# Patient Record
Sex: MALE | Race: WHITE
[De-identification: names, ages, dates, MRNs, and addresses within clinical notes are randomized per-mention and may not be internally consistent; named-entity substitution may affect disease eponyms.]

---

## 2017-01-03 ENCOUNTER — HOSPITAL ENCOUNTER (OUTPATIENT)
Dept: HOSPITAL 75 - CARD | Age: 77
End: 2017-01-03
Attending: INTERNAL MEDICINE
Payer: MEDICARE

## 2017-01-03 DIAGNOSIS — I10: ICD-10-CM

## 2017-01-03 DIAGNOSIS — I25.10: Primary | ICD-10-CM

## 2017-01-03 DIAGNOSIS — E78.2: ICD-10-CM

## 2017-01-03 DIAGNOSIS — I65.23: ICD-10-CM

## 2017-01-03 PROCEDURE — 93306 TTE W/DOPPLER COMPLETE: CPT

## 2017-01-03 NOTE — XMS REPORT
Continuity of Care Document

 Created on: 2016



TOO RAGLAND

External Reference #: [id number]

: 1940

Sex: Male



Demographics







 Address  [address] [place]  [address]

 

 Home Phone  [phone]

 

 Preferred Language  English

 

 Marital Status  Unknown

 

 Hindu Affiliation  Unknown

 

 Race  Unknown

 

 Ethnic Group  Unknown





Author







 Author  [facility]

 

 Organization  [facility]

 

 Address  Unknown

 

 Phone  Unavailable







Support







 Name  Relationship  Address  Phone

 

 JOSÉ METZGER DO  Caregiver  [address] [place]  [address] [phone]

 

 MARIETTA ORTEZ MD  Caregiver  [address]

[place]  [address]  [phone]

 

 MACARIO RAGLAND  Next Of Kin  [address] [place] [address]  [address] [phone]







Care Team Providers







 Care Team Member Name  Role  Phone

 

 JOSÉ METZGER DO  PCP  [phone]







Insurance Providers







 Payer Name  Policy Number  Subscriber Name  Relationship

 

 [employer]  [id number]  Too Ragland  20 Self / Same As Patient







Advance Directives







 Directive  Response  Recorded Date/Time

 

 Advance Directives  Yes  16 8:22pm

 

 Health Care Power of   LYLE Soriano Robert  16 8:22pm

 

 Organ Donor  Yes  16 8:22pm







Problems

Active Problems







 Medical Problem  Onset Date  Status

 

 Vertigo  Unknown  Acute

 

 Vertigo  Unknown  Acute







Medications

Current Home Medications







 Medication  Dose  Units  Route  Directions  Days/Qty  Instructions  Start Date

 

 Ranitidine Hcl 150 Mg  150  Mg  Oral  Twice A Day        01/10/12

 

 Aspirin 81 Mg  81  Mg  Oral  Daily        12

 

 Meloxicam (Mobic) 7.5 Mg  7.5  Mg  Oral  Daily        13

 

 Tiotropium Bromide 1 Inh  1  Puff  Inhalation  Daily as needed for Shortness 
Of Breath        13

 

 Enalapril Maleate 20 Mg  20  Mg  Oral  Twice A Day        14

 

 Carvedilol 12.5 Mg  12.5  Mg  Oral  Twice A Day        16

 

 Amlodipine Besylate 10 Mg  10  Mg  Oral  Daily        16

 

 Atorvastatin Calcium 10 Mg  10  Mg  Oral  Bedtime        16

 

 Levothyroxine Sodium 112 Mcg  112  Mcg  Oral  Daily        16

 

 Omega-3 Fatty Acids/Fish Oil 1 Each  1,200  Mg  Oral  Daily        16

 

 Cholecalciferol (Vitamin D3) 5,000 Unit  5,000  Unit  Oral  Daily        





Past Home Medications







 Medication  Directions  Ordered  Status

 

 Thyroid 60 Mg Tablet,     07  Discontinued

 

 [Levitra] Tab,     07  Discontinued

 

 Multivitamins 1 Ea Tablet,     07  Discontinued

 

 Aspirin 81 Mg Tablet,     07  Discontinued

 

 Aspirin 81 Mg Tabec, 81 Mg Oral  Daily  10/13/10  Discontinued

 

 Thyroid 65 Mg Tablet, 90 Mcg Oral  Daily  10/13/10  Discontinued

 

 Enalapril Maleate 10 Mg Tablet, 20 Mg Oral  Twice A Day  10/13/10  Discontinued

 

 Carvedilol (Coreg) 25 Mg Tablet, 6.25 Mg Oral  Twice A Day  10/13/10  
Discontinued

 

 Pravastatin Sodium 80 Mg Tablet, 80 Mg Oral  Daily  10/13/10  Discontinued

 

 Aspirin 325 Mg Tabec, 325 Mg Oral  Daily  01/10/12  Discontinued

 

 Potassium Chloride 10 Meq Tablet.sa, 10 Meq Oral  Daily  01/10/12  Discontinued

 

 Furosemide (Lasix) 20 Mg Tablet, 1 Each Oral  Daily  01/10/12  Discontinued

 

 Niacin 1,000 Mg Tablet.sa, 1000 Mg Oral  Bedtime  01/10/12  Discontinued

 

 Acetaminophen/Hydrocodone Bitart (Lortab Elixir) 120 Ml Solution, 10 Ml Oral  
Q4hr Prn  12  Discontinued

 

 Acetaminophen/Hydrocodone Bitart 1 Each Tablet, 1 - 2 Each Oral  Q4hr Prn    Discontinued

 

 Lidocaine Hcl 20 Ml Soln,  Ml Oral     12  Discontinued

 

 [Endur-Acin] , 750 Mg Oral  Daily  12  Discontinued

 

 Megestrol Acetate 8 Oz Susp, 8 Oz Oral  Daily  12  Discontinued

 

 Megestrol Acetate 8 Oz Susp, 20 Ml Oral  Daily  12  Discontinued

 

 Prednisone 20 Mg Tab, 20 Mg Oral  Daily  12  Discontinued

 

 Acetaminophen/Hydrocodone Bitart 1 Each Tablet, 1 - 2 Each Oral  Every 6 Hours 
as needed  12  Discontinued

 

 Levothyroxine Sodium (Levothroid) 50 Mcg Tablet, 1.5 Each Oral  Bedtime    Discontinued

 

 Furosemide 80 Mg Tablet, 40 Mg Oral  Daily  13  Discontinued

 

 Potassium Chloride 20 Meq Tab, 10 Meq Oral  Daily  13  Discontinued

 

 Hydrocodone Bit/Acetaminophen 1 Each Tablet, 1 - 2 Each Oral  Q4-6HR as needed
  02/15/13  Discontinued

 

 Levothyroxine Sodium 100 Mcg Tablet, 100 Mcg Oral  Daily  14  
Discontinued

 

 Atorvastatin Calcium 10 Mg Tablet, 10 Mg Oral  Daily  14  Discontinued

 

 Amlodipine Besylate 5 Mg Tablet, 10 Mg Oral  Daily  14  Discontinued

 

 Scopolamine Hcl 1 Patch .72 H Patch.td72, 1 Ea Transderm  Every 3 Days  03/04/
15  Discontinued

 

 Ondansetron Hcl 4 Mg Tab, 4 Mg Sublingual  Every 4HRS  03/04/15  Discontinued

 

 Amlodipine Besylate 5 Mg Tablet, 5 Mg Oral  Daily  10/15/15  Discontinued

 

 Atorvastatin Calcium 20 Mg Tablet, 20 Mg Oral  Bedtime  10/15/15  Discontinued

 

 [Levitroxin] , 100 Mg Oral  Bedtime  10/15/15  Discontinued

 

 Levothyroxine Sodium 100 Mcg Tablet, 100 Mcg Oral  Bedtime  10/15/15  
Discontinued

 

 Ranitidine Hcl 75 Mg Tablet, 75 Mg Oral  Bedtime  10/15/15  Discontinued







Social History







 Social History Problem  Response  Recorded Date/Time

 

 Alcohol Use  Denies Use  2015 5:57pm

 

 Recreational Drug Use  No  2015 5:57pm

 

 Recent Foreign Travel  [place]  08/10/2016 2:12pm

 

 Type Used  Cigarettes  2016 8:22pm

 

 Recent Hopitalizations  No  2016 8:22pm







Hospital Discharge Instructions

No hospital discharge instructions.



Plan of Care







 Prescriptions  See Medication Section







Functional Status

No functional status results.



Allergies, Adverse Reactions, Alerts

No known allergies.



Immunizations

No immunization records.



Vital Signs

No known vital signs results.



Results

Laboratory Results







 Test Name  Result  Units  Flags  Reference  Collection Date/Time  Result Date/
Time  Comments

 

 Thyroid Stimulating Hormone (TSH)  0.85  UIU/ML     0.35-4.94  2016 8:
14am  2016 9:44am   







Procedures

No known history of procedures.



Encounters







 Encounter  Location  Arrival/Admit Date  Discharge/Depart Date  Attending 
Provider

 

 Discharged Recurring  [facility]  16 12:34pm   11:59pm  MARIETTA ORTEZ MD

## 2017-01-05 NOTE — ECHOCARDIOGRAPHY REPORT
PROCEDURE PHYSICIAN:   ELIDIA ANTOINE

 

DATE OF PROCEDURE:  

01/03/2017

 

TWO DIMENSIONAL ECHOCARDIOGRAM REPORT 

 

PRIMARY PHYSICIAN:       

OTHER PHYSICIAN:         

REFERRING PHYSICIAN:          Dr. Mihir Hall

ORDERING PHYSICIAN:      

 

INDICATION FOR THE PROCEDURE:   Coronary artery disease,

hypertension

 

MEASUREMENTS                  DERIVED VALUES 

 

LV DIAMETER (LAX)   NORMALS                       NORMALS

Diastolic      4.8  (3.6-5.2)      Eject. Fract.   45%  

(60%+/-6%)       

Systolic            (2.3-3.9)      Diastolic Vol.      

% Shortening        (0.22-0.42)    Systolic Vol.       

                                   Aortic Root         

IVS THICKNESS 

Diastolic      1.3  (0.6-1.1)

 

LVPW THICKNESS 

Diastolic      1.3  (0.6-1.1)

 

LA DIAMETER 

Systolic       3.9  (2.1-3.7)  

 

FINDINGS:

1.   Technically poor quality study. 

2.   The left ventricle is prominent. Endocardium was not well

visualized in all segments. Mild diffuse left ventricular

hypokinesia was suspected, especially in the anterior wall,

anterolateral wall. Systolic function is mildly reduced.

Estimated ejection fraction 45%. Slight improvement compared to

the study of August 2016. 

3.   The left atrium is normal in size. No clot or thrombus were

seen within the left atrium. 

4.   The right the right atrium and right ventricle were not well

visualized. 

5.   Mitral valve is normal in morphology with mild to moderate

mitral regurgitation noted by color Doppler flow. No mitral valve

prolapse. No mitral valve stenosis. 

6.   Aortic valve is calcified, still has some leaflet

separation, Doppler across the aortic valve was suboptimal.

Calculated the peak gradient of 5 mmHg and mean gradient of 2

mmHg with calculated valve area of 1.3 sq cm. The previous study

was measuring a gradient of 23 mmHg at peak and 8 mmHg at mean.

Overall it appeared to be mild aortic valve stenosis. 

7.   Tricuspid valve is normal in morphology with mild to

moderate tricuspid regurgitation noted by color Doppler flow. 

8.   By color Doppler flow, Doppler across tricuspid valve

estimated pulmonary artery pressure of 28+ right atrial pressure.

 

9.   Pulmonic valve is functioning normally. 

10.  No pericardial effusion. 

 

CONCLUSION:

1.   Slightly prominent left ventricle, systolic function is

improved compared to the study of August 2016 with estimated

ejection fraction 45%. Mild hypokinesia at the anterior wall,

anterolateral wall. 

2.   Right heart chambers were not well visualized. 

3.   Mild aortic valve stenosis. Significant change compared to

the previous study of August and February 2016; close monitoring

is recommended. 

4.   Mild to moderate mitral regurgitation. Mild to moderate

tricuspid regurgitation. 

5.   Estimated pulmonary artery pressure of 35 mmHg.

 

 

 

Job ID: [id number]

Dictated Date: 01/04/2017 17:03:51 

Transcription Date: 01/05/2017 08:33:35 / ross

## 2017-01-30 ENCOUNTER — HOSPITAL ENCOUNTER (OUTPATIENT)
Dept: HOSPITAL 75 - ONC | Age: 77
LOS: 9 days | Discharge: HOME | End: 2017-02-08
Attending: INTERNAL MEDICINE
Payer: MEDICARE

## 2017-01-30 DIAGNOSIS — C02.9: Primary | ICD-10-CM

## 2017-01-30 DIAGNOSIS — Z45.2: ICD-10-CM

## 2017-01-30 PROCEDURE — 96523 IRRIG DRUG DELIVERY DEVICE: CPT

## 2017-03-07 LAB
ALBUMIN SERPL-MCNC: 4 G/DL (ref 3.2–4.5)
ALT SERPL-CCNC: 17 U/L (ref 0–55)
ANION GAP SERPL CALC-SCNC: 9 MMOL/L (ref 5–14)
AST SERPL-CCNC: 17 U/L (ref 5–34)
BASOPHILS # BLD AUTO: 0.1 10^3/UL (ref 0–0.1)
BASOPHILS NFR BLD AUTO: 1 % (ref 0–10)
BILIRUB SERPL-MCNC: 1.4 MG/DL (ref 0.1–1)
BUN SERPL-MCNC: 19 MG/DL (ref 7–18)
BUN/CREAT SERPL: 13
CALCIUM SERPL-MCNC: 9.4 MG/DL (ref 8.5–10.1)
CHLORIDE SERPL-SCNC: 106 MMOL/L (ref 98–107)
CO2 SERPL-SCNC: 28 MMOL/L (ref 21–32)
CREAT SERPL-MCNC: 1.42 MG/DL (ref 0.6–1.3)
EOSINOPHIL # BLD AUTO: 0.3 10^3/UL (ref 0–0.3)
EOSINOPHIL NFR BLD AUTO: 7 % (ref 0–10)
ERYTHROCYTE [DISTWIDTH] IN BLOOD BY AUTOMATED COUNT: 13.9 % (ref 10–14.5)
GFR SERPLBLD BASED ON 1.73 SQ M-ARVRAT: 49 ML/MIN
GLUCOSE SERPL-MCNC: 118 MG/DL (ref 70–105)
LYMPHOCYTES # BLD AUTO: 0.9 X 10^3 (ref 1–4)
LYMPHOCYTES NFR BLD AUTO: 22 % (ref 12–44)
MCH RBC QN AUTO: 31 PG (ref 25–34)
MCHC RBC AUTO-ENTMCNC: 34 G/DL (ref 32–36)
MCV RBC AUTO: 90 FL (ref 80–99)
MONOCYTES # BLD AUTO: 0.5 X 10^3 (ref 0–1)
MONOCYTES NFR BLD AUTO: 11 % (ref 0–12)
NEUTROPHILS # BLD AUTO: 2.6 X 10^3 (ref 1.8–7.8)
NEUTROPHILS NFR BLD AUTO: 59 % (ref 42–75)
PLATELET # BLD: 162 10^3/UL (ref 130–400)
PMV BLD AUTO: 11.1 FL (ref 7.4–10.4)
POTASSIUM SERPL-SCNC: 4.2 MMOL/L (ref 3.6–5)
PROT SERPL-MCNC: 6.1 G/DL (ref 6.4–8.2)
RBC # BLD AUTO: 4.63 10^6/UL (ref 4.35–5.85)
SODIUM SERPL-SCNC: 143 MMOL/L (ref 135–145)
TSH SERPL-ACNC: 1.37 UIU/ML (ref 0.35–4.94)
WBC # BLD AUTO: 4.3 10^3/UL (ref 4.3–11)

## 2017-03-07 NOTE — XMS REPORT
Continuity of Care Document

 Created on: 2016



TOO RAGLAND

External Reference #: [id number]

: 1940

Sex: Male



Demographics







 Address  [address] [place]  [address]

 

 Home Phone  [phone]

 

 Preferred Language  English

 

 Marital Status  Unknown

 

 Yazidism Affiliation  Unknown

 

 Race  Unknown

 

 Ethnic Group  Unknown





Author







 Author  [facility]

 

 Organization  [facility]

 

 Address  Unknown

 

 Phone  Unavailable







Support







 Name  Relationship  Address  Phone

 

 JOSÉ METZGER DO  Caregiver  [address] [place]  [address] [phone]

 

 MARIETTA ORTEZ MD  Caregiver  [address]

[place]  [address]  [phone]

 

 MACARIO RAGLAND  Next Of Kin  [address] [place] [address]  [address] [phone]







Care Team Providers







 Care Team Member Name  Role  Phone

 

 JOSÉ METZGER DO  PCP  [phone]







Insurance Providers







 Payer Name  Policy Number  Subscriber Name  Relationship

 

 [employer]  [id number]  Too Ragland  18 Self / Same As Patient







Advance Directives







 Directive  Response  Recorded Date/Time

 

 Advance Directives  Yes  16 8:22pm

 

 Health Care Power of   LYLE Soriano Robert  16 8:22pm

 

 Organ Donor  Yes  16 8:22pm







Problems

Active Problems







 Medical Problem  Onset Date  Status

 

 Vertigo  Unknown  Acute

 

 Vertigo  Unknown  Acute







Medications

Current Home Medications







 Medication  Dose  Units  Route  Directions  Days/Qty  Instructions  Start Date

 

 Ranitidine Hcl 150 Mg  150  Mg  Oral  Twice A Day        01/10/12

 

 Aspirin 81 Mg  81  Mg  Oral  Daily        12

 

 Meloxicam (Mobic) 7.5 Mg  7.5  Mg  Oral  Daily        13

 

 Tiotropium Bromide 1 Inh  1  Puff  Inhalation  Daily as needed for Shortness 
Of Breath        13

 

 Enalapril Maleate 20 Mg  20  Mg  Oral  Twice A Day        14

 

 Carvedilol 12.5 Mg  12.5  Mg  Oral  Twice A Day        16

 

 Amlodipine Besylate 10 Mg  10  Mg  Oral  Daily        16

 

 Atorvastatin Calcium 10 Mg  10  Mg  Oral  Bedtime        16

 

 Levothyroxine Sodium 112 Mcg  112  Mcg  Oral  Daily        16

 

 Omega-3 Fatty Acids/Fish Oil 1 Each  1,200  Mg  Oral  Daily        16

 

 Cholecalciferol (Vitamin D3) 5,000 Unit  5,000  Unit  Oral  Daily        





Past Home Medications







 Medication  Directions  Ordered  Status

 

 Thyroid 60 Mg Tablet,     07  Discontinued

 

 [Levitra] Tab,     07  Discontinued

 

 Multivitamins 1 Ea Tablet,     07  Discontinued

 

 Aspirin 81 Mg Tablet,     07  Discontinued

 

 Aspirin 81 Mg Tabec, 81 Mg Oral  Daily  10/13/10  Discontinued

 

 Thyroid 65 Mg Tablet, 90 Mcg Oral  Daily  10/13/10  Discontinued

 

 Enalapril Maleate 10 Mg Tablet, 20 Mg Oral  Twice A Day  10/13/10  Discontinued

 

 Carvedilol (Coreg) 25 Mg Tablet, 6.25 Mg Oral  Twice A Day  10/13/10  
Discontinued

 

 Pravastatin Sodium 80 Mg Tablet, 80 Mg Oral  Daily  10/13/10  Discontinued

 

 Aspirin 325 Mg Tabec, 325 Mg Oral  Daily  01/10/12  Discontinued

 

 Potassium Chloride 10 Meq Tablet.sa, 10 Meq Oral  Daily  01/10/12  Discontinued

 

 Furosemide (Lasix) 20 Mg Tablet, 1 Each Oral  Daily  01/10/12  Discontinued

 

 Niacin 1,000 Mg Tablet.sa, 1000 Mg Oral  Bedtime  01/10/12  Discontinued

 

 Acetaminophen/Hydrocodone Bitart (Lortab Elixir) 120 Ml Solution, 10 Ml Oral  
Q4hr Prn  12  Discontinued

 

 Acetaminophen/Hydrocodone Bitart 1 Each Tablet, 1 - 2 Each Oral  Q4hr Prn    Discontinued

 

 Lidocaine Hcl 20 Ml Soln,  Ml Oral     12  Discontinued

 

 [Endur-Acin] , 750 Mg Oral  Daily  12  Discontinued

 

 Megestrol Acetate 8 Oz Susp, 8 Oz Oral  Daily  12  Discontinued

 

 Megestrol Acetate 8 Oz Susp, 20 Ml Oral  Daily  12  Discontinued

 

 Prednisone 20 Mg Tab, 20 Mg Oral  Daily  12  Discontinued

 

 Acetaminophen/Hydrocodone Bitart 1 Each Tablet, 1 - 2 Each Oral  Every 6 Hours 
as needed  12  Discontinued

 

 Levothyroxine Sodium (Levothroid) 50 Mcg Tablet, 1.5 Each Oral  Bedtime    Discontinued

 

 Furosemide 80 Mg Tablet, 40 Mg Oral  Daily  13  Discontinued

 

 Potassium Chloride 20 Meq Tab, 10 Meq Oral  Daily  13  Discontinued

 

 Hydrocodone Bit/Acetaminophen 1 Each Tablet, 1 - 2 Each Oral  Q4-6HR as needed
  02/15/13  Discontinued

 

 Levothyroxine Sodium 100 Mcg Tablet, 100 Mcg Oral  Daily  14  
Discontinued

 

 Atorvastatin Calcium 10 Mg Tablet, 10 Mg Oral  Daily  14  Discontinued

 

 Amlodipine Besylate 5 Mg Tablet, 10 Mg Oral  Daily  14  Discontinued

 

 Scopolamine Hcl 1 Patch .72 H Patch.td72, 1 Ea Transderm  Every 3 Days  03/04/
15  Discontinued

 

 Ondansetron Hcl 4 Mg Tab, 4 Mg Sublingual  Every 4HRS  03/04/15  Discontinued

 

 Amlodipine Besylate 5 Mg Tablet, 5 Mg Oral  Daily  10/15/15  Discontinued

 

 Atorvastatin Calcium 20 Mg Tablet, 20 Mg Oral  Bedtime  10/15/15  Discontinued

 

 [Levitroxin] , 100 Mg Oral  Bedtime  10/15/15  Discontinued

 

 Levothyroxine Sodium 100 Mcg Tablet, 100 Mcg Oral  Bedtime  10/15/15  
Discontinued

 

 Ranitidine Hcl 75 Mg Tablet, 75 Mg Oral  Bedtime  10/15/15  Discontinued







Social History







 Social History Problem  Response  Recorded Date/Time

 

 Alcohol Use  Denies Use  2015 5:57pm

 

 Recreational Drug Use  No  2015 5:57pm

 

 Recent Foreign Travel  [place]  08/10/2016 2:12pm

 

 Type Used  Cigarettes  2016 8:22pm

 

 Recent Hopitalizations  No  2016 8:22pm







Hospital Discharge Instructions

No hospital discharge instructions.



Plan of Care







 Prescriptions  See Medication Section







Functional Status

No functional status results.



Allergies, Adverse Reactions, Alerts

No known allergies.



Immunizations

No immunization records.



Vital Signs

No known vital signs results.



Results

Laboratory Results







 Test Name  Result  Units  Flags  Reference  Collection Date/Time  Result Date/
Time  Comments

 

 Thyroid Stimulating Hormone (TSH)  0.85  UIU/ML     0.35-4.94  2016 8:
14am  2016 9:44am   







Procedures

No known history of procedures.



Encounters







 Encounter  Location  Arrival/Admit Date  Discharge/Depart Date  Attending 
Provider

 

 Discharged Recurring  [facility]  16 12:34pm   11:59pm  MARIETTA ORTEZ MD

## 2017-03-08 ENCOUNTER — HOSPITAL ENCOUNTER (OUTPATIENT)
Dept: HOSPITAL 75 - ONC | Age: 77
LOS: 89 days | Discharge: HOME | End: 2017-06-05
Attending: INTERNAL MEDICINE
Payer: MEDICARE

## 2017-03-08 DIAGNOSIS — I25.10: ICD-10-CM

## 2017-03-08 DIAGNOSIS — C02.9: Primary | ICD-10-CM

## 2017-03-08 DIAGNOSIS — I12.9: ICD-10-CM

## 2017-03-08 DIAGNOSIS — N18.9: ICD-10-CM

## 2017-03-08 DIAGNOSIS — E03.9: ICD-10-CM

## 2017-03-08 DIAGNOSIS — Z79.899: ICD-10-CM

## 2017-03-08 PROCEDURE — 80053 COMPREHEN METABOLIC PANEL: CPT

## 2017-03-08 PROCEDURE — 85025 COMPLETE CBC W/AUTO DIFF WBC: CPT

## 2017-03-08 PROCEDURE — 99213 OFFICE O/P EST LOW 20 MIN: CPT

## 2017-03-08 PROCEDURE — 36591 DRAW BLOOD OFF VENOUS DEVICE: CPT

## 2017-03-08 PROCEDURE — 84443 ASSAY THYROID STIM HORMONE: CPT

## 2017-04-17 ENCOUNTER — HOSPITAL ENCOUNTER (OUTPATIENT)
Dept: HOSPITAL 75 - RAD | Age: 77
End: 2017-04-17
Attending: NURSE PRACTITIONER
Payer: MEDICARE

## 2017-04-17 DIAGNOSIS — R06.00: ICD-10-CM

## 2017-04-17 DIAGNOSIS — J44.9: Primary | ICD-10-CM

## 2017-04-17 PROCEDURE — 71020: CPT

## 2017-04-17 NOTE — DIAGNOSTIC IMAGING REPORT
Portable upright radiograph of the chest.



INDICATION: COPD. Dyspnea.



FINDINGS: The lungs are hyperinflated with no focal infiltrate.

The heart size is normal. No effusion or pneumothorax.



The mediastinum and sinan appear unremarkable.



There is an infusion port through the left subclavian vein with

the tip at the lower SVC level. There are sternotomy wires

noted.



IMPRESSION: COPD.



Dictated by:



Dictated on workstation # [id number]

## 2017-05-10 ENCOUNTER — HOSPITAL ENCOUNTER (OUTPATIENT)
Dept: HOSPITAL 75 - RT | Age: 77
End: 2017-05-10
Attending: NURSE PRACTITIONER
Payer: MEDICARE

## 2017-05-10 DIAGNOSIS — R06.00: ICD-10-CM

## 2017-05-10 DIAGNOSIS — F17.201: ICD-10-CM

## 2017-05-10 DIAGNOSIS — J44.9: Primary | ICD-10-CM

## 2017-05-10 PROCEDURE — 94729 DIFFUSING CAPACITY: CPT

## 2017-05-10 PROCEDURE — 94726 PLETHYSMOGRAPHY LUNG VOLUMES: CPT

## 2017-05-10 PROCEDURE — 94060 EVALUATION OF WHEEZING: CPT

## 2017-05-10 PROCEDURE — 94640 AIRWAY INHALATION TREATMENT: CPT

## 2017-05-15 ENCOUNTER — HOSPITAL ENCOUNTER (OUTPATIENT)
Dept: HOSPITAL 75 - SLEEP | Age: 77
LOS: 1 days | Discharge: HOME | End: 2017-05-16
Attending: NURSE PRACTITIONER
Payer: MEDICARE

## 2017-05-15 DIAGNOSIS — G47.33: Primary | ICD-10-CM

## 2017-05-15 PROCEDURE — 95810 POLYSOM 6/> YRS 4/> PARAM: CPT

## 2017-05-29 ENCOUNTER — HOSPITAL ENCOUNTER (INPATIENT)
Dept: HOSPITAL 75 - ER | Age: 77
LOS: 3 days | Discharge: HOME | DRG: 291 | End: 2017-06-01
Attending: INTERNAL MEDICINE | Admitting: INTERNAL MEDICINE
Payer: MEDICARE

## 2017-05-29 VITALS — SYSTOLIC BLOOD PRESSURE: 137 MMHG | DIASTOLIC BLOOD PRESSURE: 96 MMHG

## 2017-05-29 VITALS — HEIGHT: 65 IN | WEIGHT: 157.19 LBS | BODY MASS INDEX: 26.19 KG/M2

## 2017-05-29 VITALS — DIASTOLIC BLOOD PRESSURE: 77 MMHG | SYSTOLIC BLOOD PRESSURE: 124 MMHG

## 2017-05-29 VITALS — SYSTOLIC BLOOD PRESSURE: 92 MMHG | DIASTOLIC BLOOD PRESSURE: 77 MMHG

## 2017-05-29 VITALS — DIASTOLIC BLOOD PRESSURE: 71 MMHG | SYSTOLIC BLOOD PRESSURE: 149 MMHG

## 2017-05-29 VITALS — SYSTOLIC BLOOD PRESSURE: 142 MMHG | DIASTOLIC BLOOD PRESSURE: 91 MMHG

## 2017-05-29 VITALS — SYSTOLIC BLOOD PRESSURE: 129 MMHG | DIASTOLIC BLOOD PRESSURE: 63 MMHG

## 2017-05-29 VITALS — DIASTOLIC BLOOD PRESSURE: 79 MMHG | SYSTOLIC BLOOD PRESSURE: 163 MMHG

## 2017-05-29 VITALS — DIASTOLIC BLOOD PRESSURE: 75 MMHG | SYSTOLIC BLOOD PRESSURE: 190 MMHG

## 2017-05-29 DIAGNOSIS — Z96.653: ICD-10-CM

## 2017-05-29 DIAGNOSIS — Z85.810: ICD-10-CM

## 2017-05-29 DIAGNOSIS — G47.30: ICD-10-CM

## 2017-05-29 DIAGNOSIS — Z99.81: ICD-10-CM

## 2017-05-29 DIAGNOSIS — I47.2: ICD-10-CM

## 2017-05-29 DIAGNOSIS — J45.909: ICD-10-CM

## 2017-05-29 DIAGNOSIS — Z95.1: ICD-10-CM

## 2017-05-29 DIAGNOSIS — Z92.21: ICD-10-CM

## 2017-05-29 DIAGNOSIS — I49.1: ICD-10-CM

## 2017-05-29 DIAGNOSIS — M19.91: ICD-10-CM

## 2017-05-29 DIAGNOSIS — K14.9: ICD-10-CM

## 2017-05-29 DIAGNOSIS — D72.829: ICD-10-CM

## 2017-05-29 DIAGNOSIS — R04.2: ICD-10-CM

## 2017-05-29 DIAGNOSIS — I49.3: ICD-10-CM

## 2017-05-29 DIAGNOSIS — Z87.891: ICD-10-CM

## 2017-05-29 DIAGNOSIS — B37.0: ICD-10-CM

## 2017-05-29 DIAGNOSIS — T49.0X5A: ICD-10-CM

## 2017-05-29 DIAGNOSIS — I48.91: ICD-10-CM

## 2017-05-29 DIAGNOSIS — J44.0: ICD-10-CM

## 2017-05-29 DIAGNOSIS — Z95.5: ICD-10-CM

## 2017-05-29 DIAGNOSIS — I25.10: ICD-10-CM

## 2017-05-29 DIAGNOSIS — I25.2: ICD-10-CM

## 2017-05-29 DIAGNOSIS — E78.00: ICD-10-CM

## 2017-05-29 DIAGNOSIS — I50.23: ICD-10-CM

## 2017-05-29 DIAGNOSIS — J18.9: ICD-10-CM

## 2017-05-29 DIAGNOSIS — N18.9: ICD-10-CM

## 2017-05-29 DIAGNOSIS — I13.0: Primary | ICD-10-CM

## 2017-05-29 DIAGNOSIS — E89.0: ICD-10-CM

## 2017-05-29 DIAGNOSIS — Z92.3: ICD-10-CM

## 2017-05-29 LAB
ALBUMIN SERPL-MCNC: 3.8 G/DL (ref 3.2–4.5)
ALT SERPL-CCNC: 67 U/L (ref 0–55)
ANION GAP SERPL CALC-SCNC: 12 MMOL/L (ref 5–14)
APTT BLD: 36 SEC (ref 24–35)
AST SERPL-CCNC: 31 U/L (ref 5–34)
BASOPHILS # BLD AUTO: 0 10^3/UL (ref 0–0.1)
BASOPHILS NFR BLD AUTO: 0 % (ref 0–10)
BASOPHILS NFR BLD MANUAL: 0 %
BILIRUB SERPL-MCNC: 1.6 MG/DL (ref 0.1–1)
BILIRUB UR QL STRIP: NEGATIVE
BUN SERPL-MCNC: 21 MG/DL (ref 7–18)
BUN/CREAT SERPL: 21
CALCIUM SERPL-MCNC: 9 MG/DL (ref 8.5–10.1)
CHLORIDE SERPL-SCNC: 104 MMOL/L (ref 98–107)
CK SERPL-CCNC: 43 U/L (ref 30–200)
CO2 SERPL-SCNC: 26 MMOL/L (ref 21–32)
CREAT SERPL-MCNC: 0.98 MG/DL (ref 0.6–1.3)
EOSINOPHIL # BLD AUTO: 0 10^3/UL (ref 0–0.3)
EOSINOPHIL NFR BLD AUTO: 0 % (ref 0–10)
EOSINOPHIL NFR BLD MANUAL: 0 %
ERYTHROCYTE [DISTWIDTH] IN BLOOD BY AUTOMATED COUNT: 14.1 % (ref 10–14.5)
GFR SERPLBLD BASED ON 1.73 SQ M-ARVRAT: > 60 ML/MIN
GLUCOSE SERPL-MCNC: 130 MG/DL (ref 70–105)
INR PPP: 2.7 (ref 0.8–1.4)
KETONES UR QL STRIP: NEGATIVE
LEUKOCYTE ESTERASE UR QL STRIP: NEGATIVE
LYMPHOCYTES # BLD AUTO: 0.4 X 10^3 (ref 1–4)
LYMPHOCYTES NFR BLD AUTO: 4 % (ref 12–44)
MAGNESIUM SERPL-MCNC: 2.5 MG/DL (ref 1.8–2.4)
MCH RBC QN AUTO: 30 PG (ref 25–34)
MCHC RBC AUTO-ENTMCNC: 33 G/DL (ref 32–36)
MCV RBC AUTO: 91 FL (ref 80–99)
MONOCYTES # BLD AUTO: 1.1 X 10^3 (ref 0–1)
MONOCYTES NFR BLD AUTO: 11 % (ref 0–12)
NEUTROPHILS # BLD AUTO: 8.2 X 10^3 (ref 1.8–7.8)
NEUTROPHILS NFR BLD AUTO: 84 % (ref 42–75)
NEUTS BAND NFR BLD MANUAL: 89 %
NEUTS BAND NFR BLD: 0 %
NITRITE UR QL STRIP: NEGATIVE
PH UR STRIP: 6 [PH] (ref 5–9)
PLATELET # BLD: 180 10^3/UL (ref 130–400)
PMV BLD AUTO: 11.9 FL (ref 7.4–10.4)
POTASSIUM SERPL-SCNC: 4 MMOL/L (ref 3.6–5)
PROT SERPL-MCNC: 5.9 G/DL (ref 6.4–8.2)
PROT UR QL STRIP: NEGATIVE
PROTHROMBIN TIME: 28.3 SEC (ref 12.2–14.7)
RBC # BLD AUTO: 4.65 10^6/UL (ref 4.35–5.85)
SODIUM SERPL-SCNC: 142 MMOL/L (ref 135–145)
SP GR UR STRIP: 1.02 (ref 1.02–1.02)
TROPONIN I SERPL-MCNC: < 0.3 NG/ML (ref ?–0.3)
UROBILINOGEN UR-MCNC: NORMAL MG/DL
VARIANT LYMPHS NFR BLD MANUAL: 11 %
WBC # BLD AUTO: 9.8 10^3/UL (ref 4.3–11)
WBC #/AREA URNS HPF: (no result) /HPF

## 2017-05-29 PROCEDURE — 82553 CREATINE MB FRACTION: CPT

## 2017-05-29 PROCEDURE — 94640 AIRWAY INHALATION TREATMENT: CPT

## 2017-05-29 PROCEDURE — 71010: CPT

## 2017-05-29 PROCEDURE — 82550 ASSAY OF CK (CPK): CPT

## 2017-05-29 PROCEDURE — 84484 ASSAY OF TROPONIN QUANT: CPT

## 2017-05-29 PROCEDURE — 84100 ASSAY OF PHOSPHORUS: CPT

## 2017-05-29 PROCEDURE — 85027 COMPLETE CBC AUTOMATED: CPT

## 2017-05-29 PROCEDURE — 94664 DEMO&/EVAL PT USE INHALER: CPT

## 2017-05-29 PROCEDURE — 71260 CT THORAX DX C+: CPT

## 2017-05-29 PROCEDURE — 87070 CULTURE OTHR SPECIMN AEROBIC: CPT

## 2017-05-29 PROCEDURE — 81000 URINALYSIS NONAUTO W/SCOPE: CPT

## 2017-05-29 PROCEDURE — 83735 ASSAY OF MAGNESIUM: CPT

## 2017-05-29 PROCEDURE — 80053 COMPREHEN METABOLIC PANEL: CPT

## 2017-05-29 PROCEDURE — 85730 THROMBOPLASTIN TIME PARTIAL: CPT

## 2017-05-29 PROCEDURE — 84443 ASSAY THYROID STIM HORMONE: CPT

## 2017-05-29 PROCEDURE — 85610 PROTHROMBIN TIME: CPT

## 2017-05-29 PROCEDURE — 93306 TTE W/DOPPLER COMPLETE: CPT

## 2017-05-29 PROCEDURE — 83880 ASSAY OF NATRIURETIC PEPTIDE: CPT

## 2017-05-29 PROCEDURE — 85025 COMPLETE CBC W/AUTO DIFF WBC: CPT

## 2017-05-29 PROCEDURE — 93041 RHYTHM ECG TRACING: CPT

## 2017-05-29 PROCEDURE — 96365 THER/PROPH/DIAG IV INF INIT: CPT

## 2017-05-29 PROCEDURE — 80048 BASIC METABOLIC PNL TOTAL CA: CPT

## 2017-05-29 PROCEDURE — 83605 ASSAY OF LACTIC ACID: CPT

## 2017-05-29 PROCEDURE — 94760 N-INVAS EAR/PLS OXIMETRY 1: CPT

## 2017-05-29 PROCEDURE — 96372 THER/PROPH/DIAG INJ SC/IM: CPT

## 2017-05-29 PROCEDURE — 93005 ELECTROCARDIOGRAM TRACING: CPT

## 2017-05-29 PROCEDURE — 87205 SMEAR GRAM STAIN: CPT

## 2017-05-29 PROCEDURE — 36598 INJ W/FLUOR EVAL CV DEVICE: CPT

## 2017-05-29 PROCEDURE — 87040 BLOOD CULTURE FOR BACTERIA: CPT

## 2017-05-29 PROCEDURE — 85007 BL SMEAR W/DIFF WBC COUNT: CPT

## 2017-05-29 PROCEDURE — 36415 COLL VENOUS BLD VENIPUNCTURE: CPT

## 2017-05-29 PROCEDURE — 96375 TX/PRO/DX INJ NEW DRUG ADDON: CPT

## 2017-05-29 RX ADMIN — SODIUM CHLORIDE SCH MLS/HR: 900 INJECTION INTRAVENOUS at 23:40

## 2017-05-29 NOTE — ED RESPIRATORY
General


Chief Complaint:  Respiratory Problems


Stated Complaint:  SOB/SWELLING IN FEET


Source:  patient, spouse





History of Present Illness


Time seen by provider:  18:05


Initial Comments


PT ARRIVES VIA POV FROM HOME


C/O SHORTNESS OF BREATH X 3 WEEKS


WAS STARTED ON HOME O2 1 WEEK AGO


TODAY HE BEGAN TO HAVE SWELLING IN HIS LEGS


+ ORTHOPNEA FOR THE LAST COUPLE OF NIGHTS


HAS OCCASIONAL PRODUCTIVE COUGH, UNKNOWN COLOR OF SPUTUM


NO FEVER


NO CHEST PAIN


HAS HAD URINARY FREQUENCY EVERY 15 MINUTES TODAY





PT HAS EXTENSIVE MEDIAL HISTORY INCLUDING COPD, CAD WITH CABG, CHF


PT USED INHALERS THIS AM





PCP: DR. METZGER


CARDIOLOGIST: DR. ANTOINE


PULMONOLOGIST: DR OROZCO


ENT: DR. OROZCO


HEME/ONC: DR. ORTEZ





Allergies and Home Medications


Allergies


Coded Allergies:  


     No Known Drug Allergies (Verified , 8/9/07)





Home Medications


Amlodipine Besylate 10 Mg Tablet, 10 MG PO DAILY, (Reported)


Aspirin 81 Mg Tabec, 81 MG PO DAILY, (Reported)


Atorvastatin Calcium 10 Mg Tablet, 10 MG PO HS, (Reported)


Carvedilol 12.5 Mg Tablet, 12.5 MG PO BID, (Reported)


Cholecalciferol (Vitamin D3) 5,000 Unit Tablet, 5,000 UNIT PO DAILY, (Reported)


Enalapril Maleate 20 Mg Tablet, 20 MG PO BID, (Reported)


Levothyroxine Sodium 112 Mcg Tablet, 112 MCG PO DAILY, (Reported)


Meloxicam 7.5 Mg Tablet, 7.5 MG PO DAILY, (Reported)


Omega-3 Fatty Acids/Fish Oil 1 Each Capsule, 1,200 MG PO DAILY, (Reported)


Ranitidine Hcl 150 Mg Tablet, 150 MG PO BID, (Reported)


Tiotropium Bromide 1 Inh Aerp, 1 PUFF IH DAILY PRN for SHORTNESS OF BREATH, (

Reported)





Constitutional:  no symptoms reported, No chills, No diaphoresis, No dizziness, 

No fever


EENTM:  no symptoms reported


Respiratory:  see HPI, cough, dyspnea on exertion, orthopnea, phlegm, short of 

breath


Cardiovascular:  No chest pain, edema, No palpitations, No syncope, vascular 

heart diseas


Gastrointestinal:  no symptoms reported


Genitourinary:  see HPI, frequency


Musculoskeletal:  see HPI (LEG SWELLING)


Skin:  no symptoms reported


Psychiatric/Neurological:  No Symptoms Reported


Hematologic/Lymphatic:  No Symptoms Reported





Past Medical-Social-Family Hx


Patient Social History


Smoking Status:  Former Smoker (SMOKED 3 PPD, QUIT 15 YEARS AGO, PER PT ON 05/29 /17)


Type Used:  Cigarettes


Recent Foreign Travel:  No


Contact w/Someone Who Travel:  No


Recent Hopitalizations:  No





Immunizations Up To Date


Date of Pneumonia Vaccine:  Sep 7, 2015


Date of Influenza Vaccine:  Oct 7, 2015





Surgeries


HX Surgeries:  Yes (SPIDER BITE, FEEDING TUBE/REMOVED, BILAT TKR, INFUSAPORT, 

TUMOR ON TONGUE)


Surgeries:  Abdominal, Cardiac, CABG, Coronary Stent, Joint Replacement, 

Orthopedic, Vascular Surgery





Respiratory


Hx Respiratory Disorders:  Yes


Respiratory Disorders:  COPD





Cardiovascular


Hx Cardiac Disorders:  Yes (CARDIAC CATHS/CABG)


Cardiac Disorders:  Coronary Artery Disease, Heart Attack, High Cholesterol, 

Hypertension





Neurological


Hx Neurological Disorders:  No





Reproductive System


Hx Reproductive Disorders:  Yes (E.D.)





Genitourinary


Hx Genitourinary Disorders:  Yes (E.D. )





Gastrointestinal


Hx Gastrointestinal Disorders:  No





Musculoskeletal


Hx Musculoskeletal Disorders:  Yes (RIGHT MEDIAL ILIAC BONE LESION; BILATERAL 

TKR)


Musculoskeletal Disorders:  Arthritis





Endocrine


Hx Endocrine Disorders:  No





HEENT


HX ENT Disorders:  Yes (TONGUE MASS, WENT THROUGH CHEMO AND RADIATION FOR CA.--

HAD TEMPORARY FEEDING TUBE)





Cancer


Hx Cancer:  Yes (TONGUE CANCER--S/P SURGERY, CHEMO, RADIATION--COMPLETED 3 

YEARS AGO)





Psychosocial


Hx Psychiatric Problems:  No





Integumentary


HX Skin/Integumentary Disorder:  No





Blood Transfusions


Hx Blood Disorders:  No





Physical Exam


Vital Signs





Vital Sign - Last 12Hours








 5/29/17





 18:05


 


Temp 99.0


 


Pulse 52


 


Resp 18


 


B/P (MAP) 133/73


 


Pulse Ox 97


 


O2 Delivery Nasal Cannula


 


O2 Flow Rate 3.00





Capillary Refill :


General Appearance:  WD/WN, no apparent distress, other (SMILING, TALKATIVE, 

DOES NOT APPEAR TO BE IN ANY DISCOMFORT OR DISTRESS)


HEENT:  PERRL/EOMI, other (EDENTULOUS)


Neck:  non-tender, full range of motion


Respiratory:  rales, rhonchi, other (DIFFUSE RALES/RHONCHI IN ALL LUNG PAUL)


Cardiovascular:  no JVD, irregularly irregular (HEART  SOUNDS VERY DIFFICULT TO 

HEAR DUE TO LUNG SOUNDS)


Gastrointestinal:  normal bowel sounds, non tender, soft


Extremities:  no calf tenderness, pedal edema (2-3+ EDEMA BILATERALLY)


Neurologic/Psychiatric:  CNs II-XII nml as tested, no motor/sensory deficits, 

alert, normal mood/affect, oriented x 3


Skin:  normal color, warm/dry





Focused Exam


Lactic Acid Level





Laboratory Tests








Test


  5/29/17


18:25


 


Lactic Acid Level


  1.47 MMOL/L


(0.50-2.00)











Progress/Results/Core Measures


Results/Orders


Lab Results





Laboratory Tests








Test


  5/29/17


18:25 5/29/17


19:08 5/29/17


19:48 Range/Units


 


 


Prothrombin Time 28.3 H   12.2-14.7  SEC


 


INR Comment 2.7 H   0.8-1.4  


 


Activated Partial


Thromboplast Time 36 H


  


  


  24-35  SEC


 


 


Sodium Level 142    135-145  MMOL/L


 


Potassium Level 4.0    3.6-5.0  MMOL/L


 


Chloride Level 104      MMOL/L


 


Carbon Dioxide Level 26    21-32  MMOL/L


 


Anion Gap 12    5-14  MMOL/L


 


Blood Urea Nitrogen 21 H   7-18  MG/DL


 


Creatinine


  0.98 


  


  


  0.60-1.30


MG/DL


 


Estimat Glomerular Filtration


Rate > 60 


  


  


   


 


 


BUN/Creatinine Ratio 21     


 


Glucose Level 130 H     MG/DL


 


Lactic Acid Level


  1.47 


  


  


  0.50-2.00


MMOL/L


 


Calcium Level 9.0    8.5-10.1  MG/DL


 


Magnesium Level 2.5 H   1.8-2.4  MG/DL


 


Total Bilirubin 1.6 H   0.1-1.0  MG/DL


 


Aspartate Amino Transf


(AST/SGOT) 31 


  


  


  5-34  U/L


 


 


Alanine Aminotransferase


(ALT/SGPT) 67 H


  


  


  0-55  U/L


 


 


Alkaline Phosphatase 59      U/L


 


Total Creatine Kinase 43      U/L


 


Creatine Kinase MB 1.5    <6.6  NG/ML


 


Troponin I < 0.30    <0.30  NG/ML


 


Total Protein 5.9 L   6.4-8.2  G/DL


 


Albumin 3.8    3.2-4.5  G/DL


 


TSH Cascade Testing


  0.37 


  


  


  0.35-4.94


UIU/ML


 


White Blood Count


  


  9.8 


  


  4.3-11.0


10^3/uL


 


Red Blood Count


  


  4.65 


  


  4.35-5.85


10^6/uL


 


Hemoglobin  14.0   13.3-17.7  G/DL


 


Hematocrit  42   40-54  %


 


Mean Corpuscular Volume  91   80-99  FL


 


Mean Corpuscular Hemoglobin  30   25-34  PG


 


Mean Corpuscular Hemoglobin


Concent 


  33 


  


  32-36  G/DL


 


 


Red Cell Distribution Width  14.1   10.0-14.5  %


 


Platelet Count


  


  180 


  


  130-400


10^3/uL


 


Mean Platelet Volume  11.9 H  7.4-10.4  FL


 


Neutrophils (%) (Auto)  84 H  42-75  %


 


Lymphocytes (%) (Auto)  4 L  12-44  %


 


Monocytes (%) (Auto)  11   0-12  %


 


Eosinophils (%) (Auto)  0   0-10  %


 


Basophils (%) (Auto)  0   0-10  %


 


Neutrophils # (Auto)  8.2 H  1.8-7.8  X 10^3


 


Lymphocytes # (Auto)  0.4 L  1.0-4.0  X 10^3


 


Monocytes # (Auto)  1.1 H  0.0-1.0  X 10^3


 


Eosinophils # (Auto)


  


  0.0 


  


  0.0-0.3


10^3/uL


 


Basophils # (Auto)


  


  0.0 


  


  0.0-0.1


10^3/uL


 


Neutrophils % (Manual)  89    %


 


Lymphocytes % (Manual)  11    %


 


Monocytes % (Manual)  0    %


 


Eosinophils % (Manual)  0    %


 


Basophils % (Manual)  0    %


 


Band Neutrophils  0    %


 


Blood Morphology Comment  NORMAL    


 


B-Type Natriuretic Peptide  668.7 H  <100.0  PG/ML


 


Urine Color   YELLOW   


 


Urine Clarity


  


  


  SLIGHTLY


CLOUDY  


 


 


Urine pH   6  5-9  


 


Urine Specific Gravity   1.020  1.016-1.022  


 


Urine Protein   NEGATIVE  NEGATIVE  


 


Urine Glucose (UA)   NEGATIVE  NEGATIVE  


 


Urine Ketones   NEGATIVE  NEGATIVE  


 


Urine Nitrite   NEGATIVE  NEGATIVE  


 


Urine Bilirubin   NEGATIVE  NEGATIVE  


 


Urine Urobilinogen   NORMAL  NORMAL  MG/DL


 


Urine Leukocyte Esterase   NEGATIVE  NEGATIVE  


 


Urine RBC (Auto)   NEGATIVE  NEGATIVE  


 


Urine RBC   RARE   /HPF


 


Urine WBC   RARE   /HPF


 


Urine Crystals   NONE   /LPF


 


Urine Bacteria   NEGATIVE   /HPF


 


Urine Casts   NONE   /LPF


 


Urine Mucus   NEGATIVE   /LPF


 


Urine Culture Indicated   NO   








My Orders





Orders - MURTAZA GUILLEN DO


Saline Lock/Iv-Start (5/29/17 18:06)


Ekg Tracing (5/29/17 18:06)


O2 (5/29/17 18:06)


Monitor-Rhythm Ecg Trace Only (5/29/17 18:06)


BNP (5/29/17 18:06)


Cbc With Automated Diff (5/29/17 18:06)


Comprehensive Metabolic Panel (5/29/17 18:06)


Creatine Kinase (5/29/17 18:06)


Creatine Kinase Mb (5/29/17 18:06)


Magnesium (5/29/17 18:06)


Protime With Inr (5/29/17 18:06)


Partial Thromboplastin Time (5/29/17 18:06)


Thyroid Analyzer (5/29/17 18:06)


Troponin I (5/29/17 18:06)


Chest 1 View, Ap/Pa Only (5/29/17 18:06)


Lactic Acid Analyzer (5/29/17 18:13)


Blood Culture (5/29/17 18:13)


Ua Culture If Indicated (5/29/17 18:59)


Manual Differential (5/29/17 19:08)


Methylprednisolone Sod Succ (Solu-Medrol (5/29/17 19:30)


Ceftriaxone Injection (Rocephin Injectio (5/29/17 19:30)


Enoxaparin Injection (Lovenox Injection) (5/29/17 19:45)





Medications Given in ED





Current Medications








 Medications  Dose


 Ordered  Sig/Igor


 Route  Start Time


 Stop Time Status Last Admin


Dose Admin


 


 Ceftriaxone


 Sodium 1000 mg/


 Sodium Chloride  50 ml @ 


 100 mls/hr  ONCE  ONCE


 IV  5/29/17 19:30


 5/29/17 19:59 DC 5/29/17 19:44


100 MLS/HR


 


 Enoxaparin Sodium  80 mg  ONCE  ONCE


 SC  5/29/17 19:45


 5/29/17 22:03 DC 5/29/17 20:10


80 MG


 


 Methylprednisolone


 Sodium Succinate  125 mg  ONCE  ONCE


 IVP  5/29/17 19:30


 5/29/17 19:31 DC 5/29/17 19:44


125 MG








Vital Signs/I&O





Vital Sign - Last 12Hours








 5/29/17 5/29/17





 18:05 18:05


 


Temp  99.0


 


Pulse  52


 


Resp  18


 


B/P (MAP)  133/73


 


Pulse Ox 97 97


 


O2 Delivery Nasal Cannula 


 


O2 Flow Rate 3.00 








Progress Note :  


Progress Note


NO DETERIORATION IN PT'S CONDITION DURING ER STAY


O2 SATS REMAINED IN UPPER 90'S





ECG


Initial ECG Impression Time:  18:07


Initial ECG Rate:  93


Initial ECG Rhythm:  A Fib/Flutter (BIGEMINY, PVC'S )


Initial ECG Comparisson:  Changed





Diagnostic Imaging





Comments


CXR--MILD INFILTRATE IN LINGULA, PER RADIOLOGIST REPORT @ 1922


   Reviewed:  Reviewed by Me





Departure


Communication


Progress Notes


1950--SPOKE WITH DR. ROSE, ACCEPTS PT FOR ADMIT. WILL CONSULT DR. SCHERER AND 

DR. OROZCO


1952--SPOKE WITH DR. SCHERER FOR CARDIOLOGY CONSULT.





Impression


Impression:  


 Primary Impression:  


 Pneumonia


 Additional Impressions:  


 CHF (congestive heart failure)


 New onset atrial fibrillation


Disposition:  09 ADMITTED AS INPATIENT


Condition:  Stable


Decision to Admit Reason:  Admit from ER (General)


Decision to Admit/Date:  May 29, 2017


Time/Decision to Admit Time:  19:50





Departure-Patient Inst.


Referrals:  


JOSÉ METZGER DO (PCP/Family)


Primary Care Physician











MURTAZA GUILLEN DO May 29, 2017 19:03

## 2017-05-29 NOTE — DIAGNOSTIC IMAGING REPORT
INDICATION: Shortness of air for two weeks, cough and swelling

for two days.



COMPARISON STUDIES: Chest from April 17.



FINDINGS: Portable upright view of the chest demonstrates a mild

infiltrate in the lingula. Heart size is in upper normal with

previous sternotomy changes. Previous pacemaker wires present.

Vascularity is normal. There are no pleural effusions.



IMPRESSION: There has been development of a mild infiltrate in

the lingula.



Dictated by: 



  Dictated on workstation # [id number]

## 2017-05-30 VITALS — DIASTOLIC BLOOD PRESSURE: 69 MMHG | SYSTOLIC BLOOD PRESSURE: 132 MMHG

## 2017-05-30 VITALS — SYSTOLIC BLOOD PRESSURE: 139 MMHG | DIASTOLIC BLOOD PRESSURE: 72 MMHG

## 2017-05-30 VITALS — DIASTOLIC BLOOD PRESSURE: 79 MMHG | SYSTOLIC BLOOD PRESSURE: 134 MMHG

## 2017-05-30 VITALS — SYSTOLIC BLOOD PRESSURE: 135 MMHG | DIASTOLIC BLOOD PRESSURE: 70 MMHG

## 2017-05-30 VITALS — DIASTOLIC BLOOD PRESSURE: 67 MMHG | SYSTOLIC BLOOD PRESSURE: 140 MMHG

## 2017-05-30 VITALS — DIASTOLIC BLOOD PRESSURE: 89 MMHG | SYSTOLIC BLOOD PRESSURE: 156 MMHG

## 2017-05-30 VITALS — SYSTOLIC BLOOD PRESSURE: 148 MMHG | DIASTOLIC BLOOD PRESSURE: 79 MMHG

## 2017-05-30 VITALS — DIASTOLIC BLOOD PRESSURE: 81 MMHG | SYSTOLIC BLOOD PRESSURE: 145 MMHG

## 2017-05-30 VITALS — SYSTOLIC BLOOD PRESSURE: 144 MMHG | DIASTOLIC BLOOD PRESSURE: 110 MMHG

## 2017-05-30 VITALS — DIASTOLIC BLOOD PRESSURE: 119 MMHG | SYSTOLIC BLOOD PRESSURE: 164 MMHG

## 2017-05-30 VITALS — SYSTOLIC BLOOD PRESSURE: 138 MMHG | DIASTOLIC BLOOD PRESSURE: 64 MMHG

## 2017-05-30 VITALS — DIASTOLIC BLOOD PRESSURE: 74 MMHG | SYSTOLIC BLOOD PRESSURE: 134 MMHG

## 2017-05-30 VITALS — SYSTOLIC BLOOD PRESSURE: 146 MMHG | DIASTOLIC BLOOD PRESSURE: 74 MMHG

## 2017-05-30 VITALS — SYSTOLIC BLOOD PRESSURE: 102 MMHG | DIASTOLIC BLOOD PRESSURE: 95 MMHG

## 2017-05-30 VITALS — DIASTOLIC BLOOD PRESSURE: 68 MMHG | SYSTOLIC BLOOD PRESSURE: 129 MMHG

## 2017-05-30 VITALS — SYSTOLIC BLOOD PRESSURE: 113 MMHG | DIASTOLIC BLOOD PRESSURE: 80 MMHG

## 2017-05-30 VITALS — DIASTOLIC BLOOD PRESSURE: 105 MMHG | SYSTOLIC BLOOD PRESSURE: 146 MMHG

## 2017-05-30 VITALS — DIASTOLIC BLOOD PRESSURE: 63 MMHG | SYSTOLIC BLOOD PRESSURE: 144 MMHG

## 2017-05-30 VITALS — DIASTOLIC BLOOD PRESSURE: 82 MMHG | SYSTOLIC BLOOD PRESSURE: 148 MMHG

## 2017-05-30 VITALS — DIASTOLIC BLOOD PRESSURE: 76 MMHG | SYSTOLIC BLOOD PRESSURE: 140 MMHG

## 2017-05-30 LAB
ALBUMIN SERPL-MCNC: 3.8 G/DL (ref 3.2–4.5)
ALT SERPL-CCNC: 55 U/L (ref 0–55)
ANION GAP SERPL CALC-SCNC: 13 MMOL/L (ref 5–14)
ANION GAP SERPL CALC-SCNC: 13 MMOL/L (ref 5–14)
ANISOCYTOSIS BLD QL SMEAR: SLIGHT
APTT BLD: 32 SEC (ref 24–35)
AST SERPL-CCNC: 16 U/L (ref 5–34)
BASOPHILS # BLD AUTO: 0 10^3/UL (ref 0–0.1)
BASOPHILS NFR BLD AUTO: 0 % (ref 0–10)
BASOPHILS NFR BLD MANUAL: 0 %
BILIRUB SERPL-MCNC: 2.1 MG/DL (ref 0.1–1)
BUN SERPL-MCNC: 24 MG/DL (ref 7–18)
BUN SERPL-MCNC: 30 MG/DL (ref 7–18)
BUN/CREAT SERPL: 20
BUN/CREAT SERPL: 21
CALCIUM SERPL-MCNC: 9.2 MG/DL (ref 8.5–10.1)
CALCIUM SERPL-MCNC: 9.6 MG/DL (ref 8.5–10.1)
CHLORIDE SERPL-SCNC: 98 MMOL/L (ref 98–107)
CHLORIDE SERPL-SCNC: 98 MMOL/L (ref 98–107)
CO2 SERPL-SCNC: 32 MMOL/L (ref 21–32)
CO2 SERPL-SCNC: 32 MMOL/L (ref 21–32)
CREAT SERPL-MCNC: 1.21 MG/DL (ref 0.6–1.3)
CREAT SERPL-MCNC: 1.4 MG/DL (ref 0.6–1.3)
EOSINOPHIL # BLD AUTO: 0 10^3/UL (ref 0–0.3)
EOSINOPHIL NFR BLD AUTO: 0 % (ref 0–10)
EOSINOPHIL NFR BLD MANUAL: 0 %
ERYTHROCYTE [DISTWIDTH] IN BLOOD BY AUTOMATED COUNT: 14.1 % (ref 10–14.5)
GFR SERPLBLD BASED ON 1.73 SQ M-ARVRAT: 50 ML/MIN
GFR SERPLBLD BASED ON 1.73 SQ M-ARVRAT: 59 ML/MIN
GLUCOSE SERPL-MCNC: 153 MG/DL (ref 70–105)
GLUCOSE SERPL-MCNC: 212 MG/DL (ref 70–105)
INR PPP: 1.2 (ref 0.8–1.4)
LYMPHOCYTES # BLD AUTO: 0.3 X 10^3 (ref 1–4)
LYMPHOCYTES NFR BLD AUTO: 2 % (ref 12–44)
MACROCYTES BLD QL SMEAR: SLIGHT
MAGNESIUM SERPL-MCNC: 2.8 MG/DL (ref 1.8–2.4)
MCH RBC QN AUTO: 30 PG (ref 25–34)
MCHC RBC AUTO-ENTMCNC: 33 G/DL (ref 32–36)
MCV RBC AUTO: 90 FL (ref 80–99)
MONOCYTES # BLD AUTO: 1.3 X 10^3 (ref 0–1)
MONOCYTES NFR BLD AUTO: 9 % (ref 0–12)
NEUTROPHILS # BLD AUTO: 13.1 X 10^3 (ref 1.8–7.8)
NEUTROPHILS NFR BLD AUTO: 90 % (ref 42–75)
NEUTS BAND NFR BLD MANUAL: 88 %
NEUTS BAND NFR BLD: 3 %
PHOSPHATE SERPL-MCNC: 3.5 MG/DL (ref 2.3–4.7)
PLATELET # BLD: 210 10^3/UL (ref 130–400)
PMV BLD AUTO: 12 FL (ref 7.4–10.4)
POIKILOCYTOSIS BLD QL SMEAR: SLIGHT
POTASSIUM SERPL-SCNC: 3.4 MMOL/L (ref 3.6–5)
POTASSIUM SERPL-SCNC: 3.6 MMOL/L (ref 3.6–5)
PROT SERPL-MCNC: 6.4 G/DL (ref 6.4–8.2)
PROTHROMBIN TIME: 14.9 SEC (ref 12.2–14.7)
RBC # BLD AUTO: 5 10^6/UL (ref 4.35–5.85)
ROULEAUX BLD QL SMEAR: SLIGHT
SODIUM SERPL-SCNC: 143 MMOL/L (ref 135–145)
SODIUM SERPL-SCNC: 143 MMOL/L (ref 135–145)
VARIANT LYMPHS NFR BLD MANUAL: 2 %
VARIANT LYMPHS NFR BLD MANUAL: 2 %
WBC # BLD AUTO: 14.7 10^3/UL (ref 4.3–11)

## 2017-05-30 RX ADMIN — METHYLPREDNISOLONE SODIUM SUCCINATE SCH MG: 125 INJECTION, POWDER, FOR SOLUTION INTRAMUSCULAR; INTRAVENOUS at 06:38

## 2017-05-30 RX ADMIN — ENOXAPARIN SODIUM SCH MG: 100 INJECTION SUBCUTANEOUS at 22:03

## 2017-05-30 RX ADMIN — SODIUM CHLORIDE SCH MLS/HR: 900 INJECTION INTRAVENOUS at 11:36

## 2017-05-30 RX ADMIN — METHYLPREDNISOLONE SODIUM SUCCINATE SCH MG: 125 INJECTION, POWDER, FOR SOLUTION INTRAMUSCULAR; INTRAVENOUS at 01:39

## 2017-05-30 RX ADMIN — Medication SCH ML: at 06:38

## 2017-05-30 RX ADMIN — IPRATROPIUM BROMIDE AND ALBUTEROL SULFATE SCH ML: .5; 3 SOLUTION RESPIRATORY (INHALATION) at 19:13

## 2017-05-30 RX ADMIN — FAMOTIDINE SCH MG: 20 TABLET, FILM COATED ORAL at 22:02

## 2017-05-30 RX ADMIN — Medication SCH ML: at 11:40

## 2017-05-30 RX ADMIN — POTASSIUM CHLORIDE SCH MLS/HR: 200 INJECTION, SOLUTION INTRAVENOUS at 15:08

## 2017-05-30 RX ADMIN — POTASSIUM CHLORIDE SCH MLS/HR: 200 INJECTION, SOLUTION INTRAVENOUS at 15:07

## 2017-05-30 RX ADMIN — ENALAPRIL MALEATE SCH MG: 10 TABLET ORAL at 22:03

## 2017-05-30 RX ADMIN — LEVOTHYROXINE SODIUM SCH MCG: 0.11 TABLET ORAL at 22:02

## 2017-05-30 RX ADMIN — SODIUM CHLORIDE SCH MLS/HR: 900 INJECTION INTRAVENOUS at 23:17

## 2017-05-30 RX ADMIN — ATORVASTATIN CALCIUM SCH MG: 10 TABLET, FILM COATED ORAL at 22:02

## 2017-05-30 RX ADMIN — ENOXAPARIN SODIUM SCH MG: 100 INJECTION SUBCUTANEOUS at 09:38

## 2017-05-30 RX ADMIN — Medication SCH ML: at 22:03

## 2017-05-30 RX ADMIN — IPRATROPIUM BROMIDE AND ALBUTEROL SULFATE SCH ML: .5; 3 SOLUTION RESPIRATORY (INHALATION) at 10:10

## 2017-05-30 RX ADMIN — IPRATROPIUM BROMIDE AND ALBUTEROL SULFATE SCH ML: .5; 3 SOLUTION RESPIRATORY (INHALATION) at 14:56

## 2017-05-30 RX ADMIN — MONTELUKAST SCH MG: 10 TABLET, FILM COATED ORAL at 22:02

## 2017-05-30 RX ADMIN — CARVEDILOL SCH MG: 12.5 TABLET, FILM COATED ORAL at 22:02

## 2017-05-30 RX ADMIN — IPRATROPIUM BROMIDE AND ALBUTEROL SULFATE SCH ML: .5; 3 SOLUTION RESPIRATORY (INHALATION) at 06:51

## 2017-05-30 NOTE — CONSULTATION-CARDIOLOGY
HPI-Cardiology


Cardiology Consultation:


Date of Consultation


17


Date of Admission





Attending Physician


Mary Marino DO


Admitting Physician


Mihir Hall DO


Consulting Physician


SABRINA ENAMORADO MD





HPI:


Chief Complaint:


Irregular heart rhythm


This is a 76-year-old gentleman who has previous history of CABG with 2 bypass.

  No valvular heart disease or arrhythmias.  He presented with shortness of 

breath and was found to be in atrial fibrillation.  He also has previous 

history of smoking and significant emphysema.  There is possible pneumonia as 

well.  During my interview he complains of mild shortness of breath.  No 

significant palpitations.  He also complains of hemoptysis.





Review of Systems-Cardiology


Review of Systems


Constitutional:  No As described under HPI, No no symptoms reported, No chills, 

No fever, No lightheadedness, No malaise, No tiredness, No weight loss, No 

weight gain, No other


Eyes:  No As described under HPI, No no symptoms reported, No blindness, No 

blurred vision, No contact lenses, No drainage, No decreased acuity, No foreign 

body sensation, No glasses, No inflammation, No pain, No photophobia, No 

previous injury, No shadows, No tunnel vision, No other, No vision change


Ears/Nose/Throat:  No As described under HPI, No no symptoms reported, No 

chronic hearing loss, No epistaxis, No ear discharge, No ear pain, No loose 

teeth, No mouth pain, No mouth swelling, No nasal drainage, No nose pain, No 

recent hearing loss, No throat pain, No throat swelling, No ulcerations, No 

other


Respiratory:  cough, other (Hemoptysis)


Cardiovascular:  No no symptoms reported, No As described under HPI, No chest 

pain, No edema, No irregular heart rate, No lightheadedness, No palpitations, 

No syncope, No other


Gastrointestinal:  No no symptoms reported, No As described under HPI, No 

abdomen distended, No abdominal pain, No blood streaked bowels, No constipation

, No diarrhea, No difficulty swallowing, No nausea, No poor appetite, No poor 

fluid intake, No rectal bleeding, No vomiting, No other, No nausea/vomiting/

diarrhea, No stool coloration changes


Genitourinary:  No no symptoms reported, No As described under HPI, No burning, 

No dysuria, No discharge, No frequency, No flank pain, No hematuria, No 

incontinence, No pain, No urgency, No other, No urine frequency changes, No 

urine coloration changes


Musculoskeletal:  No no symptoms reported, No As describe under HPI, No back 

pain, No gout, No joint pain, No joint swelling, No muscle pain, No muscle 

stiffness, No neck pain, No other


Skin:  No no symptoms reported, No As described under HPI, No change in color, 

No change in hair/nails, No dryness, No lesions, No lumps, No rash, No other, 

No skin related problems, No ulcerations, No rash on exposed areas, No 

ulcerations on exposed areas


Psychiatric/Neurological:  No As described under HPI, No anxiety, No depression

, No emotional problems, No focal weakness, No headache, No no symptoms reported

, No numbness, No other, No pre-existing deficit, No seizure, No syncope, No 

tingling, No tremors, No weakness





PMH-Social-Family Hx


Patient Social History


Alcohol Use:  Denies Use


Recreational Drug Use:  No


Smoking Status:  Former Smoker


Type Used:  Cigarettes


Recent Foreign Travel:  No


Recent Infectious Disease Expo:  No


Hospitalization with Isolation:  Denies


Physical Abuse Screen:  No


Sexual Abuse:  No





Immunizations Up To Date


Date of Pneumonia Vaccine:  Sep 7, 2015


Date of Influenza Vaccine:  Oct 7, 2015





Past Medical History


PMH


As described under Assessment.





Allergies and Home Medications


Allergies


Coded Allergies:  


     No Known Drug Allergies (Verified , 07)





Home Medications


Albuterol Sulfate 18 Gm Hfa.aer.ad, 2 PUFF INH Q4H PRN for SHORTNESS OF BREATH, 

(Reported)


Amlodipine Besylate 10 Mg Tablet, 10 MG PO DAILY, (Reported)


Aspirin 81 Mg Tabec, 81 MG PO DAILY, (Reported)


Atorvastatin Calcium 10 Mg Tablet, 10 MG PO HS, (Reported)


Budesonide/Formoterol Fumarate 10.2 Gm Hfa.aer.ad, 2 PUFF IH BID, (Reported)


Carvedilol 12.5 Mg Tablet, 12.5 MG PO BID, (Reported)


Cholecalciferol (Vitamin D3) 5,000 Unit Tablet, 5,000 UNIT PO DAILY, (Reported)


Enalapril Maleate 20 Mg Tablet, 20 MG PO BID, (Reported)


Fluocinonide 15 Gm Cream..g., TOP DAILY, (Reported)


   APPLY INSIDE CHEEK 


Fluticasone Propionate 16 Gm Spray.susp, 2 SPRAYS NS DAILY, (Reported)


Levothyroxine Sodium 112 Mcg Tablet, 112 MCG PO HS, (Reported)


Montelukast Sodium 10 Mg Tablet, 10 MG PO HS, (Reported)


Omega-3 Fatty Acids/Fish Oil 1 Each Capsule, 1,200 MG PO DAILY, (Reported)


Prednisone 10 Mg Tab, PO UD, #42 (Reported)


   FILLED -17 60MG X 2 DAYS, 50MG X 2 DAYS, 40MG X 2 DAYS, 30MG X 2 DAYS, 

20MG X 2 DAYS, 10MG X 2 DAYS THEN STOP 


Ranitidine Hcl 150 Mg Tablet, 150 MG PO BID, (Reported)


Tiotropium Bromide 4 Gm Mist.inhal, 2 PUFF IH DAILY, (Reported)





Physical Exam-Cardiology


Physical Exam


Vital Signs/I&O





Vital Sign - Last 12Hours








 17





 05:00 06:00 06:51 07:00


 


Pulse 72 84  


 


Resp    20


 


B/P (MAP) 132/69 139/72  135/70


 


Pulse Ox 97 97 95 98


 


O2 Flow Rate 3.00 3.00 3.00 3.00





 17





 07:00 08:00 08:27 10:10


 


Temp   98.7 


 


Pulse 71   


 


Pulse Ox    96


 


O2 Flow Rate  3.00  3.00


 


    





 17





 12:55 13:00 14:56 15:52


 


Temp 98.2   98.7


 


Pulse  65  


 


Pulse Ox   98 


 


O2 Flow Rate   3.00 














Intake and Output 


 


 17





 00:00


 


Intake Total 400 ml


 


Output Total 1700 ml


 


Balance -1300 ml





Capillary Refill : Less Than 3 Seconds


Constitutional:  No appears stated age, No AAO x 3, No apparent distress, No 

PERRL, No well-developed, No well-nourished, No other


HEENT:  No PERRL, No normal ENT inspection, No TMs normal, No pharynx normal, 

No scleral icterus (R), No scleral icterus (L), No pale conjunctivae (R), No 

pale conjunctivae (L), No photophobia, No TM abnormal (R), No TM abnormal (L), 

No pharyngeal erythema, No tonsillar exudate, No other, No discharge, No EOMI, 

No hearing is well preserved, No hard of hearing, No oral hygience is good, No 

ulceration, No xanthelasmas are seen


Neck:  No non-tender, No full range of motion, No supple, No normal inspection, 

No carotid bruit, No limited range of motion, No lymphadenopathy (R), No 

lymphadenopathy (L), No tender lateral, No tender midline, No thyromegaly, No 

other, No carotid pulses are 2 + bilaterally, No with good upstrokes


Respiratory:  No accessory muscle use, No respiratory distress, No chest tender

, No chest expansion is symmetric, No chest is bilaterally symmetric, No lungs 

clear to percussion, No lungs clear to auscultation, No crackles, No rhonchi, 

No rales, No stridor, No wheezing, No pleural rub, No other


Cardiovascular:  regular rate-rhythm, No irregularly irregular, No extra beats, 

No parasternal heave is noted, No JVD, No edema, No bradycardia, No tachycardia

, No point of maximal impulse, No cardiac thrills are palpable, S1 and S2, No 

gallop/S3, No gallop/S4, No diastolic murmur, No systolic murmur, No friction 

rub, No click, No other


Gastrointestinal:  No tender, No soft, No round, No distended, No pulsatile mass

, No organomegaly, No guarding, No rebound, No tenderness, No hernia, No mass, 

No audible bowel sounds, No abnormal bowel sounds, No abdominal bruits, No 

spleenomegaly, No other


Rectal:  deferred


Extremities:  No normal range of motion, No non-tender, No normal inspection, 

No pedal edema, No calf tenderness, No normal capillary refill, No pelvis stable

, No calf tenderness, No inflammation, No pedal edema, No slow capillary refill

, No swelling, No other, No abrasion, No clubbing, No cyanosis, No ecchymosis, 

No laceration, No no lower extremity edema bilateral, No significant edema, No 

tenderness, No wound


Neurologic/Psychiatric:  No CNs II-XII nml as tested, No no motor/sensory 

deficits, No alert, No normal mood/affect, No oriented x 3, No abnormal 

cerebellar tests, No abnormal CNs II-XII, No abnormal gait, No aphasia, No EOM 

palsy, No facial droop, No motor weakness, No sensory deficit, No depressed 

affect, No disoriented x 3, No other, No grossly intact, No power is 5/5 both 

on sides


Skin:  No normal color, No warm/dry, No cyanosis, No cool, No diaphoresis, No 

damp, No ecchymosis, No jaundice, No mottled, No pallor, No rash, No tattoos/

piercings, No ulcerations, No rash on exposed areas, No ulcerations on exposed 

areas, No other





Data Review


Labs


Laboratory Tests


17 18:25: 


Prothrombin Time 28.3H, INR Comment 2.7H, Activated Partial Thromboplast Time 

36H, Sodium Level 142, Potassium Level 4.0, Chloride Level 104, Carbon Dioxide 

Level 26, Anion Gap 12, Blood Urea Nitrogen 21H, Creatinine 0.98, Estimat 

Glomerular Filtration Rate > 60, BUN/Creatinine Ratio 21, Glucose Level 130H, 

Lactic Acid Level 1.47, Calcium Level 9.0, Magnesium Level 2.5H, Total 

Bilirubin 1.6H, Aspartate Amino Transf (AST/SGOT) 31, Alanine Aminotransferase (

ALT/SGPT) 67H, Alkaline Phosphatase 59, Total Creatine Kinase 43, Creatine 

Kinase MB 1.5, Troponin I < 0.30, Total Protein 5.9L, Albumin 3.8, TSH Cascade 

Testing 0.37


17 19:08: 


White Blood Count 9.8, Red Blood Count 4.65, Hemoglobin 14.0, Hematocrit 42, 

Mean Corpuscular Volume 91, Mean Corpuscular Hemoglobin 30, Mean Corpuscular 

Hemoglobin Concent 33, Red Cell Distribution Width 14.1, Platelet Count 180, 

Mean Platelet Volume 11.9H, Neutrophils (%) (Auto) 84H, Lymphocytes (%) (Auto) 

4L, Monocytes (%) (Auto) 11, Eosinophils (%) (Auto) 0, Basophils (%) (Auto) 0, 

Neutrophils # (Auto) 8.2H, Lymphocytes # (Auto) 0.4L, Monocytes # (Auto) 1.1H, 

Eosinophils # (Auto) 0.0, Basophils # (Auto) 0.0, Neutrophils % (Manual) 89, 

Lymphocytes % (Manual) 11, Monocytes % (Manual) 0, Eosinophils % (Manual) 0, 

Basophils % (Manual) 0, Band Neutrophils 0, Blood Morphology Comment NORMAL, B-

Type Natriuretic Peptide 668.7H


17 19:48: 


Urine Color YELLOW, Urine Clarity SLIGHTLY CLOUDY, Urine pH 6, Urine Specific 

Gravity 1.020, Urine Protein NEGATIVE, Urine Glucose (UA) NEGATIVE, Urine 

Ketones NEGATIVE, Urine Nitrite NEGATIVE, Urine Bilirubin NEGATIVE, Urine 

Urobilinogen NORMAL, Urine Leukocyte Esterase NEGATIVE, Urine RBC (Auto) 

NEGATIVE, Urine RBC RARE, Urine WBC RARE, Urine Crystals NONE, Urine Bacteria 

NEGATIVE, Urine Casts NONE, Urine Mucus NEGATIVE, Urine Culture Indicated NO


17 00:30: Troponin I < 0.30


17 03:51: 


White Blood Count 14.7H, Red Blood Count 5.00, Hemoglobin 14.9, Hematocrit 45, 

Mean Corpuscular Volume 90, Mean Corpuscular Hemoglobin 30, Mean Corpuscular 

Hemoglobin Concent 33, Red Cell Distribution Width 14.1, Platelet Count 210, 

Mean Platelet Volume 12.0H, Neutrophils (%) (Auto) 90H, Lymphocytes (%) (Auto) 

2L, Monocytes (%) (Auto) 9, Eosinophils (%) (Auto) 0, Basophils (%) (Auto) 0, 

Neutrophils # (Auto) 13.1H, Lymphocytes # (Auto) 0.3L, Monocytes # (Auto) 1.3H, 

Eosinophils # (Auto) 0.0, Basophils # (Auto) 0.0, Neutrophils % (Manual) 88, 

Lymphocytes % (Manual) 2, Monocytes % (Manual) 5, Eosinophils % (Manual) 0, 

Basophils % (Manual) 0, Band Neutrophils 3, Reactive Lymphocytes 2, 

Poikilocytosis SLIGHT, Anisocytosis SLIGHT, Macrocytosis SLIGHT, Elliptocytes 

MODERATE, Rouleau SLIGHT, Sodium Level 143, Potassium Level 3.4L, Chloride 

Level 98, Carbon Dioxide Level 32, Anion Gap 13, Blood Urea Nitrogen 24H, 

Creatinine 1.21, Estimat Glomerular Filtration Rate 59, BUN/Creatinine Ratio 20

, Glucose Level 153H, Calcium Level 9.2, Phosphorus Level 3.5, Magnesium Level 

2.8H


17 08:50: 


Prothrombin Time 14.9H, INR Comment 1.2, Activated Partial Thromboplast Time 32





Microbiology


17 Blood Culture - Preliminary, Resulted


          No growth








ECG Impression


ECG


Initial ECG Rhythm:  Normal Sinus





A/P-Cardiology


Assessment/Admission Diagnosis


Shortness of breath, hemoptysis, pneumonia, COPD.


Atrial fibrillation.


Coronary artery disease





Plan


This is a 76-year-old gentleman who has history of significant emphysema.  He 

also has history of tongue cancer received previous chemotherapy.  He presented 

with hemoptysis and shortness of breath.  Will defer further pulmonary 

management to Dr. Quesada.  CT scan is pending.





Patient has known history of coronary artery disease and will continue 

outpatient medications for CAD.  New onset atrial fibrillation.  No oral 

anticoagulation till reason for hemoptysis is confirmed.  Continue rate 

control.  Elevated INR without anticoagulation.





Cardiology will continue to follow.








Thank you for your consultation. Please call me if you have any questions.








YESENIA Enamorado MD, FACP, FACC, FSCAI, FHRS, CCDS


Interventional Cardiology


Cardiac Electrophysiology


Vascular Medicine and Endovascular Interventions





Clinical Quality Measures


DVT/VTE Risk/Contraindication:


Risk Factor Score Per Nursin


RFS Level Per Nursing on Admit:  4+=Very High











SABRINA ENAMORADO MD May 30, 2017 4:41 pm

## 2017-05-30 NOTE — DIAGNOSTIC IMAGING REPORT
EXAMINATION: Fluoroscopic-guided port check.



INDICATION: Dysfunctional port. Abnormal swelling near the port



CONSENT: Informed consent was obtained from the patient. The

risks, benefits, potential complications and alternatives were

reviewed and all questions answered to the patient's

satisfaction.



CONTRAST: One mL of Isovue 300.



FLUOROSCOPY TIME: 3 seconds



FINDINGS:  image of the chest was obtained and demonstrates

a left subclavian port with the tip at the SVC level. There is no

fracture in the catheter.



Upon injection of contrast under fluoroscopy, there is

extravasation of contrast from the proximal aspect of the

catheter into the soft tissues below the mid left clavicle

compatible with catheter damage.



IMPRESSION:

There is a leakage of contrast from the port catheter into the

adjacent soft tissues.



Dr. Redman is informed of the findings at time of dictation.



Dictated by: 



  Dictated on workstation # [id number]

## 2017-05-30 NOTE — DIAGNOSTIC IMAGING REPORT
INDICATION: Shortness of breath.



Portable chest 5:07 AM



FINDINGS: There are postop changes from CABG surgery. Left

subclavian Port-A-Cath tip projects over the SVC. There are

patchy areas of increased density in the lower chest bilaterally.

These could be parenchymal infiltrates that appear similar to the

previous days' comparison study.



IMPRESSION: Patchy infiltrates in the region of the right middle

lobe and lingular segment of left upper lobe.



Dictated by: 



  Dictated on workstation # [id number]

## 2017-05-30 NOTE — PULMONARY CONSULTATION
History of Present Illness


History of Present Illness


Date of Consultation


17


 07:38


Date of Admission





History of Present Illness


77yo with hx of CAD, CHF, COPD oxygen dependent (oxygen started 1wk ago)  

presented to ED secondary to worsening SOB x 3 wks orthopnea, and LE edema. 

Productive cough of only clear sputum, no fever/NS. Home INH were not helping.





Allergies and Home Medications


Allergies


Coded Allergies:  


     No Known Drug Allergies (Verified , 07)





Home Medications


Amlodipine Besylate 10 Mg Tablet, 10 MG PO DAILY, (Reported)


Aspirin 81 Mg Tabec, 81 MG PO DAILY, (Reported)


Atorvastatin Calcium 10 Mg Tablet, 10 MG PO HS, (Reported)


Carvedilol 12.5 Mg Tablet, 12.5 MG PO BID, (Reported)


Cholecalciferol (Vitamin D3) 5,000 Unit Tablet, 5,000 UNIT PO DAILY, (Reported)


Enalapril Maleate 20 Mg Tablet, 20 MG PO BID, (Reported)


Levothyroxine Sodium 112 Mcg Tablet, 112 MCG PO DAILY, (Reported)


Meloxicam 7.5 Mg Tablet, 7.5 MG PO DAILY, (Reported)


Omega-3 Fatty Acids/Fish Oil 1 Each Capsule, 1,200 MG PO DAILY, (Reported)


Ranitidine Hcl 150 Mg Tablet, 150 MG PO BID, (Reported)


Tiotropium Bromide 1 Inh Aerp, 1 PUFF IH DAILY PRN for SHORTNESS OF BREATH, (

Reported)





Past Medical-Social-Family Hx


Patient Social History


Alcohol Use:  Denies Use


Recreational Drug Use:  No


Smoking Status:  Former Smoker


Type Used:  Cigarettes


Recent Foreign Travel:  No


Contact w/Someone Who Travel:  No


Recent Infectious Disease Expo:  No


Recent Hopitalizations:  No


Physical Abuse Screen:  No


Sexual Abuse:  No





Immunizations Up To Date


Date of Pneumonia Vaccine:  Sep 7, 2015


Date of Influenza Vaccine:  Oct 7, 2015





Seasonal Allergies


Seasonal Allergies:  Yes





Surgeries


HX Surgeries:  Yes (SPIDER BITE, FEEDING TUBE/REMOVED, BILAT TKR, INFUSAPORT, 

TUMOR ON TONGUE)


Surgeries:  Abdominal, Cardiac, CABG, Coronary Stent, Joint Replacement, 

Orthopedic, Vascular Surgery





Respiratory


Hx Respiratory Disorders:  Yes


Respiratory Disorders:  Asthma, Sleep Apnea, COPD





Cardiovascular


Hx Cardiac Disorders:  Yes (CARDIAC CATHS/CABG)


Cardiac Disorders:  Coronary Artery Disease, Heart Attack, High Cholesterol, 

Hypertension





Neurological


Hx Neurological Disorders:  No





Reproductive System


Hx Reproductive Disorders:  Yes (E.D.)





Genitourinary


Hx Genitourinary Disorders:  Yes (E.D. )





Gastrointestinal


Hx Gastrointestinal Disorders:  No





Musculoskeletal


Hx Musculoskeletal Disorders:  Yes (RIGHT MEDIAL ILIAC BONE LESION; BILATERAL 

TKR)


Musculoskeletal Disorders:  Arthritis





Endocrine


Hx Endocrine Disorders:  No





HEENT


HX ENT Disorders:  Yes (TONGUE MASS, WENT THROUGH CHEMO AND RADIATION FOR CA.--

HAD TEMPORARY FEEDING TUBE)





Cancer


Hx Cancer:  Yes (TONGUE CANCER--S/P SURGERY, CHEMO, RADIATION--COMPLETED 3 

YEARS AGO)





Psychosocial


Hx Psychiatric Problems:  No





Integumentary


HX Skin/Integumentary Disorder:  No





Blood Transfusions


Hx Blood Disorders:  No





Review of Systems


Constitutional:  Malaise, Weakness, No: Chills, Fever, Other, Sweats


Eyes:  No: Conjunctivae inflammation, Eyelid inflammation, Other, Pain, Redness

, Vision change


ENT:  No: Ear discharge, Ear pain, Mouth pain, Mouth swelling, Nose congestion, 

Nose discharge, Nose pain, Other, Throat pain, Throat swelling


Respiratory:  Cough, Hemoptysis, SOB with excertion, Shortness of breath, Sputum

, Wheezing


Cardiovascular:  Paroxysmal Noc. Dyspnea, No: Chest Pain, Edema, Lt Headedness, 

Orthopnea, Other, Palpitations


Gastrointestinal:  No: Abdominal Pain, Constipation, Diarrhea, Hematochezia, 

Melena, Nausea, Other, Vomiting


Neurological:  Weakness, No: Confusion





Exam


Exam





Vital Signs








  Date Time  Temp Pulse Resp B/P (MAP) Pulse Ox O2 Delivery O2 Flow Rate FiO2


 


17 06:51     95  3.00 


 


17 06:00  84  139/72 97  3.00 


 


17 05:00  72  132/69 97  3.00 


 


17 04:33 97.0       


 


17 04:01       3.00 95


 


17 04:00  75 21 146/74 95  3.00 


 


17 04:00       3.00 


 


17 03:57     95   


 


17 03:38  68 16 145/81 96  3.00 


 


17 03:00  86 25 144/110 94  3.00 


 


17 02:00  90 21 148/82 96  3.00 


 


17 01:00  76 26 113/80 96  3.00 


 


17 01:00  72      


 


17 00:00       3.00 


 


17 00:00  64 29 144/63 94  3.00 


 


17 23:30  85 29 137/96 98  3.00 


 


17 23:00  68 24 149/71 95  3.00 


 


17 22:30  62 22 92/77 95  3.00 


 


17 22:00  65 22 190/75 91  3.00 


 


17 21:45  66 19 163/79 95  3.00 


 


17 21:30  73 19  95  3.00 


 


17 21:15  123 30 142/91 93  3.00 


 


17 21:00  81 27 129/63 92  3.00 


 


17 20:53  86      


 


17 20:50       3.00 


 


17 20:45 98.0 101 18 124/77 94  3.00 


 


17 20:30  99 10  96  3.00 


 


17 18:05 99.0 52 18 133/73 97   


 


17 18:05     97 Nasal Cannula 3.00 














I & O 


 


 17





 07:00


 


Intake Total 600 ml


 


Output Total 2100 ml


 


Balance -1500 ml








General Appearance:  No Apparent Distress, Anxious


Neck:  Full Range of Motion, Normal Inspection, Non Tender, Supple


Respiratory:  No Accessory Muscle Use, No Respiratory Distress, Decreased 

Breath Sounds


Cardiovascular:  Regular Rate, Rhythm, No Edema


Capillary Refill:  Less Than 3 Seconds


Gastrointestinal:  normal bowel sounds, non tender, soft


Neurologic/Psychiatric:  Alert, Oriented x3


Skin:  Normal Color, Warm/Dry


Lymphatic:  No Adenopathy





Results


Lab


Laboratory Tests


17 18:25








17 19:08








17 03:51











Assessment/Plan


Assessment/Plan


CHF with pulmonary edema last EF  was 45% 


   -lasix 


   -IVF hep lock 


COPD


   -decrease solumedrol to 40 IV Q 6 


   -SVNS 


Pneumonia with blood tinged sputum


   -continue Abx  and await cultures


   -check sputum culture


   -monitor bloody sputum 


   -check CT of chest with contrast this PM 


      -may need bronchoscopy 


Afib - new onset 


   -Lovenox therapeutic dosing is ordered 


      -hold for now secondary to coags 


   -INR on admission is 2.7


      -it does not appear he has been on anticoagulation at home


   -repeat PT/INR, PTT 


tobacco hx quit smoking 20 yrs ago  30pk/yr hx 





Labs and CXR reviewed 








255 60min was spent with patient and RN discussing plan of care and reviewing 

chart/medical hx.





Clinical Quality Measures


DVT/VTE Risk/Contraindication:


Risk Factor Score Per Nursin


RFS Level Per Nursing on Admit:  4+=Very High











MYAH OROZCO DO May 30, 2017 07:43

## 2017-05-30 NOTE — ECHOCARDIOGRAPHY REPORT
DATE OF SERVICE:  05/30/2017



2D ECHOCARDIOGRAM



ATTENDING AND ORDERING PHYSICIAN:

Dr. Mary Marino.



PRIMARY PHYSICIAN:

Dr. Hall.



TREATING PHYSICIAN:

Dr. YESENIA Enamorado.



DIAGNOSES:

Atrial fibrillation, pneumonia.



FINDINGS:

1.  Atrial fibrillation.

2.  Left atrial dimensions are enlarged.  Left atrial diameter is 4.3 cm.

3.  Aortic root dimensions are normal.

4.  Left ventricular systolic function is moderately reduced.  LVEF is 40%.  No

LVH is present.

5.  There are no wall motion abnormalities.

6.  Right heart size and function is normal.

7.  There is no evidence of pericardial effusion.

8.  IVC is 1.8 cm, which is borderline enlarged.

9.  The LV cavity is enlarged.



VALVULAR STRUCTURE OF THE HEART:

1.  There is mild tricuspid regurgitation with RVSP of 21 mmHg.

2.  There is mild mitral regurgitation.

3.  The aortic valve cusps are thickened with mild aortic stenosis.

4.  Aortic valve area is 1.7 cm2.  Peak gradient is 37 mmHg and mean gradient is

20 mmHg.  Trace aortic insufficiency is also noted.

5.  The pulmonic valve was not well visualized.



CONCLUSION:

1.  Atrial fibrillation.

2.  LVEF is 40%, which is mild to moderately reduced.

3.  There is enlarged LV cavity.

4.  There is enlarged left atrium.

5.  Mild aortic stenosis.

6.  Normal PA pressure.





Job ID: [id number]

DocumentID: [id number]

Dictated Date:  05/30/2017 15:44:41

Transcription Date: 05/30/2017 18:19:33

Dictated By: MEGAN ENAMORADO MD

## 2017-05-30 NOTE — HISTORY & PHYSICAL-HOSPITALIST
HPI


History of Present Illness:


HPI/Chief Complaint


The patient is a 76-year-old white male who was admitted through the emergency 

room yesterday.  He presented with a complaint of shortness of breath over a 

period of about 3 weeks.  He reported that he had been started on home oxygen 1 

week prior to presentation.  He noted orthopnea and leg swelling over the 

several nights prior to this presentation.  He denied fever or chest pain.  He 

reported an occasional cough with some sputum production.  He has a past 

history of coronary artery disease and a previous heart attack.  He had been a 

smoker and smoking is many as 3 packs per day.  He Discontinued this habit 15 

years ago.


Source:  patient, family


Exam Limitations:  no limitations


Date Seen


17


Attending Physician


Mary Marino William J DO


Referring Physician





Date of Admission


May 29, 2017 at 19:50





Home Medications & Allergies


Home Medications


Reviewed patient Home Medication Reconciliation Form





Allergies





Allergies


Coded Allergies


  No Known Drug Allergies (Verified07)








Past Medical-Social-Family Hx


Patient Social History


Alcohol Use:  Denies Use


Recreational Drug Use:  No


Smoking Status:  Former Smoker


Type Used:  Cigarettes


Physical Abuse Screen:  No


Sexual Abuse:  No


Recent Foreign Travel:  No


Contact w/other who traveled:  No


Recent Hopitalizations:  No


Recent Infectious Disease Expo:  No





Immunizations Up To Date


Date of Pneumonia Vaccine:  Sep 7, 2015


Date of Influenza Vaccine:  Oct 7, 2015





Seasonal Allergies


Seasonal Allergies:  Yes





Surgeries


HX Surgeries:  Yes (SPIDER BITE, FEEDING TUBE/REMOVED, BILAT TKR, INFUSAPORT, 

TUMOR ON TONGUE)


Surgeries:  Abdominal, Cardiac, CABG, Coronary Stent, Joint Replacement, 

Orthopedic, Vascular Surgery





Respiratory


Hx Respiratory Disorders:  Yes


Respiratory Disorders:  COPD





Cardiovascular


Hx Cardiovascular Disorders:  Yes (CARDIAC CATHS/CABG)


Cardiac Disorders:  Coronary Artery Disease, Heart Attack, High Cholesterol, 

Hypertension





Neurological


Hx Neurological Disorders:  No





Reproductive System


Hx Reproductive Disorders:  Yes (E.D.)





Genitourinary


Hx Genitourinary Disorders:  Yes (E.D. )





Gastrointestinal


Hx Gastrointestinal Disorders:  No





Musculoskeletal


Hx Musculoskeletal Disorders:  Yes (RIGHT MEDIAL ILIAC BONE LESION; BILATERAL 

TKR)


Musculoskeletal Disorders:  Arthritis





Endocrine


Hx Endocrine Disorders:  No





HEENT


HX ENT Disorders:  Yes (TONGUE MASS, WENT THROUGH CHEMO AND RADIATION FOR CA.--

HAD TEMPORARY FEEDING TUBE)





Cancer


Hx Cancer:  Yes (TONGUE CANCER--S/P SURGERY, CHEMO, RADIATION--COMPLETED 3 

YEARS AGO)





Psychosocial


Hx Psychiatric Problems:  No





Integumentary


HX Skin/Integumentary Disorder:  No





Blood Transfusions


Hx Blood Disorders:  No





Review of Systems


Constitutional:  see HPI


EENTM:  other (dry mouth.  The patient has had a previous cancer of the tongue 

treated with surgery, chemotherapy, and radiation.  He has had a new lesion on 

the tongue biopsy by Dr. Ag last week and they are awaiting path report)


Respiratory:  see HPI, cough, dyspnea on exertion, orthopnea, short of breath


Cardiovascular:  no symptoms reported


Gastrointestinal:  no symptoms reported


Genitourinary:  frequency (every 15 minutes today)


Musculoskeletal:  no symptoms reported


Skin:  no symptoms reported


Psychiatric/Neurological:  No Symptoms Reported





Physical Exam


Physical Exam


Vital Signs





Vital Sign - Last 12Hours








 17





 18:05 04:01


 


Temp 99.0 


 


Pulse 52 


 


Resp 18 


 


B/P (MAP) 133/73 


 


Pulse Ox 97 


 


O2 Delivery Nasal Cannula 


 


O2 Flow Rate 3.00 


 


FiO2  95





Capillary Refill : Less Than 3 Seconds


General Appearance:  Other (pleasant and talkative white male with no obvious 

dyspnea)


Eyes:  Bilateral Eye Normal Inspection


HEENT:  Other (dry mouth)


Neck:  Normal Inspection


Respiratory:  Chest Non Tender, Lungs Clear, Normal Breath Sounds, No Accessory 

Muscle Use, No Respiratory Distress


Cardiovascular:  Irregularly Irregular


Gastrointestinal:  Normal Bowel Sounds, No Organomegaly, No Pulsatile Mass, Non 

Tender, Soft


Back:  Normal Inspection, No CVA Tenderness, No Vertebral Tenderness


Extremity:  Normal Capillary Refill, Normal Inspection, Normal Range of Motion, 

Non Tender, No Calf Tenderness, No Pedal Edema


Neurologic/Psychiatric:  Alert, Oriented x3, No Motor/Sensory Deficits, Normal 

Mood/Affect


Skin:  Normal Color, Warm/Dry


Lymphatic:  No Adenopathy





Results


Results/Procedures


Lab


Laboratory Tests


17 18:25








17 19:08








17 03:51














Clinical Quality Measures


DVT/VTE Risk/Contraindication:


Risk Factor Score Per Nursin


RFS Level Per Nursing on Admit:  4+=Very High











MORE VALLE MD May 30, 2017 16:00

## 2017-05-30 NOTE — ONCOLOGY CONSULTATION
Visit Information


Visit Information


Date of Admission


May 29, 2017 at 19:50


Attending Physician


Mray Marino DO


Admitting Physician


Mihir Hall DO


Chief Complaint


Pneumonia and ?port leaking


Interval History


Mr. Jones is a 76 year white man known to me in the cancer center for h/o cancer 

of the tongue s/p chemoradiation 2012,. He presented to ER with increasing SOB 

and CT showed multiple nodules/infiltration and pulmonary edema. He was also 

noticed to have new onset aFib. At ER and ICU when  the nurses accessed his port

, it caused swelling around the site. We were called for solutions.


I consulted the patient on:


5/30/17


 17:17





Constitutional:  weakness


EENTM:  no symptoms reported


Respiratory:  cough, dyspnea on exertion, orthopnea, phlegm, short of breath


Cardiovascular:  edema, palpitations


Gastrointestinal:  no symptoms reported


Genitourinary:  no symptoms reported


Musculoskeletal:  no symptoms reported


Skin:  no symptoms reported





Health Status


Allergies


Coded Allergies:  


     No Known Drug Allergies (Verified , 8/9/07)





Home Medications


Albuterol Sulfate (Ventolin Hfa) 18 Gm Hfa.aer.ad, 2 PUFF INH Q4H PRN for 

SHORTNESS OF BREATH, (Reported)


Amlodipine Besylate (Amlodipine Besylate) 10 Mg Tablet, 10 MG PO DAILY, (

Reported)


Aspirin (Aspirin Ec 81 Mg) 81 Mg Tabec, 81 MG PO DAILY, (Reported)


Atorvastatin Calcium (Atorvastatin Calcium) 10 Mg Tablet, 10 MG PO HS, (Reported

)


Budesonide/Formoterol Fumarate (Symbicort 160-4.5 Mcg Inhaler) 10.2 Gm 

Hfa.aer.ad, 2 PUFF IH BID, (Reported)


Carvedilol (Carvedilol) 12.5 Mg Tablet, 12.5 MG PO BID, (Reported)


Cholecalciferol (Vitamin D3) (Vitamin D3) 5,000 Unit Tablet, 5,000 UNIT PO DAILY

, (Reported)


Enalapril Maleate (Enalapril Maleate) 20 Mg Tablet, 20 MG PO BID, (Reported)


Fluocinonide (Fluocinonide) 15 Gm Cream..g., TOP DAILY, (Reported)


   APPLY INSIDE CHEEK 


Fluticasone Propionate (Fluticasone Propionate) 16 Gm Spray.susp, 2 SPRAYS NS 

DAILY, (Reported)


Levothyroxine Sodium (Levothyroxine Sodium) 112 Mcg Tablet, 112 MCG PO HS, (

Reported)


Montelukast Sodium (Montelukast Sodium) 10 Mg Tablet, 10 MG PO HS, (Reported)


Omega-3 Fatty Acids/Fish Oil (Fish Oil 1,200 mg Softgel) 1 Each Capsule, 1,200 

MG PO DAILY, (Reported)


Prednisone (Prednisone) 10 Mg Tab, PO UD, #42 (Reported)


   FILLED 5-22-17 60MG X 2 DAYS, 50MG X 2 DAYS, 40MG X 2 DAYS, 30MG X 2 DAYS, 

20MG X 2 DAYS, 10MG X 2 DAYS THEN STOP 


Ranitidine Hcl (Zantac 150 Mg) 150 Mg Tablet, 150 MG PO BID, (Reported)


Tiotropium Bromide (Spiriva Respimat) 4 Gm Mist.inhal, 2 PUFF IH DAILY, (

Reported)





PMH-Social-Family Hx


Patient Social History


Alcohol Use:  Denies Use


Recreational Drug Use:  No


Smoking Status:  Former Smoker


Type Used:  Cigarettes


Recent Foreign Travel:  No


Contact w/other who traveled:  No


Recent Infectious Disease Expo:  No


Recent Hopitalizations:  No


Physical Abuse Screen:  No


Sexual Abuse:  No





Immunizations Up To Date


Date of Pneumonia Vaccine:  Sep 7, 2015


Date of Influenza Vaccine:  Oct 7, 2015





Physical Exam


Vital Signs





Vital Sign - Last 12Hours








 5/29/17 5/30/17





 18:05 04:01


 


Temp 99.0 


 


Pulse 52 


 


Resp 18 


 


B/P (MAP) 133/73 


 


Pulse Ox 97 


 


O2 Delivery Nasal Cannula 


 


O2 Flow Rate 3.00 


 


FiO2  95





Capillary Refill : Less Than 3 Seconds


General Appearance:  No Apparent Distress


HEENT:  PERRL/EOMI


Neck:  Non Tender, Supple


Respiratory:  No Accessory Muscle Use, No Respiratory Distress, Crackles


Cardiovascular:  Regular Rate, Rhythm


Gastrointestinal:  Non Tender, Soft


Extremity:  Pedal Edema


Neurologic/Psychiatric:  Alert, Oriented x3





Data Review


Labs


Laboratory Tests


5/29/17 18:25








5/29/17 19:08








5/30/17 03:51








Laboratory Tests


5/29/17 18:25: 


Prothrombin Time 28.3H, INR Comment 2.7H, Activated Partial Thromboplast Time 

36H, Blood Urea Nitrogen 21H, Glucose Level 130H, Magnesium Level 2.5H, Total 

Bilirubin 1.6H, Alanine Aminotransferase (ALT/SGPT) 67H, Total Protein 5.9L


5/29/17 19:08: 


Mean Platelet Volume 11.9H, Neutrophils (%) (Auto) 84H, Lymphocytes (%) (Auto) 

4L, Neutrophils # (Auto) 8.2H, Lymphocytes # (Auto) 0.4L, Monocytes # (Auto) 

1.1H, B-Type Natriuretic Peptide 668.7H


5/29/17 19:48: 


5/30/17 00:30: 


5/30/17 03:51: 


White Blood Count 14.7H, Mean Platelet Volume 12.0H, Neutrophils (%) (Auto) 90H

, Lymphocytes (%) (Auto) 2L, Neutrophils # (Auto) 13.1H, Lymphocytes # (Auto) 

0.3L, Monocytes # (Auto) 1.3H, Potassium Level 3.4L, Blood Urea Nitrogen 24H, 

Glucose Level 153H, Magnesium Level 2.8H


5/30/17 08:50: Prothrombin Time 14.9H





Laboratory Tests


5/29/17 18:25








5/29/17 19:08








5/30/17 03:51











Impression & Plan


Impression & Plan


IMP:


1. Multi lobe pneumonia on IV antibiotics


2. AFib and CHF s/p diuretics and much improved in terms of SOB.


3. Non-function and broken port. 


4. 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 

chemoradiation 


5. Chronic renal insufficiency with baseline creatinine 1.5 to 1.6.


6. 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


Plan:


1. Pt needs to 6 weeks f/u of chest Xray to make sure the resolution of the 

current lesions in the lung. If not, he will need re-evaluation of possible 

recurrence of cancer. 


2. Leave the broken port alone for now until infection/CHF clears. If in 6 weeks

, his CXR/CT clears, then I will arrange to remove the port as out-pt. If not, 

we may still need the port. I will then replace the port. 


3. Agree with your current treatment for pneumonia and CHF. 


4. When you discharge patient, please make sure to set up 6 weeks f/u with Dr Ortez at the cancer center. 


Thank you for the consultation.











MARIETTA ORTEZ MD May 30, 2017 17:20

## 2017-05-30 NOTE — DIAGNOSTIC IMAGING REPORT
PROCEDURE: 

CT chest with contrast only.



TECHNIQUE: 

Multiple contiguous axial images were obtained through the chest

after administration of intravenous contrast.



INDICATION:  

Hemoptysis. Breathing difficulty. History of cancer of the

tongue.



FINDINGS:

There are areas of consolidation seen in the left upper lobe,

lingula, and right middle lobe with mixed groundglass and solid

consolidation seen. An area of solid consolidation measuring 3.4

cm in the inferior lingula is seen. These are favored to be

related to pneumonia. Followup to resolution is recommended.

There is no pleural effusion. No pericardial effusion. The heart

size is normal. The thoracic aorta is normal in caliber. There is

a healed sternotomy with suggestion of prior CABG performed.

Aortic valve calcifications are seen.



There is no mediastinal mass. Borderline sized lymph nodes

measuring 1.2 cm in the right hilum and 1 cm in the infracarinal

region are similar to the 2012 comparison exam. No axillary

lymphadenopathy.



Sections in the upper abdomen demonstrate upper pole renal cysts.



The osseous structures demonstrate degenerative changes.



IMPRESSION: 

Bilateral mixed consolidation involving mostly the left upper

lobe, lingula, and right middle lobe, most likely related to

pneumonia. Followup PA and lateral radiographs of the chest in 6

weeks after treatment would be recommended to ensure resolution.



Dictated by: 



  Dictated on workstation # [id number]

## 2017-05-31 VITALS — DIASTOLIC BLOOD PRESSURE: 71 MMHG | SYSTOLIC BLOOD PRESSURE: 128 MMHG

## 2017-05-31 VITALS — SYSTOLIC BLOOD PRESSURE: 128 MMHG | DIASTOLIC BLOOD PRESSURE: 70 MMHG

## 2017-05-31 VITALS — DIASTOLIC BLOOD PRESSURE: 63 MMHG | SYSTOLIC BLOOD PRESSURE: 136 MMHG

## 2017-05-31 VITALS — SYSTOLIC BLOOD PRESSURE: 119 MMHG | DIASTOLIC BLOOD PRESSURE: 59 MMHG

## 2017-05-31 VITALS — SYSTOLIC BLOOD PRESSURE: 114 MMHG | DIASTOLIC BLOOD PRESSURE: 49 MMHG

## 2017-05-31 VITALS — SYSTOLIC BLOOD PRESSURE: 117 MMHG | DIASTOLIC BLOOD PRESSURE: 52 MMHG

## 2017-05-31 VITALS — DIASTOLIC BLOOD PRESSURE: 77 MMHG | SYSTOLIC BLOOD PRESSURE: 131 MMHG

## 2017-05-31 VITALS — SYSTOLIC BLOOD PRESSURE: 132 MMHG | DIASTOLIC BLOOD PRESSURE: 70 MMHG

## 2017-05-31 VITALS — DIASTOLIC BLOOD PRESSURE: 81 MMHG | SYSTOLIC BLOOD PRESSURE: 138 MMHG

## 2017-05-31 VITALS — SYSTOLIC BLOOD PRESSURE: 118 MMHG | DIASTOLIC BLOOD PRESSURE: 86 MMHG

## 2017-05-31 VITALS — DIASTOLIC BLOOD PRESSURE: 82 MMHG | SYSTOLIC BLOOD PRESSURE: 130 MMHG

## 2017-05-31 VITALS — DIASTOLIC BLOOD PRESSURE: 69 MMHG | SYSTOLIC BLOOD PRESSURE: 124 MMHG

## 2017-05-31 VITALS — SYSTOLIC BLOOD PRESSURE: 130 MMHG | DIASTOLIC BLOOD PRESSURE: 68 MMHG

## 2017-05-31 LAB
ANION GAP SERPL CALC-SCNC: 9 MMOL/L (ref 5–14)
BASOPHILS # BLD AUTO: 0 10^3/UL (ref 0–0.1)
BASOPHILS NFR BLD AUTO: 0 % (ref 0–10)
BUN SERPL-MCNC: 28 MG/DL (ref 7–18)
BUN/CREAT SERPL: 26
CALCIUM SERPL-MCNC: 9 MG/DL (ref 8.5–10.1)
CHLORIDE SERPL-SCNC: 101 MMOL/L (ref 98–107)
CO2 SERPL-SCNC: 32 MMOL/L (ref 21–32)
CREAT SERPL-MCNC: 1.08 MG/DL (ref 0.6–1.3)
EOSINOPHIL # BLD AUTO: 0 10^3/UL (ref 0–0.3)
EOSINOPHIL NFR BLD AUTO: 0 % (ref 0–10)
ERYTHROCYTE [DISTWIDTH] IN BLOOD BY AUTOMATED COUNT: 14.2 % (ref 10–14.5)
GFR SERPLBLD BASED ON 1.73 SQ M-ARVRAT: > 60 ML/MIN
GLUCOSE SERPL-MCNC: 143 MG/DL (ref 70–105)
LYMPHOCYTES # BLD AUTO: 0.3 X 10^3 (ref 1–4)
LYMPHOCYTES NFR BLD AUTO: 2 % (ref 12–44)
MAGNESIUM SERPL-MCNC: 2.3 MG/DL (ref 1.8–2.4)
MCH RBC QN AUTO: 30 PG (ref 25–34)
MCHC RBC AUTO-ENTMCNC: 33 G/DL (ref 32–36)
MCV RBC AUTO: 91 FL (ref 80–99)
MONOCYTES # BLD AUTO: 0.9 X 10^3 (ref 0–1)
MONOCYTES NFR BLD AUTO: 6 % (ref 0–12)
NEUTROPHILS # BLD AUTO: 14.4 X 10^3 (ref 1.8–7.8)
NEUTROPHILS NFR BLD AUTO: 92 % (ref 42–75)
PHOSPHATE SERPL-MCNC: 3.3 MG/DL (ref 2.3–4.7)
PLATELET # BLD: 179 10^3/UL (ref 130–400)
PMV BLD AUTO: 11.9 FL (ref 7.4–10.4)
POTASSIUM SERPL-SCNC: 3.9 MMOL/L (ref 3.6–5)
RBC # BLD AUTO: 4.58 10^6/UL (ref 4.35–5.85)
SODIUM SERPL-SCNC: 142 MMOL/L (ref 135–145)
WBC # BLD AUTO: 15.6 10^3/UL (ref 4.3–11)

## 2017-05-31 RX ADMIN — ENOXAPARIN SODIUM SCH MG: 100 INJECTION SUBCUTANEOUS at 07:54

## 2017-05-31 RX ADMIN — IPRATROPIUM BROMIDE AND ALBUTEROL SULFATE SCH ML: .5; 3 SOLUTION RESPIRATORY (INHALATION) at 07:32

## 2017-05-31 RX ADMIN — IPRATROPIUM BROMIDE AND ALBUTEROL SULFATE SCH ML: .5; 3 SOLUTION RESPIRATORY (INHALATION) at 11:28

## 2017-05-31 RX ADMIN — FAMOTIDINE SCH MG: 20 TABLET, FILM COATED ORAL at 08:55

## 2017-05-31 RX ADMIN — Medication SCH UNIT: at 07:54

## 2017-05-31 RX ADMIN — ATORVASTATIN CALCIUM SCH MG: 10 TABLET, FILM COATED ORAL at 20:24

## 2017-05-31 RX ADMIN — Medication SCH ML: at 21:46

## 2017-05-31 RX ADMIN — CARVEDILOL SCH MG: 12.5 TABLET, FILM COATED ORAL at 20:24

## 2017-05-31 RX ADMIN — IPRATROPIUM BROMIDE AND ALBUTEROL SULFATE SCH ML: .5; 3 SOLUTION RESPIRATORY (INHALATION) at 15:58

## 2017-05-31 RX ADMIN — MONTELUKAST SCH MG: 10 TABLET, FILM COATED ORAL at 20:24

## 2017-05-31 RX ADMIN — CARVEDILOL SCH MG: 12.5 TABLET, FILM COATED ORAL at 08:55

## 2017-05-31 RX ADMIN — SODIUM CHLORIDE SCH MLS/HR: 900 INJECTION INTRAVENOUS at 21:46

## 2017-05-31 RX ADMIN — Medication SCH ML: at 07:55

## 2017-05-31 RX ADMIN — ENALAPRIL MALEATE SCH MG: 10 TABLET ORAL at 08:56

## 2017-05-31 RX ADMIN — IPRATROPIUM BROMIDE AND ALBUTEROL SULFATE SCH ML: .5; 3 SOLUTION RESPIRATORY (INHALATION) at 19:12

## 2017-05-31 RX ADMIN — Medication SCH MG: at 08:55

## 2017-05-31 RX ADMIN — FAMOTIDINE SCH MG: 20 TABLET, FILM COATED ORAL at 20:24

## 2017-05-31 RX ADMIN — ASPIRIN SCH MG: 81 TABLET ORAL at 08:56

## 2017-05-31 RX ADMIN — Medication SCH ML: at 10:42

## 2017-05-31 RX ADMIN — LEVOTHYROXINE SODIUM SCH MCG: 0.11 TABLET ORAL at 20:24

## 2017-05-31 RX ADMIN — SODIUM CHLORIDE SCH MLS/HR: 900 INJECTION INTRAVENOUS at 10:42

## 2017-05-31 RX ADMIN — ENALAPRIL MALEATE SCH MG: 10 TABLET ORAL at 20:24

## 2017-05-31 NOTE — CARDIOLOGY PROGRESS NOTE
Cardiology SOAP Progress Note


Subjective:


No symptoms





Objective:


I&O/Vital Signs





Vital Sign - Last 12Hours








 5/30/17 5/31/17 5/31/17 5/31/17





 23:00 00:00 01:00 01:17


 


Pulse 90 70 65 58


 


B/P (MAP) 148/79 132/70 130/82 


 


Pulse Ox 95 97 97 


 


O2 Flow Rate 3.00 3.00 3.00 





 5/31/17 5/31/17 5/31/17 5/31/17





 02:00 03:00 04:00 05:19


 


Pulse 64 47 49 55


 


B/P (MAP) 138/81 131/77 124/69 128/71


 


Pulse Ox 96 96 96 96


 


O2 Flow Rate 3.00 3.00 3.00 3.00





 5/31/17 5/31/17 5/31/17 5/31/17





 06:09 07:00 07:34 07:48


 


Temp    98.3


 


Pulse 66 84  88


 


Resp 24   20


 


B/P (MAP) 114/49   118/86


 


Pulse Ox 96  93 93


 


O2 Flow Rate 3.00  3.00 3.00














Intake and Output 


 


 5/31/17





 00:00


 


Intake Total 1400 ml


 


Output Total 1425 ml


 


Balance -25 ml








Weight (Pounds):  168


Weight (Ounces):  2.0


Weight (Calculated Kilograms):  76.216558


Constitutional:  No appears stated age, No AAO x 3, No apparent distress, No 

PERRL, No well-developed, No well-nourished, No other


Respiratory:  No accessory muscle use, No respiratory distress, No chest tender

, No chest expansion is symmetric, No chest is bilaterally symmetric, No lungs 

clear to percussion, No lungs clear to auscultation, No crackles, No rhonchi, 

No rales, No stridor, No wheezing, No pleural rub, No other


Cardiovascular:  regular rate-rhythm, No irregularly irregular, No extra beats, 

No parasternal heave is noted, No JVD, No edema, No bradycardia, No tachycardia

, No point of maximal impulse, No cardiac thrills are palpable, S1 and S2, No 

gallop/S3, No gallop/S4, No diastolic murmur, No systolic murmur, No friction 

rub, No click, No other


Gastrointestional:  No tender, No soft, No round, No distended, No pulsatile 

mass, No organomegaly, No guarding, No rebound, No tenderness, No hernia, No 

mass, No audible bowel sounds, No abnormal bowel sounds, No abdominal bruits, 

No spleenomegaly, No other


Extremities:  No normal range of motion, No non-tender, No normal inspection, 

No pedal edema, No calf tenderness, No normal capillary refill, No pelvis stable

, No calf tenderness, No inflammation, No pedal edema, No slow capillary refill

, No swelling, No other, No abrasion, No clubbing, No cyanosis, No ecchymosis, 

No laceration, No no lower extremity edema bilateral, No significant edema, No 

tenderness, No wound


Neurologic/Psychiatric:  No CNs II-XII nml as tested, No no motor/sensory 

deficits, No alert, No normal mood/affect, No oriented x 3, No abnormal 

cerebellar tests, No abnormal CNs II-XII, No abnormal gait, No aphasia, No EOM 

palsy, No facial droop, No motor weakness, No sensory deficit, No depressed 

affect, No disoriented x 3, No other, No grossly intact, No power is 5/5 both 

on sides


Skin:  No normal color, No warm/dry, No cyanosis, No cool, No diaphoresis, No 

damp, No ecchymosis, No jaundice, No mottled, No pallor, No rash, No tattoos/

piercings, No ulcerations, No rash on exposed areas, No ulcerations on exposed 

areas, No other





Results/Procedures:


Labs


Laboratory Tests


5/30/17 17:57: 


Sodium Level 143, Potassium Level 3.6, Chloride Level 98, Carbon Dioxide Level 

32, Anion Gap 13, Blood Urea Nitrogen 30H, Creatinine 1.40H, Estimat Glomerular 

Filtration Rate 50, BUN/Creatinine Ratio 21, Glucose Level 212H, Calcium Level 

9.6, Total Bilirubin 2.1H, Aspartate Amino Transf (AST/SGOT) 16, Alanine 

Aminotransferase (ALT/SGPT) 55, Alkaline Phosphatase 63, Total Protein 6.4, 

Albumin 3.8


5/31/17 04:36: 


Sodium Level 142, Potassium Level 3.9, Chloride Level 101, Carbon Dioxide Level 

32, Anion Gap 9, Blood Urea Nitrogen 28H, Creatinine 1.08, Estimat Glomerular 

Filtration Rate > 60, BUN/Creatinine Ratio 26, Glucose Level 143H, Calcium 

Level 9.0, White Blood Count 15.6H, Red Blood Count 4.58, Hemoglobin 13.7, 

Hematocrit 42, Mean Corpuscular Volume 91, Mean Corpuscular Hemoglobin 30, Mean 

Corpuscular Hemoglobin Concent 33, Red Cell Distribution Width 14.2, Platelet 

Count 179, Mean Platelet Volume 11.9H, Neutrophils (%) (Auto) 92H, Lymphocytes (

%) (Auto) 2L, Monocytes (%) (Auto) 6, Eosinophils (%) (Auto) 0, Basophils (%) (

Auto) 0, Neutrophils # (Auto) 14.4H, Lymphocytes # (Auto) 0.3L, Monocytes # (

Auto) 0.9, Eosinophils # (Auto) 0.0, Basophils # (Auto) 0.0, Phosphorus Level 

3.3, Magnesium Level 2.3





Microbiology


5/29/17 Blood Culture - Preliminary, Resulted


          No growth








A/P:


Assessment/Dx:


Shortness of breath, hemoptysis, pneumonia, COPD.


Atrial fibrillation.


Coronary artery disease


Plan:


This is a 76-year-old gentleman who has history of significant emphysema.  He 

also has history of tongue cancer received previous chemotherapy.  He presented 

with hemoptysis and shortness of breath.  Will defer further pulmonary 

management to Dr. Quesada.  





Patient has known history of coronary artery disease and will continue 

outpatient medications for CAD.  Possible new onset atrial fibrillation -> no 

clear evidence on EKG or telemetry of sustained atrial fibrillation.  He does 

have sinus rhythm with very frequent PACs and PVCs.  One episode of 

nonsustained VT was also noted.  No oral anticoagulation till reason for 

hemoptysis is confirmed.  Continue rate control.  Elevated INR without 

anticoagulation.





Cardiology will continue to follow.








Thank you for your consultation. Please call me if you have any questions.








YESENIA Enamorado MD, FACP, FACC, FSCAI, FHRS, CCDS


Interventional Cardiology


Cardiac Electrophysiology


Vascular Medicine and Endovascular Interventions











SABRINA ENAMORADO MD May 31, 2017 10:39 am

## 2017-05-31 NOTE — PULMONARY PROGRESS NOTE
Subjective


Subjective/Events-last exam


SOB is improved. Hemoptysis is also improving





Exam


Exam





Vital Signs








  Date Time  Temp Pulse Resp B/P (MAP) Pulse Ox O2 Delivery O2 Flow Rate FiO2


 


17 06:09  66 24 114/49 96  3.00 


 


17 05:19  55  128/71 96  3.00 


 


17 04:00  49  124/69 96  3.00 


 


17 03:00  47  131/77 96  3.00 


 


17 02:00  64  138/81 96  3.00 


 


17 01:17  58      


 


17 01:00  65  130/82 97  3.00 


 


17 00:00  70  132/70 97  3.00 


 


17 23:00  90  148/79 95  3.00 


 


17 22:05  87      


 


17 22:00  102  146/105 97  3.00 


 


17 21:00  72  164/119 95  3.00 


 


17 21:00       3.00 


 


17 20:17 97.9 88 18 134/79 96  3.00 


 


17 19:14     96  3.00 


 


17 19:00  99  134/74 94  3.00 


 


17 16:00    102/95 96  3.00 


 


17 15:52 98.7       


 


17 14:56     98  3.00 


 


17 13:00  65  140/67 96  3.00 


 


17 13:00  65      


 


17 12:55 98.2       


 


17 12:00  84  129/68   3.00 


 


17 11:00    140/76 96  3.00 


 


17 10:10     96  3.00 


 


17 09:00    138/64    


 


17 08:27 98.7       


 


17 08:00       3.00 


 


17 08:00    156/89 90  3.00 














I & O 


 


 17





 07:00


 


Intake Total 1600 ml


 


Output Total 1875 ml


 


Balance -275 ml








General Appearance:  No Apparent Distress


HEENT:  PERRL/EOMI


Neck:  Non Tender, Supple


Respiratory:  No Accessory Muscle Use, No Respiratory Distress, Crackles


Cardiovascular:  Regular Rate, Rhythm


Capillary Refill:  Less Than 3 Seconds


Gastrointestinal:  normal bowel sounds, non tender, soft


Extremity:  Pedal Edema


Neurologic/Psychiatric:  Alert, Oriented x3


Skin:  Normal Color, Warm/Dry


Lymphatic:  No Adenopathy





Results


Lab


Laboratory Tests


17 18:25








17 19:08








17 03:51








17 17:57








17 04:36











Assessment/Plan


Assessment/Plan


CHF with pulmonary edema last EF  was 45% 


   -lasix 


   -IVF hep lock 


COPD


   -D/C solumedrol  and continue prednisone taper


   -SVNS 


Pneumonia with blood tinged sputum -improving 


   -continue Abx  and await cultures


   -check sputum culture


   -monitor bloody sputum 


   -PT will need f/u CXR 6 wks after discharge


      -may need bronchoscopy 


Afib - new onset 


   -Lovenox therapeutic dosing is ordered 


      -hold for now secondary to coags 


   -INR on admission is 2.7-- this was lab error repeat labs show normal INR 


      


tobacco hx quit smoking 20 yrs ago  30pk/yr hx 





Labs and CXR reviewed 








233





Clinical Quality Measures


DVT/VTE Risk/Contraindication:


Risk Factor Score Per Nursin


RFS Level Per Nursing on Admit:  4+=Very High











MYAH OROZCO DO May 31, 2017 07:36

## 2017-05-31 NOTE — PROGRESS NOTE-HOSPITALIST
Standard Progress Note


Progress Notes/Assess & Plan


Date Seen


5/31/17


Assess & Plan/Chief Complaint


The patient reports that he is feeling considerably better today.  He still has 

a slight cough but reports his shortness of breath is better.  Vital signs are 

stable.  The rhythm at this time appears to be sinus with some atrial premature 

beats.  Denies chest pain.  He has been afebrile.





Physical exam: He is alert and talkative.  Lungs show somewhat distant breath 

sounds but with no tachypnea or hypoxia.  CV is nearly regular.  Ankles show no 

pedal edema.





Impression: Dyspnea, multifactorial.  2.history of head and neck cancer with 

previous chemotherapy, surgical, and radiation therapy.





Plan: Transfer to the floor and ambulate.


Labs


Laboratory Tests


5/29/17 18:25








5/29/17 19:08








5/30/17 03:51








5/30/17 17:57








5/31/17 04:36




















MORE VALLE MD May 31, 2017 12:20

## 2017-05-31 NOTE — DIAGNOSTIC IMAGING REPORT
EXAMINATION:

Portable upright radiograph of the chest.



INDICATION: 

Pneumonia.



COMPARISON: 

05/30/2017.



FINDINGS:

There are bilateral perihilar infiltrates, similar to the

previous exam. The heart size is at the upper limits of normal.

No effusion or pneumothorax.



The mediastinum and sinan appear unremarkable. Sternotomy wires

are seen. There is an infusion port without change.



IMPRESSION: 

Stable bilateral perihilar infiltrates.



Dictated by: 



  Dictated on workstation # [id number]

## 2017-06-01 VITALS — DIASTOLIC BLOOD PRESSURE: 56 MMHG | SYSTOLIC BLOOD PRESSURE: 113 MMHG

## 2017-06-01 VITALS — DIASTOLIC BLOOD PRESSURE: 66 MMHG | SYSTOLIC BLOOD PRESSURE: 132 MMHG

## 2017-06-01 VITALS — SYSTOLIC BLOOD PRESSURE: 135 MMHG | DIASTOLIC BLOOD PRESSURE: 65 MMHG

## 2017-06-01 VITALS — SYSTOLIC BLOOD PRESSURE: 143 MMHG | DIASTOLIC BLOOD PRESSURE: 87 MMHG

## 2017-06-01 RX ADMIN — ASPIRIN SCH MG: 81 TABLET ORAL at 08:55

## 2017-06-01 RX ADMIN — IPRATROPIUM BROMIDE AND ALBUTEROL SULFATE SCH ML: .5; 3 SOLUTION RESPIRATORY (INHALATION) at 14:39

## 2017-06-01 RX ADMIN — Medication SCH ML: at 14:09

## 2017-06-01 RX ADMIN — Medication SCH MG: at 08:54

## 2017-06-01 RX ADMIN — Medication SCH ML: at 06:04

## 2017-06-01 RX ADMIN — FAMOTIDINE SCH MG: 20 TABLET, FILM COATED ORAL at 08:54

## 2017-06-01 RX ADMIN — ENALAPRIL MALEATE SCH MG: 10 TABLET ORAL at 08:55

## 2017-06-01 RX ADMIN — CARVEDILOL SCH MG: 12.5 TABLET, FILM COATED ORAL at 08:54

## 2017-06-01 RX ADMIN — IPRATROPIUM BROMIDE AND ALBUTEROL SULFATE SCH ML: .5; 3 SOLUTION RESPIRATORY (INHALATION) at 11:22

## 2017-06-01 RX ADMIN — Medication SCH UNIT: at 06:04

## 2017-06-01 RX ADMIN — IPRATROPIUM BROMIDE AND ALBUTEROL SULFATE SCH ML: .5; 3 SOLUTION RESPIRATORY (INHALATION) at 07:04

## 2017-06-01 NOTE — DISCHARGE SUMMARY-HOSPITALIST
Diagnosis/Chief Complaint


Date of Admission


May 29, 2017 at 19:50


Date of Discharge





Discharge Diagnosis


Impression: Dyspnea, multifactorial but due to AECOPD and pneumonia.  2.history 

of head and neck cancer with previous chemotherapy, surgical, and radiation 

therapy. 3. Episode of AF consulting Dr Enamorado 4. Leukocytosis due to steroids 

5. Thrush








Reason Hospital Visit/Course


The patient is a 76-year-old white male who was admitted through the emergency 

room yesterday.  He presented with a complaint of shortness of breath over a 

period of about 3 weeks.  He reported that he had been started on home oxygen 1 

week prior to presentation.  He noted orthopnea and leg swelling over the 

several nights prior to this presentation.  He denied fever or chest pain.  He 

reported an occasional cough with some sputum production.  He has a past 

history of coronary artery disease and a previous heart attack.  He had been a 

smoker and smoking is many as 3 packs per day.  He Discontinued this habit 15 

years ago.











Notes from: 17








Chart Review:


No fever


Vitals stable


Reviewed Dr Quesada note





Dr. Enamorado Review:


Afib episode discussed


Was okay yesterday





Pharmacy Review:


Pt is not currently on anticoagulation for the Afib


Pt did not take anticoagulants at home, but he has been taking Aspirin


Repeat labs this week showed discrepancies between INR levels





Patient Interview:


Pt states that Dr. Quesada saw the pt yesterday, but not today


Pt states that Cardiology informed him he may be able to DC today


Pt states that he is supposed to wear O2 at home, but does not always use it.


Pt confirms PCP as Dr Hall; pt sees him about every 6 months


Physical exam stable. Lungs sound perfect.


Pt states that he is having issues with swallowing the K+ pills. Pt is better 

able to swallow them after they have dissolved a bit. Pt states that one of the 

dissolving pills stuck to the roof of his mouth and he tried to scrub it out 

with a toothbrush. After this, pt states that the RN informed him that he 

appeared to have developed thrush.


Pt has not been wearing his dentures


Pt does appear to have the beginnings of thrush


Pt confirms [facility] as Pharmacy





AFVSS, Pleasant, O x 3, improved


RRR, CTAB diminished in bases


white coating on tongue





Plan:


Nystatin Swish and swallow treatment for Thrush


Send meds to [facility]





Scribed by Traci Calvillo under the direct supervision of Dr. Rose.





Hospital course: pt had an uncomplicated hospital course. Patient was placed on 

telemetry and monitor closely and cardiology was consulted for question of 

episode of atrial fibrillation.  Antibiotics were empirically placed for 

pneumonia on chest x-ray and CT scan.  Dr. Sangita Mirza consultation placed on 

steroid taper and was monitored closely.  He does have home oxygen which she 

uses periodically and I recommended he do more usage with that therapeutic 

modality.  At time of discharge cardiology was planning on evaluating prior to 

discharge and plan whether or not anticoagulation would be needed for episode 

of atrial fibrillation for stroke prophylaxis.





Discharge Summary


Discharge Physical Examination


Allergies:  


Coded Allergies:  


     No Known Drug Allergies (Verified , 07)


Vitals & I&Os





Vital Signs








  Date Time  Temp Pulse Resp B/P (MAP) Pulse Ox O2 Delivery O2 Flow Rate FiO2


 


17 14:40     95  3.00 


 


17 12:00 99.2 52 16 113/56    


 


17 04:01        95


 


17 18:05      Nasal Cannula  











Hospital Course


Labs (last 24 hrs)





Microbiology


17 Blood Culture - Preliminary, Resulted


          No growth


17 Gram Stain - Final, Resulted


          


17 Sputum Culture - Preliminary, Resulted


          Yeast Species








Discharge


Home Medications:





Active Scripts


Active


Prednisone 10 Mg Tab.ds.pk 10 Mg PO DAILY


     Take 6 tabs(60mg)daily,decrease by 1 tab(10MG)daily.


Cefdinir 300 Mg Capsule 300 Mg PO BID


Nystatin 100,000 Unit/1 Ml Oral.susp 5 Ml PO Q6HR


Reported


Symbicort 160-4.5 Mcg Inhaler (Budesonide/Formoterol Fumarate) 10.2 Gm 

Hfa.aer.ad 2 Puff IH BID


Fluocinonide 15 Gm Cream..g.  TOP DAILY


     APPLY INSIDE CHEEK


Spiriva Respimat (Tiotropium Bromide) 4 Gm Mist.inhal 2 Puff IH DAILY


Ventolin Hfa (Albuterol Sulfate) 18 Gm Hfa.aer.ad 2 Puff INH Q4H PRN


Montelukast Sodium 10 Mg Tablet 10 Mg PO HS


Fluticasone Propionate 16 Gm Spray.susp 2 Sprays NS DAILY


Vitamin D3 (Cholecalciferol (Vitamin D3)) 5,000 Unit Tablet 5,000 Unit PO DAILY


Fish Oil 1,200 mg Softgel (Omega-3 Fatty Acids/Fish Oil) 1 Each Capsule 1,200 

Mg PO DAILY


Levothyroxine Sodium 112 Mcg Tablet 112 Mcg PO HS


Atorvastatin Calcium 10 Mg Tablet 10 Mg PO HS


Amlodipine Besylate 10 Mg Tablet 10 Mg PO DAILY


Carvedilol 12.5 Mg Tablet 12.5 Mg PO BID


Enalapril Maleate 20 Mg Tablet 20 Mg PO BID


Aspirin Ec 81 Mg (Aspirin) 81 Mg Tabec 81 Mg PO DAILY


Zantac 150 Mg (Ranitidine HCl) 150 Mg Tablet 150 Mg PO BID





Instructions to patient/family


Please see electonic discharge instructions given to patient.





Clinical Quality Measures


DVT/VTE Risk/Contraindication:


Risk Factor Score Per Nursin


RFS Level Per Nursing on Admit:  4+=Very High











TISHA ROSE DO 2017 10:38

## 2017-06-01 NOTE — CARDIOLOGY PROGRESS NOTE
Cardiology SOAP Progress Note


Subjective:


doing better today but still has blood tinged sputum. still coughing





Objective:


I&O/Vital Signs





Vital Sign - Last 12Hours








 6/1/17 6/1/17 6/1/17 6/1/17





 04:06 07:00 07:05 07:10


 


Temp 97.4   


 


Pulse 62 62  


 


Resp 18   


 


B/P (MAP) 132/66   


 


Pulse Ox 97  97 97


 


O2 Flow Rate 3.00  3.00 


 


    





 6/1/17 6/1/17 6/1/17 6/1/17





 08:00 08:00 11:22 12:00


 


Temp  98.6  99.2


 


Pulse  70  52


 


Resp  20  16


 


B/P (MAP)  143/87  113/56


 


Pulse Ox  97 96 96


 


O2 Flow Rate 3.00 3.00 3.00 3.00














Intake and Output 


 


 6/1/17





 00:00


 


Intake Total 1330 ml


 


Output Total 900 ml


 


Balance 430 ml








Weight (Pounds):  157


Weight (Ounces):  3.0


Weight (Calculated Kilograms):  71.494233


Constitutional:  No appears stated age, No AAO x 3, No apparent distress, No 

PERRL, No well-developed, No well-nourished, No other


Respiratory:  No accessory muscle use, No respiratory distress, No chest tender

, No chest expansion is symmetric, No chest is bilaterally symmetric, No lungs 

clear to percussion, No lungs clear to auscultation, No crackles, No rhonchi, 

No rales, No stridor, No wheezing, No pleural rub, No other


Cardiovascular:  regular rate-rhythm, No irregularly irregular, No extra beats, 

No parasternal heave is noted, No JVD, No edema, No bradycardia, No tachycardia

, No point of maximal impulse, No cardiac thrills are palpable, S1 and S2, No 

gallop/S3, No gallop/S4, No diastolic murmur, No systolic murmur, No friction 

rub, No click, No other


Gastrointestional:  No tender, No soft, No round, No distended, No pulsatile 

mass, No organomegaly, No guarding, No rebound, No tenderness, No hernia, No 

mass, No audible bowel sounds, No abnormal bowel sounds, No abdominal bruits, 

No spleenomegaly, No other


Extremities:  No normal range of motion, No non-tender, No normal inspection, 

No pedal edema, No calf tenderness, No normal capillary refill, No pelvis stable

, No calf tenderness, No inflammation, No pedal edema, No slow capillary refill

, No swelling, No other, No abrasion, No clubbing, No cyanosis, No ecchymosis, 

No laceration, No no lower extremity edema bilateral, No significant edema, No 

tenderness, No wound


Neurologic/Psychiatric:  No CNs II-XII nml as tested, No no motor/sensory 

deficits, No alert, No normal mood/affect, No oriented x 3, No abnormal 

cerebellar tests, No abnormal CNs II-XII, No abnormal gait, No aphasia, No EOM 

palsy, No facial droop, No motor weakness, No sensory deficit, No depressed 

affect, No disoriented x 3, No other, No grossly intact, No power is 5/5 both 

on sides


Skin:  No normal color, No warm/dry, No cyanosis, No cool, No diaphoresis, No 

damp, No ecchymosis, No jaundice, No mottled, No pallor, No rash, No tattoos/

piercings, No ulcerations, No rash on exposed areas, No ulcerations on exposed 

areas, No other





Results/Procedures:


Labs





Microbiology


5/29/17 Blood Culture - Preliminary, Resulted


          No growth


5/31/17 Gram Stain - Final, Resulted


          


5/31/17 Sputum Culture - Preliminary, Resulted


          Yeast Species








A/P:


Assessment/Dx:


Shortness of breath, hemoptysis, pneumonia, COPD.


Atrial fibrillation.


Coronary artery disease


Plan:


This is a 76-year-old gentleman who has history of significant emphysema.  He 

also has history of tongue cancer received previous chemotherapy.  He presented 

with hemoptysis and shortness of breath.  Will defer further pulmonary 

management to Dr. Quesada.  





Patient has known history of coronary artery disease and will continue 

outpatient medications for CAD.  





Possible new onset atrial fibrillation -> no clear evidence on EKG or telemetry 

of sustained atrial fibrillation (therefore I am not sure if the patient has AF)

.  He does have sinus rhythm with very frequent PACs and PVCs.  One episode of 

nonsustained VT was also noted.  No oral anticoagulation till reason for 

hemoptysis is confirmed.  Continue rate control.  Elevated INR without 

anticoagulation. I will suggest an event monitor for thirty days and he can 

follow with his outpatient cardiologist Dr Hammonds.








Thank you for your consultation. Please call me if you have any questions.








YESENIA Enamorado MD, FACP, FACC, FSCAI, FHRS, CCDS


Interventional Cardiology


Cardiac Electrophysiology


Vascular Medicine and Endovascular Interventions











SABRINA ENAMORADO MD Jun 1, 2017 2:12 pm

## 2017-06-01 NOTE — PHYSICIAN QUERY
PQ-Further Specificity


Admission/Discharge


Admission Date: May 29, 2017 at 19:50 


Discharge Date:





The medical record reflects the following clinical scenario:





History/Risk Factors:


New onset Atrial Fibrillation


CHF 





Clinical Findings:


.7


Shortness of breath


Swelling of legs and feet


EF 40% on echocardiogram on 5/30.





Treatment:


IV Lasix 80 mg





Question:  Can you further specify Congestive Heart Failure per the clinical 

indicators above? Please document below.





1. Please clarify if acute, chronic or acute on chronic.





2. Please clarify if systolic or diastolic or combined.





3. Other, with explanation of the clinical findings.





4. Clinically undetermined, no explanation for the clinical findings.





PHYSICIAN RESPONSE


Can you specify per above:  1


Explanation/Clinical Findings


acute on chronic systolic CHF








Please remember a lack of response to the above will prompt a phone page by [facility]/

coding staff.





In responding to this query, please exercise your independent professional 

judgment.  The purpose of this communication is to more accurately reflect the 

complexity of your patients condition. The fact that a question is asked does 

not imply that any particular answer is desired or expected.  





Thank you for your timely response to this clarification.      


   


Requestors name: Lavonne Rojas [facility]   





Phone # ext [phone] or [phone]








THIS PHYSICIAN QUERY FORM IS A PERMANENT PART OF THE MEDICAL RECORD











LAVONNE ROJAS Jun 1, 2017 08:45


SABRINA SCHERER MD Jun 1, 2017 09:38

## 2017-06-01 NOTE — PULMONARY PROGRESS NOTE
Subjective


Subjective/Events-last exam


pt feels improved.





Exam


Exam





Vital Signs








  Date Time  Temp Pulse Resp B/P (MAP) Pulse Ox O2 Delivery O2 Flow Rate FiO2


 


17 08:00 98.6 70 20 143/87 97  3.00 


 


17 07:10     97   


 


17 07:05     97  3.00 


 


17 04:06 97.4 62 18 132/66 97  3.00 


 


17 01:00  77      


 


17 00:12 97.6 56 18 135/65 98  3.00 


 


17 19:40       3.00 


 


17 19:12     100  3.00 


 


17 19:00  86      


 


17 18:20 99.1 65 20 136/63 95  3.00 


 


17 16:00     92   


 


17 15:40 97.0 56 20 130/68 98  3.00 


 


17 14:26 97.5 62 20 117/52 96  3.00 


 


17 13:00  69      


 


17 12:00 98.3 60 18 128/70 95  3.00 


 


17 11:32     96  3.00 


 


17 09:00  57 28 119/59 96  3.00 


 


17 09:00       3.00 














I & O 


 


 17





 07:00


 


Intake Total 1430 ml


 


Output Total 900 ml


 


Balance 530 ml








General Appearance:  No Apparent Distress


HEENT:  PERRL/EOMI


Neck:  Non Tender, Supple


Respiratory:  No Accessory Muscle Use, No Respiratory Distress, Crackles


Cardiovascular:  Regular Rate, Rhythm


Capillary Refill:  Less Than 3 Seconds


Gastrointestinal:  normal bowel sounds, non tender, soft


Extremity:  Pedal Edema


Neurologic/Psychiatric:  Alert, Oriented x3


Skin:  Normal Color, Warm/Dry


Lymphatic:  No Adenopathy





Results


Lab


Laboratory Tests


17 17:57








17 04:36











Assessment/Plan


Assessment/Plan


CHF with pulmonary edema last EF  was 45% 


   -lasix 


   -IVF hep lock 


COPD


   - prednisone taper


   -SVNS 


Pneumonia with blood tinged sputum -improving 


   -continue Abx  and await cultures


   -check sputum culture


   -monitor bloody sputum 


   -PT will need f/u CXR 6 wks after discharge


   --may need bronchoscopy 


      


tobacco hx quit smoking 20 yrs ago  30pk/yr hx 














232





Clinical Quality Measures


DVT/VTE Risk/Contraindication:


Risk Factor Score Per Nursin


RFS Level Per Nursing on Admit:  4+=Very High











MYAH OROZCO DO 2017 08:54

## 2017-06-26 ENCOUNTER — HOSPITAL ENCOUNTER (OUTPATIENT)
Dept: HOSPITAL 75 - CARD | Age: 77
LOS: 65 days | Discharge: HOME | End: 2017-08-30
Attending: INTERNAL MEDICINE
Payer: MEDICARE

## 2017-06-26 DIAGNOSIS — R42: Primary | ICD-10-CM

## 2017-06-26 PROCEDURE — 93270 REMOTE 30 DAY ECG REV/REPORT: CPT

## 2017-07-06 ENCOUNTER — HOSPITAL ENCOUNTER (OUTPATIENT)
Dept: HOSPITAL 75 - PULM | Age: 77
LOS: 24 days | Discharge: HOME | End: 2017-07-30
Attending: INTERNAL MEDICINE
Payer: MEDICARE

## 2017-07-06 DIAGNOSIS — J44.9: Primary | ICD-10-CM

## 2017-07-06 DIAGNOSIS — R06.00: ICD-10-CM

## 2017-07-06 PROCEDURE — 99211 OFF/OP EST MAY X REQ PHY/QHP: CPT

## 2017-07-17 ENCOUNTER — HOSPITAL ENCOUNTER (OUTPATIENT)
Dept: HOSPITAL 75 - RAD | Age: 77
End: 2017-07-17
Attending: NURSE PRACTITIONER
Payer: MEDICARE

## 2017-07-17 DIAGNOSIS — I70.0: ICD-10-CM

## 2017-07-17 DIAGNOSIS — I25.10: ICD-10-CM

## 2017-07-17 DIAGNOSIS — R91.8: Primary | ICD-10-CM

## 2017-07-17 DIAGNOSIS — F17.210: ICD-10-CM

## 2017-07-17 PROCEDURE — 71250 CT THORAX DX C-: CPT

## 2017-07-17 NOTE — DIAGNOSTIC IMAGING REPORT
PROCEDURE: CT chest without contrast.



TECHNIQUE: Multiple contiguous axial images were obtained through

the chest without the use of intravenous contrast.



INDICATION: Dyspnea. History of smoking. History of pneumonia.

Followup.



COMPARISON: 05/30/2017.



FINDINGS: Evaluation of the lung fields demonstrates

near-complete interval clearing of abnormal densities from the

left upper lobe. There are some mild residual scattered

ground-glass opacities seen within the lingula. Remainder of the

lung fields are clear. There is no effusion or pneumothorax on

either side. There is a subtle punctate micronodule within the

lateral margins of the apex on the left measuring approximately

2-3 mm (image 7, series 2). Corresponding micronodular density

cannot be identified on exam dated 12/03/2012. There is also

suggestion of faint micronodular subpleural density within the

posterior margins of the left lower lobe (image 31, series 2).

Corresponding micronodular density cannot be identified on prior

exam from 2012. At least two micronodular densities are also seen

adjacent to the lateral margins of the minor fissure on the right

(image 26, series 2).



Cardiomediastinal structures show normal heart size. There is no

large pericardial effusion. There is moderate calcified

atherosclerosis of the aorta and coronary arteries. There is also

moderate calcification of the aortic valve. No pathologically

enlarged or morphologically abnormal adenopathy is seen within

the mediastinum, sinan, nor axillae.



Bony structures show no acute abnormalities. Included portions of

the upper abdomen show hypodense renal cysts, but no additional

acute abnormalities are identified.



IMPRESSION:

1. Significant interval improved aeration, bilaterally. There are

some residual nonspecific ground-glass densities within the

lingula of the left upper lobe.

2. Few scattered bilateral micronodules. Given the patient's

history of smoking, one-year followup is recommended to ensure

stability. Low-dose screening CT protocol could be employed.

3. Moderate calcified aortic and coronary atherosclerosis, as

well as moderate calcification of the aortic valve.



Dictated by: 



  Dictated on workstation # [id number]

## 2017-07-19 ENCOUNTER — HOSPITAL ENCOUNTER (OUTPATIENT)
Dept: HOSPITAL 75 - PREOP | Age: 77
End: 2017-07-19
Attending: UROLOGY
Payer: MEDICARE

## 2017-07-19 VITALS — WEIGHT: 164.19 LBS | BODY MASS INDEX: 27.36 KG/M2 | HEIGHT: 65 IN

## 2017-07-19 DIAGNOSIS — Z01.818: Primary | ICD-10-CM

## 2017-07-19 DIAGNOSIS — N40.0: ICD-10-CM

## 2017-07-25 ENCOUNTER — HOSPITAL ENCOUNTER (OUTPATIENT)
Dept: HOSPITAL 75 - SDC | Age: 77
Discharge: HOME | End: 2017-07-25
Attending: UROLOGY
Payer: MEDICARE

## 2017-07-25 VITALS — SYSTOLIC BLOOD PRESSURE: 130 MMHG | DIASTOLIC BLOOD PRESSURE: 64 MMHG

## 2017-07-25 VITALS — BODY MASS INDEX: 27.36 KG/M2 | WEIGHT: 164.19 LBS | HEIGHT: 65 IN

## 2017-07-25 VITALS — SYSTOLIC BLOOD PRESSURE: 129 MMHG | DIASTOLIC BLOOD PRESSURE: 66 MMHG

## 2017-07-25 VITALS — SYSTOLIC BLOOD PRESSURE: 109 MMHG | DIASTOLIC BLOOD PRESSURE: 53 MMHG

## 2017-07-25 VITALS — DIASTOLIC BLOOD PRESSURE: 53 MMHG | SYSTOLIC BLOOD PRESSURE: 109 MMHG

## 2017-07-25 DIAGNOSIS — C61: Primary | ICD-10-CM

## 2017-07-25 DIAGNOSIS — K21.9: ICD-10-CM

## 2017-07-25 DIAGNOSIS — I25.10: ICD-10-CM

## 2017-07-25 DIAGNOSIS — R97.20: ICD-10-CM

## 2017-07-25 DIAGNOSIS — I11.0: ICD-10-CM

## 2017-07-25 DIAGNOSIS — Z95.1: ICD-10-CM

## 2017-07-25 DIAGNOSIS — E78.5: ICD-10-CM

## 2017-07-25 DIAGNOSIS — J44.9: ICD-10-CM

## 2017-07-25 DIAGNOSIS — Z79.899: ICD-10-CM

## 2017-07-25 DIAGNOSIS — N40.0: ICD-10-CM

## 2017-07-25 DIAGNOSIS — Z95.5: ICD-10-CM

## 2017-07-25 DIAGNOSIS — J45.909: ICD-10-CM

## 2017-07-25 DIAGNOSIS — G47.33: ICD-10-CM

## 2017-07-25 DIAGNOSIS — I50.9: ICD-10-CM

## 2017-07-25 LAB
ANION GAP SERPL CALC-SCNC: 13 MMOL/L (ref 5–14)
BUN SERPL-MCNC: 22 MG/DL (ref 7–18)
BUN/CREAT SERPL: 14
CALCIUM SERPL-MCNC: 8.8 MG/DL (ref 8.5–10.1)
CHLORIDE SERPL-SCNC: 109 MMOL/L (ref 98–107)
CO2 SERPL-SCNC: 19 MMOL/L (ref 21–32)
CREAT SERPL-MCNC: 1.52 MG/DL (ref 0.6–1.3)
GFR SERPLBLD BASED ON 1.73 SQ M-ARVRAT: 45 ML/MIN
GLUCOSE SERPL-MCNC: 110 MG/DL (ref 70–105)
POTASSIUM SERPL-SCNC: 4.3 MMOL/L (ref 3.6–5)
SODIUM SERPL-SCNC: 141 MMOL/L (ref 135–145)

## 2017-07-25 PROCEDURE — 36415 COLL VENOUS BLD VENIPUNCTURE: CPT

## 2017-07-25 PROCEDURE — 80048 BASIC METABOLIC PNL TOTAL CA: CPT

## 2017-07-25 PROCEDURE — 87081 CULTURE SCREEN ONLY: CPT

## 2017-07-25 PROCEDURE — 88305 TISSUE EXAM BY PATHOLOGIST: CPT

## 2017-07-25 RX ADMIN — MORPHINE SULFATE PRN MG: 10 INJECTION, SOLUTION INTRAMUSCULAR; INTRAVENOUS at 09:17

## 2017-07-25 RX ADMIN — MORPHINE SULFATE PRN MG: 10 INJECTION, SOLUTION INTRAMUSCULAR; INTRAVENOUS at 09:23

## 2017-07-25 RX ADMIN — MORPHINE SULFATE PRN MG: 10 INJECTION, SOLUTION INTRAMUSCULAR; INTRAVENOUS at 09:18

## 2017-07-25 NOTE — DISCHARGE INST-UROLOGY
Discharge Inst-Urology


Discharge Medications


New, Converted, or Re-newed RX:  RX on Chart





Patient Instructions/Follow Up


Plan


Please make appointment to been seen in office in 2 weeks.





In 2-3 days, if no bleedings, may resume ASA





Keep bowels soft and moving





showers no bath for 48hrs





Increase oral fluids for 48 hours and then as needed.





Rest for 48hrs and then Activity as tolerated.





Diet as tolerated





If questions or concerns contact your physician 


Or seek help at emergency department.











TAWIL,ELIAS A MD Jul 25, 2017 8:59 am

## 2017-07-25 NOTE — PROGRESS NOTE-POST OPERATIVE
Post-Operative Progess Note


Surgeon (s)/Assistant (s)


Surgeon


ELIAS TAWIL MD


Assistant:  N/A





Pre-Operative Diagnosis


ABNORMAL MERCEDEZ (LT LOBE), ELEVATED PSA, AND POSSIBLE CA PROSTATE





Post-Operative Diagnosis





SAME





Procedure & Operative Findings


Date of Procedure


7/25/17


Procedure Performed/Findings


PROSTATE BIOPSIES


Anesthesia Type


IV SEDATION AND LOCAL





Estimated Blood Loss


Estimated blood loss (mL):  NEGLIGIBLE





Specimens/Packing


Specimens Removed


PROSTATE BIOPSIES 2 RT AND 2 LT


Packing:  


N/A











TAWIL,ELIAS A MD Jul 25, 2017 8:56 am

## 2017-07-25 NOTE — PROGRESS NOTE-PRE OPERATIVE
Pre-Operative Progress Note


H&P Reviewed


The H&P was reviewed, patient examined and no changes noted.


Date Seen by Provider:  Jul 25, 2017


Time Seen by Provider:  07:02


Date H&P Reviewed:  Jul 25, 2017


Time H&P Reviewed:  07:02


Pre-Operative Diagnosis:  ABNORMAL MERCEDEZ (LT LOBE), ELEVATED PSA, AND POSSIBLE CA 

PROSTATE











TAWIL,ELIAS A MD Jul 25, 2017 7:02 am

## 2017-07-25 NOTE — OPERATIVE REPORT
PROCEDURE PHYSICIAN:   ELIAS TAWIL

 

DATE OF PROCEDURE:  07/25/2017

 

PREOPERATIVE DIAGNOSIS: 

Abnormal rectal exam and elevated PSA, possible CA of the

prostate.  

 

POSTOPERATIVE DIAGNOSIS:

Abnormal rectal exam and elevated PSA, possible CA of the

prostate. 

 

OPERATION:

Transrectal needle biopsies of the prostate. 

 

SURGEON:

Dr. Tawil. 

 

ANESTHESIA:  

IV sedation and local. 

 

COMPLICATIONS:

None. 

 

PROCEDURE:

Under satisfactory IV sedation, with the patient in lithotomy

position, we went ahead and injected lidocaine jelly inside the

anus and anal canal and put in gauze for a few minutes. This was

then removed and then using the [employer] biopsy gun. I obtained 4

biopsies, 2 from the abnormal left side and 2 from the right side

as tolerated by the patient who has a very low pain threshold

despite the above anesthetics.  There was minimal bleeding. The

patient tolerated the procedure and anesthesia well and was sent

to recovery room in stable condition. 

 

 

Job ID: [id number]

Dictated Date: 07/25/2017 09:01:04 

Transcription Date: 07/25/2017 09:20:54 / elliot

## 2017-08-04 ENCOUNTER — HOSPITAL ENCOUNTER (OUTPATIENT)
Dept: HOSPITAL 75 - RAD | Age: 77
End: 2017-08-04
Attending: UROLOGY
Payer: MEDICARE

## 2017-08-04 DIAGNOSIS — K80.20: ICD-10-CM

## 2017-08-04 DIAGNOSIS — C61: Primary | ICD-10-CM

## 2017-08-04 DIAGNOSIS — K63.89: ICD-10-CM

## 2017-08-04 DIAGNOSIS — N28.1: ICD-10-CM

## 2017-08-04 DIAGNOSIS — M47.812: ICD-10-CM

## 2017-08-04 DIAGNOSIS — K76.9: ICD-10-CM

## 2017-08-04 DIAGNOSIS — I70.0: ICD-10-CM

## 2017-08-04 DIAGNOSIS — I70.91: ICD-10-CM

## 2017-08-04 DIAGNOSIS — M85.89: ICD-10-CM

## 2017-08-04 PROCEDURE — 78306 BONE IMAGING WHOLE BODY: CPT

## 2017-08-04 PROCEDURE — 74177 CT ABD & PELVIS W/CONTRAST: CPT

## 2017-08-04 NOTE — DIAGNOSTIC IMAGING REPORT
INDICATION: Prostate CA.



TECHNIQUE: 23 mCi Tc 99m MDP was given IV. 



FINDINGS: There is good uptake throughout the skeletal system.

There is a single foci of increased uptake along the left mid

cervical posterior elements. There is also focal uptake noted

within the mandible on the left. There is also mild focal uptake

within the left ankle. No other foci of abnormal uptake are seen

to suggest metastatic prostate disease.



IMPRESSION:

1. Cervical activity posterior on the left consistent with

degenerative changes.

2. Activity in the left mandible most likely from periodontal

disease.

3. Activity in the medial aspect of the left ankle likely

degenerative.



Dictated by: 



  Dictated on workstation # [id number]

## 2017-08-04 NOTE — DIAGNOSTIC IMAGING REPORT
PROCEDURE: CT abdomen and pelvis with contrast.



TECHNIQUE: Multiple contiguous axial images were obtained through

the abdomen and pelvis after administration of intravenous

contrast. 



INDICATION: History of prostate cancer. History of lingual

cancer.



COMPARISON: None.



FINDINGS: Included views of the lung bases are clear.



CT abdomen:



Multiple benign-appearing cysts are identified involving both

kidneys. There is suggestion of some peripheral micronodular

enhancement involving a partially exophytic cyst extending from

the inferior pole of the right kidney. It measures approximately

1.6 cm in diameter.



The spleen, adrenal glands, and pancreas have a normal

appearance. There is a small nodular area of hyperenhancement

within the subcapsular margins of the inferior tip of the right

lobe of the liver that measures 1.3 cm (image 29, series 3). No

other focal hepatic mass-type lesions are identified. There is

cholelithiasis (image 25, series 4).



Small bowel loops are nondistended. Normal appendix is

identified. There is colonic diverticulosis, but no CT evidence

of acute diverticulitis. Note is made of thickened appearance of

the wall of the sigmoid colon (image 58, series 3).



There is no loculated fluid collection, free fluid, nor free air

within the abdomen. No abnormal mesenteric or retroperitoneal

adenopathy is seen. There is diffuse calcified aortic and

arterial atherosclerosis. Bony structures show no acute

abnormalities.



CT pelvis:



Evaluation of osseous structures demonstrates sclerotic lesion

within the right iliac. It measures 1.8 x 0.9 cm. No other focal

bony lesions are identified. No acute bony abnormalities are

seen.



Urinary bladder is grossly unremarkable. There is no loculated

fluid collection, free fluid, nor free air within the pelvis. No

abnormal lymph nodes are seen. Bony structures show no acute

abnormalities.



IMPRESSION:

1. Single sclerotic lesion within the right iliac bone.

Conceivably, this could be on the basis of a benign bone island,

but given history of prostate cancer, metastatic lesion cannot be

excluded. Continued followup is recommended.

2. Multiple bilateral renal cysts. Again, there is suggestion of

faint mild complexity to these cysts involving the inferior pole

of the right kidney. 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

IIF. Four-month followup is recommended.

3. Hyperenhancing lesion within the right lobe of the liver

inferiorly. Differential favors hyperenhancing hemangioma or

transient hepatic attenuation defect. Neoplasm is felt to be

unlikely, but is not entirely excluded. Again, followup is

recommended.

4. Thickened appearance of the wall of the sigmoid colon. This

may be artifactual and related to incomplete distention of the

sigmoid colon. Correlation with colonoscopy is recommended.

5. Diffuse calcified aortic and arterial atherosclerosis.

6. Cholelithiasis.



Dictated by: 



  Dictated on workstation # [id number]

## 2017-09-19 LAB
ALBUMIN SERPL-MCNC: 4.2 GM/DL (ref 3.2–4.5)
ALT SERPL-CCNC: 17 U/L (ref 0–55)
ANION GAP SERPL CALC-SCNC: 7 MMOL/L (ref 5–14)
AST SERPL-CCNC: 19 U/L (ref 5–34)
BASOPHILS # BLD AUTO: 0 10^3/UL (ref 0–0.1)
BASOPHILS NFR BLD AUTO: 1 % (ref 0–10)
BILIRUB SERPL-MCNC: 1.8 MG/DL (ref 0.1–1)
BUN SERPL-MCNC: 16 MG/DL (ref 7–18)
BUN/CREAT SERPL: 12
CALCIUM SERPL-MCNC: 9.6 MG/DL (ref 8.5–10.1)
CHLORIDE SERPL-SCNC: 106 MMOL/L (ref 98–107)
CO2 SERPL-SCNC: 28 MMOL/L (ref 21–32)
CREAT SERPL-MCNC: 1.34 MG/DL (ref 0.6–1.3)
EOSINOPHIL # BLD AUTO: 0.2 10^3/UL (ref 0–0.3)
EOSINOPHIL NFR BLD AUTO: 5 % (ref 0–10)
ERYTHROCYTE [DISTWIDTH] IN BLOOD BY AUTOMATED COUNT: 13.4 % (ref 10–14.5)
GFR SERPLBLD BASED ON 1.73 SQ M-ARVRAT: 52 ML/MIN
GLUCOSE SERPL-MCNC: 112 MG/DL (ref 70–105)
LYMPHOCYTES # BLD AUTO: 0.8 X 10^3 (ref 1–4)
LYMPHOCYTES NFR BLD AUTO: 20 % (ref 12–44)
MCH RBC QN AUTO: 30 PG (ref 25–34)
MCHC RBC AUTO-ENTMCNC: 33 G/DL (ref 32–36)
MCV RBC AUTO: 90 FL (ref 80–99)
MONOCYTES # BLD AUTO: 0.6 X 10^3 (ref 0–1)
MONOCYTES NFR BLD AUTO: 14 % (ref 0–12)
NEUTROPHILS # BLD AUTO: 2.5 X 10^3 (ref 1.8–7.8)
NEUTROPHILS NFR BLD AUTO: 61 % (ref 42–75)
PLATELET # BLD: 176 10^3/UL (ref 130–400)
PMV BLD AUTO: 10.5 FL (ref 7.4–10.4)
POTASSIUM SERPL-SCNC: 4.3 MMOL/L (ref 3.6–5)
PROT SERPL-MCNC: 6 GM/DL (ref 6.4–8.2)
RBC # BLD AUTO: 4.66 10^6/UL (ref 4.35–5.85)
SODIUM SERPL-SCNC: 141 MMOL/L (ref 135–145)
TSH SERPL-ACNC: 0.56 UIU/ML (ref 0.35–4.94)
WBC # BLD AUTO: 4.1 10^3/UL (ref 4.3–11)

## 2017-09-25 ENCOUNTER — HOSPITAL ENCOUNTER (OUTPATIENT)
Dept: HOSPITAL 75 - CARD | Age: 77
End: 2017-09-25
Attending: PHYSICIAN ASSISTANT
Payer: MEDICARE

## 2017-09-25 DIAGNOSIS — I50.22: ICD-10-CM

## 2017-09-25 DIAGNOSIS — I65.23: ICD-10-CM

## 2017-09-25 DIAGNOSIS — I25.10: Primary | ICD-10-CM

## 2017-09-25 DIAGNOSIS — I11.0: ICD-10-CM

## 2017-09-25 PROCEDURE — 93306 TTE W/DOPPLER COMPLETE: CPT

## 2017-09-29 ENCOUNTER — HOSPITAL ENCOUNTER (OUTPATIENT)
Dept: HOSPITAL 75 - ONC | Age: 77
Discharge: HOME | End: 2017-09-29
Attending: INTERNAL MEDICINE
Payer: MEDICARE

## 2017-09-29 DIAGNOSIS — Z51.0: Primary | ICD-10-CM

## 2017-09-29 DIAGNOSIS — E03.9: ICD-10-CM

## 2017-09-29 DIAGNOSIS — C61: ICD-10-CM

## 2017-09-29 DIAGNOSIS — Z79.899: ICD-10-CM

## 2017-09-29 PROCEDURE — 84443 ASSAY THYROID STIM HORMONE: CPT

## 2017-09-29 PROCEDURE — 77300 RADIATION THERAPY DOSE PLAN: CPT

## 2017-09-29 PROCEDURE — 99214 OFFICE O/P EST MOD 30 MIN: CPT

## 2017-09-29 PROCEDURE — 77385: CPT

## 2017-09-29 PROCEDURE — 77470 SPECIAL RADIATION TREATMENT: CPT

## 2017-09-29 PROCEDURE — 77301 RADIOTHERAPY DOSE PLAN IMRT: CPT

## 2017-09-29 PROCEDURE — 77338 DESIGN MLC DEVICE FOR IMRT: CPT

## 2017-09-29 PROCEDURE — 77334 RADIATION TREATMENT AID(S): CPT

## 2017-09-29 PROCEDURE — 80053 COMPREHEN METABOLIC PANEL: CPT

## 2017-09-29 PROCEDURE — 85025 COMPLETE CBC W/AUTO DIFF WBC: CPT

## 2017-10-16 ENCOUNTER — HOSPITAL ENCOUNTER (OUTPATIENT)
Dept: HOSPITAL 75 - PREOP | Age: 77
End: 2017-10-16
Attending: SURGERY
Payer: MEDICARE

## 2017-10-16 VITALS — HEIGHT: 65 IN | BODY MASS INDEX: 27.69 KG/M2 | WEIGHT: 166.19 LBS

## 2017-10-16 DIAGNOSIS — Z01.818: Primary | ICD-10-CM

## 2017-10-16 DIAGNOSIS — T82.338A: ICD-10-CM

## 2017-10-19 ENCOUNTER — HOSPITAL ENCOUNTER (OUTPATIENT)
Dept: HOSPITAL 75 - SDC | Age: 77
Discharge: HOME | End: 2017-10-19
Attending: SURGERY
Payer: MEDICARE

## 2017-10-19 VITALS — DIASTOLIC BLOOD PRESSURE: 80 MMHG | SYSTOLIC BLOOD PRESSURE: 154 MMHG

## 2017-10-19 VITALS — BODY MASS INDEX: 27.69 KG/M2 | WEIGHT: 166.19 LBS | HEIGHT: 65 IN

## 2017-10-19 VITALS — SYSTOLIC BLOOD PRESSURE: 142 MMHG | DIASTOLIC BLOOD PRESSURE: 66 MMHG

## 2017-10-19 VITALS — DIASTOLIC BLOOD PRESSURE: 88 MMHG | SYSTOLIC BLOOD PRESSURE: 110 MMHG

## 2017-10-19 VITALS — SYSTOLIC BLOOD PRESSURE: 134 MMHG | DIASTOLIC BLOOD PRESSURE: 62 MMHG

## 2017-10-19 DIAGNOSIS — Z95.5: ICD-10-CM

## 2017-10-19 DIAGNOSIS — Z79.82: ICD-10-CM

## 2017-10-19 DIAGNOSIS — J44.9: ICD-10-CM

## 2017-10-19 DIAGNOSIS — Z79.899: ICD-10-CM

## 2017-10-19 DIAGNOSIS — T82.534A: Primary | ICD-10-CM

## 2017-10-19 DIAGNOSIS — I25.10: ICD-10-CM

## 2017-10-19 DIAGNOSIS — I65.23: ICD-10-CM

## 2017-10-19 DIAGNOSIS — I50.9: ICD-10-CM

## 2017-10-19 DIAGNOSIS — Z87.891: ICD-10-CM

## 2017-10-19 DIAGNOSIS — E03.9: ICD-10-CM

## 2017-10-19 DIAGNOSIS — I11.0: ICD-10-CM

## 2017-10-19 DIAGNOSIS — Z95.1: ICD-10-CM

## 2017-10-19 DIAGNOSIS — G47.33: ICD-10-CM

## 2017-10-19 DIAGNOSIS — E78.5: ICD-10-CM

## 2017-10-19 PROCEDURE — 87081 CULTURE SCREEN ONLY: CPT

## 2017-10-19 NOTE — PROGRESS NOTE-PRE OPERATIVE
Pre-Operative Progress Note


H&P Reviewed


The H&P was reviewed, patient examined and no changes noted.


Date Seen by Provider:  Oct 19, 2017


Time Seen by Provider:  10:46


Date H&P Reviewed:  Oct 19, 2017


Time H&P Reviewed:  10:46


Pre-Operative Diagnosis:  malfunctioning port











SERENA SALAZAR DO Oct 19, 2017 10:46

## 2017-10-19 NOTE — PROGRESS NOTE-POST OPERATIVE
Post-Operative Progess Note


Surgeon (s)/Assistant (s)


Surgeon


SERENA SALAZAR DO


Assistant:  na





Pre-Operative Diagnosis


malfunctioning port





Post-Operative Diagnosis





same





Procedure & Operative Findings


Date of Procedure


10/19/17


Procedure Performed/Findings


port removal


Anesthesia Type


mac





Estimated Blood Loss


Estimated blood loss (mL):  min





Specimens/Packing


Specimens Removed


na











SERENA SALAZAR DO Oct 19, 2017 13:21

## 2017-10-19 NOTE — DISCHARGE INST-SIMPLE/STANDARD
Discharge Inst-Standard


Patient Instructions/Follow Up


Plan of Care/Instructions/FU:  


2 weeks [place]


Activity as Tolerated:  Yes


Discharge Diet:  Regular Diet


Other Inst to Patient


Follow up Appt:


Make appointment for 2 week.





Instructions:


No lifting greater than 10 pounds.


No strenuous activity. 


May shower in 24 hours, no tub bath or soaking.


Use incentive spirometer at home as directed.


No Smoking





Skin/Wound Care:


You have a special glue over incision it will fall off on its own.





Symptoms to Report:


Appetite Changes, Extremity Discoloration, Numbness/Tingling, Swelling Increased

, Bleeding Excessive, Eyesight Changes, Pain Increased, Urine Color Change, 

Constipation(Persistent), Fever over 101 degree F, Pain/Pressure in chest, 

Urinating Difficulty, Cough Up/Vomit Blood, Heart Beat Irreg/Pounding, Pain/

Pressure in jaw, Vaginal Bleeding Increase, Cramps in feet or legs, 

Lightheadedness, Pain/Pressure in shoulder, Diarrhea(Persistent), Memory 

Changes Suddenly, Questions/Concerns, Weight gain consecutive days, Dizziness/

Fainting, Nausea/Vomiting, Shortness of Breath, Weight gain over 2 pounds








If questions or concerns contact your physician 


Or seek help at emergency department.











SERENA SALAZAR DO Oct 19, 2017 13:24

## 2017-10-20 NOTE — OPERATIVE REPORT
DATE OF SERVICE:  



PREOPERATIVE DIAGNOSIS:

Malfunctioning port.



POSTOPERATIVE DIAGNOSIS:

Malfunctioning port.



PROCEDURE:

Port removal.



SURGEON:

Serena Marquez DO.



ANESTHESIA:

MAC with local.



ESTIMATED BLOOD LOSS:

Minimal.



COMPLICATIONS:

None.



INDICATIONS FOR PROCEDURE:

The patient is a 77-year-old male with a port with malfunctioning.  He was

explained risks and benefits of procedure and wished to proceed with the

procedure.  Consent was signed and was on the chart.



DESCRIPTION OF PROCEDURE:

The patient was taken to the operating suite.  He was prepped and draped in

sterile fashion.  Surgical pause was performed.  Local anesthetic was

infiltrated into the area.  A #15 blade scalpel was used to make an incision

above the port.  Dissection was taken down with Metzenbaum's down to the port,

which was then mobilized, grasped and elevated.  A little bit of cautery had to

be used to mobilize the port as well.  The port was removed in its entirety. 

Just approximately inch away from the port, there was a hole in the catheter,

which was partially transected.  The entire catheter was removed in its

entirety.  The wound was then irrigated with copious amounts of irrigation.  The

subcutaneous tissues were then reapproximated using 3-0 Vicryl.  The skin was

then closed using Dermabond.  The patient tolerated the procedure well without

any complications.  He was taken to the recovery room in stable condition.





Job ID: [id number]

DocumentID: [id number]

Dictated Date:  10/19/2017 14:02:08

Transcription Date: 10/19/2017 22:14:57

Dictated By: SERENA MARQUEZ DO

## 2017-12-05 ENCOUNTER — HOSPITAL ENCOUNTER (OUTPATIENT)
Dept: HOSPITAL 75 - ONC | Age: 77
LOS: 26 days | Discharge: HOME | End: 2017-12-31
Attending: INTERNAL MEDICINE
Payer: MEDICARE

## 2017-12-05 DIAGNOSIS — Z51.0: Primary | ICD-10-CM

## 2017-12-05 DIAGNOSIS — Z79.899: ICD-10-CM

## 2017-12-05 DIAGNOSIS — C61: ICD-10-CM

## 2017-12-05 DIAGNOSIS — E03.9: ICD-10-CM

## 2017-12-05 LAB
ALBUMIN SERPL-MCNC: 4 GM/DL (ref 3.2–4.5)
ALP SERPL-CCNC: 66 U/L (ref 40–136)
ALT SERPL-CCNC: 19 U/L (ref 0–55)
BASOPHILS # BLD AUTO: 0.1 10^3/UL (ref 0–0.1)
BASOPHILS NFR BLD AUTO: 1 % (ref 0–10)
BILIRUB SERPL-MCNC: 1.8 MG/DL (ref 0.1–1)
BUN/CREAT SERPL: 9
CALCIUM SERPL-MCNC: 9.6 MG/DL (ref 8.5–10.1)
CHLORIDE SERPL-SCNC: 105 MMOL/L (ref 98–107)
CO2 SERPL-SCNC: 30 MMOL/L (ref 21–32)
CREAT SERPL-MCNC: 1.48 MG/DL (ref 0.6–1.3)
EOSINOPHIL # BLD AUTO: 0.3 10^3/UL (ref 0–0.3)
EOSINOPHIL NFR BLD AUTO: 6 % (ref 0–10)
ERYTHROCYTE [DISTWIDTH] IN BLOOD BY AUTOMATED COUNT: 14 % (ref 10–14.5)
GFR SERPLBLD BASED ON 1.73 SQ M-ARVRAT: 46 ML/MIN
GLUCOSE SERPL-MCNC: 113 MG/DL (ref 70–105)
HCT VFR BLD CALC: 42 % (ref 40–54)
HGB BLD-MCNC: 14.5 G/DL (ref 13.3–17.7)
LYMPHOCYTES # BLD AUTO: 0.7 X 10^3 (ref 1–4)
LYMPHOCYTES NFR BLD AUTO: 15 % (ref 12–44)
MANUAL DIFFERENTIAL PERFORMED BLD QL: NO
MCH RBC QN AUTO: 31 PG (ref 25–34)
MCHC RBC AUTO-ENTMCNC: 34 G/DL (ref 32–36)
MCV RBC AUTO: 89 FL (ref 80–99)
MONOCYTES # BLD AUTO: 0.6 X 10^3 (ref 0–1)
MONOCYTES NFR BLD AUTO: 13 % (ref 0–12)
NEUTROPHILS # BLD AUTO: 2.8 X 10^3 (ref 1.8–7.8)
NEUTROPHILS NFR BLD AUTO: 64 % (ref 42–75)
PLATELET # BLD: 158 10^3/UL (ref 130–400)
PMV BLD AUTO: 10.5 FL (ref 7.4–10.4)
POTASSIUM SERPL-SCNC: 4.4 MMOL/L (ref 3.6–5)
PROT SERPL-MCNC: 6.4 GM/DL (ref 6.4–8.2)
RBC # BLD AUTO: 4.73 10^6/UL (ref 4.35–5.85)
SODIUM SERPL-SCNC: 143 MMOL/L (ref 135–145)
WBC # BLD AUTO: 4.4 10^3/UL (ref 4.3–11)

## 2017-12-05 PROCEDURE — 84443 ASSAY THYROID STIM HORMONE: CPT

## 2017-12-05 PROCEDURE — 36415 COLL VENOUS BLD VENIPUNCTURE: CPT

## 2017-12-05 PROCEDURE — 77385: CPT

## 2017-12-05 PROCEDURE — 77336 RADIATION PHYSICS CONSULT: CPT

## 2017-12-05 PROCEDURE — 85025 COMPLETE CBC W/AUTO DIFF WBC: CPT

## 2017-12-05 PROCEDURE — 80053 COMPREHEN METABOLIC PANEL: CPT

## 2018-01-11 ENCOUNTER — HOSPITAL ENCOUNTER (OUTPATIENT)
Dept: HOSPITAL 75 - CARD | Age: 78
End: 2018-01-11
Attending: INTERNAL MEDICINE
Payer: MEDICARE

## 2018-01-11 DIAGNOSIS — I65.23: ICD-10-CM

## 2018-01-11 DIAGNOSIS — E78.2: ICD-10-CM

## 2018-01-11 DIAGNOSIS — R00.1: ICD-10-CM

## 2018-01-11 DIAGNOSIS — I25.10: Primary | ICD-10-CM

## 2018-01-11 DIAGNOSIS — R06.00: ICD-10-CM

## 2018-01-11 DIAGNOSIS — I10: ICD-10-CM

## 2018-01-11 PROCEDURE — 93306 TTE W/DOPPLER COMPLETE: CPT

## 2018-02-23 ENCOUNTER — HOSPITAL ENCOUNTER (OUTPATIENT)
Dept: HOSPITAL 75 - RAD | Age: 78
End: 2018-02-23
Attending: INTERNAL MEDICINE
Payer: MEDICARE

## 2018-02-23 DIAGNOSIS — J18.1: Primary | ICD-10-CM

## 2018-02-23 PROCEDURE — 71046 X-RAY EXAM CHEST 2 VIEWS: CPT

## 2018-02-23 NOTE — DIAGNOSTIC IMAGING REPORT
INDICATION: Increasing shortness of air.



TIME OF EXAM: 11:46 AM



Comparison is made with prior study 5/31/2017.



FINDINGS: The heart size is stable. There are changes of median

sternotomy and CABG. Worsening airspace infiltrate is identified

in the right middle lobe consistent with pneumonia. The left lung

is clear. No effusion is seen. The lungs are hyperinflated

consistent with COPD.



IMPRESSION: Right middle lobe pneumonia.



Dictated by: 



  Dictated on workstation # [id number]

## 2018-02-27 ENCOUNTER — HOSPITAL ENCOUNTER (OUTPATIENT)
Dept: HOSPITAL 75 - RAD | Age: 78
End: 2018-02-27
Attending: INTERNAL MEDICINE
Payer: MEDICARE

## 2018-02-27 DIAGNOSIS — R04.2: ICD-10-CM

## 2018-02-27 DIAGNOSIS — J18.9: Primary | ICD-10-CM

## 2018-02-27 PROCEDURE — 71046 X-RAY EXAM CHEST 2 VIEWS: CPT

## 2018-02-27 NOTE — DIAGNOSTIC IMAGING REPORT
INDICATION: Hemoptysis.



COMPARISON: 02/23/2018



FINDINGS: Frontal and lateral radiographic views of the chest

were obtained. There is interval improved aeration of the right

middle lobe. Some residual patchy infiltrate appearing opacities

remain. There is also persistent hyperinflation with flattening

of hemidiaphragms. There is no large effusion or pneumothorax on

either side. Left lung is relatively clear. Cardiac silhouette

and pulmonary vasculature stable. Sternotomy wires and calcified

aortic atherosclerosis are noted. Bony structures show no acute

abnormalities.



IMPRESSION:

1. Interval improved aeration of the right middle lobe. Continued

followup to resolution is recommended.

2. Probable background of COPD.



Dictated by: 



  Dictated on workstation # [id number]

## 2018-03-27 ENCOUNTER — HOSPITAL ENCOUNTER (OUTPATIENT)
Dept: HOSPITAL 75 - RAD | Age: 78
End: 2018-03-27
Attending: NURSE PRACTITIONER
Payer: MEDICARE

## 2018-03-27 DIAGNOSIS — R91.8: ICD-10-CM

## 2018-03-27 DIAGNOSIS — J44.1: Primary | ICD-10-CM

## 2018-03-27 PROCEDURE — 71046 X-RAY EXAM CHEST 2 VIEWS: CPT

## 2018-04-19 ENCOUNTER — HOSPITAL ENCOUNTER (OUTPATIENT)
Dept: HOSPITAL 75 - RAD | Age: 78
End: 2018-04-19
Attending: INTERNAL MEDICINE
Payer: MEDICARE

## 2018-04-19 DIAGNOSIS — J18.9: Primary | ICD-10-CM

## 2018-04-19 DIAGNOSIS — J44.9: ICD-10-CM

## 2018-04-19 PROCEDURE — 71046 X-RAY EXAM CHEST 2 VIEWS: CPT

## 2018-04-19 NOTE — DIAGNOSTIC IMAGING REPORT
INDICATION: History of pneumonia.



COMPARISON: Exam compared with study 03/27/2018.



FINDINGS: Right perihilar middle lobe infiltrate has nearly

completely resolved, but some residual opacity does persist. Some

more mild suprahilar infiltrate in the right upper lobe has also

improved. Underlying COPD and air trapping, chronic. We note new

infiltrate having developed in the left lower lobe medially. No

failure pattern or pneumothorax.



IMPRESSION: Improvements but incomplete resolution of right

middle and upper lobe infiltrates. New infiltrate in the medial

left lower lobe. Background COPD, chronic.



Dictated by: 



  Dictated on workstation # [id number]

## 2018-06-04 ENCOUNTER — HOSPITAL ENCOUNTER (OUTPATIENT)
Dept: HOSPITAL 75 - PULM | Age: 78
LOS: 90 days | Discharge: HOME | End: 2018-09-02
Attending: NURSE PRACTITIONER
Payer: MEDICARE

## 2018-06-04 DIAGNOSIS — J44.9: Primary | ICD-10-CM

## 2018-06-04 DIAGNOSIS — R06.00: ICD-10-CM

## 2018-06-04 PROCEDURE — 99211 OFF/OP EST MAY X REQ PHY/QHP: CPT

## 2018-06-10 ENCOUNTER — HOSPITAL ENCOUNTER (EMERGENCY)
Dept: HOSPITAL 75 - ER | Age: 78
Discharge: HOME | End: 2018-06-10
Payer: MEDICARE

## 2018-06-10 VITALS — DIASTOLIC BLOOD PRESSURE: 60 MMHG | SYSTOLIC BLOOD PRESSURE: 129 MMHG

## 2018-06-10 VITALS — BODY MASS INDEX: 27.02 KG/M2 | WEIGHT: 162.19 LBS | HEIGHT: 65 IN

## 2018-06-10 DIAGNOSIS — I25.2: ICD-10-CM

## 2018-06-10 DIAGNOSIS — N40.0: ICD-10-CM

## 2018-06-10 DIAGNOSIS — Z79.82: ICD-10-CM

## 2018-06-10 DIAGNOSIS — Z95.5: ICD-10-CM

## 2018-06-10 DIAGNOSIS — I10: ICD-10-CM

## 2018-06-10 DIAGNOSIS — J44.9: ICD-10-CM

## 2018-06-10 DIAGNOSIS — I25.10: ICD-10-CM

## 2018-06-10 DIAGNOSIS — K21.9: ICD-10-CM

## 2018-06-10 DIAGNOSIS — Z85.46: ICD-10-CM

## 2018-06-10 DIAGNOSIS — Z87.891: ICD-10-CM

## 2018-06-10 DIAGNOSIS — Z92.3: ICD-10-CM

## 2018-06-10 DIAGNOSIS — Z92.21: ICD-10-CM

## 2018-06-10 DIAGNOSIS — Z99.81: ICD-10-CM

## 2018-06-10 DIAGNOSIS — J18.9: Primary | ICD-10-CM

## 2018-06-10 DIAGNOSIS — G47.30: ICD-10-CM

## 2018-06-10 DIAGNOSIS — Z85.810: ICD-10-CM

## 2018-06-10 DIAGNOSIS — E78.00: ICD-10-CM

## 2018-06-10 DIAGNOSIS — Z79.51: ICD-10-CM

## 2018-06-10 LAB
ALBUMIN SERPL-MCNC: 3.9 GM/DL (ref 3.2–4.5)
ALP SERPL-CCNC: 61 U/L (ref 40–136)
ALT SERPL-CCNC: 13 U/L (ref 0–55)
BASOPHILS # BLD AUTO: 0 10^3/UL (ref 0–0.1)
BASOPHILS NFR BLD AUTO: 1 % (ref 0–10)
BILIRUB SERPL-MCNC: 1.9 MG/DL (ref 0.1–1)
BUN/CREAT SERPL: 9
CALCIUM SERPL-MCNC: 9.4 MG/DL (ref 8.5–10.1)
CHLORIDE SERPL-SCNC: 107 MMOL/L (ref 98–107)
CO2 SERPL-SCNC: 25 MMOL/L (ref 21–32)
CREAT SERPL-MCNC: 1.24 MG/DL (ref 0.6–1.3)
EOSINOPHIL # BLD AUTO: 0.2 10^3/UL (ref 0–0.3)
EOSINOPHIL NFR BLD AUTO: 4 % (ref 0–10)
ERYTHROCYTE [DISTWIDTH] IN BLOOD BY AUTOMATED COUNT: 13.6 % (ref 10–14.5)
GFR SERPLBLD BASED ON 1.73 SQ M-ARVRAT: 57 ML/MIN
GLUCOSE SERPL-MCNC: 101 MG/DL (ref 70–105)
HCT VFR BLD CALC: 37 % (ref 40–54)
HGB BLD-MCNC: 12.4 G/DL (ref 13.3–17.7)
LYMPHOCYTES # BLD AUTO: 0.8 X 10^3 (ref 1–4)
LYMPHOCYTES NFR BLD AUTO: 16 % (ref 12–44)
MANUAL DIFFERENTIAL PERFORMED BLD QL: NO
MCH RBC QN AUTO: 31 PG (ref 25–34)
MCHC RBC AUTO-ENTMCNC: 34 G/DL (ref 32–36)
MCV RBC AUTO: 92 FL (ref 80–99)
MONOCYTES # BLD AUTO: 0.7 X 10^3 (ref 0–1)
MONOCYTES NFR BLD AUTO: 14 % (ref 0–12)
NEUTROPHILS # BLD AUTO: 3.4 X 10^3 (ref 1.8–7.8)
NEUTROPHILS NFR BLD AUTO: 66 % (ref 42–75)
PLATELET # BLD: 157 10^3/UL (ref 130–400)
PMV BLD AUTO: 11.3 FL (ref 7.4–10.4)
POTASSIUM SERPL-SCNC: 4.3 MMOL/L (ref 3.6–5)
PROT SERPL-MCNC: 6 GM/DL (ref 6.4–8.2)
RBC # BLD AUTO: 4 10^6/UL (ref 4.35–5.85)
SODIUM SERPL-SCNC: 143 MMOL/L (ref 135–145)
WBC # BLD AUTO: 5.2 10^3/UL (ref 4.3–11)

## 2018-06-10 PROCEDURE — 94640 AIRWAY INHALATION TREATMENT: CPT

## 2018-06-10 PROCEDURE — 96365 THER/PROPH/DIAG IV INF INIT: CPT

## 2018-06-10 PROCEDURE — 80053 COMPREHEN METABOLIC PANEL: CPT

## 2018-06-10 PROCEDURE — 83880 ASSAY OF NATRIURETIC PEPTIDE: CPT

## 2018-06-10 PROCEDURE — 71046 X-RAY EXAM CHEST 2 VIEWS: CPT

## 2018-06-10 PROCEDURE — 85025 COMPLETE CBC W/AUTO DIFF WBC: CPT

## 2018-06-10 PROCEDURE — 36415 COLL VENOUS BLD VENIPUNCTURE: CPT

## 2018-06-10 PROCEDURE — 83605 ASSAY OF LACTIC ACID: CPT

## 2018-06-10 PROCEDURE — 87040 BLOOD CULTURE FOR BACTERIA: CPT

## 2018-06-10 PROCEDURE — 86141 C-REACTIVE PROTEIN HS: CPT

## 2018-06-10 NOTE — DIAGNOSTIC IMAGING REPORT
INDICATION: Hematemesis.



TECHNIQUE: Two view chest, 8:03 p.m.



CORRELATION STUDY: 04/19/2018.



FINDINGS: Poststernotomy changes. Heart size and mediastinum are

generally stable.  Chronic type change of the lung parenchyma

with likely changes of COPD. Asymmetric parenchymal density in

the right perihilar regions as well as lung base do appear to be

adversely changed from prior study. Additionally, minimal

parenchymal density at the left lung base. Small pleural

effusions. Slight accentuated thoracic kyphotic curvature and

degenerative changes of the thoracic spine.



IMPRESSION: 

1. Multifocal parenchymal density, particularly in the right

perihilar region and lung base along with bilateral pleural

effusions, overall adversely changed since prior study. While

features could be reflective of underlying multifocal areas of

pneumonia, given chronicity of some of these areas, underlying

neoplasm is definitely in the differential.  

2. Vasculature is slightly prominent as well.



Dictated by: 



  Dictated on workstation # [id number]

## 2018-06-10 NOTE — ED RESPIRATORY
General


Chief Complaint:  Respiratory Problems


Stated Complaint:  SPITTING UP BLOOD


Source:  patient, spouse


Exam Limitations:  no limitations





History of Present Illness


Date Seen by Provider:  Luciano 10, 2018


Time Seen by Provider:  19:07


Initial Comments


The patient presents to the ER by private conveyance with his spouse and a 

chief complaint is having coughing up of blood tinged sputum. He feels short of 

breath but he is on chronic O2 by nasal cannula 2-4 L as needed. He also uses 

CPAP at night. He has a history of COPD and asthma. He quit smoking 20 years 

ago. He uses nebulized albuterol, Symbicort, Spiriva and he's been using it 

regularly but not the albuterol lately. In the past several months he says he's 

had 3 bouts of pneumonia with his last one being about a month and a half ago. 

He was not hospitalized at that time. He does feel short of breath but he has 

had no fevers, chills, rash, nausea, indigestion. He denies a history of 

tuberculosis.


He says he does get edema of both legs from time to time and he'll take a 

single tablet of Lasix for this once a day as needed but he has not been using 

it recently as his swelling has not been very bad. He denies a history of 

congestive heart failure.





Allergies and Home Medications


Allergies


Coded Allergies:  


     No Known Drug Allergies (Unverified , 10/16/17)





Home Medications


Albuterol Sulfate 18 Gm Hfa.aer.ad, 2 PUFF INH Q4H PRN for SHORTNESS OF BREATH, 

(Reported)


Amlodipine Besylate 10 Mg Tablet, 10 MG PO DAILY, (Reported)


Aspirin 81 Mg Tablet.dr, 81 MG PO DAILY, (Reported)


Atorvastatin Calcium 10 Mg Tablet, 10 MG PO HS, (Reported)


Budesonide/Formoterol Fumarate 10.2 Gm Hfa.aer.ad, 2 PUFF IH BID, (Reported)


Carvedilol 12.5 Mg Tablet, 12.5 MG PO BID, (Reported)


Cetirizine HCl 10 Mg Tablet, 10 MG PO DAILY, (Reported)


Cholecalciferol (Vitamin D3) 5,000 Unit Tablet, 5,000 UNIT PO DAILY, (Reported)


Enalapril Maleate 20 Mg Tablet, 20 MG PO BID, (Reported)


Fluticasone Propionate 16 Gm Spray.susp, 2 SPRAYS NS DAILY, (Reported)


Furosemide 20 Mg Tablet, 20 MG PO DAILY PRN for SWELLING, (Reported)


Levothyroxine Sodium 112 Mcg Tablet, 112 MCG PO HS, (Reported)


Montelukast Sodium 10 Mg Tablet, 10 MG PO HS, (Reported)


Omega-3 Fatty Acids/Fish Oil 1 Each Capsule, 1,200 MG PO DAILY, (Reported)


Potassium Chloride 10 Meq Tablet.er, 10 MEQ PO DAILY PRN for WITH LASIX, (

Reported)


Ranitidine HCl 150 Mg Tablet, 150 MG PO BID, (Reported)


Temazepam 15 Mg Capsule, 15 MG PO DAILY, (Reported)


Tiotropium Bromide 4 Gm Mist.inhal, 2 PUFF IH DAILY, (Reported)





Patient Home Medication List


Home Medication List Reviewed:  Yes





Review of Systems


Constitutional:  No chills, No diaphoresis, No fever, No malaise


EENTM:  No ear pain, No eye pain


Respiratory:  cough, hemoptysis, phlegm, short of breath; No wheezing


Cardiovascular:  No chest pain, No edema; Hx of Intervention; No palpitations


Gastrointestinal:  No abdominal pain, No constipation, No diarrhea, No nausea


Genitourinary:  No discharge, No dysuria





Past Medical-Social-Family Hx


Patient Social History


Alcohol Use:  Rarely Uses


Recreational Drug Use:  No


Smoking Status:  Former Smoker


Type Used:  Cigarettes


Former Smoker, Quit:  1998


2nd Hand Smoke Exposure:  No


Recent Foreign Travel:  No


Contact w/Someone Who Travel:  No


Recent Hopitalizations:  No


Physical Abuse:  No


Sexual Abuse:  No


Mistreated:  No


Fear:  No





Immunizations Up To Date


Date of Pneumonia Vaccine:  Sep 7, 2015


Date of Influenza Vaccine:  Oct 10, 2016





Seasonal Allergies


Seasonal Allergies:  Yes





Past Medical History


Surgeries:  Yes (SPIDER BITE, FEEDING TUBE/REMOVED, BILAT TKR, INFUSAPORT, 

TUMOR ON TONGUE)


CABG, Coronary Stent, Joint Replacement


Respiratory:  Yes (02 2.5L AT NIGHT)


Asthma, Sleep Apnea, COPD


Currently Using CPAP:  Yes


Cardiac:  Yes (CARDIAC CATHS/CABG)


Coronary Artery Disease, Heart Attack, High Cholesterol, Hypertension


Neurological:  No


Reproductive Disorders:  Yes (E.D.)


Sexually Transmitted Disease:  No


HIV/AIDS:  No


Benign Prostatic Hyperpl, Prostate Problems


Gastrointestinal:  Yes


Gastroesophageal Reflux


Musculoskeletal:  Yes (RIGHT MEDIAL ILIAC BONE LESION)


Arthritis


Endocrine:  Yes


HEENT:  Yes


Loss of Vision:  Bilateral


Hearing Impairment:  Hard of Hearing, Bilateral Hearing Aide


Cancer:  Yes (TONGUE CANCER--S/P SURGERY )


Prostate


Did You Recieve Any Treatments:  Yes


What Type of Treatment Did You:  Chemotherapy, Radiation


Psychosocial:  No


Nursing Suicide Risk Score:  0


Integumentary:  No


Blood Disorders:  No


Adverse Reaction/Blood Tranf:  No (N/A)





Physical Exam


Vital Signs





Vital Signs - First Documented








 6/10/18





 18:39


 


Temp 96.8


 


Pulse 60


 


Resp 20


 


B/P (MAP) 140/102 (115)


 


Pulse Ox 94


 


O2 Delivery Nasal Cannula


 


O2 Flow Rate 2.00





Capillary Refill :


General Appearance:  WD/WN, no apparent distress


Eyes:  Bilateral Eye Normal Inspection, Bilateral Eye PERRL, Bilateral Eye EOMI


HEENT:  PERRL/EOMI, normal ENT inspection, pharynx normal


Neck:  non-tender, full range of motion, supple, normal inspection


Respiratory:  chest non-tender, no respiratory distress, no accessory muscle use

, crackles (few left basilar)


Cardiovascular:  normal peripheral pulses, regular rate, rhythm, no edema


Gastrointestinal:  normal bowel sounds, non tender, soft


Extremities:  normal range of motion, normal capillary refill, pedal edema (

bilateral 1+ pitting.)


Neurologic/Psychiatric:  alert, oriented x 3


Skin:  normal color, warm/dry





Focused Exam


Lactate Level


6/10/18 18:55: Lactic Acid Level 0.95





Lactic Acid Level





Laboratory Tests








Test


 6/10/18


18:55


 


Lactic Acid Level


 0.95 MMOL/L


(0.50-2.00)











Progress/Results/Core Measures


Suspected Sepsis


SIRS


Temperature: 


Pulse:  


Respiratory Rate: 


 


Laboratory Tests


6/10/18 18:55: White Blood Count 5.2


Blood Pressure  / 


Mean: 


 





6/10/18 18:55: Lactic Acid Level 0.95


Laboratory Tests


6/10/18 18:55: 


Creatinine 1.24, Platelet Count 157, Total Bilirubin 1.9H








Results/Orders


Lab Results





Laboratory Tests








Test


 6/10/18


18:55 Range/Units


 


 


White Blood Count


 5.2 


 4.3-11.0


10^3/uL


 


Red Blood Count


 4.00 L


 4.35-5.85


10^6/uL


 


Hemoglobin 12.4 L 13.3-17.7  G/DL


 


Hematocrit 37 L 40-54  %


 


Mean Corpuscular Volume 92  80-99  FL


 


Mean Corpuscular Hemoglobin 31  25-34  PG


 


Mean Corpuscular Hemoglobin


Concent 34 


 32-36  G/DL





 


Red Cell Distribution Width 13.6  10.0-14.5  %


 


Platelet Count


 157 


 130-400


10^3/uL


 


Mean Platelet Volume 11.3 H 7.4-10.4  FL


 


Neutrophils (%) (Auto) 66  42-75  %


 


Lymphocytes (%) (Auto) 16  12-44  %


 


Monocytes (%) (Auto) 14 H 0-12  %


 


Eosinophils (%) (Auto) 4  0-10  %


 


Basophils (%) (Auto) 1  0-10  %


 


Neutrophils # (Auto) 3.4  1.8-7.8  X 10^3


 


Lymphocytes # (Auto) 0.8 L 1.0-4.0  X 10^3


 


Monocytes # (Auto) 0.7  0.0-1.0  X 10^3


 


Eosinophils # (Auto)


 0.2 


 0.0-0.3


10^3/uL


 


Basophils # (Auto)


 0.0 


 0.0-0.1


10^3/uL


 


Sodium Level 143  135-145  MMOL/L


 


Potassium Level 4.3  3.6-5.0  MMOL/L


 


Chloride Level 107    MMOL/L


 


Carbon Dioxide Level 25  21-32  MMOL/L


 


Anion Gap 11  5-14  MMOL/L


 


Blood Urea Nitrogen 11  7-18  MG/DL


 


Creatinine


 1.24 


 0.60-1.30


MG/DL


 


Estimat Glomerular Filtration


Rate 57 


  





 


BUN/Creatinine Ratio 9   


 


Glucose Level 101    MG/DL


 


Lactic Acid Level


 0.95 


 0.50-2.00


MMOL/L


 


Calcium Level 9.4  8.5-10.1  MG/DL


 


Total Bilirubin 1.9 H 0.1-1.0  MG/DL


 


Aspartate Amino Transf


(AST/SGOT) 14 


 5-34  U/L





 


Alanine Aminotransferase


(ALT/SGPT) 13 


 0-55  U/L





 


Alkaline Phosphatase 61    U/L


 


C-Reactive Protein High


Sensitivity 1.14 H


 0.00-0.50


MG/DL


 


B-Type Natriuretic Peptide 405.7 H <100.0  PG/ML


 


Total Protein 6.0 L 6.4-8.2  GM/DL


 


Albumin 3.9  3.2-4.5  GM/DL








My Orders





Orders - RADHA KRUSE


BNP (6/10/18 19:13)


Cbc With Automated Diff (6/10/18 19:13)


Comprehensive Metabolic Panel (6/10/18 19:13)


Hs C Reactive Protein (6/10/18 19:13)


Blood Culture (6/10/18 19:13)


Sputum Culture (6/10/18 19:13)


Chest Pa/Lat (2 View) (6/10/18 19:13)


Albuterol/Ipra Inhalation Soln (Duoneb I (6/10/18 19:15)


Lactic Acid Analyzer (6/10/18 19:13)


Svn Small Volume Nebulizer (6/10/18 19:13)


Ceftriaxone Injection (Rocephin Injectio (6/10/18 20:00)


Azithromycin Tablet (Zithromax Tablet) (6/10/18 20:30)





Medications Given in ED





Current Medications








 Medications  Dose


 Ordered  Sig/Igor


 Route  Start Time


 Stop Time Status Last Admin


Dose Admin


 


 Albuterol/


 Ipratropium  3 ml  ONCE  ONCE


 INH  6/10/18 19:15


 6/10/18 19:16 DC 6/10/18 19:26


3 ML


 


 Ceftriaxone


 Sodium 1000 mg/


 Sodium Chloride  50 ml @ 


 100 mls/hr  ONCE  ONCE


 IV  6/10/18 20:00


 6/10/18 20:29  6/10/18 20:10


100 MLS/HR








Vital Signs/I&O











 6/10/18 6/10/18





 18:39 19:26


 


Temp 96.8 


 


Pulse 60 


 


Resp 20 


 


B/P (MAP) 140/102 (115) 


 


Pulse Ox 94 95


 


O2 Delivery Nasal Cannula Nasal Cannula


 


O2 Flow Rate 2.00 2.00





Capillary Refill :


Progress Note #1:  


   Time:  19:19


Progress Note


Pneumonia versus COPD exacerbation versus CHF exacerbation.


Echocardiogram 2018 Dr. Hammonds:


EF of 45-60% with normal wall thickness but increased cavity size. No regional 

wall motion abnormality. Grade 2 diastolic dysfunction.





Cardiac catheterization  by Dr. Hammonds showing a patent LIMA to the LAD and 

vein graft to the right coronary artery with moderate disease in the proximal 

LAD. 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 Patent stents in 

the mid circumflex artery with mild in-stent restenosis. Normal left 

ventricular end-diastolic pressure.


Progress Note #2:  


   Time:  20:22


Progress Note


The patient reports the Dr. Quesada the pulmonologist already ordered a CT scan 

outpatient . He also has an appointment in the next 1-2 weeks with Dr. Quesada which she can touch bases on and he would like to go outpatient with his 

treatment. His labs will support this. We'll set him up on Omnicef and 

azithromycin giving him a dose of Rocephin tonight and azithromycin 500 mg by 

mouth.





Diagnostic Imaging





   Diagonstic Imaging:  Xray


   Plain Films/CT/US/NM/MRI:  chest (2v)


Comments


 [facility].


 [place], [place]





NAME:   RU RAGLAND


MED REC#:   [id number]


ACCOUNT#:   [id number]


PT STATUS:   REG ER


:   1940


PHYSICIAN:   RADHA KRUSE MD


ADMIT DATE:   06/10/18/ER


 ***Draft***


Date of Exam:06/10/18





CHEST PA/LAT (2 VIEW)








INDICATION: Hematemesis.





TECHNIQUE: Two view chest, 8:03 p.m.





CORRELATION STUDY: 2018.





FINDINGS: Poststernotomy changes. Heart size and mediastinum are


generally stable.  Chronic type change of the lung parenchyma


with likely changes of COPD. Asymmetric parenchymal density in


the right perihilar regions as well as lung base do appear to be


adversely changed from prior study. Additionally, minimal


parenchymal density at the left lung base. Small pleural


effusions. Slight accentuated thoracic kyphotic curvature and


degenerative changes of the thoracic spine.





IMPRESSION: 


1. Multifocal parenchymal density, particularly in the right


perihilar region and lung base along with bilateral pleural


effusions, overall adversely changed since prior study. While


features could be reflective of underlying multifocal areas of


pneumonia, given chronicity of some of these areas, underlying


neoplasm is definitely in the differential.  


2. Vasculature is slightly prominent as well.





  Dictated on workstation # [id number]








Dict:   06/10/18 1947


Trans:   06/10/18 1954


[facility] [id number]





Interpreted by:     ESEQUIEL CORRAL DO


Electronically signed by:


   Reviewed:  Reviewed by Me





Departure


Impression





 Primary Impression:  


 Pneumonia


 Qualified Codes:  J18.1 - Lobar pneumonia, unspecified organism


Disposition:   HOME, SELF-CARE


Condition:  Stable





Departure-Patient Inst.


Decision time for Depature:  20:22


Referrals:  


JOSÉ METZGER DO (PCP/Family)


Primary Care Physician


Patient Instructions:  Pneumonia, Adult (DC)





Add. Discharge Instructions:  


Tomorrow  the antibiotics and start taking them.


Taken Omnicef capsule twice a day for 10 days.


Take one capsule of azithromycin daily for the next 4 days.


Follow-up with either your primary doctor or Dr. Quesada in the next 1-2 weeks 

to assure you have resolution of this pneumonia.


Keep your appointment or establish an appointment to get follow-up imaging such 

as a CT scan of your chest to further evaluate her recent history of multiple 

pneumonias.


All discharge instructions reviewed with patient and/or family. Voiced 

understanding.


Scripts


Cefdinir (Cefdinir) 300 Mg Capsule


300 MG PO BID for 9 Days, #18 CAP 0 Refills


   Prov: RADHA KRUSE         6/10/18 


Azithromycin (Azithromycin) 250 Mg Tablet


250 MG PO DAILY, #4 TAB 0 Refills


   Prov: RADHA KRUSE         6/10/18





Copy


Copies To 1:   MYAH QUESADA DO; JOSÉ METZGER TITUS J Luciano 10, 2018 19:21

## 2018-06-22 ENCOUNTER — HOSPITAL ENCOUNTER (OUTPATIENT)
Dept: HOSPITAL 75 - RAD | Age: 78
End: 2018-06-22
Attending: NURSE PRACTITIONER
Payer: MEDICARE

## 2018-06-22 DIAGNOSIS — J44.9: Primary | ICD-10-CM

## 2018-06-22 LAB
BUN/CREAT SERPL: 9
CREAT SERPL-MCNC: 1.51 MG/DL (ref 0.6–1.3)
GFR SERPLBLD BASED ON 1.73 SQ M-ARVRAT: 45 ML/MIN

## 2018-06-22 PROCEDURE — 36415 COLL VENOUS BLD VENIPUNCTURE: CPT

## 2018-06-22 PROCEDURE — 84520 ASSAY OF UREA NITROGEN: CPT

## 2018-06-22 PROCEDURE — 71260 CT THORAX DX C+: CPT

## 2018-06-22 PROCEDURE — 82565 ASSAY OF CREATININE: CPT

## 2018-06-22 NOTE — DIAGNOSTIC IMAGING REPORT
PROCEDURE: CT chest with contrast only.



TECHNIQUE: Multiple contiguous axial images were obtained through

the chest after administration of intravenous contrast.



INDICATION: Shortness of air and hemoptysis.



COMPARISON: Comparison is made with prior chest radiograph from

06/10/2018.



FINDINGS: There are changes of median sternotomy. No axillary

lymphadenopathy is identified. There are some mildly prominent

lymph nodes in the left paratracheal/AP window region. A

conglomerate of nodes measures in aggregate 2.8 x 1.7 cm. No

definite hilar lymphadenopathy is seen. There is some mild soft

tissue fullness in the subcarinal region. Subcarinal soft tissue

measures 2.7 x 1.3 cm. No pericardial fluid is seen. There are

trace bilateral pleural effusions. Parenchymal evaluation does

show some minimal patchy infiltrate in the right middle lobe as

well as bilateral lower lobes but this appears to be improved

when compared with chest radiograph from 12 days earlier. The

upper abdomen is unremarkable apart from an exophytic cyst

arising from the upper pole of the right kidney.



IMPRESSION:

1. There are bibasilar and right middle lobe infiltrates or

atelectasis as well as trace bilateral pleural effusions. There

are some prominent mediastinal lymph nodes which may be reactive.

Followup CT chest in three months could be obtained after course

of therapy to confirm stability and/or resolution.



Dictated by: 



  Dictated on workstation # [id number]

## 2018-07-03 ENCOUNTER — HOSPITAL ENCOUNTER (OUTPATIENT)
Dept: HOSPITAL 75 - RAD | Age: 78
End: 2018-07-03
Attending: INTERNAL MEDICINE
Payer: MEDICARE

## 2018-07-03 DIAGNOSIS — Z85.819: ICD-10-CM

## 2018-07-03 DIAGNOSIS — Z85.46: ICD-10-CM

## 2018-07-03 DIAGNOSIS — R59.0: Primary | ICD-10-CM

## 2018-07-03 DIAGNOSIS — J90: ICD-10-CM

## 2018-07-03 DIAGNOSIS — R91.8: ICD-10-CM

## 2018-07-03 DIAGNOSIS — R04.2: ICD-10-CM

## 2018-07-05 ENCOUNTER — HOSPITAL ENCOUNTER (OUTPATIENT)
Dept: HOSPITAL 75 - PREOP | Age: 78
End: 2018-07-05
Attending: INTERNAL MEDICINE
Payer: MEDICARE

## 2018-07-05 VITALS — HEIGHT: 65 IN | BODY MASS INDEX: 26.99 KG/M2 | WEIGHT: 162 LBS

## 2018-07-05 DIAGNOSIS — Z01.818: Primary | ICD-10-CM

## 2018-07-11 ENCOUNTER — HOSPITAL ENCOUNTER (OUTPATIENT)
Dept: HOSPITAL 75 - ENDO | Age: 78
Discharge: HOME | End: 2018-07-11
Attending: INTERNAL MEDICINE
Payer: MEDICARE

## 2018-07-11 VITALS — DIASTOLIC BLOOD PRESSURE: 57 MMHG | SYSTOLIC BLOOD PRESSURE: 105 MMHG

## 2018-07-11 VITALS — SYSTOLIC BLOOD PRESSURE: 102 MMHG | DIASTOLIC BLOOD PRESSURE: 53 MMHG

## 2018-07-11 VITALS — WEIGHT: 162 LBS | BODY MASS INDEX: 26.99 KG/M2 | HEIGHT: 65 IN

## 2018-07-11 VITALS — DIASTOLIC BLOOD PRESSURE: 82 MMHG | SYSTOLIC BLOOD PRESSURE: 133 MMHG

## 2018-07-11 DIAGNOSIS — Z95.5: ICD-10-CM

## 2018-07-11 DIAGNOSIS — I10: ICD-10-CM

## 2018-07-11 DIAGNOSIS — R59.0: ICD-10-CM

## 2018-07-11 DIAGNOSIS — G47.33: ICD-10-CM

## 2018-07-11 DIAGNOSIS — Z79.899: ICD-10-CM

## 2018-07-11 DIAGNOSIS — J44.9: ICD-10-CM

## 2018-07-11 DIAGNOSIS — Z95.1: ICD-10-CM

## 2018-07-11 DIAGNOSIS — Z79.82: ICD-10-CM

## 2018-07-11 DIAGNOSIS — F17.201: ICD-10-CM

## 2018-07-11 DIAGNOSIS — R04.2: Primary | ICD-10-CM

## 2018-07-11 PROCEDURE — 71045 X-RAY EXAM CHEST 1 VIEW: CPT

## 2018-07-11 PROCEDURE — 87070 CULTURE OTHR SPECIMN AEROBIC: CPT

## 2018-07-11 PROCEDURE — 87101 SKIN FUNGI CULTURE: CPT

## 2018-07-11 PROCEDURE — 87116 MYCOBACTERIA CULTURE: CPT

## 2018-07-11 PROCEDURE — 87205 SMEAR GRAM STAIN: CPT

## 2018-07-11 NOTE — DIAGNOSTIC IMAGING REPORT
Indication: Fluoroscopy for bronchoscopy.



Time of exam:  8:08 AM



Fluoroscopy was provided for Dr. Quesada during a bronchoscopy. 38

seconds of fluoroscopy time was utilized.



Impression: Fluoroscopy for bronchoscopy.



Dictated by: 



  Dictated on workstation # [id number]

## 2018-07-11 NOTE — XMS REPORT
Continuity of Care Document

 Created on: 2018



RU RAGLAND

External Reference #: [id number]

: 1940

Sex: Male



Demographics







 Address  [address] [place]  [address]

 

 Home Phone  [phone] x

 

 Preferred Language  Unknown

 

 Marital Status  Unknown

 

 Islam Affiliation  Unknown

 

 Race  Unknown

 

 Ethnic Group  Unknown





Author







 Author  [facility]

 

 Organization  [facility]

 

 Address  Unknown

 

 Phone  Unavailable



              



Allergies

      





 Active            Description            Code            Type            
Severity            Reaction            Onset            Reported/Identified   
         Relationship to Patient            Clinical Status        

 

 Yes            No Known Drug Allergies            [id number]            Drug 
Allergy            Unknown            N/A                         10/16/2017   
                               



                  



Medications

      



There is no data.                  



Problems

      





 Date Dx Coded            Attending            Type            Code            
Diagnosis            Diagnosed By        

 

 10/14/2010                         Ot            401.9                        
          

 

 10/14/2010                         Ot            414.01                       
           

 

 10/14/2010                         Ot            414.2                        
          

 

 10/14/2010                         Ot            427.1                        
          

 

 10/14/2010                         Ot            428.0                        
          

 

 10/14/2010                         Ot            794.30                       
           

 

 10/14/2010                         Ot            V45.81                       
           

 

 2011                         Ot            V45.82                       
           

 

 2011                         Ot            V57.89                       
           

 

 2011                         Ot            V45.82                       
           

 

 2011                         Ot            V57.89                       
           

 

 2012                         Ot            141.2            MAL CAR TIP/
LAT TONGUE                     

 

 2012                         Ot            141.9            MALIG CAR 
TONGUE NOS                     

 

 2012                         Ot            733.90            BONE   
CARTILAGE DIS NOS                     

 

 2012                         Ot            141.9            MALIG CAR 
TONGUE NOS                     

 

 2012                         Ot            V58.0            ENCOUNTER 
FOR RADIOTHERAPY                     

 

 2012                         Ot            V67.2            CHEMOTHERAPY 
FOLLOW-UP                     

 

 2012                         Ot            272.4            
HYPERLIPIDEMIA NEC/NOS                     

 

 2012                         Ot            401.9            HYPERTENSION 
NOS                     

 

 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 
INFARCT                     

 

 2012                         Ot            414.01            CORONARY 
ATHEROSCLEROSIS OF NATIVE CORON                     

 

 2012                         Ot            428.0            CONGESTIVE 
HEART FAILURE NOS                     

 

 2012                         Ot            428.22            CHRONIC 
SYSTOLIC HRT FAILURE                     

 

 2012                         Ot            794.30            ABN 
CARDIOVASC STUDY NOS                     

 

 2012                         Ot            V45.81            
AORTOCORONARY BYPASS                     

 

 2012                         Ot            V45.82            
PERCUTANEOUS TRANSLUM CORON ANGIOPLASTY                      

 

 2012                         Ot            V58.69            OTH MED,LT,
CURRENT USE                     

 

 2012                         Ot            141.9            MALIG CAR 
TONGUE NOS                     

 

 2012                         Ot            715.33            LOC 
OSTEOART NOS-FOREARM                     

 

 2012                         Ot            719.43            JOINT PAIN-
FOREARM                     

 

 2012                         Ot            141.9            MALIG CAR 
TONGUE NOS                     

 

 2012                         Ot            V58.81            FIT/ADJ 
VASCULAR CATHETER                     

 

 2012                         Ot            141.9            MALIG CAR 
TONGUE NOS                     

 

 2012                         Ot            285.3            
ANTINEOPLASTIC CHEMOTHERAPY INDUCED ANEM                     

 

 2012                         Ot            403.90            HYPTNSV CHR 
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                     

 

 2012                         Ot            414.00            CORON 
ATHEROSCLER NOS TYPE VESSEL, NATIV                     

 

 2012                         Ot            457.1            OTHER 
LYMPHEDEMA                     

 

 2012                         Ot            585.3            CHRONIC 
KIDNEY DISEASE, STAGE III (MODER                     

 

 2012                         Ot            V15.3            HX OF 
IRRADIATION                     

 

 2012                         Ot            V44.1            GASTROSTOMY 
STATUS                     

 

 2012                         Ot            V45.81            
AORTOCORONARY BYPASS                     

 

 2012                         Ot            V45.82            
PERCUTANEOUS TRANSLUM CORON ANGIOPLASTY                      

 

 2012                         Ot            V58.66            LONG-TERM (
CURRENT) USE OF ASPIRIN                     

 

 2012                         Ot            V58.69            OTH MED,LT,
CURRENT USE                     

 

 2012                         Ot            V87.41            PERSONAL 
HISTORY OF ANTINEOPLASTIC CHEMO                     

 

 2013                         Ot            141.9            MALIG CAR 
TONGUE NOS                     

 

 2013                         Ot            244.9            
HYPOTHYROIDISM NOS                     

 

 2013                         Ot            272.4            
HYPERLIPIDEMIA NEC/NOS                     

 

 2013                         Ot            403.90            HYPTNSV CHR 
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 
INFARCT                     

 

 2013                         Ot            414.00            CORON 
ATHEROSCLER NOS TYPE VESSEL, NATIV                     

 

 2013                         Ot            428.0            CONGESTIVE 
HEART FAILURE NOS                     

 

 2013                         Ot            428.43            ACUTE 
CHRONIC SYSTOLIC/DIALSTOLIC HRT FA                     

 

 2013                         Ot            491.21            OBSTR 
CHRONIC BRONCHITIS, W (ACUTE) EXAC                     

 

 2013                         Ot            585.9            CHRONIC 
KIDNEY DISEASE, UNSPECIFIED                     

 

 2013                         Ot            593.9            RENAL   
URETERAL DIS NOS                     

 

 2013                         Ot            V15.82            HISTORY OF 
TOBACCO USE                     

 

 2013                         Ot            V45.81            
AORTOCORONARY BYPASS                     

 

 2013                         Ot            550.90            UNILAT 
INGUINAL HERNIA                     

 

 2013                         Ot            V03.82            
PROPHYLACTIC VACC AGAINST STREPTOCOCCUS                      

 

 2013            PÉREZ BRIONES, MARIETTA            Ot            141.9       
     MALIG CAR TONGUE NOS                     

 

 2013            MARIETTA ORTEZ MD            Ot            285.3       
     ANTINEOPLASTIC CHEMOTHERAPY INDUCED ANEM                     

 

 2013            MARIETTA ORTEZ MD            Ot            403.90      
      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                     

 

 2013            MARIETTA ORTEZ MD            Ot            414.00      
      CORON ATHEROSCLER NOS TYPE VESSEL, NATIV                     

 

 2013            MARIETTA ORTEZ MD            Ot            457.1       
     OTHER LYMPHEDEMA                     

 

 2013            MARIETTA ORTEZ MD            Ot            585.3       
     CHRONIC KIDNEY DISEASE, STAGE III (MODER                     

 

 2013            MARIETTA ORTEZ MD            Ot            V15.3       
     HX OF IRRADIATION                     

 

 2013            MARIETTA ORTEZ MD            Ot            V44.1       
     GASTROSTOMY STATUS                     

 

 2013            MARIETTA ORTEZ MD            Ot            V45.81      
      AORTOCORONARY BYPASS                     

 

 2013            MARIETTA ORTEZ MD            Ot            V45.82      
      PERCUTANEOUS TRANSLUM CORON ANGIOPLASTY                      

 

 2013            MARIETTA ORTEZ MD            Ot            V58.66      
      LONG-TERM (CURRENT) USE OF ASPIRIN                     

 

 2013            MARIETTA ORTEZ MD            Ot            V58.69      
      OT MED,LT,CURRENT USE                     

 

 2013            MARIETTA ORTEZ MD            Ot            V87.41      
      PERSONAL HISTORY OF ANTINEOPLASTIC CHEMO                     

 

 10/07/2013            MARIETTA ORTEZ MD            Ot            141.9       
     MALIG CAR TONGUE NOS                     

 

 10/07/2013            MARIETTA ORTEZ MD            Ot            285.3       
     ANTINEOPLASTIC CHEMOTHERAPY INDUCED ANEM                     

 

 10/07/2013            MARIETTA ORTEZ MD            Ot            403.90      
      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                     

 

 10/07/2013            MARIETTA ORTEZ MD            Ot            414.00      
      CORON ATHEROSCLER NOS TYPE VESSEL, NATIV                     

 

 10/07/2013            MARIETTA ORTEZ MD            Ot            457.1       
     OTHER LYMPHEDEMA                     

 

 10/07/2013            MARIETTA ORTEZ MD            Ot            585.3       
     CHRONIC KIDNEY DISEASE, STAGE III (MODER                     

 

 10/07/2013            MARIETTA ORTEZ MD            Ot            V15.3       
     HX OF IRRADIATION                     

 

 10/07/2013            MARIETTA ORTEZ MD            Ot            V44.1       
     GASTROSTOMY STATUS                     

 

 10/07/2013            MARIETTA ORTEZ MD            Ot            V45.81      
      AORTOCORONARY BYPASS                     

 

 10/07/2013            MARIETTA ORTEZ MD            Ot            V45.82      
      PERCUTANEOUS TRANSLUM CORON ANGIOPLASTY                      

 

 10/07/2013            MARIETTA ORTEZ MD, Ot            V58.66      
      LONG-TERM (CURRENT) USE OF ASPIRIN                     

 

 10/07/2013            MARIETTA ORTEZ MD, Ot            V58.69      
      OTH MED,LT,CURRENT USE                     

 

 10/07/2013            MARIETTA ORTEZ MD, Ot            V58.81      
      FIT/ADJ VASCULAR CATHETER                     

 

 10/07/2013            MARIETTA ORTEZ MD, Ot            V87.41      
      PERSONAL HISTORY OF ANTINEOPLASTIC CHEMO                     

 

 02/10/2014            MARIETTA ORTEZ MD, Ot            141.9       
     MALIG CAR TONGUE NOS                     

 

 02/10/2014            MARIETTA ORTEZ MD, Ot            V58.81      
      FIT/ADJ VASCULAR CATHETER                     

 

 2014            MARIETTA ORTEZ MD, Ot            141.9       
     MALIG CAR TONGUE NOS                     

 

 2014            MARITETA ORTEZ MD, Ot            285.3       
     ANTINEOPLASTIC CHEMOTHERAPY INDUCED ANEM                     

 

 2014            MARIETTA ORTEZ MD, Ot            403.90      
      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                     

 

 2014            MARIETTA ORTEZ MD, Ot            414.00      
      CORON ATHEROSCLER NOS TYPE VESSEL, NATIV                     

 

 2014            MARIETTA ORTEZ MD, Ot            457.1       
     OTHER LYMPHEDEMA                     

 

 2014            MARIETTA ORTEZ MD, Ot            585.3       
     CHRONIC KIDNEY DISEASE, STAGE III (MODER                     

 

 2014            MARIETTA ORTEZ MD, Ot            V15.3       
     HX OF IRRADIATION                     

 

 2014            MARIETTA ORTEZ MD            Ot            V44.1       
     GASTROSTOMY STATUS                     

 

 2014            MARIETTA ORTEZ MD, Ot            V45.81      
      AORTOCORONARY BYPASS                     

 

 2014            MARIETTA ORTEZ MD, Ot            V45.82      
      PERCUTANEOUS TRANSLUM CORON ANGIOPLASTY                      

 

 2014            MARIETTA ORTEZ MD, Ot            V58.66      
      LONG-TERM (CURRENT) USE OF ASPIRIN                     

 

 2014            MARIETTA ORTEZ MD, Ot            V58.69      
      OTH MED,LT,CURRENT USE                     

 

 2014            MARIETTA ORTEZ MD, Ot            V58.81      
      FIT/ADJ VASCULAR CATHETER                     

 

 2014            MARIETTA ORTEZ MD            Ot            V87.41      
      PERSONAL HISTORY OF ANTINEOPLASTIC CHEMO                     

 

 2014            MARIETTA ORTEZ MD            Ot            141.9       
     MALIG CAR TONGUE NOS                     

 

 2014            XUN MD, JOHNS-NATHALIA            Ot            285.3       
     ANTINEOPLASTIC CHEMOTHERAPY INDUCED ANEM                     

 

 2014            MARIETTA ORTEZ MD            Ot            403.90      
      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                     

 

 2014            MARIETTA ORTEZ MD, Ot            414.00      
      CORON ATHEROSCLER NOS TYPE VESSEL, NATIV                     

 

 2014            MARIETTA ORTEZ MD, Ot            457.1       
     OTHER LYMPHEDEMA                     

 

 2014            MARIETTA ORTEZ MD, Ot            585.3       
     CHRONIC KIDNEY DISEASE, STAGE III (MODER                     

 

 2014            MARIETTA ORTEZ MD, Ot            V15.3       
     HX OF IRRADIATION                     

 

 2014            MARIETTA ORTEZ MD, Ot            V44.1       
     GASTROSTOMY STATUS                     

 

 2014            MARIETTA ORTEZ MD, Ot            V45.81      
      AORTOCORONARY BYPASS                     

 

 2014            MARIETTA ORTEZ MD, Ot            V45.82      
      PERCUTANEOUS TRANSLUM CORON ANGIOPLASTY                      

 

 2014            MARIETTA ORTEZ MD, Ot            V58.66      
      LONG-TERM (CURRENT) USE OF ASPIRIN                     

 

 2014            MARIETTA ORTEZ MD, Ot            V58.69      
      OT MED,LT,CURRENT USE                     

 

 2014            MARIETTA ORTEZ MD, Ot            V58.81      
      FIT/ADJ VASCULAR CATHETER                     

 

 2014            MARIETTA ORTEZ MD, Ot            V87.41      
      PERSONAL HISTORY OF ANTINEOPLASTIC CHEMO                     

 

 2014            ELIDIA ANTOINE MD            Ot            244.9       
     HYPOTHYROIDISM NOS                     

 

 2014            ELIDIA ANTOINE MD            Ot            272.4       
     HYPERLIPIDEMIA NEC/NOS                     

 

 2014            ELIDIA ANTOINE MD            Ot            401.9       
     HYPERTENSION NOS                     

 

 2014            ELIDIA ANTOINE MD            Ot            414.01      
      CORONARY ATHEROSCLEROSIS OF NATIVE CORON                     

 

 2014            ELIDIA ANTOINE MD            Ot            428.0       
     CONGESTIVE HEART FAILURE NOS                     

 

 2014            ELIDIA ANTOINE MD            Ot            433.10      
      CAROTID ARTERY OCCLUSION W O CEREBRAL IN                     

 

 2014            ELIDIA ANTOINE MD            Ot            786.50      
      CHEST PAIN NOS                     

 

 2014            ELIDIA ANTOINE MD            Ot            V15.82      
      HISTORY OF TOBACCO USE                     

 

 2014            ELIDIA ANTOINE MD            Ot            V45.81      
      AORTOCORONARY BYPASS                     

 

 2014            ELIDIA ANTOINE MD, Ot            V45.82      
      PERCUTANEOUS TRANSLUM CORON ANGIOPLASTY                      

 

 2014            ELIDIA ANTOINE MD            Ot            V58.69      
      OT MED,LT,CURRENT USE                     

 

 2014            MARIETTA ORTEZ MD            Ot            141.9       
     MALIG CAR TONGUE NOS                     

 

 2014            MARIETTA ORTEZ MD            Ot            285.3       
     ANTINEOPLASTIC CHEMOTHERAPY INDUCED ANEM                     

 

 2014            MARIETTA ORTEZ MD            Ot            403.90      
      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                     

 

 2014            MARIETTA ORTEZ MD, Ot            414.00      
      CORON ATHEROSCLER NOS TYPE VESSEL, NATIV                     

 

 2014            MARIETTA ORTEZ MD            Ot            457.1       
     OTHER LYMPHEDEMA                     

 

 2014            MARIETTA ORTEZ MD, Ot            585.3       
     CHRONIC KIDNEY DISEASE, STAGE III (MODER                     

 

 2014            MARIETTA ORTEZ MD, Ot            V15.3       
     HX OF IRRADIATION                     

 

 2014            MARIETTA ORTEZ MD, Ot            V44.1       
     GASTROSTOMY STATUS                     

 

 2014            MARIETTA ORTEZ MD, Ot            V45.81      
      AORTOCORONARY BYPASS                     

 

 2014            MARIETTA ORTEZ MD, Ot            V45.82      
      PERCUTANEOUS TRANSLUM CORON ANGIOPLASTY                      

 

 2014            MARIETTA ORTEZ MD, Ot            V58.66      
      LONG-TERM (CURRENT) USE OF ASPIRIN                     

 

 2014            MARIETTA ORTEZ MD, Ot            V58.69      
      OT MED,LT,CURRENT USE                     

 

 2014            MARIETTA ORTEZ MD, Ot            V58.81      
      FIT/ADJ VASCULAR CATHETER                     

 

 2014            MARIETTA ORTEZ MD, Ot            V87.41      
      PERSONAL HISTORY OF ANTINEOPLASTIC CHEMO                     

 

 2014            ELIDIA ANTOINE MD            Ot            272.4       
                           

 

 2014            ELIDIA ANTOINE MD            Ot            401.9       
                           

 

 2014            ELIDIA ANTOINE MD            Ot            414.00      
                            

 

 2014            ELIDIA ANTOINE MD            Ot            272.4       
                           

 

 2014            ARASH BRIONES, ELIDIA RODAS            Ot            401.9       
                           

 

 2014            ARASH BRIONES, ELIDIA RODAS            Ot            414.00      
                            

 

 2014            MARIETTA ORTEZ MD            Ot            141.9       
                           

 

 2014            MARIETTA ORTEZ MD            Ot            285.3       
                           

 

 2014            MARIETTA ORTEZ MD            Ot            403.90      
                            

 

 2014            MARIETTA ORTEZ MD            Ot            414.00      
                            

 

 2014            PÉREZ BRIONES, JHONS-NATHALIA            Ot            457.1       
                           

 

 2014            PÉREZ BRIONES, JOHNS-NATHALIA            Ot            585.3       
                           

 

 2014            PÉREZ BRIONES, JOHNS-NATHALIA            Ot            V15.3       
                           

 

 2014            PÉREZ BRIONES, JOHNS-NATHALIA            Ot            V44.1       
                           

 

 2014            PÉREZ BRIONES, JOHNS-NATHALIA            Ot            V45.81      
                            

 

 2014            PÉREZ BRIONES, JOHNS-NATHALIA            Ot            V45.82      
                            

 

 2014            PÉREZ BRIONES, JOHNS-NATHALIA            Ot            V58.66      
                            

 

 2014            PÉREZ BRIONES, JOHNS-NATHALIA            Ot            V58.69      
                            

 

 2014            PÉREZ BRIONES, JOHNS-NATHALIA            Ot            V58.81      
                            

 

 2014            PÉREZ BRIONES, JOHNS-NATHALIA            Ot            V87.41      
                            

 

 2014            PÉREZ BRIONES, JOHNS-NATHALIA            Ot            141.9       
                           

 

 2014            PÉREZ BRIONES, JOHNS-NATHALIA            Ot            285.3       
                           

 

 2014            PÉREZ BRIONES, JOHNS-NATHALIA            Ot            403.90      
                            

 

 2014            PÉREZ BRIONES, JOHNS-NATHALIA            Ot            414.00      
                            

 

 2014            PÉREZ BRIONES, JOHNS-NATHALIA            Ot            457.1       
                           

 

 2014            PÉREZ BRIONES, JOHNS-NATHALIA            Ot            585.3       
                           

 

 2014            PÉREZ BRIONES, JOHNS-NATHALIA            Ot            V15.3       
                           

 

 2014            PÉREZ BRIONES, JOHNS-NATHALIA            Ot            V44.1       
                           

 

 2014            PÉREZ BRIONES, JOHNS-NATHALIA            Ot            V45.81      
                            

 

 2014            PÉREZ BRIONES, JOHNS-NATHALIA            Ot            V45.82      
                            

 

 2014            PÉREZ BRIONES, JOHNS-NATHALIA            Ot            V58.66      
                            

 

 2014            PÉREZ BRIONES, JOHNS-NATHALIA            Ot            V58.69      
                            

 

 2014            PÉREZ BRIONES, JOHNS-NATHALIA            Ot            V58.81      
                            

 

 2014            PÉREZ BRIONES, JOHNS-NATHALIA            Ot            V87.41      
                            

 

 2015            PÉREZ BRIONES, JOHNS-NATHALIA            Ot            141.9       
                           

 

 2015            PÉREZ BRIONES, JOHNS-NATHALIA            Ot            285.3       
                           

 

 2015            PÉREZ BRIONES, JOHNS-NATHALIA            Ot            403.90      
                            

 

 2015            PÉREZ BRIONES, JOHNS-NATHALIA            Ot            414.00      
                            

 

 2015            PÉREZ BRIONES, NAT-NATHALIA            Ot            457.1       
                           

 

 2015            PÉREZ BRIONES, JOHNS-NATHLAIA            Ot            585.3       
                           

 

 2015            PÉREZ BRIONES, NAT-NATHALIA            Ot            V15.3       
                           

 

 2015            PÉREZ BRIONES, JOHNS-NATHALIA            Ot            V44.1       
                           

 

 2015            PÉREZ BRIONES, JOHNS-NATHALIA            Ot            V45.81      
                            

 

 2015            PÉREZ BRIONES, NAT-NATHALIA            Ot            V45.82      
                            

 

 2015            PÉREZ BRIONES, NAT-NATHALIA            Ot            V58.66      
                            

 

 2015            PÉREZ BRIONES, NAT-NATHALIA            Ot            V58.69      
                            

 

 2015            PÉREZ BRIONES, NAT-NATHALIA            Ot            V58.81      
                            

 

 2015            PÉREZ BRIONES, MARIETTA            Ot            V87.41      
                            

 

 2015            PÉREZ BRIONES, MARIETTA            Ot            141.9       
                           

 

 2015            PÉREZ BRIONES, MARIETTA            Ot            285.3       
                           

 

 2015            PÉREZ BRIONES, MARIETTA            Ot            403.90      
                            

 

 2015            PÉREZ BRIONES, MARIETTA            Ot            414.00      
                            

 

 2015            PÉREZ BRIONES, MARIETTA            Ot            457.1       
                           

 

 2015            PÉREZ BRIONES, MARIETTA            Ot            585.3       
                           

 

 2015            PÉREZ BRIONES, MARIETTA            Ot            V15.3       
                           

 

 2015            PÉREZ BRIONES, MARIETTA            Ot            V44.1       
                           

 

 2015            PÉREZ BRIONES, MARIETTA            Ot            V45.81      
                            

 

 2015            PÉRZE BRIONES, MARIETTA            Ot            V45.82      
                            

 

 2015            PÉREZ BRIONES, MARIETTA            Ot            V58.66      
                            

 

 2015            PÉREZ BRIONES, MARIETTA            Ot            V58.69      
                            

 

 2015            PÉREZ BRIONES, MARIETTA            Ot            V58.81      
                            

 

 2015            PÉREZ BRIONES, MARIETTA            Ot            V87.41      
                            

 

 2015                         Ot            272.0            PURE 
HYPERCHOLESTEROLEM                     

 

 2015                         Ot            401.9            HYPERTENSION 
NOS                     

 

 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 
INFARCT                     

 

 2015                         Ot            414.00            CORON 
ATHEROSCLER NOS TYPE VESSEL, NATIV                     

 

 2015                         Ot            427.9            CARDIAC 
DYSRHYTHMIA NOS                     

 

 2015                         Ot            780.4            DIZZINESS 
AND GIDDINESS                     

 

 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            
AORTOCORONARY BYPASS                     

 

 2015            PÉREZ BRIONES, MARIETTA            Ot            141.9       
     MALIG CAR TONGUE NOS                     

 

 2015            PÉREZ BRIONES, MARIETTA            Ot            285.3       
     ANTINEOPLASTIC CHEMOTHERAPY INDUCED ANEM                     

 

 2015            PÉREZ BRIONES, MARIETTA            Ot            403.90      
      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                     

 

 2015            PÉREZ BRIONES, MARIETTA Damon            414.00      
      CORON ATHEROSCLER NOS TYPE VESSEL, NATIV                     

 

 2015            PÉREZ BRIONES, MARIETTA            Ot            457.1       
     OTHER LYMPHEDEMA                     

 

 2015            PÉREZ BRIONES, MARIETTA            Ot            585.3       
     CHRONIC KIDNEY DISEASE, STAGE III (MODER                     

 

 2015            PÉREZ BRIONES, MARIETTA Damon            V15.3       
     HX OF IRRADIATION                     

 

 2015            PÉREZ BRIONES, MARIETTA Damon            V44.1       
     GASTROSTOMY STATUS                     

 

 2015            PÉREZ BRIONES, MARIETTA Damon            V45.81      
      AORTOCORONARY BYPASS                     

 

 2015            PÉREZ BRIONES, MARIETTA Damon            V45.82      
      PERCUTANEOUS TRANSLUM CORON ANGIOPLASTY                      

 

 2015            PÉREZ BRIONES, MARIETTA            Ot            V58.66      
      LONG-TERM (CURRENT) USE OF ASPIRIN                     

 

 2015            PÉREZ BRIONES, MARIETTA Damon            V58.69      
      OT MED,LT,CURRENT USE                     

 

 2015            PÉREZ BRIONES, MARIETTA            Ot            V58.81      
      FIT/ADJ VASCULAR CATHETER                     

 

 2015            PÉREZ BRIONES, MARIETTA            Ot            V87.41      
      PERSONAL HISTORY OF ANTINEOPLASTIC CHEMO                     

 

 04/15/2015            PÉREZ BRIONES, MARIETTA            Ot            141.9       
                           

 

 04/15/2015            PÉREZ BRIONES, MARIETTA            Ot            285.3       
                           

 

 04/15/2015            PÉREZ BRIONES, MARIETTA            Ot            403.90      
                            

 

 04/15/2015            PÉREZ BRIONES, MARIETTA            Ot            414.00      
                            

 

 04/15/2015            PÉREZ BRIONES, MARIETTA            Ot            457.1       
                           

 

 04/15/2015            PÉREZ BRIONES, MARIETTA            Ot            585.3       
                           

 

 04/15/2015            PÉREZ BRIONES, MARIETTA            Ot            V15.3       
                           

 

 04/15/2015            PÉREZ BRIONES, MARIETTA            Ot            V44.1       
                           

 

 04/15/2015            PÉREZ BRIONES, MARIETTA            Ot            V45.81      
                            

 

 04/15/2015            PÉREZ BRIONES, MARIETTA            Ot            V45.82      
                            

 

 04/15/2015            PÉREZ BRIONES, MARIETTA            Ot            V58.66      
                            

 

 04/15/2015            PÉREZ BRIONES, MARIETTA            Ot            V58.69      
                            

 

 04/15/2015            PÉREZ BRIONES, MARIETTA            Ot            V58.81      
                            

 

 04/15/2015            PÉREZ BRIONES, JOHNS-NATHALIA            Ot            V87.41      
                            

 

 04/15/2015            PÉREZ BRIONES, JOHNS-NATHALIA            Ot            141.9       
                           

 

 04/15/2015            PÉREZ BRIONES, JOHNS-NATHALIA            Ot            285.3       
                           

 

 04/15/2015            PÉREZ BRIONES, JOHNS-NATHALIA            Ot            403.90      
                            

 

 04/15/2015            PÉREZ BRIONES, JOHNS-NATHALIA            Ot            414.00      
                            

 

 04/15/2015            PÉREZ BRIONES, JOHNS-NATHALIA            Ot            457.1       
                           

 

 04/15/2015            PÉREZ BRIONES, JOHNS-NATHALIA            Ot            585.3       
                           

 

 04/15/2015            PÉREZ BRIONES, JOHNS-NATHALIA            Ot            V15.3       
                           

 

 04/15/2015            PÉREZ BRIONES, JOHNS-NATHALIA            Ot            V44.1       
                           

 

 04/15/2015            PÉREZ BRIONES, JOHNS-NATHALIA            Ot            V45.81      
                            

 

 04/15/2015            PÉREZ BRIONES, JOHNS-NATHALIA            Ot            V45.82      
                            

 

 04/15/2015            PÉREZ BRIONES, JOHNS-NATHALIA            Ot            V58.66      
                            

 

 04/15/2015            PÉREZ BRIONES, JOHNS-NATHALIA            Ot            V58.69      
                            

 

 04/15/2015            PÉREZ BRIONES, JOHNS-NATHALIA            Ot            V58.81      
                            

 

 04/15/2015            PÉREZ BRIONES, JOHNS-NATHALIA            Ot            V87.41      
                            

 

 04/15/2015            PÉREZ BRIONES, JOHNS-NATHALIA            Ot            141.9       
                           

 

 04/15/2015            PÉREZ BRIONES, JOHNS-NATHALIA            Ot            285.3       
                           

 

 04/15/2015            PÉREZ BRIONES, JOHNS-NATHALIA            Ot            403.90      
                            

 

 04/15/2015            PÉREZ BRIONES, JOHNS-NATHALIA            Ot            414.00      
                            

 

 04/15/2015            PÉREZ BRIONES, JOHNS-NATHALIA            Ot            457.1       
                           

 

 04/15/2015            PÉREZ BRIONES, JOHNS-NATHALIA            Ot            585.3       
                           

 

 04/15/2015            PÉREZ BRIONES, JOHNS-NATHALIA            Ot            V15.3       
                           

 

 04/15/2015            PÉREZ BRIONES, JOHNS-NATHALIA            Ot            V44.1       
                           

 

 04/15/2015            PÉREZ BRIONES, JOHNS-NATHALIA            Ot            V45.81      
                            

 

 04/15/2015            PÉREZ BRIONES, JOHNS-NATHALIA            Ot            V45.82      
                            

 

 04/15/2015            PÉREZ BRIONES, JOHNS-NATHALIA            Ot            V58.66      
                            

 

 04/15/2015            PÉREZ BRIONES, JOHNS-NATHALIA            Ot            V58.69      
                            

 

 04/15/2015            PÉREZ BRIONES, JOHNS-NATHALIA            Ot            V58.81      
                            

 

 04/15/2015            PÉREZ BRIONES, JOHNS-NATHALIA            Ot            V87.41      
                            

 

 2015            PÉREZ BRIONES, JOHNS-NATHALIA            Ot            141.9       
     MALIG CAR TONGUE NOS                     

 

 2015            PÉREZ BRIONES, MARIETTA            Ot            285.3       
     ANTINEOPLASTIC CHEMOTHERAPY INDUCED ANEM                     

 

 2015            PÉREZ BRIONES, MARIETTA            Ot            403.90      
      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                     

 

 2015            PÉREZ BRIONES, MARIETTA            Ot            414.00      
      CORON ATHEROSCLER NOS TYPE VESSEL, NATIV                     

 

 2015            PÉREZ BRIONES, MARIETTA            Ot            457.1       
     OTHER LYMPHEDEMA                     

 

 2015            PÉREZ BRIONES, MARIETTA Damon            585.3       
     CHRONIC KIDNEY DISEASE, STAGE III (MODER                     

 

 2015            PÉREZ BRIONES, MARIETTA Damon            V15.3       
     HX OF IRRADIATION                     

 

 2015            MARIETTA ORTEZ MD, Ot            V44.1       
     GASTROSTOMY STATUS                     

 

 2015            MARIETTA ORTEZ MD, Ot            V45.81      
      AORTOCORONARY BYPASS                     

 

 2015            MARIETTA ORTEZ MD, Ot            V45.82      
      PERCUTANEOUS TRANSLUM CORON ANGIOPLASTY                      

 

 2015            MARIETTA ORTEZ MD, Ot            V58.66      
      LONG-TERM (CURRENT) USE OF ASPIRIN                     

 

 2015            MARIETTA ORTEZ MD, Ot            V58.69      
      OT MED,LT,CURRENT USE                     

 

 2015            MARIETTA ORTEZ MD, Ot            V58.81      
      FIT/ADJ VASCULAR CATHETER                     

 

 2015            MARIETTA ORTEZ MD, Ot            V87.41      
      PERSONAL HISTORY OF ANTINEOPLASTIC CHEMO                     

 

 2015            ARASH BRIONES, ELIDIA RODAS            Ot            272.4       
                           

 

 2015            ARASH BRIONES, ELIDIA J            Ot            401.9       
                           

 

 2015            ARASH BRIONES, ELIDIA J            Ot            414.9       
                           

 

 2015            ARASH BRIONES, ELIDIA J            Ot            433.10      
                            

 

 2015            PÉREZ BRIONES, MARIETTA            Ot            141.9       
                           

 

 2015            PÉREZ BRIONES, MARIETTA            Ot            285.3       
                           

 

 2015            PÉREZ BRIONES, MARIETTA            Ot            403.90      
                            

 

 2015            PÉREZ BRIONES, MARIETTA            Ot            414.00      
                            

 

 2015            PÉREZ BRIONES, MARIETTA            Ot            457.1       
                           

 

 2015            PÉREZ BRIONES, MARIETTA            Ot            585.3       
                           

 

 2015            PÉREZ BRIONES, MARIETTA            Ot            V15.3       
                           

 

 2015            PÉREZ BRIONES, MARIETTA            Ot            V44.1       
                           

 

 2015            PÉREZ BRIONES, MARIETTA            Ot            V45.81      
                            

 

 2015            PÉREZ BRIONES, MARIETTA            Ot            V45.82      
                            

 

 2015            PÉREZ BIRONES, MARIETTA            Ot            V58.66      
                            

 

 2015            PÉREZ BRIONES, MARIETTA            Ot            V58.69      
                            

 

 2015            PÉREZ BRIONES, MARIETTA            Ot            V58.81      
                            

 

 2015            PÉREZ BRIONES, MARIETTA            Ot            V87.41      
                            

 

 2015            PÉREZ BRIONES, MARIETTA            Ot            141.9       
                           

 

 2015            PÉREZ BRIONES, MARIETTA            Ot            285.3       
                           

 

 2015            PÉREZ BRIONES, MARIETTA            Ot            403.90      
                            

 

 2015            PÉREZ BRIONES, MARIETTA            Ot            414.00      
                            

 

 2015            PÉREZ BRIONES, MARIETTA            Ot            457.1       
                           

 

 2015            PÉREZ BRIONES, MARIETTA            Ot            585.3       
                           

 

 2015            PÉREZ BRIONES, MARIETTA            Ot            V15.3       
                           

 

 2015            PÉREZ BRIONES, MARIETTA            Ot            V44.1       
                           

 

 2015            PÉREZ BRIONES, MARIETTA            Ot            V45.81      
                            

 

 2015            PÉREZ BRIONES, MARIETTA            Ot            V45.82      
                            

 

 2015            PÉREZ BRIONES, MARIETTA            Ot            V58.66      
                            

 

 2015            PÉREZ BRIONES, MARIETTA            Ot            V58.69      
                            

 

 2015            PÉREZ BRIONES, MARIETTA            Ot            V58.81      
                            

 

 2015            PÉREZ BRIONES, MARIETTA            Ot            V87.41      
                            

 

 2015            PÉREZ BRIONES, MARIETTA            Ot            141.9       
     MALIG CAR TONGUE NOS                     

 

 2015            PÉREZ BRIONES, MARIETTA            Ot            285.3       
     ANTINEOPLASTIC CHEMOTHERAPY INDUCED ANEM                     

 

 2015            PÉREZ BRIONES, MARIETTA            Ot            403.90      
      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                     

 

 2015            PÉREZ BRIONES, MARIETTA            Ot            414.00      
      CORON ATHEROSCLER NOS TYPE VESSEL, NATIV                     

 

 2015            PÉREZ BRIONES, MARIETTA            Ot            457.1       
     OTHER LYMPHEDEMA                     

 

 2015            PÉREZ BRIONES, MARIETTA            Ot            585.3       
     CHRONIC KIDNEY DISEASE, STAGE III (MODER                     

 

 2015            PÉREZ BRIONES, MARIETTA Damon            V15.3       
     HX OF IRRADIATION                     

 

 2015            PÉREZ BRIONES, MARIETTA            Ot            V44.1       
     GASTROSTOMY STATUS                     

 

 2015            PÉREZ BRIONES, MARIETTA            Ot            V45.81      
      AORTOCORONARY BYPASS                     

 

 2015            PÉREZ BRIONES, MARIETTA            Ot            V45.82      
      PERCUTANEOUS TRANSLUM CORON ANGIOPLASTY                      

 

 2015            PÉREZ BRIONES, MARIETTA            Ot            V58.66      
      LONG-TERM (CURRENT) USE OF ASPIRIN                     

 

 2015            PÉREZ BRIONES, MARIETTA            Ot            V58.69      
      OT MED,LT,CURRENT USE                     

 

 2015            PÉREZ BRIONES, MARIETTA            Ot            V58.81      
      FIT/ADJ VASCULAR CATHETER                     

 

 2015            PÉREZ BRIONES, MARIETTA            Ot            V87.41      
      PERSONAL HISTORY OF ANTINEOPLASTIC CHEMO                     

 

 10/27/2015            SERENA SALAZAR DO            Ot            K57.90      
                            

 

 2015            SERENA SALAZAR DO            Ot            K57.90      
                            

 

 2015            PÉREZ BRIONES, MARIETTA            Ot            141.9       
                           

 

 2015            PÉREZ BRIONES, MARIETTA            Ot            285.3       
                           

 

 2015            PÉREZ BRIONES, MARIETTA            Ot            403.90      
                            

 

 2015            PÉREZ BRIONES, MARIETTA            Ot            414.00      
                            

 

 2015            PÉREZ BRIONES, MARIETTA            Ot            457.1       
                           

 

 2015            PÉREZ BRIONES, MARIETTA            Ot            585.3       
                           

 

 2015            PÉREZ BRIONES, MARIETTA            Ot            V15.3       
                           

 

 2015            PÉREZ BRIONES, MARIETTA            Ot            V44.1       
                           

 

 2015            PÉREZ BRIONES, MARIETTA            Ot            V45.81      
                            

 

 2015            PÉREZ BRIONES, MARIETTA            Ot            V45.82      
                            

 

 2015            PÉREZ BRIONES, MARIETTA            Ot            V58.66      
                            

 

 2015            PÉREZ BRIONES, MARIETTA            Ot            V58.69      
                            

 

 2015            PÉREZ BRIONES, MARIETTA            Ot            V58.81      
                            

 

 2015            PÉREZ BRIONES, MARIETTA            Ot            V87.41      
                            

 

 2016                         Ot            401.9                        
          

 

 2016                         Ot            414.00                       
           

 

 2016                         Ot            401.9                        
          

 

 2016                         Ot            414.01                       
           

 

 2016                         Ot            401.9                        
          

 

 2016                         Ot            428.0                        
          

 

 2016                         Ot            239.0                        
          

 

 2016                         Ot            780.79                       
           

 

 2016                         Ot            784.2                        
          

 

 2016                         Ot            V72.63                       
           

 

 2016                         Ot            V72.81                       
           

 

 2016                         Ot            V74.8                        
          

 

 2016                         Ot            141.9                        
          

 

 2016                         Ot            403.90                       
           

 

 2016                         Ot            414.00                       
           

 

 2016                         Ot            585.9                        
          

 

 2016                         Ot            733.90                       
           

 

 2016                         Ot            V15.82                       
           

 

 2016                         Ot            V45.81                       
           

 

 2016                         Ot            V58.66                       
           

 

 2016                         Ot            V58.69                       
           

 

 2016                         Ot            141.9                        
          

 

 2016                         Ot            733.90                       
           

 

 2016                         Ot            141.9                        
          

 

 2016                         Ot            V72.83                       
           

 

 2016                         Ot            V74.8                        
          

 

 2016                         Ot            141.9                        
          

 

 2016                         Ot            141.9                        
          

 

 2016                         Ot            141.9                        
          

 

 2016                         Ot            V72.83                       
           

 

 2016                         Ot            401.9                        
          

 

 2016                         Ot            414.00                       
           

 

 2016                         Ot            397.0                        
          

 

 2016                         Ot            401.9                        
          

 

 2016                         Ot            414.00                       
           

 

 2016                         Ot            424.0                        
          

 

 2016                         Ot            429.3                        
          

 

 2016                         Ot            141.9                        
          

 

 2016                         Ot            401.9                        
          

 

 2016                         Ot            786.05                       
           

 

 2016                         Ot            550.90                       
           

 

 2016                         Ot            V72.63                       
           

 

 2016                         Ot            V74.8                        
          

 

 2016            PÉREZ BRIONES, [facility]            Ot            195.0       
                           

 

 2016            ARASH BRIONES, ELIDIA RODAS            Ot            396.3       
                           

 

 2016            ARASH BRIONES, ELIDIA RODAS            Ot            397.0       
                           

 

 2016            ARASH BRIONES, ELIDIA RODAS            Ot            414.00      
                            

 

 2016            ARASH BRIONES, ELIDIA RODAS            Ot            416.8       
                           

 

 2016            ARASH BRIONES, ELIDIA RODAS            Ot            428.0       
                           

 

 2016            DYLAN BRIONES, EDGARDO ORELLANA            Ot            V10.01     
                             

 

 2016            ARASH BRIONES, ELIDIA RODAS            Ot            272.4       
                           

 

 2016            ARASH BRIONES, ELIDIA RODAS            Ot            401.9       
                           

 

 2016            ARASH BRIONES, ELIDIA RODAS            Ot            414.00      
                            

 

 2016            ARASH BRIONES, ELIDIA RODAS            Ot            272.4       
                           

 

 2016            ARASH BRIONES, ELIDIA RODAS            Ot            401.9       
                           

 

 2016            ARASH BRIONES, ELIDIA J            Ot            414.00      
                            

 

 2016            ARASH BRIONES, ELIDIA RODAS            Ot            272.4       
                           

 

 2016            ARASH BRIONES, ELIDIA RODAS            Ot            401.9       
                           

 

 2016            ARASH BRIONES, ELIDIA RODAS            Ot            414.9       
                           

 

 2016            ARASH BRIONES, ELIDIA RODAS            Ot            433.10      
                            

 

 2016            [place]  SERENA STEPHEN            Ot            K57.90      
                            

 

 2016            [facility] SERENA D            Ot            K92.1       
                           

 

 2016            [facility] SERENA D            Ot            Z01.818     
                             

 

 2016            PÉREZ BRIONES, MARIETTA            Ot            C02.9       
                           

 

 2016            MARIETTA ORTEZ MD            Ot            Z45.2       
                           

 

 2016            ARASH BRIONES, ELIDIA RODAS            Ot            E78.2       
                           

 

 2016            ARASH BRIONES, ELIDIA RODAS            Ot            I10         
                         

 

 2016            ARASH BRIONES, ELIDIA RODAS            Ot            I25.10      
                            

 

 2016            ARASH BRIONES, ELIDIA RODAS            Ot            I65.23      
                            

 

 2016            MARIETTA ORTEZ MD            Ot            C02.9       
     MALIGNANT NEOPLASM OF TONGUE, UNSPECIFIE                     

 

 2016            MARIETTA ORTEZ MD            Ot            Z45.2       
     ENCOUNTER FOR ADJUSTMENT AND MANAGEMENT                      

 

 03/15/2016            MARIETTA ORTEZ MD            Ot            C02.9       
                           

 

 03/15/2016            MARIETTA ORTEZ MD            Ot            Z45.2       
                           

 

 2016            MARIETTA ORTEZ MD            Ot            C02.9       
                           

 

 2016            MARIETTA ORTEZ MD            Ot            Z45.2       
                           

 

 2016            MARIETTA ORTEZ MD            Ot            C02.9       
     MALIGNANT NEOPLASM OF TONGUE, UNSPECIFIE                     

 

 2016            MARIETTA ORTEZ MD            Ot            Z45.2       
     ENCOUNTER FOR ADJUSTMENT AND MANAGEMENT                      

 

 2016            MARIETTA ORTEZ MD            Ot            C02.9       
     MALIGNANT NEOPLASM OF TONGUE, UNSPECIFIE                     

 

 2016            MARIETTA ORTEZ MD            Ot            Z45.2       
     ENCOUNTER FOR ADJUSTMENT AND MANAGEMENT                      

 

 2016            MARIETTA ORTEZ MD            Ot            C02.9       
     MALIGNANT NEOPLASM OF TONGUE, UNSPECIFIE                     

 

 2016            MARIETTA ORTEZ MD            Ot            Z45.2       
     ENCOUNTER FOR ADJUSTMENT AND MANAGEMENT                      

 

 2016            PÉREZ BRIONES, MARIETTA            Ot            C02.9       
     MALIGNANT NEOPLASM OF TONGUE, UNSPECIFIE                     

 

 2016            MARIETTA ORTEZ MD            Ot            Z45.2       
     ENCOUNTER FOR ADJUSTMENT AND MANAGEMENT                      

 

 2016            ELIDIA ANTOINE MD            Ot            E78.2       
     MIXED HYPERLIPIDEMIA                     

 

 2016            ELIDIA ANTOINE MD            Ot            I10         
   ESSENTIAL (PRIMARY) HYPERTENSION                     

 

 2016            ELIDIA ANTOINE MD            Ot            I25.10      
      ATHSCL HEART DISEASE OF NATIVE CORONARY                      

 

 2016            ELIDIA ANTOINE MD            Ot            I50.22      
      CHRONIC SYSTOLIC (CONGESTIVE) HEART FAIL                     

 

 2016            ELIDIA ANTOINE MD            Ot            I65.23      
      OCCLUSION AND STENOSIS OF BILATERAL MI                     

 

 2016                         Ot            401.9            HYPERTENSION 
NOS                     

 

 2016                         Ot            428.0            CONGESTIVE 
HEART FAILURE NOS                     

 

 2016                         Ot            239.0            DIGESTIVE 
NEOPLASM NOS                     

 

 2016                         Ot            780.79            OTH MALAISE 
FATIGUE                     

 

 2016                         Ot            784.2            SWELLING IN 
HEAD   NECK                     

 

 2016                         Ot            V72.63            PRE-
PROCEDURAL LABORATORY EXAMINATION                     

 

 2016                         Ot            V72.81            EXAM-PRE-
OPERATIVE CARDIOVASCULAR                     

 

 2016                         Ot            V74.8            SCREEN-
BACTERIAL DIS NEC                     

 

 2016                         Ot            141.9            MALIG CAR 
TONGUE NOS                     

 

 2016                         Ot            403.90            HYPTNSV CHR 
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                     

 

 2016                         Ot            414.00            CORON 
ATHEROSCLER NOS TYPE VESSEL, NATIV                     

 

 2016                         Ot            585.9            CHRONIC 
KIDNEY DISEASE, UNSPECIFIED                     

 

 2016                         Ot            733.90            BONE   
CARTILAGE DIS NOS                     

 

 2016                         Ot            V15.82            HISTORY OF 
TOBACCO USE                     

 

 2016                         Ot            V45.81            
AORTOCORONARY BYPASS                     

 

 2016                         Ot            V58.66            LONG-TERM (
CURRENT) USE OF ASPIRIN                     

 

 2016                         Ot            V58.69            OTH MED,LT,
CURRENT USE                     

 

 2016                         Ot            141.9            MALIG CAR 
TONGUE NOS                     

 

 2016                         Ot            733.90            BONE   
CARTILAGE DIS NOS                     

 

 2016                         Ot            141.9            MALIG CAR 
TONGUE NOS                     

 

 2016                         Ot            V72.83            EXAM PRE-
OPERATIVE NEC                     

 

 2016                         Ot            V74.8            SCREEN-
BACTERIAL DIS NEC                     

 

 2016                         Ot            141.9            MALIG CAR 
TONGUE NOS                     

 

 2016                         Ot            141.9            MALIG CAR 
TONGUE NOS                     

 

 2016                         Ot            141.9            MALIG CAR 
TONGUE NOS                     

 

 2016                         Ot            V72.83            EXAM PRE-
OPERATIVE NEC                     

 

 2016                         Ot            401.9            HYPERTENSION 
NOS                     

 

 2016                         Ot            414.00            CORON 
ATHEROSCLER NOS TYPE VESSEL, NATIV                     

 

 2016                         Ot            397.0            TRICUSPID 
VALVE DISEASE                     

 

 2016                         Ot            401.9            HYPERTENSION 
NOS                     

 

 2016                         Ot            414.00            CORON 
ATHEROSCLER NOS TYPE VESSEL, NATIV                     

 

 2016                         Ot            424.0            MITRAL VALVE 
DISORDER                     

 

 2016                         Ot            429.3            CARDIOMEGALY
                     

 

 2016                         Ot            141.9            MALIG CAR 
TONGUE NOS                     

 

 2016                         Ot            401.9            HYPERTENSION 
NOS                     

 

 2016                         Ot            786.05            SHORTNESS 
OF BREATH                     

 

 2016                         Ot            550.90            UNILAT 
INGUINAL HERNIA                     

 

 2016                         Ot            V72.63            PRE-
PROCEDURAL LABORATORY EXAMINATION                     

 

 2016                         Ot            V74.8            SCREEN-
BACTERIAL DIS NEC                     

 

 2016            PÉREZ BRIONES, MARIETTA            Ot            195.0       
     MAL CAR HEAD/FACE/NECK                     

 

 2016            ARASH BRIONES, ELIDIA RODAS            Ot            396.3       
     MITRAL/AORTIC SCOTT INSUFF                     

 

 2016            ARASH BRIONES, ELIDIA RODAS            Ot            397.0       
     TRICUSPID VALVE DISEASE                     

 

 2016            ELIDIA ANTOINE MD            Ot            414.00      
      CORON ATHEROSCLER NOS TYPE VESSEL, NATIV                     

 

 2016            ELIDIA ANTOINE MD            Ot            416.8       
     CHR PULMON HEART DIS NEC                     

 

 2016            ELIDIA ANTOINE MD            Ot            428.0       
     CONGESTIVE HEART FAILURE NOS                     

 

 2016            DYLAN BRIONES, EDGARDO ORELLANA            Ot            V10.01     
       HX OF TONGUE MALIGNANCY                     

 

 2016            ELIDIA ANTOINE MD            Ot            272.4       
     HYPERLIPIDEMIA NEC/NOS                     

 

 2016            ELIDIA ANTOINE MD            Ot            401.9       
     HYPERTENSION NOS                     

 

 2016            ELIDIA ANTOINE MD            Ot            414.00      
      CORON ATHEROSCLER NOS TYPE VESSEL, NATIV                     

 

 2016            ELIDIA ANTOINE MD            Ot            272.4       
     HYPERLIPIDEMIA NEC/NOS                     

 

 2016            ELIDIA ANTOINE MD            Ot            401.9       
     HYPERTENSION NOS                     

 

 2016            ELIDIA ANTOINE MD            Ot            414.00      
      CORON ATHEROSCLER NOS TYPE VESSEL, NATIV                     

 

 2016            ELIDIA ANTOINE MD            Ot            272.4       
     HYPERLIPIDEMIA NEC/NOS                     

 

 2016            ELIDIA ANTOINE MD            Ot            401.9       
     HYPERTENSION NOS                     

 

 2016            ELIDIA ANTOINE MD            Ot            414.9       
     CHR ISCHEMIC HRT DIS NOS                     

 

 2016            ELIDIA ANTOINE MD            Ot            433.10      
      CAROTID ARTERY OCCLUSION W O CEREBRAL IN                     

 

 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                      

 

 2016            SERENA SALAZAR DO            Ot            K92.1       
     MELENA                     

 

 2016            SERENA SALAZAR DO            Ot            Z01.818     
       ENCOUNTER FOR OTHER PREPROCEDURAL EXAMIN                     

 

 2016            ELIDIA ANTOINE MD, Ot            E78.2       
     MIXED HYPERLIPIDEMIA                     

 

 2016            ELIDIA ANTOINE MD, Ot            I10         
   ESSENTIAL (PRIMARY) HYPERTENSION                     

 

 2016            ELIDIA ANTOINE MD, Ot            I25.10      
      ATHSCL HEART DISEASE OF NATIVE CORONARY                      

 

 2016            ELIDIA ANTOINE MD, Ot            I65.23      
      OCCLUSION AND STENOSIS OF BILATERAL MI                     

 

 2016            MARIETTA ORTEZ MD            Ot            C02.9       
     MALIGNANT NEOPLASM OF TONGUE, UNSPECIFIE                     

 

 2016            MARIETTA ORTEZ MD            Ot            Z45.2       
     ENCOUNTER FOR ADJUSTMENT AND MANAGEMENT                      

 

 2016            ELIDIA ANTOINE MD, Ot            E78.2       
     MIXED HYPERLIPIDEMIA                     

 

 2016            ELIDIA ANTOINE MD, Ot            I10         
   ESSENTIAL (PRIMARY) HYPERTENSION                     

 

 2016            ELIDIA ANTOINE MD, Ot            I25.10      
      ATHSCL HEART DISEASE OF NATIVE CORONARY                      

 

 2016            ELIDIA ANTOINE MD, Ot            I50.22      
      CHRONIC SYSTOLIC (CONGESTIVE) HEART FAIL                     

 

 2016            ELIDIA ANTOINE MD, Ot            I65.23      
      OCCLUSION AND STENOSIS OF BILATERAL MI                     

 

 2016            ELIDIA ANTOINE MD            Ot            E03.9       
     HYPOTHYROIDISM, UNSPECIFIED                     

 

 2016            ELIDIA ANTOINE MD, Ot            E78.5       
     HYPERLIPIDEMIA, UNSPECIFIED                     

 

 2016            ELIDIA ANTOINE MD, Ot            I10         
   ESSENTIAL (PRIMARY) HYPERTENSION                     

 

 2016            ELIDIA ANTOINE MD            Ot            I25.10      
      ATHSCL HEART DISEASE OF NATIVE CORONARY                      

 

 2016            ELIDIA ANTOINE MD, Ot            I25.82      
      CHRONIC TOTAL OCCLUSION OF CORONARY KASEY                     

 

 2016            ELIDIA ANTOINE MD, Ot            I50.22      
      CHRONIC SYSTOLIC (CONGESTIVE) HEART FAIL                     

 

 2016            ELIDIA ANTOINE MD, Ot            I65.21      
      OCCLUSION AND STENOSIS OF RIGHT CAROTID                      

 

 2016            ELIDIA ANTOINE MD, Ot            R00.8       
     OTHER ABNORMALITIES OF HEART BEAT                     

 

 2016            ELIDIA ANTOINE MD, Ot            Z79.899     
       OTHER LONG TERM (CURRENT) DRUG THERAPY                     

 

 2016            ELIDIA ANTOINE MD, Ot            Z95.1       
     PRESENCE OF AORTOCORONARY BYPASS GRAFT                     

 

 2016            ELIDIA ANTOINE MD, Ot            Z95.5       
     PRESENCE OF CORONARY ANGIOPLASTY IMPLANT                     

 

 2016            JOSÉ SORIANO DO, Ot            I10       
     ESSENTIAL (PRIMARY) HYPERTENSION                     

 

 2016            JOSÉ SORIANO DO, Ot            I97.610   
         POSTPROC HEMOR/HEMTOM OF A CIRC SYS ORG                      

 

 2016            JOSÉ SORIANO DO, Ot            Z79.82    
        LONG TERM (CURRENT) USE OF ASPIRIN                     

 

 2016            JOSÉ SORIANO DO, Ot            Z79.899   
         OTHER LONG TERM (CURRENT) DRUG THERAPY                     

 

 2016            JOSÉ SORIANO DO, Ot            Z85.810   
         PERSONAL HISTORY OF MALIGNANT NEOPLASM O                     

 

 2016            JOSÉ SORIANO DO, Ot            Z87.891   
         PERSONAL HISTORY OF NICOTINE DEPENDENCE                     

 

 2016            ELIDIA ANTOINE MD, Ot            E78.2       
     MIXED HYPERLIPIDEMIA                     

 

 2016            ELIDIA ANTOINE MD, Ot            I10         
   ESSENTIAL (PRIMARY) HYPERTENSION                     

 

 2016            ELIDIA ANTOINE MD, Ot            I25.10      
      ATHSCL HEART DISEASE OF NATIVE CORONARY                      

 

 2016            ELIDIA ANTOINE MD, Ot            I50.22      
      CHRONIC SYSTOLIC (CONGESTIVE) HEART FAIL                     

 

 2016            ELIDIA ANTOINE MD, Ot            I65.23      
      OCCLUSION AND STENOSIS OF BILATERAL MI                     

 

 2016            MARIETTA ORTEZ MD            Ot            C02.9       
     MALIGNANT NEOPLASM OF TONGUE, UNSPECIFIE                     

 

 2016            MARIETTA ORTEZ MD            Ot            Z45.2       
     ENCOUNTER FOR ADJUSTMENT AND MANAGEMENT                      

 

 10/05/2016            ELIDIA ANTOINE MD            Ot            E03.9       
     HYPOTHYROIDISM, UNSPECIFIED                     

 

 10/05/2016            ELIDIA ANTOINE MD, Ot            E78.5       
     HYPERLIPIDEMIA, UNSPECIFIED                     

 

 10/05/2016            ELIDIA ANTOINE MD            Ot            I10         
   ESSENTIAL (PRIMARY) HYPERTENSION                     

 

 10/05/2016            ELIDIA ANTOINE MD, Ot            I25.10      
      ATHSCL HEART DISEASE OF NATIVE CORONARY                      

 

 10/05/2016            ELIDIA ANTOINE MD, Ot            I25.82      
      CHRONIC TOTAL OCCLUSION OF CORONARY KASEY                     

 

 10/05/2016            ELIDIA ANTOINE MD, Ot            I50.22      
      CHRONIC SYSTOLIC (CONGESTIVE) HEART FAIL                     

 

 10/05/2016            ELIDIA ANTOINE MD, Ot            I65.21      
      OCCLUSION AND STENOSIS OF RIGHT CAROTID                      

 

 10/05/2016            ELIDIA ANTOINE MD            Ot            R00.8       
     OTHER ABNORMALITIES OF HEART BEAT                     

 

 10/05/2016            ELIDIA ANTOINE MD, Ot            Z79.899     
       OTHER LONG TERM (CURRENT) DRUG THERAPY                     

 

 10/05/2016            ELIDIA ANTOINE MD, Ot            Z95.1       
     PRESENCE OF AORTOCORONARY BYPASS GRAFT                     

 

 10/05/2016            ELIDIA ANTOINE MD, Ot            Z95.5       
     PRESENCE OF CORONARY ANGIOPLASTY IMPLANT                     

 

 2016            MARIETTA ORTEZ MD            Ot            C02.9       
     MALIGNANT NEOPLASM OF TONGUE, UNSPECIFIE                     

 

 2016            MARIETTA ORTEZ MD, Ot            Z45.2       
     ENCOUNTER FOR ADJUSTMENT AND MANAGEMENT                      

 

 2016            MARIETTA ORTEZ MD, Ot            C02.9       
     MALIGNANT NEOPLASM OF TONGUE, UNSPECIFIE                     

 

 2016            MARIETTA ORTEZ MD, Ot            Z45.2       
     ENCOUNTER FOR ADJUSTMENT AND MANAGEMENT                      

 

 2016            MARIETTA ORTEZ MD, Ot            C02.9       
     MALIGNANT NEOPLASM OF TONGUE, UNSPECIFIE                     

 

 2016            MARIETTA ORTEZ MD, Ot            Z45.2       
     ENCOUNTER FOR ADJUSTMENT AND MANAGEMENT                      

 

 2016            MARIETTA ORTEZ MD, Ot            C02.9       
     MALIGNANT NEOPLASM OF TONGUE, UNSPECIFIE                     

 

 2016            MARIETTA ORTEZ MD, Ot            Z45.2       
     ENCOUNTER FOR ADJUSTMENT AND MANAGEMENT                      

 

 2017                         Ot            239.0            DIGESTIVE 
NEOPLASM NOS                     

 

 2017                         Ot            780.79            OTH MALAISE 
FATIGUE                     

 

 2017                         Ot            784.2            SWELLING IN 
HEAD   NECK                     

 

 2017                         Ot            V72.63            PRE-
PROCEDURAL LABORATORY EXAMINATION                     

 

 2017                         Ot            V72.81            EXAM-PRE-
OPERATIVE CARDIOVASCULAR                     

 

 2017                         Ot            V74.8            SCREEN-
BACTERIAL DIS NEC                     

 

 2017                         Ot            141.9            MALIG CAR 
TONGUE NOS                     

 

 2017                         Ot            403.90            HYPTNSV CHR 
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                     

 

 2017                         Ot            414.00            CORON 
ATHEROSCLER NOS TYPE VESSEL, NATIV                     

 

 2017                         Ot            585.9            CHRONIC 
KIDNEY DISEASE, UNSPECIFIED                     

 

 2017                         Ot            733.90            BONE   
CARTILAGE DIS NOS                     

 

 2017                         Ot            V15.82            HISTORY OF 
TOBACCO USE                     

 

 2017                         Ot            V45.81            
AORTOCORONARY BYPASS                     

 

 2017                         Ot            V58.66            LONG-TERM (
CURRENT) USE OF ASPIRIN                     

 

 2017                         Ot            V58.69            OTH MED,LT,
CURRENT USE                     

 

 2017                         Ot            141.9            MALIG CAR 
TONGUE NOS                     

 

 2017                         Ot            733.90            BONE   
CARTILAGE DIS NOS                     

 

 2017                         Ot            141.9            MALIG CAR 
TONGUE NOS                     

 

 2017                         Ot            V72.83            EXAM PRE-
OPERATIVE NEC                     

 

 2017                         Ot            V74.8            SCREEN-
BACTERIAL DIS NEC                     

 

 2017                         Ot            141.9            MALIG CAR 
TONGUE NOS                     

 

 2017                         Ot            141.9            MALIG CAR 
TONGUE NOS                     

 

 2017                         Ot            141.9            MALIG CAR 
TONGUE NOS                     

 

 2017                         Ot            V72.83            EXAM PRE-
OPERATIVE NEC                     

 

 2017                         Ot            401.9            HYPERTENSION 
NOS                     

 

 2017                         Ot            414.00            CORON 
ATHEROSCLER NOS TYPE VESSEL, NATIV                     

 

 2017                         Ot            397.0            TRICUSPID 
VALVE DISEASE                     

 

 2017                         Ot            401.9            HYPERTENSION 
NOS                     

 

 2017                         Ot            414.00            CORON 
ATHEROSCLER NOS TYPE VESSEL, NATIV                     

 

 2017                         Ot            424.0            MITRAL VALVE 
DISORDER                     

 

 2017                         Ot            429.3            CARDIOMEGALY
                     

 

 2017                         Ot            141.9            MALIG CAR 
TONGUE NOS                     

 

 2017                         Ot            401.9            HYPERTENSION 
NOS                     

 

 2017                         Ot            786.05            SHORTNESS 
OF BREATH                     

 

 2017                         Ot            550.90            UNILAT 
INGUINAL HERNIA                     

 

 2017                         Ot            V72.63            PRE-
PROCEDURAL LABORATORY EXAMINATION                     

 

 2017                         Ot            V74.8            SCREEN-
BACTERIAL DIS NEC                     

 

 2017            PÉREZ BRIONES, MARIETTA            Ot            195.0       
     MAL CAR HEAD/FACE/NECK                     

 

 2017            ARASH BRIONES, ELIDIA RODAS            Ot            396.3       
     MITRAL/AORTIC SCOTT INSUFF                     

 

 2017            ARASH BRIONES, ELIDIA RODAS            Ot            397.0       
     TRICUSPID VALVE DISEASE                     

 

 2017            ELIDIA ANTOINE MD            Ot            414.00      
      CORON ATHEROSCLER NOS TYPE VESSEL, NATIV                     

 

 2017            ELIDIA ANTOINE MD            Ot            416.8       
     CHR PULMON HEART DIS NEC                     

 

 2017            ELIDIA ANTOINE MD            Ot            428.0       
     CONGESTIVE HEART FAILURE NOS                     

 

 2017            DYLAN BRIONES, EDGARDO ORELLANA            Ot            V10.01     
       HX OF TONGUE MALIGNANCY                     

 

 2017            ELIDIA ANTOINE MD            Ot            272.4       
     HYPERLIPIDEMIA NEC/NOS                     

 

 2017            ELIDIA ANTOINE MD            Ot            401.9       
     HYPERTENSION NOS                     

 

 2017            ELIDIA ANTOINE MD            Ot            414.00      
      CORON ATHEROSCLER NOS TYPE VESSEL, NATIV                     

 

 2017            ELIDIA ANTOINE MD            Ot            272.4       
     HYPERLIPIDEMIA NEC/NOS                     

 

 2017            ELIDIA ANTOINE MD            Ot            401.9       
     HYPERTENSION NOS                     

 

 2017            ELIDAI ANTOINE MD            Ot            414.00      
      CORON ATHEROSCLER NOS TYPE VESSEL, NATIV                     

 

 2017            ELIDIA ANTOINE MD            Ot            272.4       
     HYPERLIPIDEMIA NEC/NOS                     

 

 2017            ELIDIA ANTOINE MD            Ot            401.9       
     HYPERTENSION NOS                     

 

 2017            ELIDIA ANTOINE MD            Ot            414.9       
     CHR ISCHEMIC HRT DIS NOS                     

 

 2017            ELIDIA ANTOINE MD            Ot            433.10      
      CAROTID ARTERY OCCLUSION W O CEREBRAL IN                     

 

 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                      

 

 2017            SERENA SALAZAR DO            Ot            K92.1       
     MELENA                     

 

 2017            SERENA SALAZAR DO            Ot            Z01.818     
       ENCOUNTER FOR OTHER PREPROCEDURAL EXAMIN                     

 

 2017            ELIDIA ANTOINE MD            Ot            E78.2       
     MIXED HYPERLIPIDEMIA                     

 

 2017            ELIDIA ANTOINE MD            Ot            I10         
   ESSENTIAL (PRIMARY) HYPERTENSION                     

 

 2017            ELIDIA ANTOINE MD            Ot            I25.10      
      ATHSCL HEART DISEASE OF NATIVE CORONARY                      

 

 2017            ELIDIA ANTOINE MD            Ot            I65.23      
      OCCLUSION AND STENOSIS OF BILATERAL MI                     

 

 2017            ELIDIA ANTOINE MD            Ot            E78.2       
     MIXED HYPERLIPIDEMIA                     

 

 2017            ELIDIA ANTOINE MD            Ot            I10         
   ESSENTIAL (PRIMARY) HYPERTENSION                     

 

 2017            ELIDIA ANTOINE MD            Ot            I25.10      
      ATHSCL HEART DISEASE OF NATIVE CORONARY                      

 

 2017            ELIDIA ANTOINE MD            Ot            I50.22      
      CHRONIC SYSTOLIC (CONGESTIVE) HEART FAIL                     

 

 2017            ELIDIA ANTOINE MD            Ot            I65.23      
      OCCLUSION AND STENOSIS OF BILATERAL MI                     

 

 2017            MARIETTA ORTEZ MD            Ot            C02.9       
     MALIGNANT NEOPLASM OF TONGUE, UNSPECIFIE                     

 

 2017            MARIETTA ORTEZ MD, Ot            Z45.2       
     ENCOUNTER FOR ADJUSTMENT AND MANAGEMENT                      

 

 2017            ELIDIA ANTOINE MD            Ot            E78.2       
     MIXED HYPERLIPIDEMIA                     

 

 2017            ELIDIA ANTOINE MD            Ot            I10         
   ESSENTIAL (PRIMARY) HYPERTENSION                     

 

 2017            ELIDIA ANTOINE MD            Ot            I25.10      
      ATHSCL HEART DISEASE OF NATIVE CORONARY                      

 

 2017            ELIDIA ANTOINE MD            Ot            I65.23      
      OCCLUSION AND STENOSIS OF BILATERAL MI                     

 

 2017            ELIDIA ANTOINE MD            Ot            E78.2       
     MIXED HYPERLIPIDEMIA                     

 

 2017            ELIDIA ANTOINE MD            Ot            I10         
   ESSENTIAL (PRIMARY) HYPERTENSION                     

 

 2017            ELIDIA ANTOINE MD            Ot            I25.10      
      ATHSCL HEART DISEASE OF NATIVE CORONARY                      

 

 2017            ELIDIA ANTOINE MD            Ot            I65.23      
      OCCLUSION AND STENOSIS OF BILATERAL MI                     

 

 2017            ELIDIA ANTOINE MD            Ot            E78.2       
     MIXED HYPERLIPIDEMIA                     

 

 2017            ELIDIA ANTOINE MD            Ot            I10         
   ESSENTIAL (PRIMARY) HYPERTENSION                     

 

 2017            ELIDIA ANTOINE MD            Ot            I25.10      
      ATHSCL HEART DISEASE OF NATIVE CORONARY                      

 

 2017            ELIDIA ANTOINE MD            Ot            I65.23      
      OCCLUSION AND STENOSIS OF BILATERAL MI                     

 

 2017            MARIETTA ORTEZ MD            Ot            C02.9       
     MALIGNANT NEOPLASM OF TONGUE, UNSPECIFIE                     

 

 2017            MARIETTA ORTEZ MD            Ot            Z45.2       
     ENCOUNTER FOR ADJUSTMENT AND MANAGEMENT                      

 

 2017            MARIETTA ORTEZ MD            Ot            C02.9       
     MALIGNANT NEOPLASM OF TONGUE, UNSPECIFIE                     

 

 2017            MARIETTA ORTEZ MD            Ot            Z45.2       
     ENCOUNTER FOR ADJUSTMENT AND MANAGEMENT                      

 

 2017            MARIETTA ORTEZ MD            Ot            C02.9       
     MALIGNANT NEOPLASM OF TONGUE, UNSPECIFIE                     

 

 2017            MARIETTA ORTEZ MD, Ot            Z45.2       
     ENCOUNTER FOR ADJUSTMENT AND MANAGEMENT                      

 

 2017            AZEEM HARGROVE            Ot            J44.9 
           CHRONIC OBSTRUCTIVE PULMONARY DISEASE, U                     

 

 2017            AZEEM HARGROVE            Ot            R06.00
            DYSPNEA, UNSPECIFIED                     

 

 2017            MARIETTA ORTEZ MD            Ot            C02.9       
     MALIGNANT NEOPLASM OF TONGUE, UNSPECIFIE                     

 

 2017            MARIETTA ORTEZ MD            Ot            E03.9       
     HYPOTHYROIDISM, UNSPECIFIED                     

 

 2017            MARIETTA ORTEZ MD, Ot            I12.9       
     HYPERTENSIVE CHRONIC KIDNEY DISEASE W ST                     

 

 2017            MARIETTA ORTEZ MD, Ot            I25.10      
      ATHSCL HEART DISEASE OF NATIVE CORONARY                      

 

 2017            MARIETTA ORTEZ MD, Ot            N18.9       
     CHRONIC KIDNEY DISEASE, UNSPECIFIED                     

 

 2017            MARIETTA ORTEZ MD, Ot            Z79.899     
       OTHER LONG TERM (CURRENT) DRUG THERAPY                     

 

 2017            MYAH OROZCO DO            Ot            J44.9       
     CHRONIC OBSTRUCTIVE PULMONARY DISEASE, U                     

 

 2017            MYAH OROZCO DO            Ot            R06.00      
      DYSPNEA, UNSPECIFIED                     

 

 2017            MYAH OROZCO DO            Ot            J44.9       
     CHRONIC OBSTRUCTIVE PULMONARY DISEASE, U                     

 

 2017            MYAH OROZCO DO            Ot            R06.00      
      DYSPNEA, UNSPECIFIED                     

 

 2017            AZEEM HARGROVE            Ot            
F17.201            NICOTINE DEPENDENCE, UNSPECIFIED, IN REM                     

 

 2017            AZEEM HARGROVE            Ot            J44.9 
           CHRONIC OBSTRUCTIVE PULMONARY DISEASE, U                     

 

 2017            AZEEM HARGROVE            Ot            R06.00
            DYSPNEA, UNSPECIFIED                     

 

 2017            AZEEM HARGROVE            Ot            G47.33
            OBSTRUCTIVE SLEEP APNEA (ADULT) (PEDIATR                     

 

 2017            AZEEM HARGROVE            Ot            J44.9 
           CHRONIC OBSTRUCTIVE PULMONARY DISEASE, U                     

 

 2017            AZEEM HARGROVE            Ot            R06.00
            DYSPNEA, UNSPECIFIED                     

 

 2017            CYNTHIA ROSE DOI            Ot            B37.0         
   CANDIDAL STOMATITIS                     

 

 2017            CYNTHIA ROSE DOI            Ot            D72.829       
     ELEVATED WHITE BLOOD CELL COUNT, UNSPECI                     

 

 2017            MILTON BARAJAS TISHA            Ot            E78.00        
    PURE HYPERCHOLESTEROLEMIA, UNSPECIFIED                     

 

 2017            TISHA ROSE DO            Ot            E89.0         
   POSTPROCEDURAL HYPOTHYROIDISM                     

 

 2017            TISHA ROSE DO            Ot            G47.30        
    SLEEP APNEA, UNSPECIFIED                     

 

 2017            TISHA ROSE DO            Ot            I13.0         
   HYP HRT   CHR KDNY DIS W HRT FAIL AND ST                     

 

 2017            TISHA ROSE DO            Ot            I25.10        
    ATHSCL HEART DISEASE OF NATIVE CORONARY                      

 

 2017            TISHA ROSE DO            Ot            I25.2         
   OLD MYOCARDIAL INFARCTION                     

 

 2017            TISHA ROSE DO            Ot            I47.2         
   VENTRICULAR TACHYCARDIA                     

 

 2017            TISHA ROSE DO            Ot            I48.91        
    UNSPECIFIED ATRIAL FIBRILLATION                     

 

 2017            TISHA ROSE DO            Ot            I49.1         
   ATRIAL PREMATURE DEPOLARIZATION                     

 

 2017            TISHA ROSE DO            Ot            I49.3         
   VENTRICULAR PREMATURE DEPOLARIZATION                     

 

 2017            TISHA ROSE DO            Ot            I50.23        
    ACUTE ON CHRONIC SYSTOLIC (CONGESTIVE) H                     

 

 2017            TISHA ROSE DO            Ot            J18.9         
   PNEUMONIA, UNSPECIFIED ORGANISM                     

 

 2017            TISHA ROSE DO            Ot            J44.0         
   CHRONIC OBSTRUCTIVE PULMON DISEASE W ACU                     

 

 2017            TISHA ROSE DO            Ot            J45.909       
     UNSPECIFIED ASTHMA, UNCOMPLICATED                     

 

 2017            TISHA ROSE DO            Ot            K14.9         
   DISEASE OF TONGUE, UNSPECIFIED                     

 

 2017            TISHA ROSE DO            Ot            M19.91        
    PRIMARY OSTEOARTHRITIS, UNSPECIFIED SITE                     

 

 2017            TISHA ROSE DO            Ot            N18.9         
   CHRONIC KIDNEY DISEASE, UNSPECIFIED                     

 

 2017            TISHA ROSE DO            Ot            R04.2         
   HEMOPTYSIS                     

 

 2017            TISHA ROSE DO            Ot            T49.0X5A      
      ADVERSE EFFECT OF LOCAL ANTIFUNG/INFECT/                     

 

 2017            TISHA ROSE DO            Ot            Z85.810       
     PERSONAL HISTORY OF MALIGNANT NEOPLASM O                     

 

 2017            TISHA ROSE DO            Ot            Z87.891       
     PERSONAL HISTORY OF NICOTINE DEPENDENCE                     

 

 2017            TISHA ROSE DO            Ot            Z92.21        
    PERSONAL HISTORY OF ANTINEOPLASTIC CHEMO                     

 

 2017            TSIHA ROSE DO            Ot            Z92.3         
   PERSONAL HISTORY OF IRRADIATION                     

 

 2017            TISHA ROSE DO            Ot            Z95.1         
   PRESENCE OF AORTOCORONARY BYPASS GRAFT                     

 

 2017            TISHA ROSE DO            Ot            Z95.5         
   PRESENCE OF CORONARY ANGIOPLASTY IMPLANT                     

 

 2017            TISHA ROSE DO            Ot            Z96.653       
     PRESENCE OF ARTIFICIAL KNEE JOINT, BILAT                     

 

 2017            TISHA ROSE DO            Ot            Z99.81        
    DEPENDENCE ON SUPPLEMENTAL OXYGEN                     

 

 2017            SABRINA SCHERER MD            Ot            R42        
    DIZZINESS AND GIDDINESS                     

 

 2017            SABRINA SCHERER MD, Ot            R42        
    DIZZINESS AND GIDDINESS                     

 

 2017            MARIETTA ORTEZ MD, Ot            C02.9       
     MALIGNANT NEOPLASM OF TONGUE, UNSPECIFIE                     

 

 2017            MARIETTA ORTEZ MD            Ot            E03.9       
     HYPOTHYROIDISM, UNSPECIFIED                     

 

 2017            MARIETTA ORTEZ MD, Ot            I12.9       
     HYPERTENSIVE CHRONIC KIDNEY DISEASE W ST                     

 

 2017            MARIETTA ORTEZ MD            Ot            I25.10      
      ATHSCL HEART DISEASE OF NATIVE CORONARY                      

 

 2017            MARIETTA ORTEZ MD            Ot            N18.9       
     CHRONIC KIDNEY DISEASE, UNSPECIFIED                     

 

 2017            MARIETTA ORTEZ MD            Ot            Z79.899     
       OTHER LONG TERM (CURRENT) DRUG THERAPY                     

 

 2017            MARIETTA ORTEZ MD            Ot            C02.9       
     MALIGNANT NEOPLASM OF TONGUE, UNSPECIFIE                     

 

 2017            MARIETTA ORTEZ MD            Ot            E03.9       
     HYPOTHYROIDISM, UNSPECIFIED                     

 

 2017            MARIETTA ORTEZ MD            Ot            I12.9       
     HYPERTENSIVE CHRONIC KIDNEY DISEASE W ST                     

 

 2017            MARIETTA ORTEZ MD            Ot            I25.10      
      ATHSCL HEART DISEASE OF NATIVE CORONARY                      

 

 2017            MARIETTA ORTEZ MD            Ot            N18.9       
     CHRONIC KIDNEY DISEASE, UNSPECIFIED                     

 

 2017            MARIETTA ORTEZ MD, Ot            Z79.899     
       OTHER LONG TERM (CURRENT) DRUG THERAPY                     

 

 2017            MYAH OROZCO DO            Ot            J44.9       
     CHRONIC OBSTRUCTIVE PULMONARY DISEASE, U                     

 

 2017            MYAH OROZCO DO            Ot            R06.00      
      DYSPNEA, UNSPECIFIED                     

 

 2017            AZEEM HARGROVE            Ot            
F17.201            NICOTINE DEPENDENCE, UNSPECIFIED, IN REM                     

 

 2017            AZEEM HARGROVE            Ot            J44.9 
           CHRONIC OBSTRUCTIVE PULMONARY DISEASE, U                     

 

 2017            AZEEM HARGROVE            Ot            R06.00
            DYSPNEA, UNSPECIFIED                     

 

 2017            AZEEM HARGROVE            Ot            
F17.210            NICOTINE DEPENDENCE, CIGARETTES, UNCOMPL                     

 

 2017            AZEEM HARGROVE            Ot            I25.10
            ATHSCL HEART DISEASE OF NATIVE CORONARY                      

 

 2017            AZEEM HARGROVE            Ot            I70.0 
           ATHEROSCLEROSIS OF AORTA                     

 

 2017            AZEEM HARGROVE            Ot            R91.8 
           OTHER NONSPECIFIC ABNORMAL FINDING OF ANDRY                     

 

 2017            MARIETTA ORTEZ MD            Ot            C02.9       
     MALIGNANT NEOPLASM OF TONGUE, UNSPECIFIE                     

 

 2017            MARIETTA ORTEZ MD            Ot            E03.9       
     HYPOTHYROIDISM, UNSPECIFIED                     

 

 2017            MARIETTA ORTEZ MD            Ot            I12.9       
     HYPERTENSIVE CHRONIC KIDNEY DISEASE W ST                     

 

 2017            MARIETTA ORTEZ MD            Ot            I25.10      
      ATHSCL HEART DISEASE OF NATIVE CORONARY                      

 

 2017            MARIETTA ORTEZ MD            Ot            N18.9       
     CHRONIC KIDNEY DISEASE, UNSPECIFIED                     

 

 2017            MARIETTA ORTEZ MD            Ot            Z79.899     
       OTHER LONG TERM (CURRENT) DRUG THERAPY                     

 

 2017            TAWIL MD, ELIAS A            Ot            N40.0        
    BENIGN PROSTATIC HYPERPLASIA WITHOUT LOW                     

 

 2017            TAWIL MD, ELIAS A            Ot            Z01.818      
      ENCOUNTER FOR OTHER PREPROCEDURAL EXAMIN                     

 

 2017                         Ot            141.9            MALIG CAR 
TONGUE NOS                     

 

 2017                         Ot            403.90            HYPTNSV CHR 
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                     

 

 2017                         Ot            414.00            CORON 
ATHEROSCLER NOS TYPE VESSEL, NATIV                     

 

 2017                         Ot            585.9            CHRONIC 
KIDNEY DISEASE, UNSPECIFIED                     

 

 2017                         Ot            733.90            BONE   
CARTILAGE DIS NOS                     

 

 2017                         Ot            V15.82            HISTORY OF 
TOBACCO USE                     

 

 2017                         Ot            V45.81            
AORTOCORONARY BYPASS                     

 

 2017                         Ot            V58.66            LONG-TERM (
CURRENT) USE OF ASPIRIN                     

 

 2017                         Ot            V58.69            OTH MED,LT,
CURRENT USE                     

 

 2017                         Ot            141.9            MALIG CAR 
TONGUE NOS                     

 

 2017                         Ot            733.90            BONE   
CARTILAGE DIS NOS                     

 

 2017                         Ot            141.9            MALIG CAR 
TONGUE NOS                     

 

 2017                         Ot            V72.83            EXAM PRE-
OPERATIVE NEC                     

 

 2017                         Ot            V74.8            SCREEN-
BACTERIAL DIS NEC                     

 

 2017                         Ot            141.9            MALIG CAR 
TONGUE NOS                     

 

 2017                         Ot            141.9            MALIG CAR 
TONGUE NOS                     

 

 2017                         Ot            141.9            MALIG CAR 
TONGUE NOS                     

 

 2017                         Ot            V72.83            EXAM PRE-
OPERATIVE NEC                     

 

 2017                         Ot            401.9            HYPERTENSION 
NOS                     

 

 2017                         Ot            414.00            CORON 
ATHEROSCLER NOS TYPE VESSEL, NATIV                     

 

 2017                         Ot            397.0            TRICUSPID 
VALVE DISEASE                     

 

 2017                         Ot            401.9            HYPERTENSION 
NOS                     

 

 2017                         Ot            414.00            CORON 
ATHEROSCLER NOS TYPE VESSEL, NATIV                     

 

 2017                         Ot            424.0            MITRAL VALVE 
DISORDER                     

 

 2017                         Ot            429.3            CARDIOMEGALY
                     

 

 2017                         Ot            141.9            MALIG CAR 
TONGUE NOS                     

 

 2017                         Ot            401.9            HYPERTENSION 
NOS                     

 

 2017                         Ot            786.05            SHORTNESS 
OF BREATH                     

 

 2017                         Ot            550.90            UNILAT 
INGUINAL HERNIA                     

 

 2017                         Ot            V72.63            PRE-
PROCEDURAL LABORATORY EXAMINATION                     

 

 2017                         Ot            V74.8            SCREEN-
BACTERIAL DIS NEC                     

 

 2017            PÉREZ BRIONES, MARIETTA            Ot            195.0       
     MAL CAR HEAD/FACE/NECK                     

 

 2017            ELIDIA ANTOINE MD            Ot            396.3       
     MITRAL/AORTIC SCOTT INSUFF                     

 

 2017            ELIDIA ANTOINE MD            Ot            397.0       
     TRICUSPID VALVE DISEASE                     

 

 2017            ELIDIA ANTOINE MD            Ot            414.00      
      CORON ATHEROSCLER NOS TYPE VESSEL, NATIV                     

 

 2017            ELIDIA ANTOINE MD            Ot            416.8       
     CHR PULMON HEART DIS NEC                     

 

 2017            ELIDIA ANTOINE MD            Ot            428.0       
     CONGESTIVE HEART FAILURE NOS                     

 

 2017            DYLAN BRIONES, EDGARDO ORELLANA            Ot            V10.01     
       HX OF TONGUE MALIGNANCY                     

 

 2017            LEIDIA ANTOINE MD            Ot            272.4       
     HYPERLIPIDEMIA NEC/NOS                     

 

 2017            ELIDIA ANTOINE MD            Ot            401.9       
     HYPERTENSION NOS                     

 

 2017            ELIDIA ANTOINE MD            Ot            414.00      
      CORON ATHEROSCLER NOS TYPE VESSEL, NATIV                     

 

 2017            ELIDIA ANTOINE MD            Ot            272.4       
     HYPERLIPIDEMIA NEC/NOS                     

 

 2017            ELIDIA ANTOINE MD            Ot            401.9       
     HYPERTENSION NOS                     

 

 2017            ELIDIA ANTOINE MD            Ot            414.00      
      CORON ATHEROSCLER NOS TYPE VESSEL, NATIV                     

 

 2017            ELIDIA ANTOINE MD            Ot            272.4       
     HYPERLIPIDEMIA NEC/NOS                     

 

 2017            ELIDIA ANTOINE MD            Ot            401.9       
     HYPERTENSION NOS                     

 

 2017            ELIDIA ANTOINE MD            Ot            414.9       
     CHR ISCHEMIC HRT DIS NOS                     

 

 2017            ELIDIA ANTOINE MD            Ot            433.10      
      CAROTID ARTERY OCCLUSION W O CEREBRAL IN                     

 

 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                      

 

 2017            SERENA SALAZAR DO            Ot            K92.1       
     MELENA                     

 

 2017            SERENA SALAZAR DO            Ot            Z01.818     
       ENCOUNTER FOR OTHER PREPROCEDURAL EXAMIN                     

 

 2017            ELIDIA ANTOINE MD            Ot            E78.2       
     MIXED HYPERLIPIDEMIA                     

 

 2017            ELIDIA ANTOINE MD            Ot            I10         
   ESSENTIAL (PRIMARY) HYPERTENSION                     

 

 2017            ELIDIA ANTOINE MD            Ot            I25.10      
      ATHSCL HEART DISEASE OF NATIVE CORONARY                      

 

 2017            ELIDIA ANTOINE MD            Ot            I65.23      
      OCCLUSION AND STENOSIS OF BILATERAL MI                     

 

 2017            ELIDIA ANTOINE MD            Ot            E78.2       
     MIXED HYPERLIPIDEMIA                     

 

 2017            ELIDIA ANTOINE MD            Ot            I10         
   ESSENTIAL (PRIMARY) HYPERTENSION                     

 

 2017            ELIDIA ANTOINE MD            Ot            I25.10      
      ATHSCL HEART DISEASE OF NATIVE CORONARY                      

 

 2017            ELIDIA ANTOINE MD            Ot            I50.22      
      CHRONIC SYSTOLIC (CONGESTIVE) HEART FAIL                     

 

 2017            ELIDIA ANTOINE MD            Ot            I65.23      
      OCCLUSION AND STENOSIS OF BILATERAL MI                     

 

 2017            ELIDIA ANTOINE MD            Ot            E78.2       
     MIXED HYPERLIPIDEMIA                     

 

 2017            ELIDIA ANTOINE MD            Ot            I10         
   ESSENTIAL (PRIMARY) HYPERTENSION                     

 

 2017            ELIDIA ANTOINE MD            Ot            I25.10      
      ATHSCL HEART DISEASE OF NATIVE CORONARY                      

 

 2017            ELIDIA ANTOINE MD            Ot            I65.23      
      OCCLUSION AND STENOSIS OF BILATERAL MI                     

 

 2017            MYAH OROZCO DO            Ot            J44.9       
     CHRONIC OBSTRUCTIVE PULMONARY DISEASE, U                     

 

 2017            ERNESTO MYAH BARAJAS M            Ot            R06.00      
      DYSPNEA, UNSPECIFIED                     

 

 2017            AZEEM HARGROVE            Ot            J44.9 
           CHRONIC OBSTRUCTIVE PULMONARY DISEASE, U                     

 

 2017            AZEEM HARGROVE            Ot            R06.00
            DYSPNEA, UNSPECIFIED                     

 

 2017            AZEEM HARGROVE            Ot            
F17.201            NICOTINE DEPENDENCE, UNSPECIFIED, IN REM                     

 

 2017            AZEEM HARGROVE            Ot            J44.9 
           CHRONIC OBSTRUCTIVE PULMONARY DISEASE, U                     

 

 2017            AZEEM HARGROVE            Ot            R06.00
            DYSPNEA, UNSPECIFIED                     

 

 2017            GILMER BRIONES, SABRINA SMITH            Ot            R42        
    DIZZINESS AND GIDDINESS                     

 

 2017            PÉREZ BRIONES, MARIETTA            Ot            C02.9       
     MALIGNANT NEOPLASM OF TONGUE, UNSPECIFIE                     

 

 2017            MARIETTA ORTEZ MD            Ot            E03.9       
     HYPOTHYROIDISM, UNSPECIFIED                     

 

 2017            MARIETTA ORTEZ MD            Ot            I12.9       
     HYPERTENSIVE CHRONIC KIDNEY DISEASE W ST                     

 

 2017            MARIETTA ORTEZ MD            Ot            I25.10      
      ATHSCL HEART DISEASE OF NATIVE CORONARY                      

 

 2017            MARIETTA ORTEZ MD            Ot            N18.9       
     CHRONIC KIDNEY DISEASE, UNSPECIFIED                     

 

 2017            MARIETTA ORTEZ MD            Ot            Z79.899     
       OTHER LONG TERM (CURRENT) DRUG THERAPY                     

 

 2017            AZEEM HARGROVE            Ot            
F17.210            NICOTINE DEPENDENCE, CIGARETTES, UNCOMPL                     

 

 2017            AZEEM HARGROVE            Ot            I25.10
            ATHSCL HEART DISEASE OF NATIVE CORONARY                      

 

 2017            AZEEM HARGROVE            Ot            I70.0 
           ATHEROSCLEROSIS OF AORTA                     

 

 2017            AZEEM HARGROVE            Ot            R91.8 
           OTHER NONSPECIFIC ABNORMAL FINDING OF ANDRY                     

 

 2017            AZEEM HARGROVE            Ot            
F17.210            NICOTINE DEPENDENCE, CIGARETTES, UNCOMPL                     

 

 2017            AZEEM HARGROVE            Ot            I25.10
            ATHSCL HEART DISEASE OF NATIVE CORONARY                      

 

 2017            AZEEM HARGROVE            Ot            I70.0 
           ATHEROSCLEROSIS OF AORTA                     

 

 2017            AZEEM HARGROVE            Ot            R91.8 
           OTHER NONSPECIFIC ABNORMAL FINDING OF ANDRY                     

 

 2017            TAWIL MD, ELIAS A            Ot            C61          
  MALIGNANT NEOPLASM OF PROSTATE                     

 

 2017            TAWIL MD, ELIAS A Ot            E78.5        
    HYPERLIPIDEMIA, UNSPECIFIED                     

 

 2017            TAWIL MD, ELIAS A Ot            G47.33       
     OBSTRUCTIVE SLEEP APNEA (ADULT) (PEDIATR                     

 

 2017            TAWIL MD, ELIAS A Ot            I11.0        
    HYPERTENSIVE HEART DISEASE WITH HEART FA                     

 

 2017            TAWIL MD, ELIAS A Ot            I25.10       
     ATHSCL HEART DISEASE OF NATIVE CORONARY                      

 

 2017            TAWIL MD, ELIAS A Ot            I50.9        
    HEART FAILURE, UNSPECIFIED                     

 

 2017            TAWIL MD, ELIAS A Ot            J44.9        
    CHRONIC OBSTRUCTIVE PULMONARY DISEASE, U                     

 

 2017            TAWIL MD, ELIAS A Ot            J45.909      
      UNSPECIFIED ASTHMA, UNCOMPLICATED                     

 

 2017            TAWIL MD, ELIAS A Ot            K21.9        
    GASTRO-ESOPHAGEAL REFLUX DISEASE WITHOUT                     

 

 2017            TAWIL MD, ELIAS A Ot            N40.0        
    BENIGN PROSTATIC HYPERPLASIA WITHOUT LOW                     

 

 2017            TAWIL MD, ELIAS A Ot            R97.20       
     ELEVATED PROSTATE SPECIFIC ANTIGEN [PSA]                     

 

 2017            TAWIL MD, ELIAS A Ot            Z79.899      
      OTHER LONG TERM (CURRENT) DRUG THERAPY                     

 

 2017            TAWIL MD, ELIAS A Ot            Z95.1        
    PRESENCE OF AORTOCORONARY BYPASS GRAFT                     

 

 2017            TAWIL MD, ELIAS A Ot            Z95.5        
    PRESENCE OF CORONARY ANGIOPLASTY IMPLANT                     

 

 2017            MYAH OROZCO DO            Ot            J44.9       
     CHRONIC OBSTRUCTIVE PULMONARY DISEASE, U                     

 

 2017            MYAH OROZCO DO            Ot            R06.00      
      DYSPNEA, UNSPECIFIED                     

 

 2017            MYAH OROZCO DO            Ot            J44.9       
     CHRONIC OBSTRUCTIVE PULMONARY DISEASE, U                     

 

 2017            MYAH OROZCO DO            Ot            R06.00      
      DYSPNEA, UNSPECIFIED                     

 

 2017            SABRINA SCHERER MD            Ot            R42        
    DIZZINESS AND GIDDINESS                     

 

 2017            TAWIL MD, ELIAS A            Ot            C61          
  MALIGNANT NEOPLASM OF PROSTATE                     

 

 2017            TAWIL MD, ELIAS A Ot            I70.0        
    ATHEROSCLEROSIS OF AORTA                     

 

 2017            TAWIL MD, ELIAS A Ot            I70.91       
     GENERALIZED ATHEROSCLEROSIS                     

 

 2017            TAWIL MD, ELIAS A            Ot            K63.89       
     OTHER SPECIFIED DISEASES OF INTESTINE                     

 

 2017            TAWIL MD, ELIAS A            Ot            K76.9        
    LIVER DISEASE, UNSPECIFIED                     

 

 2017            TAWIL MD, ELIAS A            Ot            K80.20       
     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                     

 

 2017            TAWIL MD, ELIAS A Ot            M47.812      
      SPONDYLOSIS W/O MYELOPATHY OR RADICULOPA                     

 

 2017            TAWIL MD, ELIAS A Ot            M85.89       
     OTH DISRD OF BONE DENSITY AND STRUCTURE,                     

 

 2017            TAWIL MD, ELIAS A Ot            N28.1        
    CYST OF KIDNEY, ACQUIRED                     

 

 2017            AZEEM HARGROVE            Ot            
F17.210            NICOTINE DEPENDENCE, CIGARETTES, UNCOMPL                     

 

 2017            AZEEM HARGROVE            Ot            I25.10
            ATHSCL HEART DISEASE OF NATIVE CORONARY                      

 

 2017            AZEEM HARGROVE            Ot            I70.0 
           ATHEROSCLEROSIS OF AORTA                     

 

 2017            AZEEM HARGROVE            Ot            R91.8 
           OTHER NONSPECIFIC ABNORMAL FINDING OF ANDRY                     

 

 2017            TAWIL MD, ELIAS A            Ot            C61          
  MALIGNANT NEOPLASM OF PROSTATE                     

 

 2017            TAWIL MD, ELIAS A            Ot            I70.0        
    ATHEROSCLEROSIS OF AORTA                     

 

 2017            TAWIL MD, ELIAS A            Ot            I70.91       
     GENERALIZED ATHEROSCLEROSIS                     

 

 2017            TAWIL MD, ELIAS A            Ot            K63.89       
     OTHER SPECIFIED DISEASES OF INTESTINE                     

 

 2017            TAWIL MD, ELIAS A            Ot            K76.9        
    LIVER DISEASE, UNSPECIFIED                     

 

 2017            TAWIL MD, ELIAS A            Ot            K80.20       
     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                     

 

 2017            TAWIL MD, ELIAS A Ot            M47.812      
      SPONDYLOSIS W/O MYELOPATHY OR RADICULOPA                     

 

 2017            TAWIL MD, ELIAS A Ot            M85.89       
     OTH DISRD OF BONE DENSITY AND STRUCTURE,                     

 

 2017            TAWIL MD, ELIAS A            Ot            N28.1        
    CYST OF KIDNEY, ACQUIRED                     

 

 2017            SABRINA SCHERER MD            Ot            R42        
    DIZZINESS AND GIDDINESS                     

 

 2017            MARIETTA ORTEZ MD            Ot            C61         
   MALIGNANT NEOPLASM OF PROSTATE                     

 

 2017            MARIETTA ORTEZ MD, Ot            C61         
   MALIGNANT NEOPLASM OF PROSTATE                     

 

 2017            MARIETTA ORTEZ MD, Ot            C61         
   MALIGNANT NEOPLASM OF PROSTATE                     

 

 2017            MARIETTA ORTEZ MD            Ot            E03.9       
     HYPOTHYROIDISM, UNSPECIFIED                     

 

 2017            MARIETTA ORTEZ MD            Ot            Z51.0       
     ENCOUNTER FOR ANTINEOPLASTIC RADIATION T                     

 

 2017            MARIETTA ORTEZ MD            Ot            Z79.899     
       OTHER LONG TERM (CURRENT) DRUG THERAPY                     

 

 10/03/2017            MARIETTA ORTEZ MD            Ot            C61         
   MALIGNANT NEOPLASM OF PROSTATE                     

 

 10/03/2017            MARIETTA ORTEZ MD            Ot            E03.9       
     HYPOTHYROIDISM, UNSPECIFIED                     

 

 10/03/2017            MARIETTA ORTEZ MD, Ot            Z79.899     
       OTHER LONG TERM (CURRENT) DRUG THERAPY                     

 

 10/16/2017            SERENA SALAZAR DO            Ot            T82.338A    
        LEAKAGE OF OTHER VASCULAR GRAFTS, INITIA                     

 

 10/16/2017            SERENA SALAZAR DO            Ot            Z01.818     
       ENCOUNTER FOR OTHER PREPROCEDURAL EXAMIN                     

 

 10/16/2017                         Ot            J44.9            CHRONIC 
OBSTRUCTIVE PULMONARY DISEASE, U                     

 

 10/16/2017                         Ot            R06.00            DYSPNEA, 
UNSPECIFIED                     

 

 10/16/2017            GILMER BRIONES, SABRINA SMITH            Ot            R42        
    DIZZINESS AND GIDDINESS                     

 

 10/16/2017            MARIETTA ORTEZ MD            Ot            C61         
   MALIGNANT NEOPLASM OF PROSTATE                     

 

 10/16/2017            MARIETTA ORTEZ MD            Ot            E03.9       
     HYPOTHYROIDISM, UNSPECIFIED                     

 

 10/16/2017            MARIETTA ORTEZ MD            Ot            Z79.899     
       OTHER LONG TERM (CURRENT) DRUG THERAPY                     

 

 10/16/2017            MARTIN BARAJAS SERENA D            Ot            T82.338A    
        LEAKAGE OF OTHER VASCULAR GRAFTS, INITIA                     

 

 10/16/2017            SERENA SALAZAR DO            Ot            Z01.818     
       ENCOUNTER FOR OTHER PREPROCEDURAL EXAMIN                     

 

 10/16/2017            SERENA SALAZAR DO            Ot            T82.338A    
        LEAKAGE OF OTHER VASCULAR GRAFTS, INITIA                     

 

 10/16/2017            SERENA SALAZAR DO            Ot            Z01.818     
       ENCOUNTER FOR OTHER PREPROCEDURAL EXAMIN                     

 

 10/16/2017            SERENA SALAZAR DO            Ot            T82.338A    
        LEAKAGE OF OTHER VASCULAR GRAFTS, INITIA                     

 

 10/16/2017            SERENA SALAZAR DO            Ot            Z01.818     
       ENCOUNTER FOR OTHER PREPROCEDURAL EXAMIN                     

 

 10/17/2017            RADHAMES LATIF            Ot            
I11.0            HYPERTENSIVE HEART DISEASE WITH HEART FA                     

 

 10/17/2017            RADHAMES LATIF            Ot            
I25.10            ATHSCL HEART DISEASE OF NATIVE CORONARY                      

 

 10/17/2017            RADHAMES LATIF            Ot            
I50.22            CHRONIC SYSTOLIC (CONGESTIVE) HEART FAIL                     

 

 10/17/2017            RADHAMES LATIF            Ot            
I65.23            OCCLUSION AND STENOSIS OF BILATERAL MI                     

 

 10/19/2017            SERENA SALAZAR DO            Ot            E03.9       
     HYPOTHYROIDISM, UNSPECIFIED                     

 

 10/19/2017            SERENA SALAZAR DO            Ot            E78.5       
     HYPERLIPIDEMIA, UNSPECIFIED                     

 

 10/19/2017            SERENA SALAZAR DO            Ot            G47.33      
      OBSTRUCTIVE SLEEP APNEA (ADULT) (PEDIATR                     

 

 10/19/2017            SERENA SALAZAR DO            Ot            I11.0       
     HYPERTENSIVE HEART DISEASE WITH HEART FA                     

 

 10/19/2017            SERENA SALAZAR DO            Ot            I25.10      
      ATHSCL HEART DISEASE OF NATIVE CORONARY                      

 

 10/19/2017            SERENA SALAZAR DO            Ot            I50.9       
     HEART FAILURE, UNSPECIFIED                     

 

 10/19/2017            SERENA SALAZAR DO            Ot            I65.23      
      OCCLUSION AND STENOSIS OF BILATERAL MI                     

 

 10/19/2017            SERENA SALAZAR DO            Ot            J44.9       
     CHRONIC OBSTRUCTIVE PULMONARY DISEASE, U                     

 

 10/19/2017            SERENA SALAZAR DO            Ot            T82.534A    
        LEAKAGE OF INFUSION CATHETER, INITIAL EN                     

 

 10/19/2017            SERENA SALAZAR DO            Ot            Z79.82      
      LONG TERM (CURRENT) USE OF ASPIRIN                     

 

 10/19/2017            SERENA SALAZAR DO            Ot            Z79.899     
       OTHER LONG TERM (CURRENT) DRUG THERAPY                     

 

 10/19/2017            SERENA SALAZAR DO            Ot            Z87.891     
       PERSONAL HISTORY OF NICOTINE DEPENDENCE                     

 

 10/19/2017            SERENA SALAZAR DO            Ot            Z95.1       
     PRESENCE OF AORTOCORONARY BYPASS GRAFT                     

 

 10/19/2017            SERENA SALAZAR DO            Ot            Z95.5       
     PRESENCE OF CORONARY ANGIOPLASTY IMPLANT                     

 

 10/20/2017            SERENA SALAZAR DO            Ot            E03.9       
     HYPOTHYROIDISM, UNSPECIFIED                     

 

 10/20/2017            SERENA SALAZAR DO            Ot            E78.5       
     HYPERLIPIDEMIA, UNSPECIFIED                     

 

 10/20/2017            SERENA SALAZAR DO            Ot            G47.33      
      OBSTRUCTIVE SLEEP APNEA (ADULT) (PEDIATR                     

 

 10/20/2017            SERENA SALAZAR DO            Ot            I11.0       
     HYPERTENSIVE HEART DISEASE WITH HEART FA                     

 

 10/20/2017            SERENA SALAZAR DO            Ot            I25.10      
      ATHSCL HEART DISEASE OF NATIVE CORONARY                      

 

 10/20/2017            SERENA SALAZAR DO            Ot            I50.9       
     HEART FAILURE, UNSPECIFIED                     

 

 10/20/2017            SERENA SALAZAR DO            Ot            I65.23      
      OCCLUSION AND STENOSIS OF BILATERAL MI                     

 

 10/20/2017            SERENA SALAZAR DO            Ot            J44.9       
     CHRONIC OBSTRUCTIVE PULMONARY DISEASE, U                     

 

 10/20/2017            SERENA SALAZAR DO            Ot            T82.534A    
        LEAKAGE OF INFUSION CATHETER, INITIAL EN                     

 

 10/20/2017            SERENA SALAZAR DO            Ot            Z79.82      
      LONG TERM (CURRENT) USE OF ASPIRIN                     

 

 10/20/2017            SERENA SALAZAR DO            Ot            Z79.899     
       OTHER LONG TERM (CURRENT) DRUG THERAPY                     

 

 10/20/2017            SERENA SALAZAR DO            Ot            Z87.891     
       PERSONAL HISTORY OF NICOTINE DEPENDENCE                     

 

 10/20/2017            SERENA SALAZAR DO            Ot            Z95.1       
     PRESENCE OF AORTOCORONARY BYPASS GRAFT                     

 

 10/20/2017            SERENA SALAZAR DO            Ot            Z95.5       
     PRESENCE OF CORONARY ANGIOPLASTY IMPLANT                     

 

 2017            MARIETTA ORTEZ MD            Ot            C61         
   MALIGNANT NEOPLASM OF PROSTATE                     

 

 2017            MARIETTA ORTEZ MD            Ot            E03.9       
     HYPOTHYROIDISM, UNSPECIFIED                     

 

 2017            MARIETTA ORTEZ MD            Ot            Z79.899     
       OTHER LONG TERM (CURRENT) DRUG THERAPY                     

 

 2017            MARIETTA ORTEZ MD            Ot            C61         
   MALIGNANT NEOPLASM OF PROSTATE                     

 

 2017            MARIETTA ORTEZ MD            Ot            E03.9       
     HYPOTHYROIDISM, UNSPECIFIED                     

 

 2017            MARIETTA ORTEZ MD            Ot            Z51.0       
     ENCOUNTER FOR ANTINEOPLASTIC RADIATION T                     

 

 2017            MARIETTA ORTEZ MD            Ot            Z79.899     
       OTHER LONG TERM (CURRENT) DRUG THERAPY                     

 

 2018            MARIETTA ORTEZ MD            Ot            C61         
   MALIGNANT NEOPLASM OF PROSTATE                     

 

 2018            MARIETTA ORTEZ MD            Ot            E03.9       
     HYPOTHYROIDISM, UNSPECIFIED                     

 

 2018            MARIETTA ORTEZ MD            Ot            Z51.0       
     ENCOUNTER FOR ANTINEOPLASTIC RADIATION T                     

 

 2018            PÉREZ BRIONES, MARIETTA            Ot            Z79.899     
       OTHER LONG TERM (CURRENT) DRUG THERAPY                     

 

 2018                         Ot            141.9            MALIG CAR 
TONGUE NOS                     

 

 2018                         Ot            401.9            HYPERTENSION 
NOS                     

 

 2018                         Ot            786.05            SHORTNESS 
OF BREATH                     

 

 2018                         Ot            550.90            UNILAT 
INGUINAL HERNIA                     

 

 2018                         Ot            V72.63            PRE-
PROCEDURAL LABORATORY EXAMINATION                     

 

 2018                         Ot            V74.8            SCREEN-
BACTERIAL DIS NEC                     

 

 2018            PÉREZ BRIONES, MARIETTA            Ot            195.0       
     MAL CAR HEAD/FACE/NECK                     

 

 2018            ELIDIA ANTOINE MD            Ot            396.3       
     MITRAL/AORTIC SCOTT INSUFF                     

 

 2018            ELIDIA ANTOINE MD            Ot            397.0       
     TRICUSPID VALVE DISEASE                     

 

 2018            ELIDIA ANTOINE MD            Ot            414.00      
      CORON ATHEROSCLER NOS TYPE VESSEL, NATIV                     

 

 2018            ELIDIA ANTOINE MD            Ot            416.8       
     CHR PULMON HEART DIS NEC                     

 

 2018            ELIDIA ANTOINE MD            Ot            428.0       
     CONGESTIVE HEART FAILURE NOS                     

 

 2018            DYLAN BRIONES, EDGARDO ORELLANA            Ot            V10.01     
       HX OF TONGUE MALIGNANCY                     

 

 2018            ELIDIA ANTOINE MD            Ot            272.4       
     HYPERLIPIDEMIA NEC/NOS                     

 

 2018            ELIDIA ANTOINE MD            Ot            401.9       
     HYPERTENSION NOS                     

 

 2018            ELIDIA ANTOINE MD            Ot            414.00      
      CORON ATHEROSCLER NOS TYPE VESSEL, NATIV                     

 

 2018            ELIDIA ANTOINE MD            Ot            272.4       
     HYPERLIPIDEMIA NEC/NOS                     

 

 2018            ELIDIA ANTOINE MD            Ot            401.9       
     HYPERTENSION NOS                     

 

 2018            ELIDIA ANTOINE MD            Ot            414.00      
      CORON ATHEROSCLER NOS TYPE VESSEL, NATIV                     

 

 2018            ELIDIA ANTOINE MD            Ot            272.4       
     HYPERLIPIDEMIA NEC/NOS                     

 

 2018            ELIDIA ANTOINE MD            Ot            401.9       
     HYPERTENSION NOS                     

 

 2018            ELIDIA ANTOINE MD            Ot            414.9       
     CHR ISCHEMIC HRT DIS NOS                     

 

 2018            ELIDIA ANTOINE MD            Ot            433.10      
      CAROTID ARTERY OCCLUSION W O CEREBRAL IN                     

 

 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                      

 

 2018            SERENA SALAZAR DO            Ot            K92.1       
     MELENA                     

 

 2018            SERENA SALAZAR DO            Ot            Z01.818     
       ENCOUNTER FOR OTHER PREPROCEDURAL EXAMIN                     

 

 2018            ELIDIA ANTOINE MD            Ot            E78.2       
     MIXED HYPERLIPIDEMIA                     

 

 2018            ELIDIA ANTOINE MD            Ot            I10         
   ESSENTIAL (PRIMARY) HYPERTENSION                     

 

 2018            ELIDIA ANTOINE MD            Ot            I25.10      
      ATHSCL HEART DISEASE OF NATIVE CORONARY                      

 

 2018            ELIDIA ANTOINE MD            Ot            I65.23      
      OCCLUSION AND STENOSIS OF BILATERAL MI                     

 

 2018            ELIDIA ANTOINE MD            Ot            E78.2       
     MIXED HYPERLIPIDEMIA                     

 

 2018            ELIDIA ANTOINE MD            Ot            I10         
   ESSENTIAL (PRIMARY) HYPERTENSION                     

 

 2018            ELIDIA ANTOINE MD            Ot            I25.10      
      ATHSCL HEART DISEASE OF NATIVE CORONARY                      

 

 2018            ELIDIA ANTOINE MD            Ot            I50.22      
      CHRONIC SYSTOLIC (CONGESTIVE) HEART FAIL                     

 

 2018            ELIDIA ANTOINE MD            Ot            I65.23      
      OCCLUSION AND STENOSIS OF BILATERAL MI                     

 

 2018            ELIDIA ANTOINE MD            Ot            E78.2       
     MIXED HYPERLIPIDEMIA                     

 

 2018            ELIDIA ANTOINE MD            Ot            I10         
   ESSENTIAL (PRIMARY) HYPERTENSION                     

 

 2018            ELIDIA ANTOINE MD            Ot            I25.10      
      ATHSCL HEART DISEASE OF NATIVE CORONARY                      

 

 2018            ELIDIA ANTOINE MD            Ot            I65.23      
      OCCLUSION AND STENOSIS OF BILATERAL MI                     

 

 2018            RADHAMES LATIF            Ot            
I11.0            HYPERTENSIVE HEART DISEASE WITH HEART FA                     

 

 2018            RADHAMES LATIF            Ot            
I25.10            ATHSCL HEART DISEASE OF NATIVE CORONARY                      

 

 2018            RADHAMES LATIF            Ot            
I50.22            CHRONIC SYSTOLIC (CONGESTIVE) HEART FAIL                     

 

 2018            RADHAMES LATIF            Ot            
I65.23            OCCLUSION AND STENOSIS OF BILATERAL MI                     

 

 2018            AZEEM HARGROVE            Ot            J44.9 
           CHRONIC OBSTRUCTIVE PULMONARY DISEASE, U                     

 

 2018            AZEEM HARGROVE            Ot            R06.00
            DYSPNEA, UNSPECIFIED                     

 

 2018            AZEEM HARGROVE            Ot            
F17.201            NICOTINE DEPENDENCE, UNSPECIFIED, IN REM                     

 

 2018            AZEEM HARGROVE            Ot            J44.9 
           CHRONIC OBSTRUCTIVE PULMONARY DISEASE, U                     

 

 2018            AZEEM HARGROVE            Ot            R06.00
            DYSPNEA, UNSPECIFIED                     

 

 2018            AZEEM HARGROVE            Ot            
F17.210            NICOTINE DEPENDENCE, CIGARETTES, UNCOMPL                     

 

 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
            ATHSCL HEART DISEASE OF NATIVE CORONARY                      

 

 2018            AZEEM HARGROVE            Ot            I70.0 
           ATHEROSCLEROSIS OF AORTA                     

 

 2018            AZEEM HARGROVE            Ot            R91.8 
           OTHER NONSPECIFIC ABNORMAL FINDING OF ANDRY                     

 

 2018            TAWIL MD, ELIAS A Ot            C61          
  MALIGNANT NEOPLASM OF PROSTATE                     

 

 2018            TAWIL MD, ELIAS A Ot            I70.0        
    ATHEROSCLEROSIS OF AORTA                     

 

 2018            TAWIL MD, ELIAS A            Ot            I70.91       
     GENERALIZED ATHEROSCLEROSIS                     

 

 2018            TAWIL MD, ELIAS A            Ot            K63.89       
     OTHER SPECIFIED DISEASES OF INTESTINE                     

 

 2018            TAWIL MD, ELIAS A            Ot            K76.9        
    LIVER DISEASE, UNSPECIFIED                     

 

 2018            TAWIL MD, ELIAS A            Ot            K80.20       
     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                     

 

 2018            TAWIL MD, ELIAS A Ot            M47.812      
      SPONDYLOSIS W/O MYELOPATHY OR RADICULOPA                     

 

 2018            TAWIL MD, ELIAS A Ot            M85.89       
     OTH DISRD OF BONE DENSITY AND STRUCTURE,                     

 

 2018            TAWIL MD, ELIAS A            Ot            N28.1        
    CYST OF KIDNEY, ACQUIRED                     

 

 2018                         Ot            J44.9            CHRONIC 
OBSTRUCTIVE PULMONARY DISEASE, U                     

 

 2018                         Ot            R06.00            DYSPNEA, 
UNSPECIFIED                     

 

 2018            GILMER BRIONES, SABRINA SMITH            Ot            R42        
    DIZZINESS AND GIDDINESS                     

 

 2018            MARIETTA ORTEZ MD, Ot            C61         
   MALIGNANT NEOPLASM OF PROSTATE                     

 

 2018            MARIETTA ORTEZ MD            Ot            E03.9       
     HYPOTHYROIDISM, UNSPECIFIED                     

 

 2018            MARIETTA ORTEZ MD            Ot            Z79.899     
       OTHER LONG TERM (CURRENT) DRUG THERAPY                     

 

 2018            ELIDIA ANTOINE MD            Ot            E78.2       
     MIXED HYPERLIPIDEMIA                     

 

 2018            ELIDIA ANTOINE MD            Ot            I10         
   ESSENTIAL (PRIMARY) HYPERTENSION                     

 

 2018            ELIDIA ANTOINE MD            Ot            I25.10      
      ATHSCL HEART DISEASE OF NATIVE CORONARY                      

 

 2018            ELIDIA ANTOINE MD            Ot            I65.23      
      OCCLUSION AND STENOSIS OF BILATERAL MI                     

 

 2018            ELIDIA ANTOINE MD            Ot            R00.1       
     BRADYCARDIA, UNSPECIFIED                     

 

 2018            ELIDIA ANTOINE MD            Ot            R06.00      
      DYSPNEA, UNSPECIFIED                     

 

 2018            ELIDIA ANTOINE MD            Ot            E78.2       
     MIXED HYPERLIPIDEMIA                     

 

 2018            ELIDIA ANTOINE MD            Ot            I10         
   ESSENTIAL (PRIMARY) HYPERTENSION                     

 

 2018            ELIDIA ANTOINE MD            Ot            I25.10      
      ATHSCL HEART DISEASE OF NATIVE CORONARY                      

 

 2018            ELIDIA ANTOINE MD            Ot            I65.23      
      OCCLUSION AND STENOSIS OF BILATERAL MI                     

 

 2018            ELIDIA ANTOINE MD            Ot            R00.1       
     BRADYCARDIA, UNSPECIFIED                     

 

 2018            ELIDIA ANTOINE MD            Ot            R06.00      
      DYSPNEA, UNSPECIFIED                     

 

 2018            ELIDIA ANTOINE MD            Ot            E78.2       
     MIXED HYPERLIPIDEMIA                     

 

 2018            ELIDIA ANTOINE MD            Ot            I10         
   ESSENTIAL (PRIMARY) HYPERTENSION                     

 

 2018            ELIDIA ANTOINE MD            Ot            I25.10      
      ATHSCL HEART DISEASE OF NATIVE CORONARY                      

 

 2018            ELIDIA ANTOINE MD            Ot            I65.23      
      OCCLUSION AND STENOSIS OF BILATERAL MI                     

 

 2018            ELIDIA ANTOINE MD            Ot            R00.1       
     BRADYCARDIA, UNSPECIFIED                     

 

 2018            ELIDIA ANTOINE MD            Ot            R06.00      
      DYSPNEA, UNSPECIFIED                     

 

 2018            MARIETTA ORTEZ MD            Ot            C61         
   MALIGNANT NEOPLASM OF PROSTATE                     

 

 2018            MARIETTA ORTEZ MD            Ot            E03.9       
     HYPOTHYROIDISM, UNSPECIFIED                     

 

 2018            MARIETTA ORTEZ MD            Ot            Z79.899     
       OTHER LONG TERM (CURRENT) DRUG THERAPY                     

 

 2018            MARIETTA ORTEZ MD            Ot            C61         
   MALIGNANT NEOPLASM OF PROSTATE                     

 

 2018            MARIETTA ORTEZ MD            Ot            E03.9       
     HYPOTHYROIDISM, UNSPECIFIED                     

 

 2018            MARIETTA ORTEZ MD            Ot            Z79.899     
       OTHER LONG TERM (CURRENT) DRUG THERAPY                     

 

 2018            METZGER DO, JOSÉ J            Ot            J18.1   
         LOBAR PNEUMONIA, UNSPECIFIED ORGANISM                     

 

 2018            METZGER DO, JOSÉ J            Ot            J18.9   
         PNEUMONIA, UNSPECIFIED ORGANISM                     

 

 2018            METZGER DO, JOSÉ J            Ot            R04.2   
         HEMOPTYSIS                     

 

 2018            METZGER DO, JOSÉ J            Ot            J18.1   
         LOBAR PNEUMONIA, UNSPECIFIED ORGANISM                     

 

 2018            METZGER DO, JOSÉ J            Ot            J18.9   
         PNEUMONIA, UNSPECIFIED ORGANISM                     

 

 2018            METZGER DO, JOSÉ J            Ot            R04.2   
         HEMOPTYSIS                     

 

 2018            METZGER DO, JOSÉ J            Ot            J18.9   
         PNEUMONIA, UNSPECIFIED ORGANISM                     

 

 2018            METZGER DO, JOSÉ J            Ot            R04.2   
         HEMOPTYSIS                     

 

 2018            METZGER DO, JOSÉ J            Ot            J18.1   
         LOBAR PNEUMONIA, UNSPECIFIED ORGANISM                     

 

 2018            AZEEM HARGROVE            Ot            J44.1 
           CHRONIC OBSTRUCTIVE PULMONARY DISEASE W                      

 

 2018            AZEEM HARGROVE            Ot            R91.8 
           OTHER NONSPECIFIC ABNORMAL FINDING OF ANDRY                     

 

 2018            METZGER DO, JOSÉ J            Ot            J18.9   
         PNEUMONIA, UNSPECIFIED ORGANISM                     

 

 2018            METZGER DO, JOSÉ J            Ot            J44.9   
         CHRONIC OBSTRUCTIVE PULMONARY DISEASE, U                     

 

 2018            AZEEM HARGROVE            Ot            J44.1 
           CHRONIC OBSTRUCTIVE PULMONARY DISEASE W                      

 

 2018            AZEEM HARGROVE            Ot            R91.8 
           OTHER NONSPECIFIC ABNORMAL FINDING OF ANRDY                     

 

 2018            MARIETTA ORTEZ MD            Ot            C61         
   MALIGNANT NEOPLASM OF PROSTATE                     

 

 2018            MARIETTA ORTEZ MD            Ot            E03.9       
     HYPOTHYROIDISM, UNSPECIFIED                     

 

 2018            MARIETTA ORTEZ MD            Ot            Z79.899     
       OTHER LONG TERM (CURRENT) DRUG THERAPY                     

 

 2018            MARIETTA ORTEZ MD            Ot            C61         
   MALIGNANT NEOPLASM OF PROSTATE                     

 

 2018            MARIETTA ORTEZ MD            Ot            E03.9       
     HYPOTHYROIDISM, UNSPECIFIED                     

 

 2018            MARIETTA ORTEZ MD            Ot            Z79.899     
       OTHER LONG TERM (CURRENT) DRUG THERAPY                     

 

 2018            AZEEM HARGROVE            Ot            G47.33
            OBSTRUCTIVE SLEEP APNEA (ADULT) (PEDIATR                     

 

 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
            IDIO SLEEP RELATED NONOBSTRUCTIVE ALVEOL                     

 

 2018            AZEEM HARGROVE            Ot            G47.33
            OBSTRUCTIVE SLEEP APNEA (ADULT) (PEDIATR                     

 

 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
            IDIO SLEEP RELATED NONOBSTRUCTIVE ALVEOL                     

 

 2018                         Ot            141.9            MALIG CAR 
TONGUE NOS                     

 

 2018                         Ot            401.9            HYPERTENSION 
NOS                     

 

 2018                         Ot            786.05            SHORTNESS 
OF BREATH                     

 

 2018                         Ot            550.90            UNILAT 
INGUINAL HERNIA                     

 

 2018                         Ot            V72.63            PRE-
PROCEDURAL LABORATORY EXAMINATION                     

 

 2018                         Ot            V74.8            SCREEN-
BACTERIAL DIS NEC                     

 

 2018            PÉREZ BRIONES, MARIETTA            Ot            195.0       
     MAL CAR HEAD/FACE/NECK                     

 

 2018            ELIDIA ANTOINE MD            Ot            396.3       
     MITRAL/AORTIC SCOTT INSUFF                     

 

 2018            ELIDIA ANTOINE MD            Ot            397.0       
     TRICUSPID VALVE DISEASE                     

 

 2018            ELIDIA ANTOINE MD            Ot            414.00      
      CORON ATHEROSCLER NOS TYPE VESSEL, NATIV                     

 

 2018            ELIDIA ANTOINE MD            Ot            416.8       
     CHR PULMON HEART DIS NEC                     

 

 2018            ELIDIA ANTOINE MD            Ot            428.0       
     CONGESTIVE HEART FAILURE NOS                     

 

 2018            DYLAN BRIONES, EDGARDO ORELLANA            Ot            V10.01     
       HX OF TONGUE MALIGNANCY                     

 

 2018            ELIDIA ANTOINE MD            Ot            272.4       
     HYPERLIPIDEMIA NEC/NOS                     

 

 2018            ELIDIA ANTOINE MD            Ot            401.9       
     HYPERTENSION NOS                     

 

 2018            ELIDIA ANTOINE MD            Ot            414.00      
      CORON ATHEROSCLER NOS TYPE VESSEL, NATIV                     

 

 2018            ELIDIA ANTOINE MD            Ot            272.4       
     HYPERLIPIDEMIA NEC/NOS                     

 

 2018            ELIDIA ANTOINE MD            Ot            401.9       
     HYPERTENSION NOS                     

 

 2018            ELIDIA ANTOINE MD            Ot            414.00      
      CORON ATHEROSCLER NOS TYPE VESSEL, NATIV                     

 

 2018            ELIDIA ANTOINE MD            Ot            272.4       
     HYPERLIPIDEMIA NEC/NOS                     

 

 2018            ELIDIA ANTOINE MD            Ot            401.9       
     HYPERTENSION NOS                     

 

 2018            ELIDIA ANTOINE MD            Ot            414.9       
     CHR ISCHEMIC HRT DIS NOS                     

 

 2018            ELIDIA ANTOINE MD            Ot            433.10      
      CAROTID ARTERY OCCLUSION W O CEREBRAL IN                     

 

 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                      

 

 2018            SERENA SALAZAR DO            Ot            K92.1       
     MELENA                     

 

 2018            SERENA SALAZAR DO            Ot            Z01.818     
       ENCOUNTER FOR OTHER PREPROCEDURAL EXAMIN                     

 

 2018            ELIDIA ANTOINE MD, Ot            E78.2       
     MIXED HYPERLIPIDEMIA                     

 

 2018            ELIDIA ANTOINE MD            Ot            I10         
   ESSENTIAL (PRIMARY) HYPERTENSION                     

 

 2018            ELIDIA ANTOINE MD            Ot            I25.10      
      ATHSCL HEART DISEASE OF NATIVE CORONARY                      

 

 2018            ELIDIA ANTOINE MD            Ot            I65.23      
      OCCLUSION AND STENOSIS OF BILATERAL MI                     

 

 2018            ELIDIA ANTOINE MD            Ot            E78.2       
     MIXED HYPERLIPIDEMIA                     

 

 2018            ELIDIA ANTOINE MD, Ot            I10         
   ESSENTIAL (PRIMARY) HYPERTENSION                     

 

 2018            ELIDIA ANTOINE MD            Ot            I25.10      
      ATHSCL HEART DISEASE OF NATIVE CORONARY                      

 

 2018            ELIDIA ANTOINE MD            Ot            I50.22      
      CHRONIC SYSTOLIC (CONGESTIVE) HEART FAIL                     

 

 2018            ELIDIA ANTOINE MD            Ot            I65.23      
      OCCLUSION AND STENOSIS OF BILATERAL MI                     

 

 2018            ELIDIA ANTOINE MD            Ot            E78.2       
     MIXED HYPERLIPIDEMIA                     

 

 2018            ELIDIA ANTOINE MD            Ot            I10         
   ESSENTIAL (PRIMARY) HYPERTENSION                     

 

 2018            ELIDIA ANTOINE MD            Ot            I25.10      
      ATHSCL HEART DISEASE OF NATIVE CORONARY                      

 

 2018            ELIDIA ANTOINE MD            Ot            I65.23      
      OCCLUSION AND STENOSIS OF BILATERAL MI                     

 

 2018            RADHAMES LATIF            Ot            
I11.0            HYPERTENSIVE HEART DISEASE WITH HEART FA                     

 

 2018            RADHAMES LATIF            Ot            
I25.10            ATHSCL HEART DISEASE OF NATIVE CORONARY                      

 

 2018            RADHAMES LATIF            Ot            
I50.22            CHRONIC SYSTOLIC (CONGESTIVE) HEART FAIL                     

 

 2018            RADHAMES LATIF            Ot            
I65.23            OCCLUSION AND STENOSIS OF BILATERAL MI                     

 

 2018            AZEEM HARGROVE            Ot            J44.9 
           CHRONIC OBSTRUCTIVE PULMONARY DISEASE, U                     

 

 2018            AZEEM HARGROVE            Ot            R06.00
            DYSPNEA, UNSPECIFIED                     

 

 2018            AZEEM HARGROVE            Ot            
F17.201            NICOTINE DEPENDENCE, UNSPECIFIED, IN REM                     

 

 2018            AZEEM HARGROVE            Ot            J44.9 
           CHRONIC OBSTRUCTIVE PULMONARY DISEASE, U                     

 

 2018            AZEEM HARGROVE            Ot            R06.00
            DYSPNEA, UNSPECIFIED                     

 

 2018            AZEEM HARGROVE            Ot            
F17.210            NICOTINE DEPENDENCE, CIGARETTES, UNCOMPL                     

 

 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
            ATHSCL HEART DISEASE OF NATIVE CORONARY                      

 

 2018            AZEEM HARGROVE            Ot            I70.0 
           ATHEROSCLEROSIS OF AORTA                     

 

 2018            AZEEM HARGROVE            Ot            R91.8 
           OTHER NONSPECIFIC ABNORMAL FINDING OF ANDRY                     

 

 2018            TAWIL MD, ELIAS A            Ot            C61          
  MALIGNANT NEOPLASM OF PROSTATE                     

 

 2018            TAWIL MD, ELIAS A            Ot            I70.0        
    ATHEROSCLEROSIS OF AORTA                     

 

 2018            TAWIL MD, ELIAS A            Ot            I70.91       
     GENERALIZED ATHEROSCLEROSIS                     

 

 2018            TAWIL MD, ELIAS A            Ot            K63.89       
     OTHER SPECIFIED DISEASES OF INTESTINE                     

 

 2018            TAWIL MD, ELIAS A            Ot            K76.9        
    LIVER DISEASE, UNSPECIFIED                     

 

 2018            TAWIL MD, ELIAS A            Ot            K80.20       
     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                     

 

 2018            TAWIL MD, ELIAS A Ot            M47.812      
      SPONDYLOSIS W/O MYELOPATHY OR RADICULOPA                     

 

 2018            TAWIL MD, ELIAS A            Ot            M85.89       
     OTH DISRD OF BONE DENSITY AND STRUCTURE,                     

 

 2018            TAWIL MD, ELIAS A            Ot            N28.1        
    CYST OF KIDNEY, ACQUIRED                     

 

 2018                         Ot            J44.9            CHRONIC 
OBSTRUCTIVE PULMONARY DISEASE, U                     

 

 2018                         Ot            R06.00            DYSPNEA, 
UNSPECIFIED                     

 

 2018            GILMER BRIONES, SABRINA SMITH            Ot            R42        
    DIZZINESS AND GIDDINESS                     

 

 2018            ELIDIA ANTOINE MD            Ot            E78.2       
     MIXED HYPERLIPIDEMIA                     

 

 2018            ELIDIA ANTOINE MD, Ot            I10         
   ESSENTIAL (PRIMARY) HYPERTENSION                     

 

 2018            ELIDIA ANTOINE MD, Ot            I25.10      
      ATHSCL HEART DISEASE OF NATIVE CORONARY                      

 

 2018            ELIDIA ANTOINE MD            Ot            I65.23      
      OCCLUSION AND STENOSIS OF BILATERAL MI                     

 

 2018            ELIDIA ANTOINE MD            Ot            R00.1       
     BRADYCARDIA, UNSPECIFIED                     

 

 2018            ELIDIA ANTOINE MD            Ot            R06.00      
      DYSPNEA, UNSPECIFIED                     

 

 2018            JOSÉ METZGER DO, Ot            J18.1   
         LOBAR PNEUMONIA, UNSPECIFIED ORGANISM                     

 

 2018            JOSÉ METZGER DO, Ot            J18.9   
         PNEUMONIA, UNSPECIFIED ORGANISM                     

 

 2018            JOSÉ METZGER DO            Ot            R04.2   
         HEMOPTYSIS                     

 

 2018            AZEEM HARGROVE            Ot            J44.1 
           CHRONIC OBSTRUCTIVE PULMONARY DISEASE W                      

 

 2018            AZEEM HARGROVE            Ot            R91.8 
           OTHER NONSPECIFIC ABNORMAL FINDING OF ANDRY                     

 

 2018            JOSÉ METZGER DO, Ot            J18.9   
         PNEUMONIA, UNSPECIFIED ORGANISM                     

 

 2018            JOSÉ METZGER DO, Ot            J44.9   
         CHRONIC OBSTRUCTIVE PULMONARY DISEASE, U                     

 

 2018            MARIETTA ORTEZ MD            Ot            C61         
   MALIGNANT NEOPLASM OF PROSTATE                     

 

 2018            MARIETTA ORTEZ MD            Ot            E03.9       
     HYPOTHYROIDISM, UNSPECIFIED                     

 

 2018            MARIETTA ORTEZ MD            Ot            Z79.899     
       OTHER LONG TERM (CURRENT) DRUG THERAPY                     

 

 05/10/2018            JOSÉ METZGER DO, Ot            J18.9   
         PNEUMONIA, UNSPECIFIED ORGANISM                     

 

 05/10/2018            JOSÉ METZGER DO, Ot            J44.9   
         CHRONIC OBSTRUCTIVE PULMONARY DISEASE, U                     

 

 06/10/2018            RADHA KRUSE MD            Ot            E78.00      
      PURE HYPERCHOLESTEROLEMIA, UNSPECIFIED                     

 

 06/10/2018            RDAHA KRUSE MD            Ot            G47.30      
      SLEEP APNEA, UNSPECIFIED                     

 

 06/10/2018            RADHA KRUSE MD            Ot            I10         
   ESSENTIAL (PRIMARY) HYPERTENSION                     

 

 06/10/2018            RADHA KRUSE MD            Ot            I25.10      
      ATHSCL HEART DISEASE OF NATIVE CORONARY                      

 

 06/10/2018            RADHA KRUSE MD            Ot            I25.2       
     OLD MYOCARDIAL INFARCTION                     

 

 06/10/2018            RADHA KRUSE MD, Ot            J18.9       
     PNEUMONIA, UNSPECIFIED ORGANISM                     

 

 06/10/2018            RADHA KRUSE MD            Ot            J44.9       
     CHRONIC OBSTRUCTIVE PULMONARY DISEASE, U                     

 

 06/10/2018            RADHA KRUSE MD            Ot            K21.9       
     GASTRO-ESOPHAGEAL REFLUX DISEASE WITHOUT                     

 

 06/10/2018            RADHA KRUSE MD            Ot            N40.0       
     BENIGN PROSTATIC HYPERPLASIA WITHOUT LOW                     

 

 06/10/2018            RADHA KRUSE MD            Ot            R04.0       
     EPISTAXIS                     

 

 06/10/2018            RADHA KRUSE MD            Ot            Z79.51      
      LONG TERM (CURRENT) USE OF INHALED STERO                     

 

 06/10/2018            RADHA KRUSE MD            Ot            Z79.82      
      LONG TERM (CURRENT) USE OF ASPIRIN                     

 

 06/10/2018            RADHA KRUSE MD            Ot            Z85.46      
      PERSONAL HISTORY OF MALIGNANT NEOPLASM O                     

 

 06/10/2018            RADHA KRUSE MD            Ot            Z85.810     
       PERSONAL HISTORY OF MALIGNANT NEOPLASM O                     

 

 06/10/2018            RADHA KRUSE MD            Ot            Z87.891     
       PERSONAL HISTORY OF NICOTINE DEPENDENCE                     

 

 06/10/2018            RADHA KRUSE MD            Ot            Z92.21      
      PERSONAL HISTORY OF ANTINEOPLASTIC CHEMO                     

 

 06/10/2018            RADHA KRUSE MD            Ot            Z92.3       
     PERSONAL HISTORY OF IRRADIATION                     

 

 06/10/2018            RADHA KRUSE MD            Ot            Z95.5       
     PRESENCE OF CORONARY ANGIOPLASTY IMPLANT                     

 

 06/10/2018            RADHA KRUSE MD            Ot            Z99.81      
      DEPENDENCE ON SUPPLEMENTAL OXYGEN                     

 

 2018            RADHA KRUSE MD            Ot            E78.00      
      PURE HYPERCHOLESTEROLEMIA, UNSPECIFIED                     

 

 2018            RADHA KRUSE MD            Ot            G47.30      
      SLEEP APNEA, UNSPECIFIED                     

 

 2018            RADHA KRUSE MD            Ot            I10         
   ESSENTIAL (PRIMARY) HYPERTENSION                     

 

 2018            RADHA KRUSE MD            Ot            I25.10      
      ATHSCL HEART DISEASE OF NATIVE CORONARY                      

 

 2018            RADHA KRUSE MD            Ot            I25.2       
     OLD MYOCARDIAL INFARCTION                     

 

 2018            RADHA KRUSE MD            Ot            J18.9       
     PNEUMONIA, UNSPECIFIED ORGANISM                     

 

 2018            RADHA KRUSE MD            Ot            J44.9       
     CHRONIC OBSTRUCTIVE PULMONARY DISEASE, U                     

 

 2018            RADHA KRUSE MD            Ot            K21.9       
     GASTRO-ESOPHAGEAL REFLUX DISEASE WITHOUT                     

 

 2018            RADHA KRUSE MD            Ot            N40.0       
     BENIGN PROSTATIC HYPERPLASIA WITHOUT LOW                     

 

 2018            RADHA KRUSE MD            Ot            R04.0       
     EPISTAXIS                     

 

 2018            RADHA KRUSE MD            Ot            Z79.51      
      LONG TERM (CURRENT) USE OF INHALED STERO                     

 

 2018            RADHA KRUSE MD            Ot            Z79.82      
      LONG TERM (CURRENT) USE OF ASPIRIN                     

 

 2018            RADHA KRUSE MD            Ot            Z85.46      
      PERSONAL HISTORY OF MALIGNANT NEOPLASM O                     

 

 2018            RADHA KRUSE MD            Ot            Z85.810     
       PERSONAL HISTORY OF MALIGNANT NEOPLASM O                     

 

 2018            RADHA KRUSE MD            Ot            Z87.891     
       PERSONAL HISTORY OF NICOTINE DEPENDENCE                     

 

 2018            RADHA KRUSE MD            Ot            Z92.21      
      PERSONAL HISTORY OF ANTINEOPLASTIC CHEMO                     

 

 2018            RADHA KRUSE MD            Ot            Z92.3       
     PERSONAL HISTORY OF IRRADIATION                     

 

 2018            RADHA KRUSE MD            Ot            Z95.5       
     PRESENCE OF CORONARY ANGIOPLASTY IMPLANT                     

 

 2018            RADHA KRUSE MD            Ot            Z99.81      
      DEPENDENCE ON SUPPLEMENTAL OXYGEN                     

 

 2018            RADHA KRUSE MD            Ot            E78.00      
      PURE HYPERCHOLESTEROLEMIA, UNSPECIFIED                     

 

 2018            RADHA KRUSE MD            Ot            G47.30      
      SLEEP APNEA, UNSPECIFIED                     

 

 2018            RADHA KRUSE MD            Ot            I10         
   ESSENTIAL (PRIMARY) HYPERTENSION                     

 

 2018            RADHA KRUSE MD            Ot            I25.10      
      ATHSCL HEART DISEASE OF NATIVE CORONARY                      

 

 2018            RADHA KRUSE MD            Ot            I25.2       
     OLD MYOCARDIAL INFARCTION                     

 

 2018            RADHA KRUSE MD            Ot            J18.9       
     PNEUMONIA, UNSPECIFIED ORGANISM                     

 

 2018            RADHA KRUSE MD            Ot            J44.9       
     CHRONIC OBSTRUCTIVE PULMONARY DISEASE, U                     

 

 2018            RADHA KRUSE MD            Ot            K21.9       
     GASTRO-ESOPHAGEAL REFLUX DISEASE WITHOUT                     

 

 2018            RADHA KRUSE MD            Ot            N40.0       
     BENIGN PROSTATIC HYPERPLASIA WITHOUT LOW                     

 

 2018            RADHA KRUSE MD            Ot            R04.0       
     EPISTAXIS                     

 

 2018            RADHA KRUSE MD            Ot            Z79.51      
      LONG TERM (CURRENT) USE OF INHALED STERO                     

 

 2018            RADHA KRUSE MD            Ot            Z79.82      
      LONG TERM (CURRENT) USE OF ASPIRIN                     

 

 2018            RADHA KRUSE MD            Ot            Z85.46      
      PERSONAL HISTORY OF MALIGNANT NEOPLASM O                     

 

 2018            RADHA KRUSE MD            Ot            Z85.810     
       PERSONAL HISTORY OF MALIGNANT NEOPLASM O                     

 

 2018            RADHA KRUSE MD            Ot            Z87.891     
       PERSONAL HISTORY OF NICOTINE DEPENDENCE                     

 

 2018            RADHA KRUSE MD            Ot            Z92.21      
      PERSONAL HISTORY OF ANTINEOPLASTIC CHEMO                     

 

 2018            RADHA KRUSE MD            Ot            Z92.3       
     PERSONAL HISTORY OF IRRADIATION                     

 

 2018            RADHA KRUSE MD            Ot            Z95.5       
     PRESENCE OF CORONARY ANGIOPLASTY IMPLANT                     

 

 2018            RADHA KRUSE MD            Ot            Z99.81      
      DEPENDENCE ON SUPPLEMENTAL OXYGEN                     

 

 2018            AZEEM HARGROVE            Ot            J44.9 
           CHRONIC OBSTRUCTIVE PULMONARY DISEASE, U                     

 

 2018            MYAH OROZCO DO, Ot            J90         
   PLEURAL EFFUSION, NOT ELSEWHERE CLASSIFI                     

 

 2018            MYAH OROZCO DO, Ot            R04.2       
     HEMOPTYSIS                     

 

 2018            MYAH OROZCO DO, Ot            R59.0       
     LOCALIZED ENLARGED LYMPH NODES                     

 

 2018            MYAH OROZCO DO, Ot            R91.8       
     OTHER NONSPECIFIC ABNORMAL FINDING OF ANDRY                     

 

 2018            MYAH OROZCO DO, Ot            Z85.46      
      PERSONAL HISTORY OF MALIGNANT NEOPLASM O                     

 

 2018            MYAH OROZCO DO, Ot            Z85.819     
       PRSNL HX OF MALIG NEOPLM OF [facility] SITE LI                     

 

 2018            MYAH OROZCO DO, Ot            Z01.818     
       ENCOUNTER FOR OTHER PREPROCEDURAL EXAMIN                     



                                                                               
                                                                               
                                                                               
                                                                               
                                                                               
                                                                               
                                                                               
                                                                               
                                                                               
                                                                               
                                                                               
                                                                               
                                                                               
                                                                               
                                                                               
                                                                               
                                                                               
                                                                               
                                                                               
                                                                               
                                                                               
                                                                               
                                                                               
                                                                               
                                                                               
                                                                               
                                                                               
                                                                               
                                                                               
                                                       



Procedures

      



There is no data.                  



Results

      





 Test            Result            Range        









 Automated blood complete blood count (hemogram) panel - 16 11:30         









 Blood leukocytes automated count (number/volume)            4.8 10*3/uL       
     4.3-11.0        

 

 Blood erythrocytes automated count (number/volume)            4.71 10*6/uL    
        4.35-5.85        

 

 Venous blood hemoglobin measurement (mass/volume)            14.6 g/dL        
    13.3-17.7        

 

 Blood hematocrit (volume fraction)            42 %            40-54        

 

 Automated erythrocyte mean corpuscular volume            89 [foz_us]          
  80-99        

 

 Automated erythrocyte mean corpuscular hemoglobin (mass per erythrocyte)      
      31 pg            25-34        

 

 Automated erythrocyte mean corpuscular hemoglobin concentration measurement (
mass/volume)            35 g/dL            32-36        

 

 Automated erythrocyte distribution width ratio            13.2 %            
10.0-14.5        

 

 Automated blood platelet count (count/volume)            170 10*3/uL          
  130-400        

 

 Automated blood platelet mean volume measurement            11.1 [foz_us]     
       7.4-10.4        









 Comprehensive metabolic panel - 16 11:30         









 Serum or plasma sodium measurement (moles/volume)            141 mmol/L       
     135-145        

 

 Serum or plasma potassium measurement (moles/volume)            4.2 mmol/L    
        3.6-5.0        

 

 Serum or plasma chloride measurement (moles/volume)            106 mmol/L     
               

 

 Carbon dioxide            26 mmol/L            21-32        

 

 Serum or plasma anion gap determination (moles/volume)            9 mmol/L    
        5-14        

 

 Serum or plasma urea nitrogen measurement (mass/volume)            15 mg/dL   
         7-18        

 

 Serum or plasma creatinine measurement (mass/volume)            1.31 mg/dL    
        0.60-1.30        

 

 Serum or plasma urea nitrogen/creatinine mass ratio            11             
NRG        

 

 Serum or plasma creatinine measurement with calculation of estimated 
glomerular filtration rate            53             NRG        

 

 Serum or plasma glucose measurement (mass/volume)            105 mg/dL        
            

 

 Serum or plasma calcium measurement (mass/volume)            9.4 mg/dL        
    8.5-10.1        

 

 Serum or plasma total bilirubin measurement (mass/volume)            2.0 mg/dL
            0.1-1.0        

 

 Serum or plasma alkaline phosphatase measurement (enzymatic activity/volume)  
          75 U/L                    

 

 Serum or plasma aspartate aminotransferase measurement (enzymatic activity/
volume)            18 U/L            5-34        

 

 Serum or plasma alanine aminotransferase measurement (enzymatic activity/volume
)            15 U/L            0-55        

 

 Serum or plasma protein measurement (mass/volume)            7.2 g/dL         
   6.4-8.2        

 

 Serum or plasma albumin measurement (mass/volume)            4.4 g/dL         
   3.2-4.5        









 Lipid 1996 panel - 16 11:30         









 Serum or plasma triglyceride measurement (mass/volume)            95 mg/dL    
        <150        

 

 Serum or plasma cholesterol measurement (mass/volume)            133 mg/dL    
        < 200        

 

 Serum or plasma cholesterol in HDL measurement (mass/volume)            37 mg/
dL            40-60        

 

 Cholesterol in LDL [mass/volume] in serum or plasma by direct assay            
81 mg/dL            1-129        

 

 Serum or plasma cholesterol in VLDL measurement (mass/volume)            19 mg/
dL            5-40        









 PT panel in platelet poor plasma by coagulation assay - 16 11:30         









 Prothrombin time (PT) in platelet poor plasma by coagulation assay            
13.9 s            12.2-14.7        

 

 INR in platelet poor plasma or blood by coagulation assay            1.1      
       0.8-1.4        









 Activated partial thromboplastin time (aPTT) in platelet poor plasma 
bycoagulation assay - 16 11:30         









 Activated partial thromboplastin time (aPTT) in platelet poor plasma 
bycoagulation assay            24 s            24-35        









 Methicillin resistant Staphylococcus aureus (MRSA) screening culture -  11:30         









 Methicillin resistant Staphylococcus aureus (MRSA) screening culture          
  NEG             NRG        









 Blood lactic acid measurement (moles/volume) - 17 18:25         









 Blood lactic acid measurement (moles/volume)            1.47 mmol/L            
0.50-2.00        









 PT panel in platelet poor plasma by coagulation assay - 17 18:25         









 Prothrombin time (PT) in platelet poor plasma by coagulation assay            
28.3 s            12.2-14.7        

 

 INR in platelet poor plasma or blood by coagulation assay            2.7      
       0.8-1.4        









 Activated partial thromboplastin time (aPTT) in platelet poor plasma 
bycoagulation assay - 17 18:25         









 Activated partial thromboplastin time (aPTT) in platelet poor plasma 
bycoagulation assay            36 s            24-35        









 Comprehensive metabolic panel - 17 18:25         









 Serum or plasma sodium measurement (moles/volume)            142 mmol/L       
     135-145        

 

 Serum or plasma potassium measurement (moles/volume)            4.0 mmol/L    
        3.6-5.0        

 

 Serum or plasma chloride measurement (moles/volume)            104 mmol/L     
               

 

 Carbon dioxide            26 mmol/L            21-32        

 

 Serum or plasma anion gap determination (moles/volume)            12 mmol/L   
         5-14        

 

 Serum or plasma urea nitrogen measurement (mass/volume)            21 mg/dL   
         7-18        

 

 Serum or plasma creatinine measurement (mass/volume)            0.98 mg/dL    
        0.60-1.30        

 

 Serum or plasma urea nitrogen/creatinine mass ratio            21             
NRG        

 

 Serum or plasma creatinine measurement with calculation of estimated 
glomerular filtration rate            >             NRG        

 

 Serum or plasma glucose measurement (mass/volume)            130 mg/dL        
            

 

 Serum or plasma calcium measurement (mass/volume)            9.0 mg/dL        
    8.5-10.1        

 

 Serum or plasma total bilirubin measurement (mass/volume)            1.6 mg/dL
            0.1-1.0        

 

 Serum or plasma alkaline phosphatase measurement (enzymatic activity/volume)  
          59 U/L                    

 

 Serum or plasma aspartate aminotransferase measurement (enzymatic activity/
volume)            31 U/L            5-34        

 

 Serum or plasma alanine aminotransferase measurement (enzymatic activity/volume
)            67 U/L            0-55        

 

 Serum or plasma protein measurement (mass/volume)            5.9 g/dL         
   6.4-8.2        

 

 Serum or plasma albumin measurement (mass/volume)            3.8 g/dL         
   3.2-4.5        









 Magnesium - 17 18:25         









 Magnesium            2.5 mg/dL            1.8-2.4        









 Serum or plasma creatine kinase measurement (enzymatic activity/volume) -  18:25         









 Serum or plasma creatine kinase measurement (enzymatic activity/volume)       
     43 U/L                    









 Serum or plasma creatine kinase MB measurement (enzymatic activity/volume) -  18:25         









 Serum or plasma creatine kinase MB measurement (enzymatic activity/volume)    
        1.5 ng/mL            <6.6        









 Serum or plasma troponin i.cardiac measurement (mass/volume) - 17 18:25 
        









 Serum or plasma troponin i.cardiac measurement (mass/volume)            < ng/
mL            <0.30        









 Serum or plasma thyrotropin measurement by detection limit <=0.05 miu/l (units/
volume) - 17 18:25         









 Serum or plasma thyrotropin measurement by detection limit <=0.05 miu/l (units/
volume)            0.37 u[iU]/mL            0.35-4.94        









 Bacterial blood culture - 17 18:25         









 Bacterial blood culture            NG             NRG        









 Complete blood count (CBC) with automated white blood cell (WBC) differential 
- 17 19:08         









 Blood leukocytes automated count (number/volume)            9.8 10*3/uL       
     4.3-11.0        

 

 Blood erythrocytes automated count (number/volume)            4.65 10*6/uL    
        4.35-5.85        

 

 Venous blood hemoglobin measurement (mass/volume)            14.0 g/dL        
    13.3-17.7        

 

 Blood hematocrit (volume fraction)            42 %            40-54        

 

 Automated erythrocyte mean corpuscular volume            91 [foz_us]          
  80-99        

 

 Automated erythrocyte mean corpuscular hemoglobin (mass per erythrocyte)      
      30 pg            25-34        

 

 Automated erythrocyte mean corpuscular hemoglobin concentration measurement (
mass/volume)            33 g/dL            32-36        

 

 Automated erythrocyte distribution width ratio            14.1 %            
10.0-14.5        

 

 Automated blood platelet count (count/volume)            180 10*3/uL          
  130-400        

 

 Automated blood platelet mean volume measurement            11.9 [foz_us]     
       7.4-10.4        

 

 Automated blood neutrophils/100 leukocytes            84 %            42-75   
     

 

 Automated blood lymphocytes/100 leukocytes            4 %            12-44    
    

 

 Blood monocytes/100 leukocytes            11 %            0-12        

 

 Automated blood eosinophils/100 leukocytes            0 %            0-10     
   

 

 Automated blood basophils/100 leukocytes            0 %            0-10        

 

 Blood neutrophils automated count (number/volume)            8.2 10*3         
   1.8-7.8        

 

 Blood lymphocytes automated count (number/volume)            0.4 10*3         
   1.0-4.0        

 

 Blood monocytes automated count (number/volume)            1.1 10*3            
0.0-1.0        

 

 Automated eosinophil count            0.0 10*3/uL            0.0-0.3        

 

 Automated blood basophil count (count/volume)            0.0 10*3/uL          
  0.0-0.1        









 Blood manual differential performed detection - 17 19:08         









 Blood monocytes/100 leukocytes            0 %            NRG        

 

 Manual blood segmented neutrophils/100 leukocytes            89 %            
NRG        

 

 Blood band neutrophils/100 leukocytes            0 %            NRG        

 

 Manual blood lymphocytes/100 leukocytes            11 %            NRG        

 

 Manual eosinophils/100 leukocytes in nose            0 %            NRG        

 

 Manual blood basophils/100 leukocytes            0 %            NRG        

 

 Blood erythrocyte morphology finding identification            NORMAL         
    NRG        









 Serum or plasma lithium measurement (moles/volume) - 17 19:08         









 BNP level            668.7 pg/mL            <100.0        









 Bacterial blood culture - 17 19:08         









 Bacterial blood culture            NG             NRG        









 Complete urinalysis with reflex to culture - 17 19:48         









 Urine color determination            YELLOW             NRG        

 

 Urine clarity determination            SLIGHTLY CLOUDY             NRG        

 

 Urine pH measurement by test strip            6             5-9        

 

 Specific gravity of urine by test strip            1.020             1.016-
1.022        

 

 Urine protein assay by test strip, semi-quantitative            NEGATIVE      
       NEGATIVE        

 

 Urine glucose detection by automated test strip            NEGATIVE           
  NEGATIVE        

 

 Erythrocytes detection in urine sediment by light microscopy            
NEGATIVE             NEGATIVE        

 

 Urine ketones detection by automated test strip            NEGATIVE           
  NEGATIVE        

 

 Urine nitrite detection by test strip            NEGATIVE             NEGATIVE
        

 

 Urine total bilirubin detection by test strip            NEGATIVE             
NEGATIVE        

 

 Urine urobilinogen measurement by automated test strip (mass/volume)          
  NORMAL             NORMAL        

 

 Urine leukocyte esterase detection by dipstick            NEGATIVE             
NEGATIVE        

 

 Automated urine sediment erythrocyte count by microscopy (number/high power 
field)            RARE             NRG        

 

 Automated urine sediment leukocyte count by microscopy (number/high power field
)            RARE             NRG        

 

 Bacteria detection in urine sediment by light microscopy            NEGATIVE  
           NRG        

 

 Crystals detection in urine sediment by light microscopy            NONE      
       NRG        

 

 Casts detection in urine sediment by light microscopy            NONE         
    NRG        

 

 Mucus detection in urine sediment by light microscopy            NEGATIVE     
        NRG        

 

 Complete urinalysis with reflex to culture            NO             NRG      
  









 Serum or plasma troponin i.cardiac measurement (mass/volume) - 17 00:30 
        









 Serum or plasma troponin i.cardiac measurement (mass/volume)            < ng/
mL            <0.30        









 Complete blood count (CBC) with automated white blood cell (WBC) differential 
- 17 03:51         









 Blood leukocytes automated count (number/volume)            14.7 10*3/uL      
      4.3-11.0        

 

 Blood erythrocytes automated count (number/volume)            5.00 10*6/uL    
        4.35-5.85        

 

 Venous blood hemoglobin measurement (mass/volume)            14.9 g/dL        
    13.3-17.7        

 

 Blood hematocrit (volume fraction)            45 %            40-54        

 

 Automated erythrocyte mean corpuscular volume            90 [foz_us]          
  80-99        

 

 Automated erythrocyte mean corpuscular hemoglobin (mass per erythrocyte)      
      30 pg            25-34        

 

 Automated erythrocyte mean corpuscular hemoglobin concentration measurement (
mass/volume)            33 g/dL            32-36        

 

 Automated erythrocyte distribution width ratio            14.1 %            
10.0-14.5        

 

 Automated blood platelet count (count/volume)            210 10*3/uL          
  130-400        

 

 Automated blood platelet mean volume measurement            12.0 [foz_us]     
       7.4-10.4        

 

 Automated blood neutrophils/100 leukocytes            90 %            42-75   
     

 

 Automated blood lymphocytes/100 leukocytes            2 %            12-44    
    

 

 Blood monocytes/100 leukocytes            9 %            0-12        

 

 Automated blood eosinophils/100 leukocytes            0 %            0-10     
   

 

 Automated blood basophils/100 leukocytes            0 %            0-10        

 

 Blood neutrophils automated count (number/volume)            13.1 10*3        
    1.8-7.8        

 

 Blood lymphocytes automated count (number/volume)            0.3 10*3         
   1.0-4.0        

 

 Blood monocytes automated count (number/volume)            1.3 10*3            
0.0-1.0        

 

 Automated eosinophil count            0.0 10*3/uL            0.0-0.3        

 

 Automated blood basophil count (count/volume)            0.0 10*3/uL          
  0.0-0.1        









 Whole blood basic metabolic panel - 17 03:51         









 Serum or plasma sodium measurement (moles/volume)            143 mmol/L       
     135-145        

 

 Serum or plasma potassium measurement (moles/volume)            3.4 mmol/L    
        3.6-5.0        

 

 Serum or plasma chloride measurement (moles/volume)            98 mmol/L      
              

 

 Carbon dioxide            32 mmol/L            21-32        

 

 Serum or plasma anion gap determination (moles/volume)            13 mmol/L   
         5-14        

 

 Serum or plasma urea nitrogen measurement (mass/volume)            24 mg/dL   
         7-18        

 

 Serum or plasma creatinine measurement (mass/volume)            1.21 mg/dL    
        0.60-1.30        

 

 Serum or plasma urea nitrogen/creatinine mass ratio            20             
NRG        

 

 Serum or plasma creatinine measurement with calculation of estimated 
glomerular filtration rate            59             NRG        

 

 Serum or plasma glucose measurement (mass/volume)            153 mg/dL        
            

 

 Serum or plasma calcium measurement (mass/volume)            9.2 mg/dL        
    8.5-10.1        









 Serum or plasma phosphate measurement (mass/volume) - 17 03:51         









 Serum or plasma phosphate measurement (mass/volume)            3.5 mg/dL      
      2.3-4.7        









 Magnesium - 17 03:51         









 Magnesium            2.8 mg/dL            1.8-2.4        









 Blood manual differential performed detection - 17 03:51         









 Blood monocytes/100 leukocytes            5 %            NRG        

 

 Manual blood segmented neutrophils/100 leukocytes            88 %            
NRG        

 

 Blood band neutrophils/100 leukocytes            3 %            NRG        

 

 Manual blood lymphocytes/100 leukocytes            2 %            NRG        

 

 Manual eosinophils/100 leukocytes in nose            0 %            NRG        

 

 Manual blood basophils/100 leukocytes            0 %            NRG        

 

 Blood lymphocytes variant/100 leukocytes            2 %            NRG        

 

 Blood anisocytosis detection by light microscopy            SLIGHT             
NRG        

 

 Blood macrocytes detection by light microscopy            SLIGHT             
NRG        

 

 Blood ovalocytes detection by light microscopy            MODERATE             
NRG        

 

 Blood poikilocytosis detection by light microscopy            SLIGHT          
   NRG        

 

 Blood rouleaux detection by light microscopy            SLIGHT             NRG
        









 PT panel in platelet poor plasma by coagulation assay - 17 08:50         









 Prothrombin time (PT) in platelet poor plasma by coagulation assay            
14.9 s            12.2-14.7        

 

 INR in platelet poor plasma or blood by coagulation assay            1.2      
       0.8-1.4        









 Activated partial thromboplastin time (aPTT) in platelet poor plasma 
bycoagulation assay - 17 08:50         









 Activated partial thromboplastin time (aPTT) in platelet poor plasma 
bycoagulation assay            32 s            24-35        









 Comprehensive metabolic panel - 17 17:57         









 Serum or plasma sodium measurement (moles/volume)            143 mmol/L       
     135-145        

 

 Serum or plasma potassium measurement (moles/volume)            3.6 mmol/L    
        3.6-5.0        

 

 Serum or plasma chloride measurement (moles/volume)            98 mmol/L      
              

 

 Carbon dioxide            32 mmol/L            21-32        

 

 Serum or plasma anion gap determination (moles/volume)            13 mmol/L   
         5-14        

 

 Serum or plasma urea nitrogen measurement (mass/volume)            30 mg/dL   
         7-18        

 

 Serum or plasma creatinine measurement (mass/volume)            1.40 mg/dL    
        0.60-1.30        

 

 Serum or plasma urea nitrogen/creatinine mass ratio            21             
NRG        

 

 Serum or plasma creatinine measurement with calculation of estimated 
glomerular filtration rate            50             NRG        

 

 Serum or plasma glucose measurement (mass/volume)            212 mg/dL        
            

 

 Serum or plasma calcium measurement (mass/volume)            9.6 mg/dL        
    8.5-10.1        

 

 Serum or plasma total bilirubin measurement (mass/volume)            2.1 mg/dL
            0.1-1.0        

 

 Serum or plasma alkaline phosphatase measurement (enzymatic activity/volume)  
          63 U/L                    

 

 Serum or plasma aspartate aminotransferase measurement (enzymatic activity/
volume)            16 U/L            5-34        

 

 Serum or plasma alanine aminotransferase measurement (enzymatic activity/volume
)            55 U/L            0-55        

 

 Serum or plasma protein measurement (mass/volume)            6.4 g/dL         
   6.4-8.2        

 

 Serum or plasma albumin measurement (mass/volume)            3.8 g/dL         
   3.2-4.5        









 Complete blood count (CBC) with automated white blood cell (WBC) differential 
- 17 04:36         









 Blood leukocytes automated count (number/volume)            15.6 10*3/uL      
      4.3-11.0        

 

 Blood erythrocytes automated count (number/volume)            4.58 10*6/uL    
        4.35-5.85        

 

 Venous blood hemoglobin measurement (mass/volume)            13.7 g/dL        
    13.3-17.7        

 

 Blood hematocrit (volume fraction)            42 %            40-54        

 

 Automated erythrocyte mean corpuscular volume            91 [foz_us]          
  80-99        

 

 Automated erythrocyte mean corpuscular hemoglobin (mass per erythrocyte)      
      30 pg            25-34        

 

 Automated erythrocyte mean corpuscular hemoglobin concentration measurement (
mass/volume)            33 g/dL            32-36        

 

 Automated erythrocyte distribution width ratio            14.2 %            
10.0-14.5        

 

 Automated blood platelet count (count/volume)            179 10*3/uL          
  130-400        

 

 Automated blood platelet mean volume measurement            11.9 [foz_us]     
       7.4-10.4        

 

 Automated blood neutrophils/100 leukocytes            92 %            42-75   
     

 

 Automated blood lymphocytes/100 leukocytes            2 %            12-44    
    

 

 Blood monocytes/100 leukocytes            6 %            0-12        

 

 Automated blood eosinophils/100 leukocytes            0 %            0-10     
   

 

 Automated blood basophils/100 leukocytes            0 %            0-10        

 

 Blood neutrophils automated count (number/volume)            14.4 10*3        
    1.8-7.8        

 

 Blood lymphocytes automated count (number/volume)            0.3 10*3         
   1.0-4.0        

 

 Blood monocytes automated count (number/volume)            0.9 10*3            
0.0-1.0        

 

 Automated eosinophil count            0.0 10*3/uL            0.0-0.3        

 

 Automated blood basophil count (count/volume)            0.0 10*3/uL          
  0.0-0.1        









 Whole blood basic metabolic panel - 17 04:36         









 Serum or plasma sodium measurement (moles/volume)            142 mmol/L       
     135-145        

 

 Serum or plasma potassium measurement (moles/volume)            3.9 mmol/L    
        3.6-5.0        

 

 Serum or plasma chloride measurement (moles/volume)            101 mmol/L     
               

 

 Carbon dioxide            32 mmol/L            21-32        

 

 Serum or plasma anion gap determination (moles/volume)            9 mmol/L    
        5-14        

 

 Serum or plasma urea nitrogen measurement (mass/volume)            28 mg/dL   
         7-18        

 

 Serum or plasma creatinine measurement (mass/volume)            1.08 mg/dL    
        0.60-1.30        

 

 Serum or plasma urea nitrogen/creatinine mass ratio            26             
NRG        

 

 Serum or plasma creatinine measurement with calculation of estimated 
glomerular filtration rate            >             NRG        

 

 Serum or plasma glucose measurement (mass/volume)            143 mg/dL        
            

 

 Serum or plasma calcium measurement (mass/volume)            9.0 mg/dL        
    8.5-10.1        









 Serum or plasma phosphate measurement (mass/volume) - 17 04:36         









 Serum or plasma phosphate measurement (mass/volume)            3.3 mg/dL      
      2.3-4.7        









 Magnesium - 17 04:36         









 Magnesium            2.3 mg/dL            1.8-2.4        









 Sputum Gram stain - 17 13:15         









 GRAM STAIN SPUTUM            NO BACTERIA             NRG        









 Bacterial sputum culture - 17 13:15         









 FREE TEXT EXTERNAL            PLUS SCANT NORMAL ANISA             NRG        

 

 QUANTITY OF GROWTH            Moderate Growth             NRG        

 

 Bacterial sputum culture            [id number]             NRG        









 Methicillin resistant Staphylococcus aureus (MRSA) screening culture -  06:55         









 Methicillin resistant Staphylococcus aureus (MRSA) screening culture          
  NEG             NRG        









 Whole blood basic metabolic panel - 17 07:04         









 Serum or plasma sodium measurement (moles/volume)            141 mmol/L       
     135-145        

 

 Serum or plasma potassium measurement (moles/volume)            4.3 mmol/L    
        3.6-5.0        

 

 Serum or plasma chloride measurement (moles/volume)            109 mmol/L     
               

 

 Carbon dioxide            19 mmol/L            21-32        

 

 Serum or plasma anion gap determination (moles/volume)            13 mmol/L   
         5-14        

 

 Serum or plasma urea nitrogen measurement (mass/volume)            22 mg/dL   
         7-18        

 

 Serum or plasma creatinine measurement (mass/volume)            1.52 mg/dL    
        0.60-1.30        

 

 Serum or plasma urea nitrogen/creatinine mass ratio            14             
NRG        

 

 Serum or plasma creatinine measurement with calculation of estimated 
glomerular filtration rate            45             NRG        

 

 Serum or plasma glucose measurement (mass/volume)            110 mg/dL        
            

 

 Serum or plasma calcium measurement (mass/volume)            8.8 mg/dL        
    8.5-10.1        









 Methicillin resistant Staphylococcus aureus (MRSA) screening culture - 10/19/
17 10:40         









 MRSA SCREEN RESULT            MRSA NOT ISOLATED             NRG        









 Complete blood count (CBC) with automated white blood cell (WBC) differential 
- 06/10/18 18:55         









 Blood leukocytes automated count (number/volume)            5.2 10*3/uL       
     4.3-11.0        

 

 Blood erythrocytes automated count (number/volume)            4.00 10*6/uL    
        4.35-5.85        

 

 Venous blood hemoglobin measurement (mass/volume)            12.4 g/dL        
    13.3-17.7        

 

 Blood hematocrit (volume fraction)            37 %            40-54        

 

 Automated erythrocyte mean corpuscular volume            92 [foz_us]          
  80-99        

 

 Automated erythrocyte mean corpuscular hemoglobin (mass per erythrocyte)      
      31 pg            25-34        

 

 Automated erythrocyte mean corpuscular hemoglobin concentration measurement (
mass/volume)            34 g/dL            32-36        

 

 Automated erythrocyte distribution width ratio            13.6 %            
10.0-14.5        

 

 Automated blood platelet count (count/volume)            157 10*3/uL          
  130-400        

 

 Automated blood platelet mean volume measurement            11.3 [foz_us]     
       7.4-10.4        

 

 Automated blood neutrophils/100 leukocytes            66 %            42-75   
     

 

 Automated blood lymphocytes/100 leukocytes            16 %            12-44   
     

 

 Blood monocytes/100 leukocytes            14 %            0-12        

 

 Automated blood eosinophils/100 leukocytes            4 %            0-10     
   

 

 Automated blood basophils/100 leukocytes            1 %            0-10        

 

 Blood neutrophils automated count (number/volume)            3.4 10*3         
   1.8-7.8        

 

 Blood lymphocytes automated count (number/volume)            0.8 10*3         
   1.0-4.0        

 

 Blood monocytes automated count (number/volume)            0.7 10*3            
0.0-1.0        

 

 Automated eosinophil count            0.2 10*3/uL            0.0-0.3        

 

 Automated blood basophil count (count/volume)            0.0 10*3/uL          
  0.0-0.1        









 Blood lactic acid measurement (moles/volume) - 06/10/18 18:55         









 Blood lactic acid measurement (moles/volume)            0.95 mmol/L            
0.50-2.00        









 Comprehensive metabolic panel - 06/10/18 18:55         









 Serum or plasma sodium measurement (moles/volume)            143 mmol/L       
     135-145        

 

 Serum or plasma potassium measurement (moles/volume)            4.3 mmol/L    
        3.6-5.0        

 

 Serum or plasma chloride measurement (moles/volume)            107 mmol/L     
               

 

 Carbon dioxide            25 mmol/L            21-32        

 

 Serum or plasma anion gap determination (moles/volume)            11 mmol/L   
         5-14        

 

 Serum or plasma urea nitrogen measurement (mass/volume)            11 mg/dL   
         7-18        

 

 Serum or plasma creatinine measurement (mass/volume)            1.24 mg/dL    
        0.60-1.30        

 

 Serum or plasma urea nitrogen/creatinine mass ratio            9             
NRG        

 

 Serum or plasma creatinine measurement with calculation of estimated 
glomerular filtration rate            57             NRG        

 

 Serum or plasma glucose measurement (mass/volume)            101 mg/dL        
            

 

 Serum or plasma calcium measurement (mass/volume)            9.4 mg/dL        
    8.5-10.1        

 

 Serum or plasma total bilirubin measurement (mass/volume)            1.9 mg/dL
            0.1-1.0        

 

 Serum or plasma alkaline phosphatase measurement (enzymatic activity/volume)  
          61 U/L                    

 

 Serum or plasma aspartate aminotransferase measurement (enzymatic activity/
volume)            14 U/L            5-34        

 

 Serum or plasma alanine aminotransferase measurement (enzymatic activity/volume
)            13 U/L            0-55        

 

 Serum or plasma protein measurement (mass/volume)            6.0 g/dL         
   6.4-8.2        

 

 Serum or plasma albumin measurement (mass/volume)            3.9 g/dL         
   3.2-4.5        









 Serum or plasma C reactive protein measurement (mass/volume) - 06/10/18 18:55 
        









 Serum or plasma C reactive protein measurement (mass/volume)            1.14 mg
/dL            0.00-0.50        









 Serum or plasma lithium measurement (moles/volume) - 06/10/18 18:55         









 BNP level            405.7 pg/mL            <100.0        









 Bacterial blood culture - 06/10/18 18:55         









 Bacterial blood culture            NG             NRG        









 Bacterial blood culture - 06/10/18 19:28         









 Bacterial blood culture            NG             NRG        



                                                                               
                           



Encounters

      





 ACCT No.            Visit Date/Time            Discharge            Status    
        Pt. Type            Provider            Facility            Loc./Unit  
          Complaint        

 

 [id number]            2018 05:38:00            2018 13:50:00    
        DIS            Outpatient            MYAH OROZCO DO            [facility]            PREOP            BRONCHOSCOPY        

 

 [id number]            2018 08:38:00            2018 23:59:59    
        CLS            Outpatient            MYAH OROZCO DO            [facility]            RAD            ABNORMAL CT,DYSPNEA,    
    

 

 [id number]            2018 13:10:00            2018 23:59:59    
        CLS            Outpatient            AZEEM HARGROVE          
  [facility]            RAD            HEMOPTYSIS,COPD   
     

 

 [id number]            06/10/2018 17:58:00            06/10/2018 20:41:00    
        DIS            Emergency            AIXA BRIONES, RADHA RODAS            [facility]            ER            SPITTING UP BLOOD        

 

 [id number]            2018 13:59:00            2018 23:59:59    
        CLS            Outpatient            AZEEM HARGROVE          
  [facility]            PULM            J44.9 COPD        

 

 [id number]            2018 20:47:00            2018 06:05:00    
        DIS            Outpatient            AZEEM HARGROVE          
  [facility]            SLEEP            KOLBY        

 

 [id number]            2018 00:09:00            2018 23:59:59    
        CLS            Preadmit            MARIETTA ORTEZ MD            [facility]            ONC                     

 

 [id number]            2018 15:10:00            2018 00:01:00    
        DIS            Outpatient            MARIETTA ORTEZ MD            [facility]            ONC                     

 

 [id number]            2018 14:02:00            2018 23:59:59    
        CLS            Outpatient            JOSÉ METZGER DO            
[facility]            RAD            J18.9,J44.9        

 

 [id number]            2018 09:33:00            2018 23:59:59    
        CLS            Outpatient            AZEEM HARGROVE          
  [facility]            RAD            J44.1        

 

 [id number]            2018 11:23:00            2018 23:59:59    
        CLS            Outpatient            JOSÉ METZGER DO            
[facility]            RAD            J18.9        

 

 [id number]            2018 11:15:00            2018 23:59:59    
        CLS            Outpatient            JOSÉ METZGER DO            
[facility]            RAD            R06.00 J18.9        

 

 [id number]            2018 12:32:00            2018 23:59:59    
        CLS            Outpatient            ELIDIA ANTONIE MD            [facility]            CARD            CAD I25.10        

 

 [id number]            2017 08:54:00            2017 00:01:00    
        DIS            Outpatient            MARIETTA ORTEZ MD            [facility]            ONC                     

 

 [id number]            10/19/2017 10:26:00            10/19/2017 14:55:00    
        DIS            Outpatient            SERENA SALAZAR DO            [facility]            SDC            MALFUNCTIONING PORT     
   

 

 [id number]            10/16/2017 05:33:00            10/16/2017 15:59:00    
        DIS            Outpatient            SERENA SALAZAR DO            [facility]            PREOP            PORT REMOVAL        

 

 [id number]            2017 09:52:00            2017 14:45:00    
        DIS            Outpatient            MARIETTA ORTEZ MD            [facility]            ONC                     

 

 [id number]            2017 13:33:00            2017 23:59:59    
        CLS            Outpatient            RADHAMES LATIF    
        [facility]            CARD            CAD,CHF,HTN
        

 

 [id number]            2017 15:25:00            2017 23:59:59    
        CLS            Preadmit            SABRINA SCHERER MD            [facility]            CARD            VERTIGO        

 

 [id number]            2017 08:09:00            2017 00:01:00    
        DIS            Outpatient            SABRINA SCHERER MD            [facility]            CARD            VERTIGO        

 

 [id number]            2017 12:15:00            2017 23:59:59    
        CLS            Preadmit            TAWIL MD, ELIAS A            [facility]            RAD            PROSTATE CA        

 

 [id number]            2017 12:05:00            2017 23:59:59    
        CLS            Outpatient            TAWIL MD, ELIAS A            [facility]            RAD            PROSTATE CA        

 

 [id number]            2017 06:19:00            2017 10:52:00    
        DIS            Outpatient            TAWIL MD, ELIAS A            [facility]            SDC            BPH WITH ABNORMAL MERCEDEZ, 
POSSIBLE CA        

 

 [id number]            2017 05:28:00            2017 11:03:00    
        DIS            Outpatient            TAWIL MD, ELIAS A            [facility]            PREOP            BPH WITH ABNORMAL MERCEDEZ 
POSSIBLE CA        

 

 [id number]            2017 13:45:00            2017 23:59:59    
        CLS            Outpatient            AZEEM HARGROVE          
  [facility]            RAD            J44.9 COPD        

 

 [id number]            2017 14:30:00            2017 23:59:59    
        CLS            Outpatient            MYAH OROZCO DO            [facility]            PULM            COPD,DYSPNEA        

 

 [id number]            2017 12:34:00            2017 00:01:00    
        DIS            Outpatient            MARIETTA ORTEZ MD            [facility]            ONC                     

 

 [id number]            2017 19:50:00            2017 15:21:00    
        DIS            Inpatient            ROSE TISHA BARAJAS            [facility]            4TH            PNEUMONIA,NEW ONSET 
ATRIAL FIB,CHF,COPD        

 

 [id number]            05/15/2017 20:00:00            2017 06:10:00    
        DIS            Outpatient            AZEEM HARGROVE          
  [facility]            SLEEP            KOLBY,PARASOMNIA,
SLEEP DISORDER,HYPERSOMNIA        

 

 [id number]            05/10/2017 12:39:00            05/10/2017 23:59:59    
        CLS            Outpatient            AZEEM HARGROVE          
  [facility]            RT            COPD,DYSPNEA,
TOBACCO DEPENDENCE IN REMISSION        

 

 [id number]            2017 12:18:00            2017 23:59:59    
        CLS            Outpatient            AZEEM HARGROVE          
  [facility]            RAD            COPD,DYSPNEA      
  

 

 [id number]            2017 11:45:00            2017 00:01:00    
        DIS            Outpatient            MARIETTA ORTEZ MD            [facility]            ONC                     

 

 [id number]            2017 14:36:00            2017 23:59:59    
        CLS            Outpatient            ELIDIA ANTOINE MD            [facility]            CARD            CAD,HTN        

 

 [id number]            2016 12:34:00            2016 00:01:00    
        DIS            Outpatient            MARIETTA ORTEZ MD            [facility]            ONC                     

 

 [id number]            2016 20:11:00            2016 21:58:00    
        DIS            Emergency            JOSÉ SORIANO DO            [facility]            ER            HEART CATH BLEED/POST 
HEART CATH PROCEDURE        

 

 [id number]            2016 10:31:00            2016 21:07:00    
        DIS            Outpatient            ELIDIA ANTOINE MD            [facility]            CATH            ABNORMAL ECHO,CAD,
FATIGUE,HTN,HLP        

 

 [id number]            2016 07:38:00            2016 23:59:59    
        CLS            Outpatient            ELIDIA ANTOINE MD            [facility]            CARD            CAD,CHF,HTN,HLP,CAROTID 
ARTERY STENOSIS        

 

 [id number]            2016 14:36:00            2016 00:01:00    
        DIS            Outpatient            MARIETTA ORTEZ MD            [facility]            ONC                     

 

 [id number]            2016 14:04:00            2016 00:01:00    
        DIS            Outpatient            MARIETTA ORTEZ MD            [facility]            ONC                     

 

 [id number]            2016 13:18:00            2016 23:59:59    
        CLS            Outpatient            ELIDIA ANTOINE MD            [facility]            CARD            CAD,HTN,HLP        

 

 [id number]            10/15/2015 15:00:00            10/15/2015 23:59:59    
        CLS            Outpatient            SERENA SALAZAR DO            [facility]            SDC            SCREENING; BLOOD IN 
STOOLS        

 

 [id number]            10/14/2015 05:43:00            10/14/2015 23:59:59    
        CLS            Outpatient            SERENA SALAZAR DO            [facility]            PREOP            SCREENING; BLOOD IN 
STOOLS        

 

 [id number]            2015 12:52:00            2015 00:01:00    
        DIS            Outpatient            MARIETTA ORTEZ MD            [facility]            ONC                     

 

 [id number]            2015 11:13:00            2015 00:01:00    
        DIS            Outpatient            MARIETTA ORTEZ MD            [facility] 
[facility]            ONC                     

 

 [id number]            2015 08:41:00            2015 23:59:59    
        CLS            Outpatient            ELIDIA ANTOINE MD            [facility] 
[facility]            CARD            CAD,HTN        

 

 [id number]            2015 12:36:00            2015 00:01:00    
        DIS            Outpatient            MARIETTA ORTEZ MD            [facility] 
[facility]            ONC                     

 

 [id number]            10/08/2014 11:01:00            2014 00:01:00    
        DIS            Outpatient            MARIETTA ORTEZ MD            [facility] 
[facility]            ONC                     

 

 [id number]            2014 06:34:00            2014 15:20:00    
        DIS            Outpatient            ELIDIA ANTOINE MD            [facility] 
[facility]            CATH            ABN STRESS,CAD,CHF,HTN,
HLP        

 

 [id number]            2014 12:04:00            2014 23:59:59    
        CLS            Outpatient            ELIDIA ANTOINE MD            [facility] 
[facility]            CARD            CAD HTN HLE        

 

 [id number]            2014 13:44:00            2014 23:59:59    
        CLS            Outpatient            ELIDIA ANTOINE MD            [facility] 
[facility]            CARD            CAD HTN HLE        

 

 [id number]            2014 13:58:00            2014 00:01:00    
        DIS            Outpatient            MARIETTA ORTEZ MD            [facility] 
[facility]            ONC                     

 

 [id number]            2014 13:58:00            2014 00:01:00    
        DIS            Outpatient            MARIETTA ORTEZ MD            [facility]            ONC                     

 

 [id number]            2014 15:19:00            2014 23:59:59    
        CLS            Outpatient            EDGARDO RICHARDSON MD            [facility] 
[facility]            RAD            FLU        

 

 [id number]            2013 14:54:00            02/10/2014 00:01:00    
        DIS            Outpatient            MARIETTA ORTEZ MD            [facility] 
[facility]            ONC                     

 

 [id number]            10/01/2013 13:21:00            10/07/2013 00:01:00    
        DIS            Outpatient            MARIETTA ORTEZ MD            [facility] 
[facility]            ONC                     

 

 [id number]            2013 12:42:00            2013 00:01:00    
        DIS            Outpatient            MARIETTA ORTEZ MD            [facility]            ONC                     

 

 [id number]            2013 09:24:00            2013 23:59:59    
        CLS            Outpatient            MARIETTA ORTEZ MD            [facility]            RAD            RESTAGING HEAD/NECK     
   

 

 [id number]            2013 07:55:00            2013 23:59:59    
        CLS            Outpatient            ELIDIA ANTOINE MD            [facility]            CARD            CAD,CHF        

 

 [id number]            2018 07:30:00                         PEN       
     Preadmit            MYAH OROZCO DO            [facility]            ENDO            HEMOPTYSIS/ABNORMAL CT OF THE CHEST/LYMPH 
NODES EN        

 

 [id number]            2017 08:15:00                                   
   Document Registration                                                       
     

 

 [id number]            2016 13:19:00                                   
   Document Registration                                                       
     

 

 [id number]            2015 17:50:00                                   
   Document Registration                                                       
     

 

 [id number]            02/15/2013 09:00:00                                   
   Document Registration                                                       
     

 

 [id number]            2013 07:36:00                                   
   Document Registration                                                       
     

 

 [id number]            2013 14:03:00                                   
   Document Registration                                                       
     

 

 [id number]            2013 09:55:00                                   
   Document Registration                                                       
     

 

 [id number]            2012 13:52:00                                   
   Document Registration                                                       
     

 

 [id number]            2012 09:06:00                                   
   Document Registration                                                       
     

 

 [id number]            2012 11:40:00                                   
   Document Registration                                                       
     

 

 [id number]            2012 11:06:00                                   
   Document Registration                                                       
     

 

 [id number]            2012 10:45:00                                   
   Document Registration                                                       
     

 

 [id number]            2012 07:25:00                                   
   Document Registration                                                       
     

 

 [id number]            2012 14:17:00                                   
   Document Registration                                                       
     

 

 [id number]            05/15/2012 11:33:00                                   
   Document Registration                                                       
     

 

 [id number]            2012 12:27:00                                   
   Document Registration                                                       
     

 

 [id number]            2012 09:49:00                                   
   Document Registration                                                       
     

 

 [id number]            2012 08:06:00                                   
   Document Registration                                                       
     

 

 [id number]            2012 12:52:00                                   
   Document Registration                                                       
     

 

 [id number]            02/10/2012 05:41:00                                   
   Document Registration                                                       
     

 

 [id number]            2012 12:48:00                                   
   Document Registration                                                       
     

 

 [id number]            2012 07:40:00                                   
   Document Registration                                                       
     

 

 [id number]            2012 09:27:00                                   
   Document Registration                                                       
     

 

 [id number]            2012 05:38:00                                   
   Document Registration                                                       
     

 

 [id number]            01/10/2012 10:41:00                                   
   Document Registration                                                       
     

 

 [id number]            2011 10:29:00                                   
   Document Registration                                                       
     

 

 [id number]            2011 11:13:00                                   
   Document Registration                                                       
     

 

 [id number]            12/15/2010 11:57:00                                   
   Document Registration                                                       
     

 

 [id number]            10/13/2010 10:49:00                                   
   Document Registration                                                       
     

 

 [id number]            10/11/2010 06:50:00                                   
   Document Registration                                                       
     

 

 [id number]            10/08/2010 09:38:00                                   
   Document Registration                                                       
     

 

 [id number]            09/10/2015 07:23:50                         ACT            
Document Registration

## 2018-07-11 NOTE — ANESTHESIA-GENERAL POST-OP
General


Patient Condition


Mental Status/LOC:  Same as Preop


Cardiovascular:  Satisfactory


Nausea/Vomiting:  Absent


Respiratory:  Satisfactory


Pain:  Controlled


Complications:  Absent





Post Op Complications


Complications


None





Follow Up Care/Instructions


Patient Instructions


None needed.





Anesthesia/Patient Condition


Patient Condition


Patient was seen after the procedure and was doing well, no complaints, stable 

vital signs, no apparent adverse anesthesia problems.











REYNALDO HUMMEL DO Jul 11, 2018 15:12

## 2018-07-11 NOTE — DIAGNOSTIC IMAGING REPORT
INDICATION:  

Post bronchoscopy.



TECHNIQUE:  

Single view chest at 9:02 AM.



CORRELATION STUDY:  

06/10/2018.



FINDINGS: 

The patient is post sternotomy. The heart size is enlarged. There

is the presence of vascular congestion.  Scattered pulmonary

parenchymal densities are present. There are, however, increasing

densities in the right mid lung and to lesser degree the right

lung base. These are probable pleural effusions. No pneumothorax.



IMPRESSION: 

1. Increasing parenchymal density, particularly in the right mid

lung field. Given bronchoscopy, this could be reflective of

hemorrhage, infiltrate, and/or consolidation. Followup imaging is

recommended.



2. Cardiac enlargement and vascular congestion.     



Dictated by: 



  Dictated on workstation # [id number]

## 2018-07-11 NOTE — PULMONARY PROCEDURES
Pulmonary Procedures


Date of Procedure


Date of Service:  Jul 11, 2018


Bronch


Bronchoscopy with fluoroscopy, BAL, wash, transbronchial brush,  EBUS with bx 

of station 7 and 10R lymph nodes





Preop DX: [mediastinal lymphadenopathy] 





PostOP DX: same. no endobronchial masses noted. 





Complications: None





Pt was sedated per anesthesia. Bronchoscopy was advanced through the ED tube 

and an anatomical undertaken down to the segmental bronchi bilaterally. No 

endobronchial lesions noted. fluoroscopy, BAL, wash, transbronchial brush,  

EBUS with bx of station 7 and 10R lymph nodes EBUS was then advanced through ET 

tube and the mediastinum was US. Station [7] and [10R] lymph nodes were sampled 

via needle bx under US guidance. Pt tolerated procedure well. No complications 

noted.











MYAH OROZCO DO Jul 11, 2018 09:48

## 2018-07-11 NOTE — PROGRESS NOTE-PRE OPERATIVE
Pre-Operative Progress Note


H&P Reviewed


The H&P was reviewed, patient examined and no changes noted.


Time Seen by Provider:  07:00


Date H&P Reviewed:  Jul 11, 2018


Time H&P Reviewed:  07:00


Pre-Operative Diagnosis:  lung mass











MYAH OROZCO DO Jul 11, 2018 09:47

## 2018-07-17 ENCOUNTER — HOSPITAL ENCOUNTER (INPATIENT)
Dept: HOSPITAL 75 - 4TH | Age: 78
LOS: 4 days | Discharge: HOME | DRG: 242 | End: 2018-07-21
Attending: INTERNAL MEDICINE | Admitting: INTERNAL MEDICINE
Payer: MEDICARE

## 2018-07-17 VITALS — WEIGHT: 172 LBS | HEIGHT: 65 IN | BODY MASS INDEX: 28.66 KG/M2

## 2018-07-17 VITALS — DIASTOLIC BLOOD PRESSURE: 59 MMHG | SYSTOLIC BLOOD PRESSURE: 125 MMHG

## 2018-07-17 VITALS — SYSTOLIC BLOOD PRESSURE: 140 MMHG | DIASTOLIC BLOOD PRESSURE: 66 MMHG

## 2018-07-17 VITALS — SYSTOLIC BLOOD PRESSURE: 126 MMHG | DIASTOLIC BLOOD PRESSURE: 79 MMHG

## 2018-07-17 DIAGNOSIS — I25.10: ICD-10-CM

## 2018-07-17 DIAGNOSIS — Z87.891: ICD-10-CM

## 2018-07-17 DIAGNOSIS — E78.00: ICD-10-CM

## 2018-07-17 DIAGNOSIS — I50.21: ICD-10-CM

## 2018-07-17 DIAGNOSIS — Z99.81: ICD-10-CM

## 2018-07-17 DIAGNOSIS — K21.9: ICD-10-CM

## 2018-07-17 DIAGNOSIS — I49.5: ICD-10-CM

## 2018-07-17 DIAGNOSIS — J96.10: ICD-10-CM

## 2018-07-17 DIAGNOSIS — I25.2: ICD-10-CM

## 2018-07-17 DIAGNOSIS — Z85.46: ICD-10-CM

## 2018-07-17 DIAGNOSIS — E87.6: ICD-10-CM

## 2018-07-17 DIAGNOSIS — Z95.5: ICD-10-CM

## 2018-07-17 DIAGNOSIS — N40.0: ICD-10-CM

## 2018-07-17 DIAGNOSIS — I49.3: ICD-10-CM

## 2018-07-17 DIAGNOSIS — R53.81: ICD-10-CM

## 2018-07-17 DIAGNOSIS — J98.11: ICD-10-CM

## 2018-07-17 DIAGNOSIS — Z95.1: ICD-10-CM

## 2018-07-17 DIAGNOSIS — I13.0: Primary | ICD-10-CM

## 2018-07-17 DIAGNOSIS — R59.0: ICD-10-CM

## 2018-07-17 DIAGNOSIS — J44.9: ICD-10-CM

## 2018-07-17 DIAGNOSIS — N18.9: ICD-10-CM

## 2018-07-17 LAB
ALBUMIN SERPL-MCNC: 4.3 GM/DL (ref 3.2–4.5)
ALP SERPL-CCNC: 69 U/L (ref 40–136)
ALT SERPL-CCNC: 13 U/L (ref 0–55)
BASOPHILS # BLD AUTO: 0 10^3/UL (ref 0–0.1)
BASOPHILS NFR BLD AUTO: 0 % (ref 0–10)
BILIRUB SERPL-MCNC: 1.7 MG/DL (ref 0.1–1)
BUN/CREAT SERPL: 10
CALCIUM SERPL-MCNC: 10.2 MG/DL (ref 8.5–10.1)
CHLORIDE SERPL-SCNC: 109 MMOL/L (ref 98–107)
CO2 SERPL-SCNC: 28 MMOL/L (ref 21–32)
CREAT SERPL-MCNC: 1.33 MG/DL (ref 0.6–1.3)
EOSINOPHIL # BLD AUTO: 0.2 10^3/UL (ref 0–0.3)
EOSINOPHIL NFR BLD AUTO: 4 % (ref 0–10)
ERYTHROCYTE [DISTWIDTH] IN BLOOD BY AUTOMATED COUNT: 13.9 % (ref 10–14.5)
GFR SERPLBLD BASED ON 1.73 SQ M-ARVRAT: 52 ML/MIN
GLUCOSE SERPL-MCNC: 109 MG/DL (ref 70–105)
HCT VFR BLD CALC: 39 % (ref 40–54)
HGB BLD-MCNC: 12.7 G/DL (ref 13.3–17.7)
LYMPHOCYTES # BLD AUTO: 0.6 X 10^3 (ref 1–4)
LYMPHOCYTES NFR BLD AUTO: 13 % (ref 12–44)
MAGNESIUM SERPL-MCNC: 2.2 MG/DL (ref 1.8–2.4)
MANUAL DIFFERENTIAL PERFORMED BLD QL: NO
MCH RBC QN AUTO: 29 PG (ref 25–34)
MCHC RBC AUTO-ENTMCNC: 32 G/DL (ref 32–36)
MCV RBC AUTO: 90 FL (ref 80–99)
MONOCYTES # BLD AUTO: 0.5 X 10^3 (ref 0–1)
MONOCYTES NFR BLD AUTO: 11 % (ref 0–12)
NEUTROPHILS # BLD AUTO: 3.4 X 10^3 (ref 1.8–7.8)
NEUTROPHILS NFR BLD AUTO: 72 % (ref 42–75)
PHOSPHATE SERPL-MCNC: 3.2 MG/DL (ref 2.3–4.7)
PLATELET # BLD: 125 10^3/UL (ref 130–400)
PMV BLD AUTO: 11.7 FL (ref 7.4–10.4)
POTASSIUM SERPL-SCNC: 4.4 MMOL/L (ref 3.6–5)
PROT SERPL-MCNC: 6.6 GM/DL (ref 6.4–8.2)
RBC # BLD AUTO: 4.37 10^6/UL (ref 4.35–5.85)
SODIUM SERPL-SCNC: 145 MMOL/L (ref 135–145)
WBC # BLD AUTO: 4.7 10^3/UL (ref 4.3–11)

## 2018-07-17 PROCEDURE — 80048 BASIC METABOLIC PNL TOTAL CA: CPT

## 2018-07-17 PROCEDURE — 75820 VEIN X-RAY ARM/LEG: CPT

## 2018-07-17 PROCEDURE — 71045 X-RAY EXAM CHEST 1 VIEW: CPT

## 2018-07-17 PROCEDURE — 33208 INSRT HEART PM ATRIAL & VENT: CPT

## 2018-07-17 PROCEDURE — 71046 X-RAY EXAM CHEST 2 VIEWS: CPT

## 2018-07-17 PROCEDURE — 83880 ASSAY OF NATRIURETIC PEPTIDE: CPT

## 2018-07-17 PROCEDURE — 84100 ASSAY OF PHOSPHORUS: CPT

## 2018-07-17 PROCEDURE — 94640 AIRWAY INHALATION TREATMENT: CPT

## 2018-07-17 PROCEDURE — 80053 COMPREHEN METABOLIC PANEL: CPT

## 2018-07-17 PROCEDURE — 93005 ELECTROCARDIOGRAM TRACING: CPT

## 2018-07-17 PROCEDURE — 36415 COLL VENOUS BLD VENIPUNCTURE: CPT

## 2018-07-17 PROCEDURE — 84443 ASSAY THYROID STIM HORMONE: CPT

## 2018-07-17 PROCEDURE — 84484 ASSAY OF TROPONIN QUANT: CPT

## 2018-07-17 PROCEDURE — 85025 COMPLETE CBC W/AUTO DIFF WBC: CPT

## 2018-07-17 PROCEDURE — 94664 DEMO&/EVAL PT USE INHALER: CPT

## 2018-07-17 PROCEDURE — 87081 CULTURE SCREEN ONLY: CPT

## 2018-07-17 PROCEDURE — 85027 COMPLETE CBC AUTOMATED: CPT

## 2018-07-17 PROCEDURE — 83735 ASSAY OF MAGNESIUM: CPT

## 2018-07-17 PROCEDURE — 93306 TTE W/DOPPLER COMPLETE: CPT

## 2018-07-17 PROCEDURE — 94760 N-INVAS EAR/PLS OXIMETRY 1: CPT

## 2018-07-17 RX ADMIN — MONTELUKAST SCH MG: 10 TABLET, FILM COATED ORAL at 20:30

## 2018-07-17 RX ADMIN — FUROSEMIDE SCH MG: 10 INJECTION, SOLUTION INTRAVENOUS at 12:49

## 2018-07-17 RX ADMIN — ATORVASTATIN CALCIUM SCH MG: 10 TABLET, FILM COATED ORAL at 20:30

## 2018-07-17 RX ADMIN — LEVOTHYROXINE SODIUM SCH MCG: 0.11 TABLET ORAL at 20:30

## 2018-07-17 RX ADMIN — IPRATROPIUM BROMIDE AND ALBUTEROL SULFATE SCH ML: .5; 3 SOLUTION RESPIRATORY (INHALATION) at 15:22

## 2018-07-17 RX ADMIN — ENOXAPARIN SODIUM SCH MG: 100 INJECTION SUBCUTANEOUS at 12:49

## 2018-07-17 RX ADMIN — CARVEDILOL SCH MG: 12.5 TABLET, FILM COATED ORAL at 20:30

## 2018-07-17 RX ADMIN — ENALAPRIL MALEATE SCH MG: 10 TABLET ORAL at 20:31

## 2018-07-17 RX ADMIN — FLUTICASONE PROPIONATE AND SALMETEROL XINAFOATE SCH PUFF: 115; 21 AEROSOL, METERED RESPIRATORY (INHALATION) at 20:28

## 2018-07-17 RX ADMIN — IPRATROPIUM BROMIDE AND ALBUTEROL SULFATE SCH ML: .5; 3 SOLUTION RESPIRATORY (INHALATION) at 20:27

## 2018-07-17 RX ADMIN — IPRATROPIUM BROMIDE AND ALBUTEROL SULFATE SCH ML: .5; 3 SOLUTION RESPIRATORY (INHALATION) at 20:28

## 2018-07-17 NOTE — XMS REPORT
Continuity of Care Document

 Created on: 2018



RU RAGLAND

External Reference #: [id number]

: 1940

Sex: Male



Demographics







 Address  [address] [place]  [address]

 

 Home Phone  [phone] x

 

 Preferred Language  Unknown

 

 Marital Status  Unknown

 

 Religion Affiliation  Unknown

 

 Race  Unknown

 

 Ethnic Group  Unknown





Author







 Author  [facility]

 

 Organization  [facility]

 

 Address  Unknown

 

 Phone  Unavailable



              



Allergies

      





 Active            Description            Code            Type            
Severity            Reaction            Onset            Reported/Identified   
         Relationship to Patient            Clinical Status        

 

 Yes            No Known Drug Allergies            [id number]            Drug 
Allergy            Unknown            N/A                         10/16/2017   
                               



                  



Medications

      



There is no data.                  



Problems

      





 Date Dx Coded            Attending            Type            Code            
Diagnosis            Diagnosed By        

 

 10/14/2010                         Ot            401.9                        
          

 

 10/14/2010                         Ot            414.01                       
           

 

 10/14/2010                         Ot            414.2                        
          

 

 10/14/2010                         Ot            427.1                        
          

 

 10/14/2010                         Ot            428.0                        
          

 

 10/14/2010                         Ot            794.30                       
           

 

 10/14/2010                         Ot            V45.81                       
           

 

 2011                         Ot            V45.82                       
           

 

 2011                         Ot            V57.89                       
           

 

 2011                         Ot            V45.82                       
           

 

 2011                         Ot            V57.89                       
           

 

 2012                         Ot            141.2            MAL CAR TIP/
LAT TONGUE                     

 

 2012                         Ot            141.9            MALIG CAR 
TONGUE NOS                     

 

 2012                         Ot            733.90            BONE   
CARTILAGE DIS NOS                     

 

 2012                         Ot            141.9            MALIG CAR 
TONGUE NOS                     

 

 2012                         Ot            V58.0            ENCOUNTER 
FOR RADIOTHERAPY                     

 

 2012                         Ot            V67.2            CHEMOTHERAPY 
FOLLOW-UP                     

 

 2012                         Ot            272.4            
HYPERLIPIDEMIA NEC/NOS                     

 

 2012                         Ot            401.9            HYPERTENSION 
NOS                     

 

 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 
INFARCT                     

 

 2012                         Ot            414.01            CORONARY 
ATHEROSCLEROSIS OF NATIVE CORON                     

 

 2012                         Ot            428.0            CONGESTIVE 
HEART FAILURE NOS                     

 

 2012                         Ot            428.22            CHRONIC 
SYSTOLIC HRT FAILURE                     

 

 2012                         Ot            794.30            ABN 
CARDIOVASC STUDY NOS                     

 

 2012                         Ot            V45.81            
AORTOCORONARY BYPASS                     

 

 2012                         Ot            V45.82            
PERCUTANEOUS TRANSLUM CORON ANGIOPLASTY                      

 

 2012                         Ot            V58.69            OTH MED,LT,
CURRENT USE                     

 

 2012                         Ot            141.9            MALIG CAR 
TONGUE NOS                     

 

 2012                         Ot            715.33            LOC 
OSTEOART NOS-FOREARM                     

 

 2012                         Ot            719.43            JOINT PAIN-
FOREARM                     

 

 2012                         Ot            141.9            MALIG CAR 
TONGUE NOS                     

 

 2012                         Ot            V58.81            FIT/ADJ 
VASCULAR CATHETER                     

 

 2012                         Ot            141.9            MALIG CAR 
TONGUE NOS                     

 

 2012                         Ot            285.3            
ANTINEOPLASTIC CHEMOTHERAPY INDUCED ANEM                     

 

 2012                         Ot            403.90            HYPTNSV CHR 
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                     

 

 2012                         Ot            414.00            CORON 
ATHEROSCLER NOS TYPE VESSEL, NATIV                     

 

 2012                         Ot            457.1            OTHER 
LYMPHEDEMA                     

 

 2012                         Ot            585.3            CHRONIC 
KIDNEY DISEASE, STAGE III (MODER                     

 

 2012                         Ot            V15.3            HX OF 
IRRADIATION                     

 

 2012                         Ot            V44.1            GASTROSTOMY 
STATUS                     

 

 2012                         Ot            V45.81            
AORTOCORONARY BYPASS                     

 

 2012                         Ot            V45.82            
PERCUTANEOUS TRANSLUM CORON ANGIOPLASTY                      

 

 2012                         Ot            V58.66            LONG-TERM (
CURRENT) USE OF ASPIRIN                     

 

 2012                         Ot            V58.69            OTH MED,LT,
CURRENT USE                     

 

 2012                         Ot            V87.41            PERSONAL 
HISTORY OF ANTINEOPLASTIC CHEMO                     

 

 2013                         Ot            141.9            MALIG CAR 
TONGUE NOS                     

 

 2013                         Ot            244.9            
HYPOTHYROIDISM NOS                     

 

 2013                         Ot            272.4            
HYPERLIPIDEMIA NEC/NOS                     

 

 2013                         Ot            403.90            HYPTNSV CHR 
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 
INFARCT                     

 

 2013                         Ot            414.00            CORON 
ATHEROSCLER NOS TYPE VESSEL, NATIV                     

 

 2013                         Ot            428.0            CONGESTIVE 
HEART FAILURE NOS                     

 

 2013                         Ot            428.43            ACUTE 
CHRONIC SYSTOLIC/DIALSTOLIC HRT FA                     

 

 2013                         Ot            491.21            OBSTR 
CHRONIC BRONCHITIS, W (ACUTE) EXAC                     

 

 2013                         Ot            585.9            CHRONIC 
KIDNEY DISEASE, UNSPECIFIED                     

 

 2013                         Ot            593.9            RENAL   
URETERAL DIS NOS                     

 

 2013                         Ot            V15.82            HISTORY OF 
TOBACCO USE                     

 

 2013                         Ot            V45.81            
AORTOCORONARY BYPASS                     

 

 2013                         Ot            550.90            UNILAT 
INGUINAL HERNIA                     

 

 2013                         Ot            V03.82            
PROPHYLACTIC VACC AGAINST STREPTOCOCCUS                      

 

 2013            PÉREZ BRIONES, MARIETTA            Ot            141.9       
     MALIG CAR TONGUE NOS                     

 

 2013            MARIETTA ORTEZ MD            Ot            285.3       
     ANTINEOPLASTIC CHEMOTHERAPY INDUCED ANEM                     

 

 2013            MARIETTA ORTEZ MD            Ot            403.90      
      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                     

 

 2013            MARIETTA ORTEZ MD            Ot            414.00      
      CORON ATHEROSCLER NOS TYPE VESSEL, NATIV                     

 

 2013            MARIETTA ORTEZ MD            Ot            457.1       
     OTHER LYMPHEDEMA                     

 

 2013            MARIETTA ORTEZ MD            Ot            585.3       
     CHRONIC KIDNEY DISEASE, STAGE III (MODER                     

 

 2013            MARIETTA ORTEZ MD            Ot            V15.3       
     HX OF IRRADIATION                     

 

 2013            MARIETTA ORTEZ MD            Ot            V44.1       
     GASTROSTOMY STATUS                     

 

 2013            MARIETTA ORTEZ MD            Ot            V45.81      
      AORTOCORONARY BYPASS                     

 

 2013            MARIETTA ORTEZ MD            Ot            V45.82      
      PERCUTANEOUS TRANSLUM CORON ANGIOPLASTY                      

 

 2013            MARIETTA ORTEZ MD            Ot            V58.66      
      LONG-TERM (CURRENT) USE OF ASPIRIN                     

 

 2013            MARIETTA ORTEZ MD            Ot            V58.69      
      OT MED,LT,CURRENT USE                     

 

 2013            MARIETTA ORTEZ MD            Ot            V87.41      
      PERSONAL HISTORY OF ANTINEOPLASTIC CHEMO                     

 

 10/07/2013            MARIETTA ORTEZ MD            Ot            141.9       
     MALIG CAR TONGUE NOS                     

 

 10/07/2013            MARIETTA ORTEZ MD            Ot            285.3       
     ANTINEOPLASTIC CHEMOTHERAPY INDUCED ANEM                     

 

 10/07/2013            MARIETTA ORTEZ MD            Ot            403.90      
      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                     

 

 10/07/2013            MARIETTA ORTEZ MD            Ot            414.00      
      CORON ATHEROSCLER NOS TYPE VESSEL, NATIV                     

 

 10/07/2013            MARIETTA ORTEZ MD            Ot            457.1       
     OTHER LYMPHEDEMA                     

 

 10/07/2013            MARIETTA ORTEZ MD            Ot            585.3       
     CHRONIC KIDNEY DISEASE, STAGE III (MODER                     

 

 10/07/2013            MARIETTA ORTEZ MD            Ot            V15.3       
     HX OF IRRADIATION                     

 

 10/07/2013            MARIETTA ORTEZ MD            Ot            V44.1       
     GASTROSTOMY STATUS                     

 

 10/07/2013            MARIETTA ORTEZ MD            Ot            V45.81      
      AORTOCORONARY BYPASS                     

 

 10/07/2013            MARIETTA ORTEZ MD            Ot            V45.82      
      PERCUTANEOUS TRANSLUM CORON ANGIOPLASTY                      

 

 10/07/2013            MARIETTA ORTEZ MD, Ot            V58.66      
      LONG-TERM (CURRENT) USE OF ASPIRIN                     

 

 10/07/2013            MARIETTA ORTEZ MD, Ot            V58.69      
      OTH MED,LT,CURRENT USE                     

 

 10/07/2013            MARIETTA ORTEZ MD, Ot            V58.81      
      FIT/ADJ VASCULAR CATHETER                     

 

 10/07/2013            MARIETTA ORTEZ MD, Ot            V87.41      
      PERSONAL HISTORY OF ANTINEOPLASTIC CHEMO                     

 

 02/10/2014            MARIETTA ORTEZ MD, Ot            141.9       
     MALIG CAR TONGUE NOS                     

 

 02/10/2014            MARIETTA ORTEZ MD, Ot            V58.81      
      FIT/ADJ VASCULAR CATHETER                     

 

 2014            MARIETTA ORTEZ MD, Ot            141.9       
     MALIG CAR TONGUE NOS                     

 

 2014            MARIETTA ORTEZ MD, Ot            285.3       
     ANTINEOPLASTIC CHEMOTHERAPY INDUCED ANEM                     

 

 2014            MARIETTA ORTEZ MD, Ot            403.90      
      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                     

 

 2014            MARIETTA ORTEZ MD, Ot            414.00      
      CORON ATHEROSCLER NOS TYPE VESSEL, NATIV                     

 

 2014            MARIETTA ORTEZ MD, Ot            457.1       
     OTHER LYMPHEDEMA                     

 

 2014            MARIETTA ORTEZ MD, Ot            585.3       
     CHRONIC KIDNEY DISEASE, STAGE III (MODER                     

 

 2014            MARIETTA ORTEZ MD, Ot            V15.3       
     HX OF IRRADIATION                     

 

 2014            MARIETTA ORTEZ MD            Ot            V44.1       
     GASTROSTOMY STATUS                     

 

 2014            MARIETTA ORTEZ MD, Ot            V45.81      
      AORTOCORONARY BYPASS                     

 

 2014            MARIETTA ORTEZ MD, Ot            V45.82      
      PERCUTANEOUS TRANSLUM CORON ANGIOPLASTY                      

 

 2014            MARIETTA ORTEZ MD, Ot            V58.66      
      LONG-TERM (CURRENT) USE OF ASPIRIN                     

 

 2014            MARIETTA ORTEZ MD, Ot            V58.69      
      OTH MED,LT,CURRENT USE                     

 

 2014            MARIETTA ORTEZ MD, Ot            V58.81      
      FIT/ADJ VASCULAR CATHETER                     

 

 2014            MARIETTA ORTEZ MD            Ot            V87.41      
      PERSONAL HISTORY OF ANTINEOPLASTIC CHEMO                     

 

 2014            MARIETTA ORTEZ MD            Ot            141.9       
     MALIG CAR TONGUE NOS                     

 

 2014            XUN MD, JOHNS-NATHALIA            Ot            285.3       
     ANTINEOPLASTIC CHEMOTHERAPY INDUCED ANEM                     

 

 2014            MARIETTA ORTEZ MD            Ot            403.90      
      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                     

 

 2014            MARIETTA ORTEZ MD, Ot            414.00      
      CORON ATHEROSCLER NOS TYPE VESSEL, NATIV                     

 

 2014            MARIETTA ORTEZ MD, Ot            457.1       
     OTHER LYMPHEDEMA                     

 

 2014            MARIETTA ORTEZ MD, Ot            585.3       
     CHRONIC KIDNEY DISEASE, STAGE III (MODER                     

 

 2014            MARIETTA ORTEZ MD, Ot            V15.3       
     HX OF IRRADIATION                     

 

 2014            MARIETTA ORTEZ MD, Ot            V44.1       
     GASTROSTOMY STATUS                     

 

 2014            MARIETTA ORTEZ MD, Ot            V45.81      
      AORTOCORONARY BYPASS                     

 

 2014            MARIETTA ORTEZ MD, Ot            V45.82      
      PERCUTANEOUS TRANSLUM CORON ANGIOPLASTY                      

 

 2014            MARIETTA ORTEZ MD, Ot            V58.66      
      LONG-TERM (CURRENT) USE OF ASPIRIN                     

 

 2014            MARIETTA ORTEZ MD, Ot            V58.69      
      OT MED,LT,CURRENT USE                     

 

 2014            MARIETTA ORTEZ MD, Ot            V58.81      
      FIT/ADJ VASCULAR CATHETER                     

 

 2014            MARIETTA ORTEZ MD, Ot            V87.41      
      PERSONAL HISTORY OF ANTINEOPLASTIC CHEMO                     

 

 2014            ELIDIA ANTOINE MD            Ot            244.9       
     HYPOTHYROIDISM NOS                     

 

 2014            ELIDIA ANTOINE MD            Ot            272.4       
     HYPERLIPIDEMIA NEC/NOS                     

 

 2014            ELIDIA ANTOINE MD            Ot            401.9       
     HYPERTENSION NOS                     

 

 2014            ELIDIA ANTOINE MD            Ot            414.01      
      CORONARY ATHEROSCLEROSIS OF NATIVE CORON                     

 

 2014            ELIDIA ANTOINE MD            Ot            428.0       
     CONGESTIVE HEART FAILURE NOS                     

 

 2014            ELIDIA ANTOINE MD            Ot            433.10      
      CAROTID ARTERY OCCLUSION W O CEREBRAL IN                     

 

 2014            ELIDIA ANTOINE MD            Ot            786.50      
      CHEST PAIN NOS                     

 

 2014            ELIDIA ANTOINE MD            Ot            V15.82      
      HISTORY OF TOBACCO USE                     

 

 2014            ELIDIA ANTOINE MD            Ot            V45.81      
      AORTOCORONARY BYPASS                     

 

 2014            ELIDIA ANTOINE MD, Ot            V45.82      
      PERCUTANEOUS TRANSLUM CORON ANGIOPLASTY                      

 

 2014            ELIDIA ANTOINE MD            Ot            V58.69      
      OT MED,LT,CURRENT USE                     

 

 2014            MARIETTA ORTEZ MD            Ot            141.9       
     MALIG CAR TONGUE NOS                     

 

 2014            MARIETTA ORTEZ MD            Ot            285.3       
     ANTINEOPLASTIC CHEMOTHERAPY INDUCED ANEM                     

 

 2014            MARIETTA ORTEZ MD            Ot            403.90      
      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                     

 

 2014            MARIETTA ORTEZ MD, Ot            414.00      
      CORON ATHEROSCLER NOS TYPE VESSEL, NATIV                     

 

 2014            MARIETTA ORTEZ MD            Ot            457.1       
     OTHER LYMPHEDEMA                     

 

 2014            MARIETTA ORTEZ MD, Ot            585.3       
     CHRONIC KIDNEY DISEASE, STAGE III (MODER                     

 

 2014            MARIETTA ORTEZ MD, Ot            V15.3       
     HX OF IRRADIATION                     

 

 2014            MARIETTA ORTEZ MD, Ot            V44.1       
     GASTROSTOMY STATUS                     

 

 2014            MARIETTA ORTEZ MD, Ot            V45.81      
      AORTOCORONARY BYPASS                     

 

 2014            MARIETTA ORTEZ MD, Ot            V45.82      
      PERCUTANEOUS TRANSLUM CORON ANGIOPLASTY                      

 

 2014            MARIETTA ORTEZ MD, Ot            V58.66      
      LONG-TERM (CURRENT) USE OF ASPIRIN                     

 

 2014            MARIETTA ORTEZ MD, Ot            V58.69      
      OT MED,LT,CURRENT USE                     

 

 2014            MARIETTA ORTEZ MD, Ot            V58.81      
      FIT/ADJ VASCULAR CATHETER                     

 

 2014            MARIETTA ORTEZ MD, Ot            V87.41      
      PERSONAL HISTORY OF ANTINEOPLASTIC CHEMO                     

 

 2014            ELIDIA ANTOINE MD            Ot            272.4       
                           

 

 2014            ELIDIA ANTOINE MD            Ot            401.9       
                           

 

 2014            ELIDIA ANTOINE MD            Ot            414.00      
                            

 

 2014            ELIDIA ANTOINE MD            Ot            272.4       
                           

 

 2014            ARASH BRIONES, ELIDIA RODAS            Ot            401.9       
                           

 

 2014            ARASH BRIONES, ELIDIA RODAS            Ot            414.00      
                            

 

 2014            MARIETTA ORTEZ MD            Ot            141.9       
                           

 

 2014            MARIETTA ORTEZ MD            Ot            285.3       
                           

 

 2014            MARIETTA ORTEZ MD            Ot            403.90      
                            

 

 2014            MARIETTA ORTEZ MD            Ot            414.00      
                            

 

 2014            PÉREZ BRIONES, JOHNS-NATHALIA            Ot            457.1       
                           

 

 2014            PÉREZ BRIONES, JOHNS-NATHALIA            Ot            585.3       
                           

 

 2014            PÉREZ BRIONES, JOHNS-NATHALIA            Ot            V15.3       
                           

 

 2014            PÉREZ BRIONES, JOHNS-NATHALIA            Ot            V44.1       
                           

 

 2014            PÉREZ BRIONES, JOHNS-NATHALIA            Ot            V45.81      
                            

 

 2014            PÉREZ BRIONES, JOHNS-NATHALIA            Ot            V45.82      
                            

 

 2014            PÉREZ BRIONES, JOHNS-NATHALIA            Ot            V58.66      
                            

 

 2014            PÉREZ BRIONES, JOHNS-NATHALIA            Ot            V58.69      
                            

 

 2014            PÉREZ BRIONES, JOHNS-NATHALIA            Ot            V58.81      
                            

 

 2014            PÉREZ BRIONES, JOHNS-NATHALIA            Ot            V87.41      
                            

 

 2014            PÉREZ BRIONES, JOHNS-NATHALIA            Ot            141.9       
                           

 

 2014            PÉREZ BRIONES, JOHNS-NATHALIA            Ot            285.3       
                           

 

 2014            PÉREZ BRIONES, JOHNS-NATHALIA            Ot            403.90      
                            

 

 2014            PÉREZ BRIONES, JOHNS-NATHALIA            Ot            414.00      
                            

 

 2014            PÉREZ BRIONES, JOHNS-NATHALIA            Ot            457.1       
                           

 

 2014            PÉREZ BRIONES, JOHNS-NATHALIA            Ot            585.3       
                           

 

 2014            PÉREZ BRIONES, JOHNS-NATHALIA            Ot            V15.3       
                           

 

 2014            PÉREZ BRIONES, JOHNS-NATHALIA            Ot            V44.1       
                           

 

 2014            PÉREZ BRIONES, JOHNS-NATHALIA            Ot            V45.81      
                            

 

 2014            PÉREZ BRIONES, JOHNS-NATHALIA            Ot            V45.82      
                            

 

 2014            PÉREZ BRIONES, JOHNS-NATHALIA            Ot            V58.66      
                            

 

 2014            PÉREZ BRIONES, JOHNS-NATHALIA            Ot            V58.69      
                            

 

 2014            PÉREZ BRIONES, JOHNS-NATHALIA            Ot            V58.81      
                            

 

 2014            PÉREZ BRIONES, JOHNS-NATHALIA            Ot            V87.41      
                            

 

 2015            PÉREZ BRIONES, JOHNS-NATHALIA            Ot            141.9       
                           

 

 2015            PÉREZ BRIONES, JOHNS-NATHALIA            Ot            285.3       
                           

 

 2015            PÉREZ BRIONES, JOHNS-NATHALIA            Ot            403.90      
                            

 

 2015            PÉREZ BRIONES, JOHNS-NATHALIA            Ot            414.00      
                            

 

 2015            PÉREZ BRIONES, NAT-NATHALIA            Ot            457.1       
                           

 

 2015            PÉREZ BRIONES, JOHNS-NATHALIA            Ot            585.3       
                           

 

 2015            PÉREZ BRIONES, NAT-NATHALIA            Ot            V15.3       
                           

 

 2015            PÉREZ BRIONES, JOHNS-NATHALIA            Ot            V44.1       
                           

 

 2015            PÉREZ BRIONES, JOHNS-NATHALIA            Ot            V45.81      
                            

 

 2015            PÉREZ BRIONES, NAT-NATHALIA            Ot            V45.82      
                            

 

 2015            PÉREZ BRIONES, NAT-NATHALIA            Ot            V58.66      
                            

 

 2015            PÉREZ BRIONES, NAT-NATHALIA            Ot            V58.69      
                            

 

 2015            PÉREZ BRIONES, NAT-NATHALIA            Ot            V58.81      
                            

 

 2015            PÉREZ BRIONES, MARIETTA            Ot            V87.41      
                            

 

 2015            PÉREZ BRIONES, MARIETTA            Ot            141.9       
                           

 

 2015            PÉREZ BRIONES, MARIETTA            Ot            285.3       
                           

 

 2015            PÉREZ BRIONES, MARIETTA            Ot            403.90      
                            

 

 2015            PÉREZ BRIONES, MARIETTA            Ot            414.00      
                            

 

 2015            PÉREZ BRIONES, MARIETTA            Ot            457.1       
                           

 

 2015            PÉREZ BRIONES, MARIETTA            Ot            585.3       
                           

 

 2015            PÉREZ BRIONES, MARIETTA            Ot            V15.3       
                           

 

 2015            PÉREZ BRIONES, MARIETTA            Ot            V44.1       
                           

 

 2015            PÉREZ BRIONES, MARIETTA            Ot            V45.81      
                            

 

 2015            PÉREZ BRIONES, MARIETTA            Ot            V45.82      
                            

 

 2015            PÉREZ BRIONES, MARIETTA            Ot            V58.66      
                            

 

 2015            PÉREZ BRIONES, MARIETTA            Ot            V58.69      
                            

 

 2015            PÉREZ BRIONES, MARIETTA            Ot            V58.81      
                            

 

 2015            PÉREZ BRIONES, MARIETTA            Ot            V87.41      
                            

 

 2015                         Ot            272.0            PURE 
HYPERCHOLESTEROLEM                     

 

 2015                         Ot            401.9            HYPERTENSION 
NOS                     

 

 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 
INFARCT                     

 

 2015                         Ot            414.00            CORON 
ATHEROSCLER NOS TYPE VESSEL, NATIV                     

 

 2015                         Ot            427.9            CARDIAC 
DYSRHYTHMIA NOS                     

 

 2015                         Ot            780.4            DIZZINESS 
AND GIDDINESS                     

 

 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            
AORTOCORONARY BYPASS                     

 

 2015            PÉREZ BRIONES, MARIETTA            Ot            141.9       
     MALIG CAR TONGUE NOS                     

 

 2015            PÉREZ BRIONES, MARIETTA            Ot            285.3       
     ANTINEOPLASTIC CHEMOTHERAPY INDUCED ANEM                     

 

 2015            PÉREZ BRIONES, MARIETTA            Ot            403.90      
      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                     

 

 2015            PÉREZ BRIONES, MARIETTA Damon            414.00      
      CORON ATHEROSCLER NOS TYPE VESSEL, NATIV                     

 

 2015            PÉREZ BRIONES, MARIETTA            Ot            457.1       
     OTHER LYMPHEDEMA                     

 

 2015            PÉREZ BRIONES, MARIETTA            Ot            585.3       
     CHRONIC KIDNEY DISEASE, STAGE III (MODER                     

 

 2015            PÉREZ BRIONES, MARIETTA Damon            V15.3       
     HX OF IRRADIATION                     

 

 2015            PÉREZ BRIONES, MARIETTA Damon            V44.1       
     GASTROSTOMY STATUS                     

 

 2015            PÉREZ BRIONES, MARIETTA Damon            V45.81      
      AORTOCORONARY BYPASS                     

 

 2015            PÉREZ BRIONES, MARIETTA Damon            V45.82      
      PERCUTANEOUS TRANSLUM CORON ANGIOPLASTY                      

 

 2015            PÉREZ BRIONES, MARIETTA            Ot            V58.66      
      LONG-TERM (CURRENT) USE OF ASPIRIN                     

 

 2015            PÉREZ BRIONES, MARIETTA Damon            V58.69      
      OT MED,LT,CURRENT USE                     

 

 2015            PÉREZ BRIONES, MARIETTA            Ot            V58.81      
      FIT/ADJ VASCULAR CATHETER                     

 

 2015            PÉREZ BRIONES, MARIETTA            Ot            V87.41      
      PERSONAL HISTORY OF ANTINEOPLASTIC CHEMO                     

 

 04/15/2015            PÉREZ BRIONES, MARIETTA            Ot            141.9       
                           

 

 04/15/2015            PÉREZ BRIONES, MARIETTA            Ot            285.3       
                           

 

 04/15/2015            PÉREZ BRIONES, MARIETTA            Ot            403.90      
                            

 

 04/15/2015            PÉREZ BRIONES, MARIETTA            Ot            414.00      
                            

 

 04/15/2015            PÉREZ BRIONES, MARIETTA            Ot            457.1       
                           

 

 04/15/2015            PÉREZ BRIONES, MARIETTA            Ot            585.3       
                           

 

 04/15/2015            PÉREZ BRIONES, MARIETTA            Ot            V15.3       
                           

 

 04/15/2015            PÉREZ BRIONES, MARIETTA            Ot            V44.1       
                           

 

 04/15/2015            PÉREZ BRIONES, MARIETTA            Ot            V45.81      
                            

 

 04/15/2015            PÉREZ BRIONES, MARIETTA            Ot            V45.82      
                            

 

 04/15/2015            PÉREZ BRIONES, MARIETTA            Ot            V58.66      
                            

 

 04/15/2015            PÉREZ BRIONES, MARIETTA            Ot            V58.69      
                            

 

 04/15/2015            PÉREZ BRIONES, MARIETTA            Ot            V58.81      
                            

 

 04/15/2015            PÉREZ BRIONES, JOHNS-NATHALIA            Ot            V87.41      
                            

 

 04/15/2015            PÉREZ BRIONES, JOHNS-NATHALIA            Ot            141.9       
                           

 

 04/15/2015            PÉREZ BRIONES, JOHNS-NATHALIA            Ot            285.3       
                           

 

 04/15/2015            PÉREZ BRIONES, JOHNS-NATHALIA            Ot            403.90      
                            

 

 04/15/2015            PÉREZ BRIONES, JOHNS-NATHALIA            Ot            414.00      
                            

 

 04/15/2015            PÉREZ BIRONES, JOHNS-NATHALIA            Ot            457.1       
                           

 

 04/15/2015            PÉREZ BRIONES, JOHNS-NATHALIA            Ot            585.3       
                           

 

 04/15/2015            PÉREZ BRIONES, JOHNS-NATHALIA            Ot            V15.3       
                           

 

 04/15/2015            PÉREZ BRIONES, JOHNS-NATHALIA            Ot            V44.1       
                           

 

 04/15/2015            PÉREZ BRIONES, JOHNS-NATHALIA            Ot            V45.81      
                            

 

 04/15/2015            PÉREZ BRIONES, JOHNS-NATHALIA            Ot            V45.82      
                            

 

 04/15/2015            PÉREZ BRIONES, JOHNS-NATHALIA            Ot            V58.66      
                            

 

 04/15/2015            PÉREZ BRIONES, JOHNS-NATHALIA            Ot            V58.69      
                            

 

 04/15/2015            PÉREZ BRIONES, JOHNS-NATHALIA            Ot            V58.81      
                            

 

 04/15/2015            PÉREZ BRIONES, JOHNS-NATHALIA            Ot            V87.41      
                            

 

 04/15/2015            PÉREZ BRIONES, JOHNS-NATHALIA            Ot            141.9       
                           

 

 04/15/2015            PÉREZ BRIONES, JOHNS-NATHALIA            Ot            285.3       
                           

 

 04/15/2015            PÉREZ BRIONES, JOHNS-NATHALIA            Ot            403.90      
                            

 

 04/15/2015            PÉREZ BRIONES, JOHNS-NATHALIA            Ot            414.00      
                            

 

 04/15/2015            PÉREZ BRIONES, JOHNS-NATHALIA            Ot            457.1       
                           

 

 04/15/2015            PÉREZ BRIONES, JOHNS-NATHALIA            Ot            585.3       
                           

 

 04/15/2015            PÉREZ BRIONES, JOHNS-NATHALIA            Ot            V15.3       
                           

 

 04/15/2015            PÉREZ BRIONES, JOHNS-NATHALIA            Ot            V44.1       
                           

 

 04/15/2015            PÉREZ BRIONES, OJHNS-NATHALIA            Ot            V45.81      
                            

 

 04/15/2015            PÉREZ BRIONES, JOHNS-NATHALIA            Ot            V45.82      
                            

 

 04/15/2015            PÉREZ BRIONES, JOHNS-NATHALIA            Ot            V58.66      
                            

 

 04/15/2015            PÉREZ BRIONES, JOHNS-NATHALIA            Ot            V58.69      
                            

 

 04/15/2015            PÉREZ BRIONES, JOHNS-NATHALIA            Ot            V58.81      
                            

 

 04/15/2015            PÉREZ BRIONES, JOHNS-NATHALIA            Ot            V87.41      
                            

 

 2015            PÉREZ BRIONES, JOHNS-NATHALIA            Ot            141.9       
     MALIG CAR TONGUE NOS                     

 

 2015            PÉREZ BRIONES, MARIETTA            Ot            285.3       
     ANTINEOPLASTIC CHEMOTHERAPY INDUCED ANEM                     

 

 2015            PÉREZ BRIONES, MARIETTA            Ot            403.90      
      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                     

 

 2015            PÉREZ BRIONES, MARIETTA            Ot            414.00      
      CORON ATHEROSCLER NOS TYPE VESSEL, NATIV                     

 

 2015            PÉREZ BRIONES, MARIETTA            Ot            457.1       
     OTHER LYMPHEDEMA                     

 

 2015            PÉREZ BRIONES, MARIETTA Damon            585.3       
     CHRONIC KIDNEY DISEASE, STAGE III (MODER                     

 

 2015            PÉREZ BRIONES, MARIETTA Damon            V15.3       
     HX OF IRRADIATION                     

 

 2015            MARIETTA ORTEZ MD, Ot            V44.1       
     GASTROSTOMY STATUS                     

 

 2015            MARIETTA ORTEZ MD, Ot            V45.81      
      AORTOCORONARY BYPASS                     

 

 2015            MARIETTA ORTEZ MD, Ot            V45.82      
      PERCUTANEOUS TRANSLUM CORON ANGIOPLASTY                      

 

 2015            MARIETTA ORTEZ MD, Ot            V58.66      
      LONG-TERM (CURRENT) USE OF ASPIRIN                     

 

 2015            MARIETTA ORTEZ MD, Ot            V58.69      
      OT MED,LT,CURRENT USE                     

 

 2015            MARIETTA ORTEZ MD, Ot            V58.81      
      FIT/ADJ VASCULAR CATHETER                     

 

 2015            MARIETTA ORTEZ MD, Ot            V87.41      
      PERSONAL HISTORY OF ANTINEOPLASTIC CHEMO                     

 

 2015            ARASH BRIONES, ELIDIA RODAS            Ot            272.4       
                           

 

 2015            ARASH BRIONES, ELIDIA J            Ot            401.9       
                           

 

 2015            ARASH BRIONES, ELIDIA J            Ot            414.9       
                           

 

 2015            ARASH BRIONES, ELIDIA J            Ot            433.10      
                            

 

 2015            PÉREZ BRIONES, MARIETTA            Ot            141.9       
                           

 

 2015            PÉREZ BRIONES, MARIETTA            Ot            285.3       
                           

 

 2015            PÉREZ BRIONES, MARIETTA            Ot            403.90      
                            

 

 2015            PÉREZ BRIONES, MARIETTA            Ot            414.00      
                            

 

 2015            PÉREZ BRIONES, MARIETTA            Ot            457.1       
                           

 

 2015            PÉREZ BRIONES, MARIETTA            Ot            585.3       
                           

 

 2015            PÉREZ BRIONES, MARIETTA            Ot            V15.3       
                           

 

 2015            PÉREZ BRIONES, MARIETTA            Ot            V44.1       
                           

 

 2015            PÉREZ BRIONES, MARIETTA            Ot            V45.81      
                            

 

 2015            PÉREZ BRIONES, MARIETTA            Ot            V45.82      
                            

 

 2015            PÉREZ BRIONES, MARIETTA            Ot            V58.66      
                            

 

 2015            PÉREZ BRIONES, MARIETTA            Ot            V58.69      
                            

 

 2015            PÉREZ BRIONES, MARIETTA            Ot            V58.81      
                            

 

 2015            PÉREZ BRIONES, MARIETTA            Ot            V87.41      
                            

 

 2015            PÉREZ BRIONES, MARIETTA            Ot            141.9       
                           

 

 2015            PÉREZ BRIONES, MARIETTA            Ot            285.3       
                           

 

 2015            PÉREZ BRIONES, MARIETTA            Ot            403.90      
                            

 

 2015            PÉREZ BRIONES, MARIETTA            Ot            414.00      
                            

 

 2015            PÉREZ BRIONES, MARIETTA            Ot            457.1       
                           

 

 2015            PÉREZ BRIONES, MARIETTA            Ot            585.3       
                           

 

 2015            PÉREZ BRIONES, MARIETTA            Ot            V15.3       
                           

 

 2015            PÉREZ BRIONES, MARIETTA            Ot            V44.1       
                           

 

 2015            PÉREZ BRIONES, MARIETTA            Ot            V45.81      
                            

 

 2015            PÉREZ BRIONES, MARIETTA            Ot            V45.82      
                            

 

 2015            PÉRZE BRIONES, MARIETTA            Ot            V58.66      
                            

 

 2015            PÉREZ BRIONES, MARIETTA            Ot            V58.69      
                            

 

 2015            PÉREZ BRIONES, MARIETTA            Ot            V58.81      
                            

 

 2015            PÉREZ BRIONES, MARIETTA            Ot            V87.41      
                            

 

 2015            PÉREZ BRIONES, MARIETTA            Ot            141.9       
     MALIG CAR TONGUE NOS                     

 

 2015            PÉREZ BRIONES, MARIETTA            Ot            285.3       
     ANTINEOPLASTIC CHEMOTHERAPY INDUCED ANEM                     

 

 2015            PÉREZ BRIONES, MARIETTA            Ot            403.90      
      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                     

 

 2015            PÉREZ BRIONES, MARIETTA            Ot            414.00      
      CORON ATHEROSCLER NOS TYPE VESSEL, NATIV                     

 

 2015            PÉREZ BRIONES, MARIETTA            Ot            457.1       
     OTHER LYMPHEDEMA                     

 

 2015            PÉREZ BRIONES, MARIETTA            Ot            585.3       
     CHRONIC KIDNEY DISEASE, STAGE III (MODER                     

 

 2015            PÉREZ BRIONES, MARIETTA Damon            V15.3       
     HX OF IRRADIATION                     

 

 2015            PÉREZ BRIONES, MARIETTA            Ot            V44.1       
     GASTROSTOMY STATUS                     

 

 2015            PÉREZ BRIONES, MARIETTA            Ot            V45.81      
      AORTOCORONARY BYPASS                     

 

 2015            PÉREZ BRIONES, MARIETTA            Ot            V45.82      
      PERCUTANEOUS TRANSLUM CORON ANGIOPLASTY                      

 

 2015            PÉREZ BRIONES, MARIETTA            Ot            V58.66      
      LONG-TERM (CURRENT) USE OF ASPIRIN                     

 

 2015            PÉREZ BRIONES, MARIETTA            Ot            V58.69      
      OT MED,LT,CURRENT USE                     

 

 2015            PÉREZ BRIONES, MARIETTA            Ot            V58.81      
      FIT/ADJ VASCULAR CATHETER                     

 

 2015            PÉREZ BRIONES, MARIETTA            Ot            V87.41      
      PERSONAL HISTORY OF ANTINEOPLASTIC CHEMO                     

 

 10/27/2015            SERENA SALAZAR DO            Ot            K57.90      
                            

 

 2015            SERENA SALAZAR DO            Ot            K57.90      
                            

 

 2015            PÉREZ BRIONES, MARIETTA            Ot            141.9       
                           

 

 2015            PÉREZ BRIONES, MARIETTA            Ot            285.3       
                           

 

 2015            PÉREZ BRIONES, MARIETTA            Ot            403.90      
                            

 

 2015            PÉREZ BRIONES, MARIETTA            Ot            414.00      
                            

 

 2015            PÉREZ BRIONES, MARIETTA            Ot            457.1       
                           

 

 2015            PÉREZ BRIONES, MARIETTA            Ot            585.3       
                           

 

 2015            PÉREZ BRIONES, MARIETTA            Ot            V15.3       
                           

 

 2015            PÉREZ BRIONES, MARIETTA            Ot            V44.1       
                           

 

 2015            PÉREZ BRIONES, MARIETTA            Ot            V45.81      
                            

 

 2015            PÉREZ BRIONES, MARIETTA            Ot            V45.82      
                            

 

 2015            PÉREZ BRIONES, MARIETTA            Ot            V58.66      
                            

 

 2015            PÉREZ BRIONES, MARIETTA            Ot            V58.69      
                            

 

 2015            PÉREZ BRIONES, MARIETTA            Ot            V58.81      
                            

 

 2015            PÉREZ BRIONES, MARIETTA            Ot            V87.41      
                            

 

 2016                         Ot            401.9                        
          

 

 2016                         Ot            414.00                       
           

 

 2016                         Ot            401.9                        
          

 

 2016                         Ot            414.01                       
           

 

 2016                         Ot            401.9                        
          

 

 2016                         Ot            428.0                        
          

 

 2016                         Ot            239.0                        
          

 

 2016                         Ot            780.79                       
           

 

 2016                         Ot            784.2                        
          

 

 2016                         Ot            V72.63                       
           

 

 2016                         Ot            V72.81                       
           

 

 2016                         Ot            V74.8                        
          

 

 2016                         Ot            141.9                        
          

 

 2016                         Ot            403.90                       
           

 

 2016                         Ot            414.00                       
           

 

 2016                         Ot            585.9                        
          

 

 2016                         Ot            733.90                       
           

 

 2016                         Ot            V15.82                       
           

 

 2016                         Ot            V45.81                       
           

 

 2016                         Ot            V58.66                       
           

 

 2016                         Ot            V58.69                       
           

 

 2016                         Ot            141.9                        
          

 

 2016                         Ot            733.90                       
           

 

 2016                         Ot            141.9                        
          

 

 2016                         Ot            V72.83                       
           

 

 2016                         Ot            V74.8                        
          

 

 2016                         Ot            141.9                        
          

 

 2016                         Ot            141.9                        
          

 

 2016                         Ot            141.9                        
          

 

 2016                         Ot            V72.83                       
           

 

 2016                         Ot            401.9                        
          

 

 2016                         Ot            414.00                       
           

 

 2016                         Ot            397.0                        
          

 

 2016                         Ot            401.9                        
          

 

 2016                         Ot            414.00                       
           

 

 2016                         Ot            424.0                        
          

 

 2016                         Ot            429.3                        
          

 

 2016                         Ot            141.9                        
          

 

 2016                         Ot            401.9                        
          

 

 2016                         Ot            786.05                       
           

 

 2016                         Ot            550.90                       
           

 

 2016                         Ot            V72.63                       
           

 

 2016                         Ot            V74.8                        
          

 

 2016            PÉREZ BRIONES, [facility]            Ot            195.0       
                           

 

 2016            ARASH BRIONES, ELIDIA RODAS            Ot            396.3       
                           

 

 2016            ARASH BRIONES, ELIDIA RODAS            Ot            397.0       
                           

 

 2016            ARASH BRIONES, ELIDIA RODAS            Ot            414.00      
                            

 

 2016            ARASH BRIONES, ELIDIA RODAS            Ot            416.8       
                           

 

 2016            ARASH BRIONES, ELIDIA RODAS            Ot            428.0       
                           

 

 2016            DYLAN BRIONES, EDGARDO ORELLANA            Ot            V10.01     
                             

 

 2016            ARASH BRIONES, ELIDIA RODAS            Ot            272.4       
                           

 

 2016            ARASH BRIONES, ELIDIA RODAS            Ot            401.9       
                           

 

 2016            ARASH BRIONES, ELIDIA RODAS            Ot            414.00      
                            

 

 2016            ARASH BRIONES, ELIDIA RODAS            Ot            272.4       
                           

 

 2016            ARASH BRIONES, ELIDIA RODAS            Ot            401.9       
                           

 

 2016            ARASH BRIONES, ELIDIA J            Ot            414.00      
                            

 

 2016            ARASH BRIONES, ELIDIA RODAS            Ot            272.4       
                           

 

 2016            ARASH BRIONES, ELIDIA RODAS            Ot            401.9       
                           

 

 2016            ARASH BRIONES, ELIDIA RODAS            Ot            414.9       
                           

 

 2016            ARASH BRIONES, ELIDIA RODAS            Ot            433.10      
                            

 

 2016            [place]  SERENA STEPHEN            Ot            K57.90      
                            

 

 2016            [facility] SERENA D            Ot            K92.1       
                           

 

 2016            [facility] SERENA D            Ot            Z01.818     
                             

 

 2016            PÉREZ BRIONES, MARIETTA            Ot            C02.9       
                           

 

 2016            MARIETTA ORTEZ MD            Ot            Z45.2       
                           

 

 2016            ARASH BRIONES, ELIDIA RODAS            Ot            E78.2       
                           

 

 2016            ARASH BRIONES, ELIDIA RODAS            Ot            I10         
                         

 

 2016            ARASH BRIONES, ELIDIA RODAS            Ot            I25.10      
                            

 

 2016            ARASH BRIONES, ELIDIA RODAS            Ot            I65.23      
                            

 

 2016            MARIETTA ORTEZ MD            Ot            C02.9       
     MALIGNANT NEOPLASM OF TONGUE, UNSPECIFIE                     

 

 2016            MARIETTA ORTEZ MD            Ot            Z45.2       
     ENCOUNTER FOR ADJUSTMENT AND MANAGEMENT                      

 

 03/15/2016            MARIETTA ORTEZ MD            Ot            C02.9       
                           

 

 03/15/2016            MARIETTA ORTEZ MD            Ot            Z45.2       
                           

 

 2016            MARIETTA ORTEZ MD            Ot            C02.9       
                           

 

 2016            MARIETTA ORTEZ MD            Ot            Z45.2       
                           

 

 2016            MARIETTA ORTEZ MD            Ot            C02.9       
     MALIGNANT NEOPLASM OF TONGUE, UNSPECIFIE                     

 

 2016            MARIETTA ORTEZ MD            Ot            Z45.2       
     ENCOUNTER FOR ADJUSTMENT AND MANAGEMENT                      

 

 2016            MARIETTA ORTEZ MD            Ot            C02.9       
     MALIGNANT NEOPLASM OF TONGUE, UNSPECIFIE                     

 

 2016            MARIETTA ORTEZ MD            Ot            Z45.2       
     ENCOUNTER FOR ADJUSTMENT AND MANAGEMENT                      

 

 2016            MARIETTA ORTEZ MD            Ot            C02.9       
     MALIGNANT NEOPLASM OF TONGUE, UNSPECIFIE                     

 

 2016            MARIETTA ORTEZ MD            Ot            Z45.2       
     ENCOUNTER FOR ADJUSTMENT AND MANAGEMENT                      

 

 2016            PÉREZ BRIONES, MARIETTA            Ot            C02.9       
     MALIGNANT NEOPLASM OF TONGUE, UNSPECIFIE                     

 

 2016            MARIETTA ORTEZ MD            Ot            Z45.2       
     ENCOUNTER FOR ADJUSTMENT AND MANAGEMENT                      

 

 2016            ELIDIA ANTOINE MD            Ot            E78.2       
     MIXED HYPERLIPIDEMIA                     

 

 2016            ELIDIA ANTOINE MD            Ot            I10         
   ESSENTIAL (PRIMARY) HYPERTENSION                     

 

 2016            ELIDIA ANTOINE MD            Ot            I25.10      
      ATHSCL HEART DISEASE OF NATIVE CORONARY                      

 

 2016            ELIDIA ANTOINE MD            Ot            I50.22      
      CHRONIC SYSTOLIC (CONGESTIVE) HEART FAIL                     

 

 2016            ELIDIA ANTOINE MD            Ot            I65.23      
      OCCLUSION AND STENOSIS OF BILATERAL MI                     

 

 2016                         Ot            401.9            HYPERTENSION 
NOS                     

 

 2016                         Ot            428.0            CONGESTIVE 
HEART FAILURE NOS                     

 

 2016                         Ot            239.0            DIGESTIVE 
NEOPLASM NOS                     

 

 2016                         Ot            780.79            OTH MALAISE 
FATIGUE                     

 

 2016                         Ot            784.2            SWELLING IN 
HEAD   NECK                     

 

 2016                         Ot            V72.63            PRE-
PROCEDURAL LABORATORY EXAMINATION                     

 

 2016                         Ot            V72.81            EXAM-PRE-
OPERATIVE CARDIOVASCULAR                     

 

 2016                         Ot            V74.8            SCREEN-
BACTERIAL DIS NEC                     

 

 2016                         Ot            141.9            MALIG CAR 
TONGUE NOS                     

 

 2016                         Ot            403.90            HYPTNSV CHR 
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                     

 

 2016                         Ot            414.00            CORON 
ATHEROSCLER NOS TYPE VESSEL, NATIV                     

 

 2016                         Ot            585.9            CHRONIC 
KIDNEY DISEASE, UNSPECIFIED                     

 

 2016                         Ot            733.90            BONE   
CARTILAGE DIS NOS                     

 

 2016                         Ot            V15.82            HISTORY OF 
TOBACCO USE                     

 

 2016                         Ot            V45.81            
AORTOCORONARY BYPASS                     

 

 2016                         Ot            V58.66            LONG-TERM (
CURRENT) USE OF ASPIRIN                     

 

 2016                         Ot            V58.69            OTH MED,LT,
CURRENT USE                     

 

 2016                         Ot            141.9            MALIG CAR 
TONGUE NOS                     

 

 2016                         Ot            733.90            BONE   
CARTILAGE DIS NOS                     

 

 2016                         Ot            141.9            MALIG CAR 
TONGUE NOS                     

 

 2016                         Ot            V72.83            EXAM PRE-
OPERATIVE NEC                     

 

 2016                         Ot            V74.8            SCREEN-
BACTERIAL DIS NEC                     

 

 2016                         Ot            141.9            MALIG CAR 
TONGUE NOS                     

 

 2016                         Ot            141.9            MALIG CAR 
TONGUE NOS                     

 

 2016                         Ot            141.9            MALIG CAR 
TONGUE NOS                     

 

 2016                         Ot            V72.83            EXAM PRE-
OPERATIVE NEC                     

 

 2016                         Ot            401.9            HYPERTENSION 
NOS                     

 

 2016                         Ot            414.00            CORON 
ATHEROSCLER NOS TYPE VESSEL, NATIV                     

 

 2016                         Ot            397.0            TRICUSPID 
VALVE DISEASE                     

 

 2016                         Ot            401.9            HYPERTENSION 
NOS                     

 

 2016                         Ot            414.00            CORON 
ATHEROSCLER NOS TYPE VESSEL, NATIV                     

 

 2016                         Ot            424.0            MITRAL VALVE 
DISORDER                     

 

 2016                         Ot            429.3            CARDIOMEGALY
                     

 

 2016                         Ot            141.9            MALIG CAR 
TONGUE NOS                     

 

 2016                         Ot            401.9            HYPERTENSION 
NOS                     

 

 2016                         Ot            786.05            SHORTNESS 
OF BREATH                     

 

 2016                         Ot            550.90            UNILAT 
INGUINAL HERNIA                     

 

 2016                         Ot            V72.63            PRE-
PROCEDURAL LABORATORY EXAMINATION                     

 

 2016                         Ot            V74.8            SCREEN-
BACTERIAL DIS NEC                     

 

 2016            PÉREZ BRIONES, MARIETTA            Ot            195.0       
     MAL CAR HEAD/FACE/NECK                     

 

 2016            ARASH BRIONES, ELIDIA RODAS            Ot            396.3       
     MITRAL/AORTIC SCOTT INSUFF                     

 

 2016            ARASH BRIONES, ELIDIA RODAS            Ot            397.0       
     TRICUSPID VALVE DISEASE                     

 

 2016            ELIDIA ANTOINE MD            Ot            414.00      
      CORON ATHEROSCLER NOS TYPE VESSEL, NATIV                     

 

 2016            ELIDIA ANTOINE MD            Ot            416.8       
     CHR PULMON HEART DIS NEC                     

 

 2016            ELIDIA ANTOINE MD            Ot            428.0       
     CONGESTIVE HEART FAILURE NOS                     

 

 2016            DYLAN BRIONES, EDGARDO ORELLANA            Ot            V10.01     
       HX OF TONGUE MALIGNANCY                     

 

 2016            ELIDIA ANTOINE MD            Ot            272.4       
     HYPERLIPIDEMIA NEC/NOS                     

 

 2016            ELIDIA ANTOINE MD            Ot            401.9       
     HYPERTENSION NOS                     

 

 2016            ELIDIA ANTOINE MD            Ot            414.00      
      CORON ATHEROSCLER NOS TYPE VESSEL, NATIV                     

 

 2016            ELIDIA ANTOINE MD            Ot            272.4       
     HYPERLIPIDEMIA NEC/NOS                     

 

 2016            ELIDIA ANTOINE MD            Ot            401.9       
     HYPERTENSION NOS                     

 

 2016            ELIDIA ANTOINE MD            Ot            414.00      
      CORON ATHEROSCLER NOS TYPE VESSEL, NATIV                     

 

 2016            ELIDIA ANTOINE MD            Ot            272.4       
     HYPERLIPIDEMIA NEC/NOS                     

 

 2016            ELIDIA ANTOINE MD            Ot            401.9       
     HYPERTENSION NOS                     

 

 2016            ELIDIA ANTOINE MD            Ot            414.9       
     CHR ISCHEMIC HRT DIS NOS                     

 

 2016            ELIDIA ANTOINE MD            Ot            433.10      
      CAROTID ARTERY OCCLUSION W O CEREBRAL IN                     

 

 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                      

 

 2016            SERENA SALAZAR DO            Ot            K92.1       
     MELENA                     

 

 2016            SERENA SALAZAR DO            Ot            Z01.818     
       ENCOUNTER FOR OTHER PREPROCEDURAL EXAMIN                     

 

 2016            ELIDIA ANTOINE MD, Ot            E78.2       
     MIXED HYPERLIPIDEMIA                     

 

 2016            ELIDIA ANTOINE MD, Ot            I10         
   ESSENTIAL (PRIMARY) HYPERTENSION                     

 

 2016            ELIDIA ANTOINE MD, Ot            I25.10      
      ATHSCL HEART DISEASE OF NATIVE CORONARY                      

 

 2016            ELIDIA ANTOINE MD, Ot            I65.23      
      OCCLUSION AND STENOSIS OF BILATERAL MI                     

 

 2016            MARIETTA ORTEZ MD            Ot            C02.9       
     MALIGNANT NEOPLASM OF TONGUE, UNSPECIFIE                     

 

 2016            MARIETTA ORTEZ MD            Ot            Z45.2       
     ENCOUNTER FOR ADJUSTMENT AND MANAGEMENT                      

 

 2016            ELIDIA ANTOINE MD, Ot            E78.2       
     MIXED HYPERLIPIDEMIA                     

 

 2016            ELIDIA ANTOINE MD, Ot            I10         
   ESSENTIAL (PRIMARY) HYPERTENSION                     

 

 2016            ELIDIA ANTOINE MD, Ot            I25.10      
      ATHSCL HEART DISEASE OF NATIVE CORONARY                      

 

 2016            ELIDIA ANTOINE MD, Ot            I50.22      
      CHRONIC SYSTOLIC (CONGESTIVE) HEART FAIL                     

 

 2016            ELIDIA ANTOINE MD, Ot            I65.23      
      OCCLUSION AND STENOSIS OF BILATERAL MI                     

 

 2016            ELIDIA ANTOINE MD            Ot            E03.9       
     HYPOTHYROIDISM, UNSPECIFIED                     

 

 2016            ELIDIA ANTOINE MD, Ot            E78.5       
     HYPERLIPIDEMIA, UNSPECIFIED                     

 

 2016            ELIDIA ANTOINE MD, Ot            I10         
   ESSENTIAL (PRIMARY) HYPERTENSION                     

 

 2016            ELIDIA ANTOINE MD            Ot            I25.10      
      ATHSCL HEART DISEASE OF NATIVE CORONARY                      

 

 2016            ELIDIA ANTOINE MD, Ot            I25.82      
      CHRONIC TOTAL OCCLUSION OF CORONARY KASEY                     

 

 2016            ELIDIA ANTOINE MD, Ot            I50.22      
      CHRONIC SYSTOLIC (CONGESTIVE) HEART FAIL                     

 

 2016            ELIDIA ANTOINE MD, Ot            I65.21      
      OCCLUSION AND STENOSIS OF RIGHT CAROTID                      

 

 2016            ELIDIA ANTOINE MD, Ot            R00.8       
     OTHER ABNORMALITIES OF HEART BEAT                     

 

 2016            ELIDIA ANTOINE MD, Ot            Z79.899     
       OTHER LONG TERM (CURRENT) DRUG THERAPY                     

 

 2016            ELIDIA ANTOINE MD, Ot            Z95.1       
     PRESENCE OF AORTOCORONARY BYPASS GRAFT                     

 

 2016            ELIDIA ANTOINE MD, Ot            Z95.5       
     PRESENCE OF CORONARY ANGIOPLASTY IMPLANT                     

 

 2016            JOSÉ SORIANO DO, Ot            I10       
     ESSENTIAL (PRIMARY) HYPERTENSION                     

 

 2016            JOSÉ SORIANO DO, Ot            I97.610   
         POSTPROC HEMOR/HEMTOM OF A CIRC SYS ORG                      

 

 2016            JOSÉ SORIANO DO, Ot            Z79.82    
        LONG TERM (CURRENT) USE OF ASPIRIN                     

 

 2016            JOSÉ SORIANO DO, Ot            Z79.899   
         OTHER LONG TERM (CURRENT) DRUG THERAPY                     

 

 2016            JOSÉ SORIANO DO, Ot            Z85.810   
         PERSONAL HISTORY OF MALIGNANT NEOPLASM O                     

 

 2016            JOSÉ SORIANO DO, Ot            Z87.891   
         PERSONAL HISTORY OF NICOTINE DEPENDENCE                     

 

 2016            ELIDIA ANTOINE MD, Ot            E78.2       
     MIXED HYPERLIPIDEMIA                     

 

 2016            ELIDIA ANTOINE MD, Ot            I10         
   ESSENTIAL (PRIMARY) HYPERTENSION                     

 

 2016            ELIDIA ANTOINE MD, Ot            I25.10      
      ATHSCL HEART DISEASE OF NATIVE CORONARY                      

 

 2016            ELIDIA ANTOINE MD, Ot            I50.22      
      CHRONIC SYSTOLIC (CONGESTIVE) HEART FAIL                     

 

 2016            ELIDIA ANTOINE MD, Ot            I65.23      
      OCCLUSION AND STENOSIS OF BILATERAL MI                     

 

 2016            MARIETTA ORTEZ MD            Ot            C02.9       
     MALIGNANT NEOPLASM OF TONGUE, UNSPECIFIE                     

 

 2016            MARIETTA ORTEZ MD            Ot            Z45.2       
     ENCOUNTER FOR ADJUSTMENT AND MANAGEMENT                      

 

 10/05/2016            ELIDIA ANTOINE MD            Ot            E03.9       
     HYPOTHYROIDISM, UNSPECIFIED                     

 

 10/05/2016            ELIDIA ANTOINE MD, Ot            E78.5       
     HYPERLIPIDEMIA, UNSPECIFIED                     

 

 10/05/2016            ELIDIA ANTOINE MD            Ot            I10         
   ESSENTIAL (PRIMARY) HYPERTENSION                     

 

 10/05/2016            ELIDIA ANTOINE MD, Ot            I25.10      
      ATHSCL HEART DISEASE OF NATIVE CORONARY                      

 

 10/05/2016            ELIDIA ANTOINE MD, Ot            I25.82      
      CHRONIC TOTAL OCCLUSION OF CORONARY KASEY                     

 

 10/05/2016            ELIDIA ANTOINE MD, Ot            I50.22      
      CHRONIC SYSTOLIC (CONGESTIVE) HEART FAIL                     

 

 10/05/2016            ELIDIA ANTOINE MD, Ot            I65.21      
      OCCLUSION AND STENOSIS OF RIGHT CAROTID                      

 

 10/05/2016            ELIDIA ANTOINE MD            Ot            R00.8       
     OTHER ABNORMALITIES OF HEART BEAT                     

 

 10/05/2016            ELIDIA ANTOINE MD, Ot            Z79.899     
       OTHER LONG TERM (CURRENT) DRUG THERAPY                     

 

 10/05/2016            ELIDIA ANTOINE MD, Ot            Z95.1       
     PRESENCE OF AORTOCORONARY BYPASS GRAFT                     

 

 10/05/2016            ELIDIA ANTOINE MD, Ot            Z95.5       
     PRESENCE OF CORONARY ANGIOPLASTY IMPLANT                     

 

 2016            MARIETTA ORTEZ MD            Ot            C02.9       
     MALIGNANT NEOPLASM OF TONGUE, UNSPECIFIE                     

 

 2016            MARIETTA ORTEZ MD, Ot            Z45.2       
     ENCOUNTER FOR ADJUSTMENT AND MANAGEMENT                      

 

 2016            MARIETTA ORTEZ MD, Ot            C02.9       
     MALIGNANT NEOPLASM OF TONGUE, UNSPECIFIE                     

 

 2016            MARIETTA ORTEZ MD, Ot            Z45.2       
     ENCOUNTER FOR ADJUSTMENT AND MANAGEMENT                      

 

 2016            MARIETTA ORTEZ MD, Ot            C02.9       
     MALIGNANT NEOPLASM OF TONGUE, UNSPECIFIE                     

 

 2016            MARIETTA ORTEZ MD, Ot            Z45.2       
     ENCOUNTER FOR ADJUSTMENT AND MANAGEMENT                      

 

 2016            MARIETTA ORTEZ MD, Ot            C02.9       
     MALIGNANT NEOPLASM OF TONGUE, UNSPECIFIE                     

 

 2016            MARIETTA ORTEZ MD, Ot            Z45.2       
     ENCOUNTER FOR ADJUSTMENT AND MANAGEMENT                      

 

 2017                         Ot            239.0            DIGESTIVE 
NEOPLASM NOS                     

 

 2017                         Ot            780.79            OTH MALAISE 
FATIGUE                     

 

 2017                         Ot            784.2            SWELLING IN 
HEAD   NECK                     

 

 2017                         Ot            V72.63            PRE-
PROCEDURAL LABORATORY EXAMINATION                     

 

 2017                         Ot            V72.81            EXAM-PRE-
OPERATIVE CARDIOVASCULAR                     

 

 2017                         Ot            V74.8            SCREEN-
BACTERIAL DIS NEC                     

 

 2017                         Ot            141.9            MALIG CAR 
TONGUE NOS                     

 

 2017                         Ot            403.90            HYPTNSV CHR 
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                     

 

 2017                         Ot            414.00            CORON 
ATHEROSCLER NOS TYPE VESSEL, NATIV                     

 

 2017                         Ot            585.9            CHRONIC 
KIDNEY DISEASE, UNSPECIFIED                     

 

 2017                         Ot            733.90            BONE   
CARTILAGE DIS NOS                     

 

 2017                         Ot            V15.82            HISTORY OF 
TOBACCO USE                     

 

 2017                         Ot            V45.81            
AORTOCORONARY BYPASS                     

 

 2017                         Ot            V58.66            LONG-TERM (
CURRENT) USE OF ASPIRIN                     

 

 2017                         Ot            V58.69            OTH MED,LT,
CURRENT USE                     

 

 2017                         Ot            141.9            MALIG CAR 
TONGUE NOS                     

 

 2017                         Ot            733.90            BONE   
CARTILAGE DIS NOS                     

 

 2017                         Ot            141.9            MALIG CAR 
TONGUE NOS                     

 

 2017                         Ot            V72.83            EXAM PRE-
OPERATIVE NEC                     

 

 2017                         Ot            V74.8            SCREEN-
BACTERIAL DIS NEC                     

 

 2017                         Ot            141.9            MALIG CAR 
TONGUE NOS                     

 

 2017                         Ot            141.9            MALIG CAR 
TONGUE NOS                     

 

 2017                         Ot            141.9            MALIG CAR 
TONGUE NOS                     

 

 2017                         Ot            V72.83            EXAM PRE-
OPERATIVE NEC                     

 

 2017                         Ot            401.9            HYPERTENSION 
NOS                     

 

 2017                         Ot            414.00            CORON 
ATHEROSCLER NOS TYPE VESSEL, NATIV                     

 

 2017                         Ot            397.0            TRICUSPID 
VALVE DISEASE                     

 

 2017                         Ot            401.9            HYPERTENSION 
NOS                     

 

 2017                         Ot            414.00            CORON 
ATHEROSCLER NOS TYPE VESSEL, NATIV                     

 

 2017                         Ot            424.0            MITRAL VALVE 
DISORDER                     

 

 2017                         Ot            429.3            CARDIOMEGALY
                     

 

 2017                         Ot            141.9            MALIG CAR 
TONGUE NOS                     

 

 2017                         Ot            401.9            HYPERTENSION 
NOS                     

 

 2017                         Ot            786.05            SHORTNESS 
OF BREATH                     

 

 2017                         Ot            550.90            UNILAT 
INGUINAL HERNIA                     

 

 2017                         Ot            V72.63            PRE-
PROCEDURAL LABORATORY EXAMINATION                     

 

 2017                         Ot            V74.8            SCREEN-
BACTERIAL DIS NEC                     

 

 2017            PÉREZ BRIONES, MARIETTA            Ot            195.0       
     MAL CAR HEAD/FACE/NECK                     

 

 2017            ARASH BRIONES, ELIDIA RODAS            Ot            396.3       
     MITRAL/AORTIC SCOTT INSUFF                     

 

 2017            ARASH BRIONES, ELIDIA RODAS            Ot            397.0       
     TRICUSPID VALVE DISEASE                     

 

 2017            ELIDIA ANTOINE MD            Ot            414.00      
      CORON ATHEROSCLER NOS TYPE VESSEL, NATIV                     

 

 2017            ELIDIA ANTOINE MD            Ot            416.8       
     CHR PULMON HEART DIS NEC                     

 

 2017            ELIDIA ANTOINE MD            Ot            428.0       
     CONGESTIVE HEART FAILURE NOS                     

 

 2017            DYLAN BRIONES, EDGARDO ORELLANA            Ot            V10.01     
       HX OF TONGUE MALIGNANCY                     

 

 2017            ELIDIA ANTOINE MD            Ot            272.4       
     HYPERLIPIDEMIA NEC/NOS                     

 

 2017            ELIDIA ANTOINE MD            Ot            401.9       
     HYPERTENSION NOS                     

 

 2017            ELIDIA ANTOINE MD            Ot            414.00      
      CORON ATHEROSCLER NOS TYPE VESSEL, NATIV                     

 

 2017            ELIDIA ANTOINE MD            Ot            272.4       
     HYPERLIPIDEMIA NEC/NOS                     

 

 2017            ELIDIA ANTOINE MD            Ot            401.9       
     HYPERTENSION NOS                     

 

 2017            ELIDIA ANTOINE MD            Ot            414.00      
      CORON ATHEROSCLER NOS TYPE VESSEL, NATIV                     

 

 2017            ELIDIA ANTOINE MD            Ot            272.4       
     HYPERLIPIDEMIA NEC/NOS                     

 

 2017            ELIDIA ANTOINE MD            Ot            401.9       
     HYPERTENSION NOS                     

 

 2017            ELIDIA ANTOINE MD            Ot            414.9       
     CHR ISCHEMIC HRT DIS NOS                     

 

 2017            ELIDIA ANTOINE MD            Ot            433.10      
      CAROTID ARTERY OCCLUSION W O CEREBRAL IN                     

 

 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                      

 

 2017            SERENA SALAZAR DO            Ot            K92.1       
     MELENA                     

 

 2017            SERENA SALAZAR DO            Ot            Z01.818     
       ENCOUNTER FOR OTHER PREPROCEDURAL EXAMIN                     

 

 2017            ELIDIA ANTOINE MD            Ot            E78.2       
     MIXED HYPERLIPIDEMIA                     

 

 2017            ELIDIA ANTOINE MD            Ot            I10         
   ESSENTIAL (PRIMARY) HYPERTENSION                     

 

 2017            ELIDIA ANTOINE MD            Ot            I25.10      
      ATHSCL HEART DISEASE OF NATIVE CORONARY                      

 

 2017            ELIDIA ANTOINE MD            Ot            I65.23      
      OCCLUSION AND STENOSIS OF BILATERAL MI                     

 

 2017            ELIDIA ANTOINE MD            Ot            E78.2       
     MIXED HYPERLIPIDEMIA                     

 

 2017            ELIDIA ANTOINE MD            Ot            I10         
   ESSENTIAL (PRIMARY) HYPERTENSION                     

 

 2017            ELIDIA ANTOINE MD            Ot            I25.10      
      ATHSCL HEART DISEASE OF NATIVE CORONARY                      

 

 2017            ELIDIA ANTOINE MD            Ot            I50.22      
      CHRONIC SYSTOLIC (CONGESTIVE) HEART FAIL                     

 

 2017            ELIDIA ANTOINE MD            Ot            I65.23      
      OCCLUSION AND STENOSIS OF BILATERAL MI                     

 

 2017            MARIETTA ORTEZ MD            Ot            C02.9       
     MALIGNANT NEOPLASM OF TONGUE, UNSPECIFIE                     

 

 2017            MARIETTA ORTEZ MD, Ot            Z45.2       
     ENCOUNTER FOR ADJUSTMENT AND MANAGEMENT                      

 

 2017            ELIDIA ANTOINE MD            Ot            E78.2       
     MIXED HYPERLIPIDEMIA                     

 

 2017            ELIDIA ANTOINE MD            Ot            I10         
   ESSENTIAL (PRIMARY) HYPERTENSION                     

 

 2017            ELIDIA ANTOINE MD            Ot            I25.10      
      ATHSCL HEART DISEASE OF NATIVE CORONARY                      

 

 2017            ELIDIA ANTOINE MD            Ot            I65.23      
      OCCLUSION AND STENOSIS OF BILATERAL MI                     

 

 2017            ELIDIA ANTOINE MD            Ot            E78.2       
     MIXED HYPERLIPIDEMIA                     

 

 2017            ELIDIA ANTOINE MD            Ot            I10         
   ESSENTIAL (PRIMARY) HYPERTENSION                     

 

 2017            ELIDIA ANTOINE MD            Ot            I25.10      
      ATHSCL HEART DISEASE OF NATIVE CORONARY                      

 

 2017            ELIDIA ANTOINE MD            Ot            I65.23      
      OCCLUSION AND STENOSIS OF BILATERAL MI                     

 

 2017            ELIDIA ANTOINE MD            Ot            E78.2       
     MIXED HYPERLIPIDEMIA                     

 

 2017            ELIDIA ANTOINE MD            Ot            I10         
   ESSENTIAL (PRIMARY) HYPERTENSION                     

 

 2017            ELIDIA ANTOINE MD            Ot            I25.10      
      ATHSCL HEART DISEASE OF NATIVE CORONARY                      

 

 2017            ELIDIA ANTOINE MD            Ot            I65.23      
      OCCLUSION AND STENOSIS OF BILATERAL MI                     

 

 2017            MARIETTA ORTEZ MD            Ot            C02.9       
     MALIGNANT NEOPLASM OF TONGUE, UNSPECIFIE                     

 

 2017            MARIETTA ORTEZ MD            Ot            Z45.2       
     ENCOUNTER FOR ADJUSTMENT AND MANAGEMENT                      

 

 2017            MARIETTA ORTEZ MD            Ot            C02.9       
     MALIGNANT NEOPLASM OF TONGUE, UNSPECIFIE                     

 

 2017            MARIETTA ORTEZ MD            Ot            Z45.2       
     ENCOUNTER FOR ADJUSTMENT AND MANAGEMENT                      

 

 2017            MARIETTA ORTEZ MD            Ot            C02.9       
     MALIGNANT NEOPLASM OF TONGUE, UNSPECIFIE                     

 

 2017            MARIETTA ORTEZ MD, Ot            Z45.2       
     ENCOUNTER FOR ADJUSTMENT AND MANAGEMENT                      

 

 2017            AZEEM HARGROVE            Ot            J44.9 
           CHRONIC OBSTRUCTIVE PULMONARY DISEASE, U                     

 

 2017            AZEEM HARGROVE            Ot            R06.00
            DYSPNEA, UNSPECIFIED                     

 

 2017            MARIETTA ORTEZ MD            Ot            C02.9       
     MALIGNANT NEOPLASM OF TONGUE, UNSPECIFIE                     

 

 2017            MARIETTA ORTEZ MD            Ot            E03.9       
     HYPOTHYROIDISM, UNSPECIFIED                     

 

 2017            MARIETTA ORTEZ MD, Ot            I12.9       
     HYPERTENSIVE CHRONIC KIDNEY DISEASE W ST                     

 

 2017            MARIETTA ORTEZ MD, Ot            I25.10      
      ATHSCL HEART DISEASE OF NATIVE CORONARY                      

 

 2017            MARIETTA ORTEZ MD, Ot            N18.9       
     CHRONIC KIDNEY DISEASE, UNSPECIFIED                     

 

 2017            MARIETTA ORTEZ MD, Ot            Z79.899     
       OTHER LONG TERM (CURRENT) DRUG THERAPY                     

 

 2017            MYAH OROZCO DO            Ot            J44.9       
     CHRONIC OBSTRUCTIVE PULMONARY DISEASE, U                     

 

 2017            MYAH OROCZO DO            Ot            R06.00      
      DYSPNEA, UNSPECIFIED                     

 

 2017            MYAH OROZCO DO            Ot            J44.9       
     CHRONIC OBSTRUCTIVE PULMONARY DISEASE, U                     

 

 2017            MYAH OROZCO DO            Ot            R06.00      
      DYSPNEA, UNSPECIFIED                     

 

 2017            AZEEM HARGROVE            Ot            
F17.201            NICOTINE DEPENDENCE, UNSPECIFIED, IN REM                     

 

 2017            AZEEM HARGROVE            Ot            J44.9 
           CHRONIC OBSTRUCTIVE PULMONARY DISEASE, U                     

 

 2017            AZEEM HARGROVE            Ot            R06.00
            DYSPNEA, UNSPECIFIED                     

 

 2017            AZEEM HARGROVE            Ot            G47.33
            OBSTRUCTIVE SLEEP APNEA (ADULT) (PEDIATR                     

 

 2017            AZEEM HARGROVE            Ot            J44.9 
           CHRONIC OBSTRUCTIVE PULMONARY DISEASE, U                     

 

 2017            AZEEM HARGROVE            Ot            R06.00
            DYSPNEA, UNSPECIFIED                     

 

 2017            CYNTHIA ROSE DOI            Ot            B37.0         
   CANDIDAL STOMATITIS                     

 

 2017            CYNTHIA ROSE DOI            Ot            D72.829       
     ELEVATED WHITE BLOOD CELL COUNT, UNSPECI                     

 

 2017            MILTON BARAJAS TISHA            Ot            E78.00        
    PURE HYPERCHOLESTEROLEMIA, UNSPECIFIED                     

 

 2017            TISHA ROSE DO            Ot            E89.0         
   POSTPROCEDURAL HYPOTHYROIDISM                     

 

 2017            TISHA ROSE DO            Ot            G47.30        
    SLEEP APNEA, UNSPECIFIED                     

 

 2017            TISHA ROSE DO            Ot            I13.0         
   HYP HRT   CHR KDNY DIS W HRT FAIL AND ST                     

 

 2017            TISHA ROSE DO            Ot            I25.10        
    ATHSCL HEART DISEASE OF NATIVE CORONARY                      

 

 2017            TISHA ROSE DO            Ot            I25.2         
   OLD MYOCARDIAL INFARCTION                     

 

 2017            TISHA ROSE DO            Ot            I47.2         
   VENTRICULAR TACHYCARDIA                     

 

 2017            TISHA ROSE DO            Ot            I48.91        
    UNSPECIFIED ATRIAL FIBRILLATION                     

 

 2017            TISHA ROSE DO            Ot            I49.1         
   ATRIAL PREMATURE DEPOLARIZATION                     

 

 2017            TISHA ROSE DO            Ot            I49.3         
   VENTRICULAR PREMATURE DEPOLARIZATION                     

 

 2017            TISHA ROSE DO            Ot            I50.23        
    ACUTE ON CHRONIC SYSTOLIC (CONGESTIVE) H                     

 

 2017            TISHA ROSE DO            Ot            J18.9         
   PNEUMONIA, UNSPECIFIED ORGANISM                     

 

 2017            TISHA ROSE DO            Ot            J44.0         
   CHRONIC OBSTRUCTIVE PULMON DISEASE W ACU                     

 

 2017            TISHA ROSE DO            Ot            J45.909       
     UNSPECIFIED ASTHMA, UNCOMPLICATED                     

 

 2017            TISHA ROSE DO            Ot            K14.9         
   DISEASE OF TONGUE, UNSPECIFIED                     

 

 2017            TISHA ROSE DO            Ot            M19.91        
    PRIMARY OSTEOARTHRITIS, UNSPECIFIED SITE                     

 

 2017            TISHA ROSE DO            Ot            N18.9         
   CHRONIC KIDNEY DISEASE, UNSPECIFIED                     

 

 2017            TISHA ROSE DO            Ot            R04.2         
   HEMOPTYSIS                     

 

 2017            TISHA ROSE DO            Ot            T49.0X5A      
      ADVERSE EFFECT OF LOCAL ANTIFUNG/INFECT/                     

 

 2017            TISHA ROSE DO            Ot            Z85.810       
     PERSONAL HISTORY OF MALIGNANT NEOPLASM O                     

 

 2017            TISHA ROSE DO            Ot            Z87.891       
     PERSONAL HISTORY OF NICOTINE DEPENDENCE                     

 

 2017            TISHA ROSE DO            Ot            Z92.21        
    PERSONAL HISTORY OF ANTINEOPLASTIC CHEMO                     

 

 2017            TISHA ROSE DO            Ot            Z92.3         
   PERSONAL HISTORY OF IRRADIATION                     

 

 2017            TISHA ROSE DO            Ot            Z95.1         
   PRESENCE OF AORTOCORONARY BYPASS GRAFT                     

 

 2017            TISHA ROSE DO            Ot            Z95.5         
   PRESENCE OF CORONARY ANGIOPLASTY IMPLANT                     

 

 2017            TISHA ROSE DO            Ot            Z96.653       
     PRESENCE OF ARTIFICIAL KNEE JOINT, BILAT                     

 

 2017            TISHA ROSE DO            Ot            Z99.81        
    DEPENDENCE ON SUPPLEMENTAL OXYGEN                     

 

 2017            SABRINA SCHERER MD            Ot            R42        
    DIZZINESS AND GIDDINESS                     

 

 2017            SABRINA SCHERER MD, Ot            R42        
    DIZZINESS AND GIDDINESS                     

 

 2017            MARIETTA ORTEZ MD, Ot            C02.9       
     MALIGNANT NEOPLASM OF TONGUE, UNSPECIFIE                     

 

 2017            MARIETTA ORTEZ MD            Ot            E03.9       
     HYPOTHYROIDISM, UNSPECIFIED                     

 

 2017            MARIETTA ORTEZ MD, Ot            I12.9       
     HYPERTENSIVE CHRONIC KIDNEY DISEASE W ST                     

 

 2017            MARIETTA ORTEZ MD            Ot            I25.10      
      ATHSCL HEART DISEASE OF NATIVE CORONARY                      

 

 2017            MARIETTA ORTEZ MD            Ot            N18.9       
     CHRONIC KIDNEY DISEASE, UNSPECIFIED                     

 

 2017            MARIETTA ORTEZ MD            Ot            Z79.899     
       OTHER LONG TERM (CURRENT) DRUG THERAPY                     

 

 2017            MARIETTA ORTEZ MD            Ot            C02.9       
     MALIGNANT NEOPLASM OF TONGUE, UNSPECIFIE                     

 

 2017            MARIETTA ORTEZ MD            Ot            E03.9       
     HYPOTHYROIDISM, UNSPECIFIED                     

 

 2017            MARIETTA ORTEZ MD            Ot            I12.9       
     HYPERTENSIVE CHRONIC KIDNEY DISEASE W ST                     

 

 2017            MARIETTA ORTEZ MD            Ot            I25.10      
      ATHSCL HEART DISEASE OF NATIVE CORONARY                      

 

 2017            MARIETTA ORTEZ MD            Ot            N18.9       
     CHRONIC KIDNEY DISEASE, UNSPECIFIED                     

 

 2017            MARIETTA ORTEZ MD, Ot            Z79.899     
       OTHER LONG TERM (CURRENT) DRUG THERAPY                     

 

 2017            MYAH OROZCO DO            Ot            J44.9       
     CHRONIC OBSTRUCTIVE PULMONARY DISEASE, U                     

 

 2017            MYAH OROZCO DO            Ot            R06.00      
      DYSPNEA, UNSPECIFIED                     

 

 2017            AZEEM HARGROVE            Ot            
F17.201            NICOTINE DEPENDENCE, UNSPECIFIED, IN REM                     

 

 2017            AZEEM HARGROVE            Ot            J44.9 
           CHRONIC OBSTRUCTIVE PULMONARY DISEASE, U                     

 

 2017            AZEEM HARGROVE            Ot            R06.00
            DYSPNEA, UNSPECIFIED                     

 

 2017            AZEEM HARGROVE            Ot            
F17.210            NICOTINE DEPENDENCE, CIGARETTES, UNCOMPL                     

 

 2017            AZEEM HARGROVE            Ot            I25.10
            ATHSCL HEART DISEASE OF NATIVE CORONARY                      

 

 2017            AZEEM HARGROVE            Ot            I70.0 
           ATHEROSCLEROSIS OF AORTA                     

 

 2017            AZEEM HARGROVE            Ot            R91.8 
           OTHER NONSPECIFIC ABNORMAL FINDING OF ANDRY                     

 

 2017            MARIETTA ORTEZ MD            Ot            C02.9       
     MALIGNANT NEOPLASM OF TONGUE, UNSPECIFIE                     

 

 2017            MARIETTA ORTEZ MD            Ot            E03.9       
     HYPOTHYROIDISM, UNSPECIFIED                     

 

 2017            MARIETTA ORTEZ MD            Ot            I12.9       
     HYPERTENSIVE CHRONIC KIDNEY DISEASE W ST                     

 

 2017            MARIETTA ORTEZ MD            Ot            I25.10      
      ATHSCL HEART DISEASE OF NATIVE CORONARY                      

 

 2017            MARIETTA ORTEZ MD            Ot            N18.9       
     CHRONIC KIDNEY DISEASE, UNSPECIFIED                     

 

 2017            MARIETTA ORTEZ MD            Ot            Z79.899     
       OTHER LONG TERM (CURRENT) DRUG THERAPY                     

 

 2017            TAWIL MD, ELIAS A            Ot            N40.0        
    BENIGN PROSTATIC HYPERPLASIA WITHOUT LOW                     

 

 2017            TAWIL MD, ELIAS A            Ot            Z01.818      
      ENCOUNTER FOR OTHER PREPROCEDURAL EXAMIN                     

 

 2017                         Ot            141.9            MALIG CAR 
TONGUE NOS                     

 

 2017                         Ot            403.90            HYPTNSV CHR 
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                     

 

 2017                         Ot            414.00            CORON 
ATHEROSCLER NOS TYPE VESSEL, NATIV                     

 

 2017                         Ot            585.9            CHRONIC 
KIDNEY DISEASE, UNSPECIFIED                     

 

 2017                         Ot            733.90            BONE   
CARTILAGE DIS NOS                     

 

 2017                         Ot            V15.82            HISTORY OF 
TOBACCO USE                     

 

 2017                         Ot            V45.81            
AORTOCORONARY BYPASS                     

 

 2017                         Ot            V58.66            LONG-TERM (
CURRENT) USE OF ASPIRIN                     

 

 2017                         Ot            V58.69            OTH MED,LT,
CURRENT USE                     

 

 2017                         Ot            141.9            MALIG CAR 
TONGUE NOS                     

 

 2017                         Ot            733.90            BONE   
CARTILAGE DIS NOS                     

 

 2017                         Ot            141.9            MALIG CAR 
TONGUE NOS                     

 

 2017                         Ot            V72.83            EXAM PRE-
OPERATIVE NEC                     

 

 2017                         Ot            V74.8            SCREEN-
BACTERIAL DIS NEC                     

 

 2017                         Ot            141.9            MALIG CAR 
TONGUE NOS                     

 

 2017                         Ot            141.9            MALIG CAR 
TONGUE NOS                     

 

 2017                         Ot            141.9            MALIG CAR 
TONGUE NOS                     

 

 2017                         Ot            V72.83            EXAM PRE-
OPERATIVE NEC                     

 

 2017                         Ot            401.9            HYPERTENSION 
NOS                     

 

 2017                         Ot            414.00            CORON 
ATHEROSCLER NOS TYPE VESSEL, NATIV                     

 

 2017                         Ot            397.0            TRICUSPID 
VALVE DISEASE                     

 

 2017                         Ot            401.9            HYPERTENSION 
NOS                     

 

 2017                         Ot            414.00            CORON 
ATHEROSCLER NOS TYPE VESSEL, NATIV                     

 

 2017                         Ot            424.0            MITRAL VALVE 
DISORDER                     

 

 2017                         Ot            429.3            CARDIOMEGALY
                     

 

 2017                         Ot            141.9            MALIG CAR 
TONGUE NOS                     

 

 2017                         Ot            401.9            HYPERTENSION 
NOS                     

 

 2017                         Ot            786.05            SHORTNESS 
OF BREATH                     

 

 2017                         Ot            550.90            UNILAT 
INGUINAL HERNIA                     

 

 2017                         Ot            V72.63            PRE-
PROCEDURAL LABORATORY EXAMINATION                     

 

 2017                         Ot            V74.8            SCREEN-
BACTERIAL DIS NEC                     

 

 2017            PÉREZ BRIONES, MARIETTA            Ot            195.0       
     MAL CAR HEAD/FACE/NECK                     

 

 2017            ELIDIA ANTOINE MD            Ot            396.3       
     MITRAL/AORTIC SCOTT INSUFF                     

 

 2017            ELIDIA ANTOINE MD            Ot            397.0       
     TRICUSPID VALVE DISEASE                     

 

 2017            ELIDIA ANTOINE MD            Ot            414.00      
      CORON ATHEROSCLER NOS TYPE VESSEL, NATIV                     

 

 2017            ELIDIA ANTOINE MD            Ot            416.8       
     CHR PULMON HEART DIS NEC                     

 

 2017            ELIDIA ANTOINE MD            Ot            428.0       
     CONGESTIVE HEART FAILURE NOS                     

 

 2017            DYLAN BRIONES, EDGARDO ORELLANA            Ot            V10.01     
       HX OF TONGUE MALIGNANCY                     

 

 2017            ELIDIA ANTOINE MD            Ot            272.4       
     HYPERLIPIDEMIA NEC/NOS                     

 

 2017            ELIDIA ANTOINE MD            Ot            401.9       
     HYPERTENSION NOS                     

 

 2017            ELIDIA ANTOINE MD            Ot            414.00      
      CORON ATHEROSCLER NOS TYPE VESSEL, NATIV                     

 

 2017            ELIDIA ANTOINE MD            Ot            272.4       
     HYPERLIPIDEMIA NEC/NOS                     

 

 2017            ELIDIA ANTOINE MD            Ot            401.9       
     HYPERTENSION NOS                     

 

 2017            ELIDIA ANTOINE MD            Ot            414.00      
      CORON ATHEROSCLER NOS TYPE VESSEL, NATIV                     

 

 2017            ELIDIA ANTOINE MD            Ot            272.4       
     HYPERLIPIDEMIA NEC/NOS                     

 

 2017            ELIDIA ANTOINE MD            Ot            401.9       
     HYPERTENSION NOS                     

 

 2017            ELIDIA ANTOINE MD            Ot            414.9       
     CHR ISCHEMIC HRT DIS NOS                     

 

 2017            ELIDIA ANTOINE MD            Ot            433.10      
      CAROTID ARTERY OCCLUSION W O CEREBRAL IN                     

 

 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                      

 

 2017            SERENA SALAZAR DO            Ot            K92.1       
     MELENA                     

 

 2017            SERENA SALAZAR DO            Ot            Z01.818     
       ENCOUNTER FOR OTHER PREPROCEDURAL EXAMIN                     

 

 2017            ELIDIA ANTOINE MD            Ot            E78.2       
     MIXED HYPERLIPIDEMIA                     

 

 2017            ELIDIA ANTOINE MD            Ot            I10         
   ESSENTIAL (PRIMARY) HYPERTENSION                     

 

 2017            ELIDIA ANTOINE MD            Ot            I25.10      
      ATHSCL HEART DISEASE OF NATIVE CORONARY                      

 

 2017            ELIDIA ANTOINE MD            Ot            I65.23      
      OCCLUSION AND STENOSIS OF BILATERAL MI                     

 

 2017            ELIDIA ANTOINE MD            Ot            E78.2       
     MIXED HYPERLIPIDEMIA                     

 

 2017            ELIDIA ANTOINE MD            Ot            I10         
   ESSENTIAL (PRIMARY) HYPERTENSION                     

 

 2017            ELIDIA ANTOINE MD            Ot            I25.10      
      ATHSCL HEART DISEASE OF NATIVE CORONARY                      

 

 2017            ELIDIA ANTOINE MD            Ot            I50.22      
      CHRONIC SYSTOLIC (CONGESTIVE) HEART FAIL                     

 

 2017            ELIDIA ANTOINE MD            Ot            I65.23      
      OCCLUSION AND STENOSIS OF BILATERAL MI                     

 

 2017            ELIDIA ANTOINE MD            Ot            E78.2       
     MIXED HYPERLIPIDEMIA                     

 

 2017            ELIDIA ANTOINE MD            Ot            I10         
   ESSENTIAL (PRIMARY) HYPERTENSION                     

 

 2017            ELIDIA ANTOINE MD            Ot            I25.10      
      ATHSCL HEART DISEASE OF NATIVE CORONARY                      

 

 2017            ELIDIA ANTOINE MD            Ot            I65.23      
      OCCLUSION AND STENOSIS OF BILATERAL MI                     

 

 2017            MYAH OROZCO DO            Ot            J44.9       
     CHRONIC OBSTRUCTIVE PULMONARY DISEASE, U                     

 

 2017            ERNESTO MYAH BARAJAS M            Ot            R06.00      
      DYSPNEA, UNSPECIFIED                     

 

 2017            AZEEM HARGROVE            Ot            J44.9 
           CHRONIC OBSTRUCTIVE PULMONARY DISEASE, U                     

 

 2017            AZEEM HARGROVE            Ot            R06.00
            DYSPNEA, UNSPECIFIED                     

 

 2017            AZEEM HARGROVE            Ot            
F17.201            NICOTINE DEPENDENCE, UNSPECIFIED, IN REM                     

 

 2017            AZEEM HARGROVE            Ot            J44.9 
           CHRONIC OBSTRUCTIVE PULMONARY DISEASE, U                     

 

 2017            AZEEM HARGROVE            Ot            R06.00
            DYSPNEA, UNSPECIFIED                     

 

 2017            GILMER BRIONES, SABRINA SMITH            Ot            R42        
    DIZZINESS AND GIDDINESS                     

 

 2017            PÉREZ BRIONES, MARIETTA            Ot            C02.9       
     MALIGNANT NEOPLASM OF TONGUE, UNSPECIFIE                     

 

 2017            MARIETTA ORTEZ MD            Ot            E03.9       
     HYPOTHYROIDISM, UNSPECIFIED                     

 

 2017            MARIETTA ORTEZ MD            Ot            I12.9       
     HYPERTENSIVE CHRONIC KIDNEY DISEASE W ST                     

 

 2017            MARIETTA ORTEZ MD            Ot            I25.10      
      ATHSCL HEART DISEASE OF NATIVE CORONARY                      

 

 2017            MARIETTA ORTEZ MD            Ot            N18.9       
     CHRONIC KIDNEY DISEASE, UNSPECIFIED                     

 

 2017            MARIETTA ORTEZ MD            Ot            Z79.899     
       OTHER LONG TERM (CURRENT) DRUG THERAPY                     

 

 2017            AZEEM HARGROVE            Ot            
F17.210            NICOTINE DEPENDENCE, CIGARETTES, UNCOMPL                     

 

 2017            AZEEM HARGROVE            Ot            I25.10
            ATHSCL HEART DISEASE OF NATIVE CORONARY                      

 

 2017            AZEEM HARGROVE            Ot            I70.0 
           ATHEROSCLEROSIS OF AORTA                     

 

 2017            AZEEM HARGROVE            Ot            R91.8 
           OTHER NONSPECIFIC ABNORMAL FINDING OF ANDRY                     

 

 2017            AZEEM HARGROVE            Ot            
F17.210            NICOTINE DEPENDENCE, CIGARETTES, UNCOMPL                     

 

 2017            AZEEM HARGROVE            Ot            I25.10
            ATHSCL HEART DISEASE OF NATIVE CORONARY                      

 

 2017            AZEEM HARGROVE            Ot            I70.0 
           ATHEROSCLEROSIS OF AORTA                     

 

 2017            AZEEM HARGROVE            Ot            R91.8 
           OTHER NONSPECIFIC ABNORMAL FINDING OF ANDRY                     

 

 2017            TAWIL MD, ELIAS A            Ot            C61          
  MALIGNANT NEOPLASM OF PROSTATE                     

 

 2017            TAWIL MD, ELIAS A Ot            E78.5        
    HYPERLIPIDEMIA, UNSPECIFIED                     

 

 2017            TAWIL MD, ELIAS A Ot            G47.33       
     OBSTRUCTIVE SLEEP APNEA (ADULT) (PEDIATR                     

 

 2017            TAWIL MD, ELIAS A Ot            I11.0        
    HYPERTENSIVE HEART DISEASE WITH HEART FA                     

 

 2017            TAWIL MD, ELIAS A Ot            I25.10       
     ATHSCL HEART DISEASE OF NATIVE CORONARY                      

 

 2017            TAWIL MD, ELIAS A Ot            I50.9        
    HEART FAILURE, UNSPECIFIED                     

 

 2017            TAWIL MD, ELIAS A Ot            J44.9        
    CHRONIC OBSTRUCTIVE PULMONARY DISEASE, U                     

 

 2017            TAWIL MD, ELIAS A Ot            J45.909      
      UNSPECIFIED ASTHMA, UNCOMPLICATED                     

 

 2017            TAWIL MD, ELIAS A Ot            K21.9        
    GASTRO-ESOPHAGEAL REFLUX DISEASE WITHOUT                     

 

 2017            TAWIL MD, ELIAS A Ot            N40.0        
    BENIGN PROSTATIC HYPERPLASIA WITHOUT LOW                     

 

 2017            TAWIL MD, ELIAS A Ot            R97.20       
     ELEVATED PROSTATE SPECIFIC ANTIGEN [PSA]                     

 

 2017            TAWIL MD, ELIAS A Ot            Z79.899      
      OTHER LONG TERM (CURRENT) DRUG THERAPY                     

 

 2017            TAWIL MD, ELIAS A Ot            Z95.1        
    PRESENCE OF AORTOCORONARY BYPASS GRAFT                     

 

 2017            TAWIL MD, ELIAS A Ot            Z95.5        
    PRESENCE OF CORONARY ANGIOPLASTY IMPLANT                     

 

 2017            MYAH OROZCO DO            Ot            J44.9       
     CHRONIC OBSTRUCTIVE PULMONARY DISEASE, U                     

 

 2017            MYAH OROZCO DO            Ot            R06.00      
      DYSPNEA, UNSPECIFIED                     

 

 2017            MYAH OROZCO DO            Ot            J44.9       
     CHRONIC OBSTRUCTIVE PULMONARY DISEASE, U                     

 

 2017            MYAH OROZCO DO            Ot            R06.00      
      DYSPNEA, UNSPECIFIED                     

 

 2017            SABRINA SCHERER MD            Ot            R42        
    DIZZINESS AND GIDDINESS                     

 

 2017            TAWIL MD, ELIAS A            Ot            C61          
  MALIGNANT NEOPLASM OF PROSTATE                     

 

 2017            TAWIL MD, ELIAS A Ot            I70.0        
    ATHEROSCLEROSIS OF AORTA                     

 

 2017            TAWIL MD, ELIAS A Ot            I70.91       
     GENERALIZED ATHEROSCLEROSIS                     

 

 2017            TAWIL MD, ELIAS A            Ot            K63.89       
     OTHER SPECIFIED DISEASES OF INTESTINE                     

 

 2017            TAWIL MD, ELIAS A            Ot            K76.9        
    LIVER DISEASE, UNSPECIFIED                     

 

 2017            TAWIL MD, ELIAS A            Ot            K80.20       
     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                     

 

 2017            TAWIL MD, ELIAS A Ot            M47.812      
      SPONDYLOSIS W/O MYELOPATHY OR RADICULOPA                     

 

 2017            TAWIL MD, ELIAS A Ot            M85.89       
     OTH DISRD OF BONE DENSITY AND STRUCTURE,                     

 

 2017            TAWIL MD, ELIAS A Ot            N28.1        
    CYST OF KIDNEY, ACQUIRED                     

 

 2017            AZEEM HARGROVE            Ot            
F17.210            NICOTINE DEPENDENCE, CIGARETTES, UNCOMPL                     

 

 2017            AZEEM HARGROVE            Ot            I25.10
            ATHSCL HEART DISEASE OF NATIVE CORONARY                      

 

 2017            AZEEM HARGROVE            Ot            I70.0 
           ATHEROSCLEROSIS OF AORTA                     

 

 2017            AZEEM HARGROVE            Ot            R91.8 
           OTHER NONSPECIFIC ABNORMAL FINDING OF ANDRY                     

 

 2017            TAWIL MD, ELIAS A            Ot            C61          
  MALIGNANT NEOPLASM OF PROSTATE                     

 

 2017            TAWIL MD, ELIAS A            Ot            I70.0        
    ATHEROSCLEROSIS OF AORTA                     

 

 2017            TAWIL MD, ELIAS A            Ot            I70.91       
     GENERALIZED ATHEROSCLEROSIS                     

 

 2017            TAWIL MD, ELIAS A            Ot            K63.89       
     OTHER SPECIFIED DISEASES OF INTESTINE                     

 

 2017            TAWIL MD, ELIAS A            Ot            K76.9        
    LIVER DISEASE, UNSPECIFIED                     

 

 2017            TAWIL MD, ELIAS A            Ot            K80.20       
     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                     

 

 2017            TAWIL MD, ELIAS A Ot            M47.812      
      SPONDYLOSIS W/O MYELOPATHY OR RADICULOPA                     

 

 2017            TAWIL MD, ELIAS A Ot            M85.89       
     OTH DISRD OF BONE DENSITY AND STRUCTURE,                     

 

 2017            TAWIL MD, ELIAS A            Ot            N28.1        
    CYST OF KIDNEY, ACQUIRED                     

 

 2017            ASBRINA SCHERER MD            Ot            R42        
    DIZZINESS AND GIDDINESS                     

 

 2017            MARIETTA ORTEZ MD            Ot            C61         
   MALIGNANT NEOPLASM OF PROSTATE                     

 

 2017            MARIETTA ORTEZ MD, Ot            C61         
   MALIGNANT NEOPLASM OF PROSTATE                     

 

 2017            MARIETTA ORTEZ MD, Ot            C61         
   MALIGNANT NEOPLASM OF PROSTATE                     

 

 2017            MARIETTA ORTEZ MD            Ot            E03.9       
     HYPOTHYROIDISM, UNSPECIFIED                     

 

 2017            MARIETTA ORTEZ MD            Ot            Z51.0       
     ENCOUNTER FOR ANTINEOPLASTIC RADIATION T                     

 

 2017            MARIETTA ORTEZ MD            Ot            Z79.899     
       OTHER LONG TERM (CURRENT) DRUG THERAPY                     

 

 10/03/2017            MARIETTA ORTEZ MD            Ot            C61         
   MALIGNANT NEOPLASM OF PROSTATE                     

 

 10/03/2017            MARIETTA ORTEZ MD            Ot            E03.9       
     HYPOTHYROIDISM, UNSPECIFIED                     

 

 10/03/2017            MARIETTA ORTEZ MD, Ot            Z79.899     
       OTHER LONG TERM (CURRENT) DRUG THERAPY                     

 

 10/16/2017            SERENA SALAZAR DO            Ot            T82.338A    
        LEAKAGE OF OTHER VASCULAR GRAFTS, INITIA                     

 

 10/16/2017            SERENA SALAZAR DO            Ot            Z01.818     
       ENCOUNTER FOR OTHER PREPROCEDURAL EXAMIN                     

 

 10/16/2017                         Ot            J44.9            CHRONIC 
OBSTRUCTIVE PULMONARY DISEASE, U                     

 

 10/16/2017                         Ot            R06.00            DYSPNEA, 
UNSPECIFIED                     

 

 10/16/2017            GILMER BRIONES, SABRINA SMITH            Ot            R42        
    DIZZINESS AND GIDDINESS                     

 

 10/16/2017            MARIETTA ORTEZ MD            Ot            C61         
   MALIGNANT NEOPLASM OF PROSTATE                     

 

 10/16/2017            MARIETTA ORTEZ MD            Ot            E03.9       
     HYPOTHYROIDISM, UNSPECIFIED                     

 

 10/16/2017            MARIETTA ORTEZ MD            Ot            Z79.899     
       OTHER LONG TERM (CURRENT) DRUG THERAPY                     

 

 10/16/2017            MARTIN BARAJAS SERENA D            Ot            T82.338A    
        LEAKAGE OF OTHER VASCULAR GRAFTS, INITIA                     

 

 10/16/2017            SERENA SALAZAR DO            Ot            Z01.818     
       ENCOUNTER FOR OTHER PREPROCEDURAL EXAMIN                     

 

 10/16/2017            SERENA SALAZAR DO            Ot            T82.338A    
        LEAKAGE OF OTHER VASCULAR GRAFTS, INITIA                     

 

 10/16/2017            SERENA SALAZAR DO            Ot            Z01.818     
       ENCOUNTER FOR OTHER PREPROCEDURAL EXAMIN                     

 

 10/16/2017            SERENA SALAZAR DO            Ot            T82.338A    
        LEAKAGE OF OTHER VASCULAR GRAFTS, INITIA                     

 

 10/16/2017            SERENA SALAZAR DO            Ot            Z01.818     
       ENCOUNTER FOR OTHER PREPROCEDURAL EXAMIN                     

 

 10/17/2017            RADHAMES LATIF            Ot            
I11.0            HYPERTENSIVE HEART DISEASE WITH HEART FA                     

 

 10/17/2017            RADHAMES LATIF            Ot            
I25.10            ATHSCL HEART DISEASE OF NATIVE CORONARY                      

 

 10/17/2017            RADHAMES LATIF            Ot            
I50.22            CHRONIC SYSTOLIC (CONGESTIVE) HEART FAIL                     

 

 10/17/2017            RADHAMES LATIF            Ot            
I65.23            OCCLUSION AND STENOSIS OF BILATERAL MI                     

 

 10/19/2017            SERENA SALAZAR DO            Ot            E03.9       
     HYPOTHYROIDISM, UNSPECIFIED                     

 

 10/19/2017            SERENA SALAZAR DO            Ot            E78.5       
     HYPERLIPIDEMIA, UNSPECIFIED                     

 

 10/19/2017            SERENA SALAZAR DO            Ot            G47.33      
      OBSTRUCTIVE SLEEP APNEA (ADULT) (PEDIATR                     

 

 10/19/2017            SERENA SALAZAR DO            Ot            I11.0       
     HYPERTENSIVE HEART DISEASE WITH HEART FA                     

 

 10/19/2017            SERENA SALAZAR DO            Ot            I25.10      
      ATHSCL HEART DISEASE OF NATIVE CORONARY                      

 

 10/19/2017            SERENA SALAZAR DO            Ot            I50.9       
     HEART FAILURE, UNSPECIFIED                     

 

 10/19/2017            SERENA SALAZAR DO            Ot            I65.23      
      OCCLUSION AND STENOSIS OF BILATERAL MI                     

 

 10/19/2017            SERENA SALAZAR DO            Ot            J44.9       
     CHRONIC OBSTRUCTIVE PULMONARY DISEASE, U                     

 

 10/19/2017            SERENA SALAZAR DO            Ot            T82.534A    
        LEAKAGE OF INFUSION CATHETER, INITIAL EN                     

 

 10/19/2017            SERENA SALAZAR DO            Ot            Z79.82      
      LONG TERM (CURRENT) USE OF ASPIRIN                     

 

 10/19/2017            SERENA SALAZAR DO            Ot            Z79.899     
       OTHER LONG TERM (CURRENT) DRUG THERAPY                     

 

 10/19/2017            SERENA SALAZAR DO            Ot            Z87.891     
       PERSONAL HISTORY OF NICOTINE DEPENDENCE                     

 

 10/19/2017            SERENA SALAZAR DO            Ot            Z95.1       
     PRESENCE OF AORTOCORONARY BYPASS GRAFT                     

 

 10/19/2017            SERENA SALAZAR DO            Ot            Z95.5       
     PRESENCE OF CORONARY ANGIOPLASTY IMPLANT                     

 

 10/20/2017            SERENA SALAZAR DO            Ot            E03.9       
     HYPOTHYROIDISM, UNSPECIFIED                     

 

 10/20/2017            SERENA SALAZAR DO            Ot            E78.5       
     HYPERLIPIDEMIA, UNSPECIFIED                     

 

 10/20/2017            SERENA SALAZAR DO            Ot            G47.33      
      OBSTRUCTIVE SLEEP APNEA (ADULT) (PEDIATR                     

 

 10/20/2017            SERENA SALAZAR DO            Ot            I11.0       
     HYPERTENSIVE HEART DISEASE WITH HEART FA                     

 

 10/20/2017            SERENA SALAZAR DO            Ot            I25.10      
      ATHSCL HEART DISEASE OF NATIVE CORONARY                      

 

 10/20/2017            SERENA SALAZAR DO            Ot            I50.9       
     HEART FAILURE, UNSPECIFIED                     

 

 10/20/2017            SERENA SALAZAR DO            Ot            I65.23      
      OCCLUSION AND STENOSIS OF BILATERAL MI                     

 

 10/20/2017            SERENA SALAZAR DO            Ot            J44.9       
     CHRONIC OBSTRUCTIVE PULMONARY DISEASE, U                     

 

 10/20/2017            SERENA SALAZAR DO            Ot            T82.534A    
        LEAKAGE OF INFUSION CATHETER, INITIAL EN                     

 

 10/20/2017            SERENA SALAZAR DO            Ot            Z79.82      
      LONG TERM (CURRENT) USE OF ASPIRIN                     

 

 10/20/2017            SERENA SALAZAR DO            Ot            Z79.899     
       OTHER LONG TERM (CURRENT) DRUG THERAPY                     

 

 10/20/2017            SERENA SALAZAR DO            Ot            Z87.891     
       PERSONAL HISTORY OF NICOTINE DEPENDENCE                     

 

 10/20/2017            SERENA SALAZAR DO            Ot            Z95.1       
     PRESENCE OF AORTOCORONARY BYPASS GRAFT                     

 

 10/20/2017            SERENA SALAZAR DO            Ot            Z95.5       
     PRESENCE OF CORONARY ANGIOPLASTY IMPLANT                     

 

 2017            MARIETTA ORTEZ MD            Ot            C61         
   MALIGNANT NEOPLASM OF PROSTATE                     

 

 2017            MARIETTA ORTEZ MD            Ot            E03.9       
     HYPOTHYROIDISM, UNSPECIFIED                     

 

 2017            MARIETTA ORTEZ MD            Ot            Z79.899     
       OTHER LONG TERM (CURRENT) DRUG THERAPY                     

 

 2017            MARIETTA ORTEZ MD            Ot            C61         
   MALIGNANT NEOPLASM OF PROSTATE                     

 

 2017            MARIETTA ORTEZ MD            Ot            E03.9       
     HYPOTHYROIDISM, UNSPECIFIED                     

 

 2017            MARIETTA ORTEZ MD            Ot            Z51.0       
     ENCOUNTER FOR ANTINEOPLASTIC RADIATION T                     

 

 2017            MARIETTA ORTEZ MD            Ot            Z79.899     
       OTHER LONG TERM (CURRENT) DRUG THERAPY                     

 

 2018            MARIETTA ORTEZ MD            Ot            C61         
   MALIGNANT NEOPLASM OF PROSTATE                     

 

 2018            MARIETTA ORTEZ MD            Ot            E03.9       
     HYPOTHYROIDISM, UNSPECIFIED                     

 

 2018            MARIETTA ORTEZ MD            Ot            Z51.0       
     ENCOUNTER FOR ANTINEOPLASTIC RADIATION T                     

 

 2018            PÉREZ BRIONES, MARIETTA            Ot            Z79.899     
       OTHER LONG TERM (CURRENT) DRUG THERAPY                     

 

 2018                         Ot            141.9            MALIG CAR 
TONGUE NOS                     

 

 2018                         Ot            401.9            HYPERTENSION 
NOS                     

 

 2018                         Ot            786.05            SHORTNESS 
OF BREATH                     

 

 2018                         Ot            550.90            UNILAT 
INGUINAL HERNIA                     

 

 2018                         Ot            V72.63            PRE-
PROCEDURAL LABORATORY EXAMINATION                     

 

 2018                         Ot            V74.8            SCREEN-
BACTERIAL DIS NEC                     

 

 2018            PÉREZ BRIONES, MARIETTA            Ot            195.0       
     MAL CAR HEAD/FACE/NECK                     

 

 2018            ELIDIA ANTOINE MD            Ot            396.3       
     MITRAL/AORTIC SCOTT INSUFF                     

 

 2018            ELIDIA ANTOINE MD            Ot            397.0       
     TRICUSPID VALVE DISEASE                     

 

 2018            ELIDIA ANTOINE MD            Ot            414.00      
      CORON ATHEROSCLER NOS TYPE VESSEL, NATIV                     

 

 2018            ELIDIA ANTOINE MD            Ot            416.8       
     CHR PULMON HEART DIS NEC                     

 

 2018            ELIDIA ANTOINE MD            Ot            428.0       
     CONGESTIVE HEART FAILURE NOS                     

 

 2018            DYLAN BRIONES, EDGARDO ORELLANA            Ot            V10.01     
       HX OF TONGUE MALIGNANCY                     

 

 2018            ELIDIA ANTOINE MD            Ot            272.4       
     HYPERLIPIDEMIA NEC/NOS                     

 

 2018            ELIDIA ANTOINE MD            Ot            401.9       
     HYPERTENSION NOS                     

 

 2018            ELIDIA ANTOINE MD            Ot            414.00      
      CORON ATHEROSCLER NOS TYPE VESSEL, NATIV                     

 

 2018            ELIDIA ANTOINE MD            Ot            272.4       
     HYPERLIPIDEMIA NEC/NOS                     

 

 2018            ELIDIA ANTOINE MD            Ot            401.9       
     HYPERTENSION NOS                     

 

 2018            ELIDIA ANTOINE MD            Ot            414.00      
      CORON ATHEROSCLER NOS TYPE VESSEL, NATIV                     

 

 2018            ELIDIA ANTOINE MD            Ot            272.4       
     HYPERLIPIDEMIA NEC/NOS                     

 

 2018            ELIDIA ANTOINE MD            Ot            401.9       
     HYPERTENSION NOS                     

 

 2018            ELIDIA ANTOINE MD            Ot            414.9       
     CHR ISCHEMIC HRT DIS NOS                     

 

 2018            ELIDIA ANTOINE MD            Ot            433.10      
      CAROTID ARTERY OCCLUSION W O CEREBRAL IN                     

 

 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                      

 

 2018            SERENA SALAZAR DO            Ot            K92.1       
     MELENA                     

 

 2018            SERENA SALAZAR DO            Ot            Z01.818     
       ENCOUNTER FOR OTHER PREPROCEDURAL EXAMIN                     

 

 2018            ELIDIA ANTOINE MD            Ot            E78.2       
     MIXED HYPERLIPIDEMIA                     

 

 2018            ELIDIA ANTOINE MD            Ot            I10         
   ESSENTIAL (PRIMARY) HYPERTENSION                     

 

 2018            ELIDIA ANTOINE MD            Ot            I25.10      
      ATHSCL HEART DISEASE OF NATIVE CORONARY                      

 

 2018            ELIDIA ANTOINE MD            Ot            I65.23      
      OCCLUSION AND STENOSIS OF BILATERAL MI                     

 

 2018            ELIDIA ANTOINE MD            Ot            E78.2       
     MIXED HYPERLIPIDEMIA                     

 

 2018            ELIDIA ANTOINE MD            Ot            I10         
   ESSENTIAL (PRIMARY) HYPERTENSION                     

 

 2018            ELIDIA ANTOINE MD            Ot            I25.10      
      ATHSCL HEART DISEASE OF NATIVE CORONARY                      

 

 2018            ELIDIA ANTOINE MD            Ot            I50.22      
      CHRONIC SYSTOLIC (CONGESTIVE) HEART FAIL                     

 

 2018            ELIDIA ANTOINE MD            Ot            I65.23      
      OCCLUSION AND STENOSIS OF BILATERAL MI                     

 

 2018            ELIDIA ANTOINE MD            Ot            E78.2       
     MIXED HYPERLIPIDEMIA                     

 

 2018            ELIDIA ANTOINE MD            Ot            I10         
   ESSENTIAL (PRIMARY) HYPERTENSION                     

 

 2018            ELIDIA ANTOINE MD            Ot            I25.10      
      ATHSCL HEART DISEASE OF NATIVE CORONARY                      

 

 2018            ELIDIA ANTOINE MD            Ot            I65.23      
      OCCLUSION AND STENOSIS OF BILATERAL MI                     

 

 2018            RADHAMES LATIF            Ot            
I11.0            HYPERTENSIVE HEART DISEASE WITH HEART FA                     

 

 2018            RADHAMES LATIF            Ot            
I25.10            ATHSCL HEART DISEASE OF NATIVE CORONARY                      

 

 2018            RADHAMES LATIF            Ot            
I50.22            CHRONIC SYSTOLIC (CONGESTIVE) HEART FAIL                     

 

 2018            RADHAMES LATIF            Ot            
I65.23            OCCLUSION AND STENOSIS OF BILATERAL MI                     

 

 2018            AZEEM HARGROVE            Ot            J44.9 
           CHRONIC OBSTRUCTIVE PULMONARY DISEASE, U                     

 

 2018            AZEEM HARGROVE            Ot            R06.00
            DYSPNEA, UNSPECIFIED                     

 

 2018            AZEEM HARGROVE            Ot            
F17.201            NICOTINE DEPENDENCE, UNSPECIFIED, IN REM                     

 

 2018            AZEEM HARGROVE            Ot            J44.9 
           CHRONIC OBSTRUCTIVE PULMONARY DISEASE, U                     

 

 2018            AZEEM HARGROVE            Ot            R06.00
            DYSPNEA, UNSPECIFIED                     

 

 2018            AZEEM HARGROVE            Ot            
F17.210            NICOTINE DEPENDENCE, CIGARETTES, UNCOMPL                     

 

 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
            ATHSCL HEART DISEASE OF NATIVE CORONARY                      

 

 2018            AZEEM HARGROVE            Ot            I70.0 
           ATHEROSCLEROSIS OF AORTA                     

 

 2018            AZEEM HARGROVE            Ot            R91.8 
           OTHER NONSPECIFIC ABNORMAL FINDING OF ANDRY                     

 

 2018            TAWIL MD, ELIAS A Ot            C61          
  MALIGNANT NEOPLASM OF PROSTATE                     

 

 2018            TAWIL MD, ELIAS A Ot            I70.0        
    ATHEROSCLEROSIS OF AORTA                     

 

 2018            TAWIL MD, ELIAS A            Ot            I70.91       
     GENERALIZED ATHEROSCLEROSIS                     

 

 2018            TAWIL MD, ELIAS A            Ot            K63.89       
     OTHER SPECIFIED DISEASES OF INTESTINE                     

 

 2018            TAWIL MD, ELIAS A            Ot            K76.9        
    LIVER DISEASE, UNSPECIFIED                     

 

 2018            TAWIL MD, ELIAS A            Ot            K80.20       
     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                     

 

 2018            TAWIL MD, ELIAS A Ot            M47.812      
      SPONDYLOSIS W/O MYELOPATHY OR RADICULOPA                     

 

 2018            TAWIL MD, ELIAS A Ot            M85.89       
     OTH DISRD OF BONE DENSITY AND STRUCTURE,                     

 

 2018            TAWIL MD, ELIAS A            Ot            N28.1        
    CYST OF KIDNEY, ACQUIRED                     

 

 2018                         Ot            J44.9            CHRONIC 
OBSTRUCTIVE PULMONARY DISEASE, U                     

 

 2018                         Ot            R06.00            DYSPNEA, 
UNSPECIFIED                     

 

 2018            GILMER BRIONES, SABRINA SMITH            Ot            R42        
    DIZZINESS AND GIDDINESS                     

 

 2018            MARIETTA ORTEZ MD, Ot            C61         
   MALIGNANT NEOPLASM OF PROSTATE                     

 

 2018            MARIETTA ORTEZ MD            Ot            E03.9       
     HYPOTHYROIDISM, UNSPECIFIED                     

 

 2018            MARIETTA ORTEZ MD            Ot            Z79.899     
       OTHER LONG TERM (CURRENT) DRUG THERAPY                     

 

 2018            ELIDIA ANTOINE MD            Ot            E78.2       
     MIXED HYPERLIPIDEMIA                     

 

 2018            ELIDIA ANTOINE MD            Ot            I10         
   ESSENTIAL (PRIMARY) HYPERTENSION                     

 

 2018            ELIDIA ANTOINE MD            Ot            I25.10      
      ATHSCL HEART DISEASE OF NATIVE CORONARY                      

 

 2018            ELIDIA ANTOINE MD            Ot            I65.23      
      OCCLUSION AND STENOSIS OF BILATERAL MI                     

 

 2018            ELIDIA ANTOINE MD            Ot            R00.1       
     BRADYCARDIA, UNSPECIFIED                     

 

 2018            ELIDIA ANTOINE MD            Ot            R06.00      
      DYSPNEA, UNSPECIFIED                     

 

 2018            ELIDIA ANTOINE MD            Ot            E78.2       
     MIXED HYPERLIPIDEMIA                     

 

 2018            ELIDIA ANTOINE MD            Ot            I10         
   ESSENTIAL (PRIMARY) HYPERTENSION                     

 

 2018            ELIDIA ANTOINE MD            Ot            I25.10      
      ATHSCL HEART DISEASE OF NATIVE CORONARY                      

 

 2018            ELIDIA ANTOINE MD            Ot            I65.23      
      OCCLUSION AND STENOSIS OF BILATERAL MI                     

 

 2018            ELIDIA ANTOINE MD            Ot            R00.1       
     BRADYCARDIA, UNSPECIFIED                     

 

 2018            ELIDIA ANTOINE MD            Ot            R06.00      
      DYSPNEA, UNSPECIFIED                     

 

 2018            ELIDIA ANTOINE MD            Ot            E78.2       
     MIXED HYPERLIPIDEMIA                     

 

 2018            ELIDIA ANTOINE MD            Ot            I10         
   ESSENTIAL (PRIMARY) HYPERTENSION                     

 

 2018            ELIDIA ANTOINE MD            Ot            I25.10      
      ATHSCL HEART DISEASE OF NATIVE CORONARY                      

 

 2018            ELIDIA ANTOINE MD            Ot            I65.23      
      OCCLUSION AND STENOSIS OF BILATERAL MI                     

 

 2018            ELIDIA ANTOINE MD            Ot            R00.1       
     BRADYCARDIA, UNSPECIFIED                     

 

 2018            ELIDIA ANTOINE MD            Ot            R06.00      
      DYSPNEA, UNSPECIFIED                     

 

 2018            MARIETTA ORTEZ MD            Ot            C61         
   MALIGNANT NEOPLASM OF PROSTATE                     

 

 2018            MARIETTA ORTEZ MD            Ot            E03.9       
     HYPOTHYROIDISM, UNSPECIFIED                     

 

 2018            MARIETTA ORTEZ MD            Ot            Z79.899     
       OTHER LONG TERM (CURRENT) DRUG THERAPY                     

 

 2018            MARIETTA ORTEZ MD            Ot            C61         
   MALIGNANT NEOPLASM OF PROSTATE                     

 

 2018            MARIETTA ORTEZ MD            Ot            E03.9       
     HYPOTHYROIDISM, UNSPECIFIED                     

 

 2018            MARIETTA ORTEZ MD            Ot            Z79.899     
       OTHER LONG TERM (CURRENT) DRUG THERAPY                     

 

 2018            METZGER DO, JOSÉ J            Ot            J18.1   
         LOBAR PNEUMONIA, UNSPECIFIED ORGANISM                     

 

 2018            METZGER DO, JOSÉ J            Ot            J18.9   
         PNEUMONIA, UNSPECIFIED ORGANISM                     

 

 2018            METZGER DO, JOSÉ J            Ot            R04.2   
         HEMOPTYSIS                     

 

 2018            METZGER DO, JOSÉ J            Ot            J18.1   
         LOBAR PNEUMONIA, UNSPECIFIED ORGANISM                     

 

 2018            METZGER DO, JOSÉ J            Ot            J18.9   
         PNEUMONIA, UNSPECIFIED ORGANISM                     

 

 2018            METZGER DO, JOSÉ J            Ot            R04.2   
         HEMOPTYSIS                     

 

 2018            METZGER DO, JOSÉ J            Ot            J18.9   
         PNEUMONIA, UNSPECIFIED ORGANISM                     

 

 2018            METZGER DO, JOSÉ J            Ot            R04.2   
         HEMOPTYSIS                     

 

 2018            METZGER DO, JOSÉ J            Ot            J18.1   
         LOBAR PNEUMONIA, UNSPECIFIED ORGANISM                     

 

 2018            AZEEM HARGROVE            Ot            J44.1 
           CHRONIC OBSTRUCTIVE PULMONARY DISEASE W                      

 

 2018            AZEEM HARGROVE            Ot            R91.8 
           OTHER NONSPECIFIC ABNORMAL FINDING OF ANDRY                     

 

 2018            METZGER DO, JOSÉ J            Ot            J18.9   
         PNEUMONIA, UNSPECIFIED ORGANISM                     

 

 2018            METZGER DO, JOSÉ J            Ot            J44.9   
         CHRONIC OBSTRUCTIVE PULMONARY DISEASE, U                     

 

 2018            AZEEM HARGROVE            Ot            J44.1 
           CHRONIC OBSTRUCTIVE PULMONARY DISEASE W                      

 

 2018            AZEEM HARGROVE            Ot            R91.8 
           OTHER NONSPECIFIC ABNORMAL FINDING OF ANDRY                     

 

 2018            MARIETTA ORTEZ MD            Ot            C61         
   MALIGNANT NEOPLASM OF PROSTATE                     

 

 2018            MARIETTA ORTEZ MD            Ot            E03.9       
     HYPOTHYROIDISM, UNSPECIFIED                     

 

 2018            MARIETTA ORTEZ MD            Ot            Z79.899     
       OTHER LONG TERM (CURRENT) DRUG THERAPY                     

 

 2018            MARIETTA ORTEZ MD            Ot            C61         
   MALIGNANT NEOPLASM OF PROSTATE                     

 

 2018            MARIETTA ORTEZ MD            Ot            E03.9       
     HYPOTHYROIDISM, UNSPECIFIED                     

 

 2018            MARIETTA ORTEZ MD            Ot            Z79.899     
       OTHER LONG TERM (CURRENT) DRUG THERAPY                     

 

 2018            AZEEM HARGROVE            Ot            G47.33
            OBSTRUCTIVE SLEEP APNEA (ADULT) (PEDIATR                     

 

 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
            IDIO SLEEP RELATED NONOBSTRUCTIVE ALVEOL                     

 

 2018            AZEEM HARGROVE            Ot            G47.33
            OBSTRUCTIVE SLEEP APNEA (ADULT) (PEDIATR                     

 

 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
            IDIO SLEEP RELATED NONOBSTRUCTIVE ALVEOL                     

 

 2018                         Ot            141.9            MALIG CAR 
TONGUE NOS                     

 

 2018                         Ot            401.9            HYPERTENSION 
NOS                     

 

 2018                         Ot            786.05            SHORTNESS 
OF BREATH                     

 

 2018                         Ot            550.90            UNILAT 
INGUINAL HERNIA                     

 

 2018                         Ot            V72.63            PRE-
PROCEDURAL LABORATORY EXAMINATION                     

 

 2018                         Ot            V74.8            SCREEN-
BACTERIAL DIS NEC                     

 

 2018            PÉREZ BRIONES, MARIETTA            Ot            195.0       
     MAL CAR HEAD/FACE/NECK                     

 

 2018            ELIDIA ANTOINE MD            Ot            396.3       
     MITRAL/AORTIC SCOTT INSUFF                     

 

 2018            ELIDIA ANTOINE MD            Ot            397.0       
     TRICUSPID VALVE DISEASE                     

 

 2018            ELIDIA ANTOINE MD            Ot            414.00      
      CORON ATHEROSCLER NOS TYPE VESSEL, NATIV                     

 

 2018            ELIDIA ANTOINE MD            Ot            416.8       
     CHR PULMON HEART DIS NEC                     

 

 2018            ELIDIA ANTOINE MD            Ot            428.0       
     CONGESTIVE HEART FAILURE NOS                     

 

 2018            DYLAN BRIONES, EDGARDO ORELLANA            Ot            V10.01     
       HX OF TONGUE MALIGNANCY                     

 

 2018            ELIDIA ANTOINE MD            Ot            272.4       
     HYPERLIPIDEMIA NEC/NOS                     

 

 2018            ELIDIA ANTOINE MD            Ot            401.9       
     HYPERTENSION NOS                     

 

 2018            ELIDIA ANTOINE MD            Ot            414.00      
      CORON ATHEROSCLER NOS TYPE VESSEL, NATIV                     

 

 2018            ELIDIA ANTOINE MD            Ot            272.4       
     HYPERLIPIDEMIA NEC/NOS                     

 

 2018            ELIDIA ANTOINE MD            Ot            401.9       
     HYPERTENSION NOS                     

 

 2018            ELIDIA ANTOINE MD            Ot            414.00      
      CORON ATHEROSCLER NOS TYPE VESSEL, NATIV                     

 

 2018            ELIDIA ANTOINE MD            Ot            272.4       
     HYPERLIPIDEMIA NEC/NOS                     

 

 2018            ELIDIA ANTOINE MD            Ot            401.9       
     HYPERTENSION NOS                     

 

 2018            ELIDIA ANTOINE MD            Ot            414.9       
     CHR ISCHEMIC HRT DIS NOS                     

 

 2018            ELIDIA ANTOINE MD            Ot            433.10      
      CAROTID ARTERY OCCLUSION W O CEREBRAL IN                     

 

 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                      

 

 2018            SERENA SALAZAR DO            Ot            K92.1       
     MELENA                     

 

 2018            SERENA SALAZAR DO            Ot            Z01.818     
       ENCOUNTER FOR OTHER PREPROCEDURAL EXAMIN                     

 

 2018            ELIDIA ANTOINE MD, Ot            E78.2       
     MIXED HYPERLIPIDEMIA                     

 

 2018            ELIDIA ANTOINE MD            Ot            I10         
   ESSENTIAL (PRIMARY) HYPERTENSION                     

 

 2018            ELIDIA ANTOINE MD            Ot            I25.10      
      ATHSCL HEART DISEASE OF NATIVE CORONARY                      

 

 2018            ELIDIA ANTOINE MD            Ot            I65.23      
      OCCLUSION AND STENOSIS OF BILATERAL MI                     

 

 2018            ELIDIA ANTOINE MD            Ot            E78.2       
     MIXED HYPERLIPIDEMIA                     

 

 2018            ELIDIA ANTOINE MD, Ot            I10         
   ESSENTIAL (PRIMARY) HYPERTENSION                     

 

 2018            ELIDIA ANTOINE MD            Ot            I25.10      
      ATHSCL HEART DISEASE OF NATIVE CORONARY                      

 

 2018            ELIDIA ANTOINE MD            Ot            I50.22      
      CHRONIC SYSTOLIC (CONGESTIVE) HEART FAIL                     

 

 2018            ELIDIA ANTOINE MD            Ot            I65.23      
      OCCLUSION AND STENOSIS OF BILATERAL MI                     

 

 2018            ELIDIA ANTOINE MD            Ot            E78.2       
     MIXED HYPERLIPIDEMIA                     

 

 2018            ELIDIA ANTOINE MD            Ot            I10         
   ESSENTIAL (PRIMARY) HYPERTENSION                     

 

 2018            ELIDIA ANTOINE MD            Ot            I25.10      
      ATHSCL HEART DISEASE OF NATIVE CORONARY                      

 

 2018            ELIDIA ANTOINE MD            Ot            I65.23      
      OCCLUSION AND STENOSIS OF BILATERAL MI                     

 

 2018            RADHAMES LATIF            Ot            
I11.0            HYPERTENSIVE HEART DISEASE WITH HEART FA                     

 

 2018            RADHAMES LATIF            Ot            
I25.10            ATHSCL HEART DISEASE OF NAT[place] CORONARY                      

 

 2018            RADHAMES LATIF            Ot            
I50.22            CHRONIC SYSTOLIC (CONGESTIVE) HEART FAIL                     

 

 2018            RADHAMES LATIF            Ot            
I65.23            OCCLUSION AND STENOSIS OF BILATERAL MI                     

 

 2018            AZEEM HARGROVE            Ot            J44.9 
           CHRONIC OBSTRUCTIVE PULMONARY DISEASE, U                     

 

 2018            AZEEM HARGROVE            Ot            R06.00
            DYSPNEA, UNSPECIFIED                     

 

 2018            AZEEM HARGROVE            Ot            
F17.201            NICOTINE DEPENDENCE, UNSPECIFIED, IN REM                     

 

 2018            AZEEM HARGROVE            Ot            J44.9 
           CHRONIC OBSTRUCTIVE PULMONARY DISEASE, U                     

 

 2018            AZEEM HARGROVE            Ot            R06.00
            DYSPNEA, UNSPECIFIED                     

 

 2018            AZEEM HARGROVE            Ot            
F17.210            NICOTINE DEPENDENCE, CIGARETTES, UNCOMPL                     

 

 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
            ATHSCL HEART DISEASE OF NATIVE CORONARY                      

 

 2018            AZEEM HARGROVE            Ot            I70.0 
           ATHEROSCLEROSIS OF AORTA                     

 

 2018            AZEEM HARGROVE            Ot            R91.8 
           OTHER NONSPECIFIC ABNORMAL FINDING OF ANDRY                     

 

 2018            TAWIL MD, ELIAS A            Ot            C61          
  MALIGNANT NEOPLASM OF PROSTATE                     

 

 2018            TAWIL MD, ELIAS A            Ot            I70.0        
    ATHEROSCLEROSIS OF AORTA                     

 

 2018            TAWIL MD, ELIAS A            Ot            I70.91       
     GENERALIZED ATHEROSCLEROSIS                     

 

 2018            TAWIL MD, ELIAS A            Ot            K63.89       
     OTHER SPECIFIED DISEASES OF INTESTINE                     

 

 2018            TAWIL MD, ELIAS A            Ot            K76.9        
    LIVER DISEASE, UNSPECIFIED                     

 

 2018            TAWIL MD, ELIAS A            Ot            K80.20       
     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                     

 

 2018            TAWIL MD, ELIAS A Ot            M47.812      
      SPONDYLOSIS W/O MYELOPATHY OR RADICULOPA                     

 

 2018            TAWIL MD, ELIAS A            Ot            M85.89       
     OTH DISRD OF BONE DENSITY AND STRUCTURE,                     

 

 2018            TAWIL MD, ELIAS A            Ot            N28.1        
    CYST OF KIDNEY, ACQUIRED                     

 

 2018                         Ot            J44.9            CHRONIC 
OBSTRUCTIVE PULMONARY DISEASE, U                     

 

 2018                         Ot            R06.00            DYSPNEA, 
UNSPECIFIED                     

 

 2018            GILMER BRIONES, SABRINA SMITH            Ot            R42        
    DIZZINESS AND GIDDINESS                     

 

 2018            ELIDIA ANTOINE MD            Ot            E78.2       
     MIXED HYPERLIPIDEMIA                     

 

 2018            ELIDIA ANTOINE MD, Ot            I10         
   ESSENTIAL (PRIMARY) HYPERTENSION                     

 

 2018            ELIDIA ANTOINE MD, Ot            I25.10      
      ATHSCL HEART DISEASE OF NATIVE CORONARY                      

 

 2018            ELIDIA ANTOINE MD            Ot            I65.23      
      OCCLUSION AND STENOSIS OF BILATERAL MI                     

 

 2018            ELIDIA ANTOINE MD            Ot            R00.1       
     BRADYCARDIA, UNSPECIFIED                     

 

 2018            ELIDIA ANTOINE MD            Ot            R06.00      
      DYSPNEA, UNSPECIFIED                     

 

 2018            JOSÉ METZGER DO, Ot            J18.1   
         LOBAR PNEUMONIA, UNSPECIFIED ORGANISM                     

 

 2018            JOSÉ METZGER DO, Ot            J18.9   
         PNEUMONIA, UNSPECIFIED ORGANISM                     

 

 2018            JOSÉ METZGER DO            Ot            R04.2   
         HEMOPTYSIS                     

 

 2018            AZEEM HARGROVE            Ot            J44.1 
           CHRONIC OBSTRUCTIVE PULMONARY DISEASE W                      

 

 2018            AZEEM HARGROVE            Ot            R91.8 
           OTHER NONSPECIFIC ABNORMAL FINDING OF ANDRY                     

 

 2018            JOSÉ METZGER DO, Ot            J18.9   
         PNEUMONIA, UNSPECIFIED ORGANISM                     

 

 2018            JOSÉ METZGER DO, Ot            J44.9   
         CHRONIC OBSTRUCTIVE PULMONARY DISEASE, U                     

 

 2018            MARIETTA ORTEZ MD            Ot            C61         
   MALIGNANT NEOPLASM OF PROSTATE                     

 

 2018            MARIETTA ORTEZ MD            Ot            E03.9       
     HYPOTHYROIDISM, UNSPECIFIED                     

 

 2018            MARIETTA ORTEZ MD            Ot            Z79.899     
       OTHER LONG TERM (CURRENT) DRUG THERAPY                     

 

 05/10/2018            JOSÉ METZGER DO, Ot            J18.9   
         PNEUMONIA, UNSPECIFIED ORGANISM                     

 

 05/10/2018            JOSÉ METZGER DO, Ot            J44.9   
         CHRONIC OBSTRUCTIVE PULMONARY DISEASE, U                     

 

 06/10/2018            RADHA KRUSE MD            Ot            E78.00      
      PURE HYPERCHOLESTEROLEMIA, UNSPECIFIED                     

 

 06/10/2018            RADHA KRUSE MD            Ot            G47.30      
      SLEEP APNEA, UNSPECIFIED                     

 

 06/10/2018            RADHA KRUSE MD            Ot            I10         
   ESSENTIAL (PRIMARY) HYPERTENSION                     

 

 06/10/2018            RADHA KRUSE MD            Ot            I25.10      
      ATHSCL HEART DISEASE OF NATIVE CORONARY                      

 

 06/10/2018            RADHA KRUSE MD            Ot            I25.2       
     OLD MYOCARDIAL INFARCTION                     

 

 06/10/2018            RADHA KRUSE MD, Ot            J18.9       
     PNEUMONIA, UNSPECIFIED ORGANISM                     

 

 06/10/2018            RADHA KRUSE MD            Ot            J44.9       
     CHRONIC OBSTRUCTIVE PULMONARY DISEASE, U                     

 

 06/10/2018            RADHA KRUSE MD            Ot            K21.9       
     GASTRO-ESOPHAGEAL REFLUX DISEASE WITHOUT                     

 

 06/10/2018            RADHA KRUSE MD            Ot            N40.0       
     BENIGN PROSTATIC HYPERPLASIA WITHOUT LOW                     

 

 06/10/2018            RADHA KRUSE MD            Ot            R04.0       
     EPISTAXIS                     

 

 06/10/2018            RADHA KRUSE MD            Ot            Z79.51      
      LONG TERM (CURRENT) USE OF INHALED STERO                     

 

 06/10/2018            RADHA KRUSE MD            Ot            Z79.82      
      LONG TERM (CURRENT) USE OF ASPIRIN                     

 

 06/10/2018            RADHA KRUSE MD            Ot            Z85.46      
      PERSONAL HISTORY OF MALIGNANT NEOPLASM O                     

 

 06/10/2018            RADHA KRUSE MD            Ot            Z85.810     
       PERSONAL HISTORY OF MALIGNANT NEOPLASM O                     

 

 06/10/2018            RADHA KRUSE MD            Ot            Z87.891     
       PERSONAL HISTORY OF NICOTINE DEPENDENCE                     

 

 06/10/2018            RADHA KRUSE MD            Ot            Z92.21      
      PERSONAL HISTORY OF ANTINEOPLASTIC CHEMO                     

 

 06/10/2018            RADHA KRUSE MD            Ot            Z92.3       
     PERSONAL HISTORY OF IRRADIATION                     

 

 06/10/2018            RADHA KRUSE MD            Ot            Z95.5       
     PRESENCE OF CORONARY ANGIOPLASTY IMPLANT                     

 

 06/10/2018            RADHA KRUSE MD            Ot            Z99.81      
      DEPENDENCE ON SUPPLEMENTAL OXYGEN                     

 

 2018            RADHA KRUSE MD            Ot            E78.00      
      PURE HYPERCHOLESTEROLEMIA, UNSPECIFIED                     

 

 2018            RADHA KRUSE MD            Ot            G47.30      
      SLEEP APNEA, UNSPECIFIED                     

 

 2018            RADHA KRUSE MD            Ot            I10         
   ESSENTIAL (PRIMARY) HYPERTENSION                     

 

 2018            RADHA KRUSE MD            Ot            I25.10      
      ATHSCL HEART DISEASE OF NATIVE CORONARY                      

 

 2018            RADHA KRSUE MD            Ot            I25.2       
     OLD MYOCARDIAL INFARCTION                     

 

 2018            RADHA KRUSE MD            Ot            J18.9       
     PNEUMONIA, UNSPECIFIED ORGANISM                     

 

 2018            RADHA KRUSE MD            Ot            J44.9       
     CHRONIC OBSTRUCTIVE PULMONARY DISEASE, U                     

 

 2018            RADHA KRUSE MD            Ot            K21.9       
     GASTRO-ESOPHAGEAL REFLUX DISEASE WITHOUT                     

 

 2018            RADHA KRUSE MD            Ot            N40.0       
     BENIGN PROSTATIC HYPERPLASIA WITHOUT LOW                     

 

 2018            RADHA KRUSE MD            Ot            R04.0       
     EPISTAXIS                     

 

 2018            RADHA KRUSE MD            Ot            Z79.51      
      LONG TERM (CURRENT) USE OF INHALED STERO                     

 

 2018            RADHA KRUSE MD            Ot            Z79.82      
      LONG TERM (CURRENT) USE OF ASPIRIN                     

 

 2018            RADHA KRUSE MD            Ot            Z85.46      
      PERSONAL HISTORY OF MALIGNANT NEOPLASM O                     

 

 2018            RADHA KRUSE MD            Ot            Z85.810     
       PERSONAL HISTORY OF MALIGNANT NEOPLASM O                     

 

 2018            RADHA KRUSE MD            Ot            Z87.891     
       PERSONAL HISTORY OF NICOTINE DEPENDENCE                     

 

 2018            RADHA KRUSE MD            Ot            Z92.21      
      PERSONAL HISTORY OF ANTINEOPLASTIC CHEMO                     

 

 2018            RADHA KRUSE MD            Ot            Z92.3       
     PERSONAL HISTORY OF IRRADIATION                     

 

 2018            RADHA KRUSE MD            Ot            Z95.5       
     PRESENCE OF CORONARY ANGIOPLASTY IMPLANT                     

 

 2018            RADHA KRUSE MD            Ot            Z99.81      
      DEPENDENCE ON SUPPLEMENTAL OXYGEN                     

 

 2018            RADHA KRUSE MD            Ot            E78.00      
      PURE HYPERCHOLESTEROLEMIA, UNSPECIFIED                     

 

 2018            RADHA KRUSE MD            Ot            G47.30      
      SLEEP APNEA, UNSPECIFIED                     

 

 2018            RADHA KRUSE MD            Ot            I10         
   ESSENTIAL (PRIMARY) HYPERTENSION                     

 

 2018            RADHA KRUSE MD            Ot            I25.10      
      ATHSCL HEART DISEASE OF NATIVE CORONARY                      

 

 2018            RADHA KRUSE MD            Ot            I25.2       
     OLD MYOCARDIAL INFARCTION                     

 

 2018            RADHA KRUSE MD            Ot            J18.9       
     PNEUMONIA, UNSPECIFIED ORGANISM                     

 

 2018            RADHA KRUSE MD            Ot            J44.9       
     CHRONIC OBSTRUCTIVE PULMONARY DISEASE, U                     

 

 2018            RADHA KRUSE MD            Ot            K21.9       
     GASTRO-ESOPHAGEAL REFLUX DISEASE WITHOUT                     

 

 2018            RADHA KRUSE MD            Ot            N40.0       
     BENIGN PROSTATIC HYPERPLASIA WITHOUT LOW                     

 

 2018            RADHA KRUSE MD            Ot            R04.0       
     EPISTAXIS                     

 

 2018            RADHA KRUSE MD            Ot            Z79.51      
      LONG TERM (CURRENT) USE OF INHALED STERO                     

 

 2018            RADHA KRUSE MD            Ot            Z79.82      
      LONG TERM (CURRENT) USE OF ASPIRIN                     

 

 2018            RADHA KRUSE MD            Ot            Z85.46      
      PERSONAL HISTORY OF MALIGNANT NEOPLASM O                     

 

 2018            RADHA KRUSE MD            Ot            Z85.810     
       PERSONAL HISTORY OF MALIGNANT NEOPLASM O                     

 

 2018            RADHA KRUSE MD            Ot            Z87.891     
       PERSONAL HISTORY OF NICOTINE DEPENDENCE                     

 

 2018            RADHA KRUSE MD            Ot            Z92.21      
      PERSONAL HISTORY OF ANTINEOPLASTIC CHEMO                     

 

 2018            RADHA KRUSE MD            Ot            Z92.3       
     PERSONAL HISTORY OF IRRADIATION                     

 

 2018            RADHA KRUSE MD            Ot            Z95.5       
     PRESENCE OF CORONARY ANGIOPLASTY IMPLANT                     

 

 2018            RADHA KRUSE MD            Ot            Z99.81      
      DEPENDENCE ON SUPPLEMENTAL OXYGEN                     

 

 2018            AZEEM HARGROVE            Ot            J44.9 
           CHRONIC OBSTRUCTIVE PULMONARY DISEASE, U                     

 

 2018            MYAH OROZCO DO, Ot            J90         
   PLEURAL EFFUSION, NOT ELSEWHERE CLASSIFI                     

 

 2018            MYAH OROZCO DO            Ot            R04.2       
     HEMOPTYSIS                     

 

 2018            MYAH OROZCO DO            Ot            R59.0       
     LOCALIZED ENLARGED LYMPH NODES                     

 

 2018            MYAH OROZCO DO            Ot            R91.8       
     OTHER NONSPECIFIC ABNORMAL FINDING OF ANDRY                     

 

 2018            MYAH OROZCO DO            Ot            Z85.46      
      PERSONAL HISTORY OF MALIGNANT NEOPLASM O                     

 

 2018            MYAH OROZCO DO            Ot            Z85.819     
       PRSNL HX OF MALIG NEOPLM OF [facility] SITE LI                     

 

 2018            MYAH OROZCO DO            Ot            Z01.818     
       ENCOUNTER FOR OTHER PREPROCEDURAL EXAMIN                     

 

 2018            AZEEM HARGROVE            Ot            J44.9 
           CHRONIC OBSTRUCTIVE PULMONARY DISEASE, U                     

 

 2018            MYAH OROZCO DO            Ot            F17.201     
       NICOTINE DEPENDENCE, UNSPECIFIED, IN REM                     

 

 2018            MYAH OROZCO DO            Ot            G47.33      
      OBSTRUCTIVE SLEEP APNEA (ADULT) (PEDIATR                     

 

 2018            MYAH OROZCO DO            Ot            I10         
   ESSENTIAL (PRIMARY) HYPERTENSION                     

 

 2018            MYAH OROZCO DO, Ot            J44.9       
     CHRONIC OBSTRUCTIVE PULMONARY DISEASE, U                     

 

 2018            MYAH OROZCO DO            Ot            R04.2       
     HEMOPTYSIS                     

 

 2018            MYAH OROZCO DO            Ot            R59.0       
     LOCALIZED ENLARGED LYMPH NODES                     

 

 2018            MYAH OROZCO DO            Ot            Z79.82      
      LONG TERM (CURRENT) USE OF ASPIRIN                     

 

 2018            MYAH OROZCO DO            Ot            Z79.899     
       OTHER LONG TERM (CURRENT) DRUG THERAPY                     

 

 2018            MYAH OROZCO DO            Ot            Z95.1       
     PRESENCE OF AORTOCORONARY BYPASS GRAFT                     

 

 2018            MYAH OROZCO DO            Ot            Z95.5       
     PRESENCE OF CORONARY ANGIOPLASTY IMPLANT                     



                                                                               
                                                                               
                                                                               
                                                                               
                                                                               
                                                                               
                                                                               
                                                                               
                                                                               
                                                                               
                                                                               
                                                                               
                                                                               
                                                                               
                                                                               
                                                                               
                                                                               
                                                                               
                                                                               
                                                                               
                                                                               
                                                                               
                                                                               
                                                                               
                                                                               
                                                                               
                                                                               
                                                                               
                                                                               
                                                                             



Procedures

      



There is no data.                  



Results

      





 Test            Result            Range        









 Automated blood complete blood count (hemogram) panel - 16 11:30         









 Blood leukocytes automated count (number/volume)            4.8 10*3/uL       
     4.3-11.0        

 

 Blood erythrocytes automated count (number/volume)            4.71 10*6/uL    
        4.35-5.85        

 

 Venous blood hemoglobin measurement (mass/volume)            14.6 g/dL        
    13.3-17.7        

 

 Blood hematocrit (volume fraction)            42 %            40-54        

 

 Automated erythrocyte mean corpuscular volume            89 [foz_us]          
  80-99        

 

 Automated erythrocyte mean corpuscular hemoglobin (mass per erythrocyte)      
      31 pg            25-34        

 

 Automated erythrocyte mean corpuscular hemoglobin concentration measurement (
mass/volume)            35 g/dL            32-36        

 

 Automated erythrocyte distribution width ratio            13.2 %            
10.0-14.5        

 

 Automated blood platelet count (count/volume)            170 10*3/uL          
  130-400        

 

 Automated blood platelet mean volume measurement            11.1 [foz_us]     
       7.4-10.4        









 Comprehensive metabolic panel - 16 11:30         









 Serum or plasma sodium measurement (moles/volume)            141 mmol/L       
     135-145        

 

 Serum or plasma potassium measurement (moles/volume)            4.2 mmol/L    
        3.6-5.0        

 

 Serum or plasma chloride measurement (moles/volume)            106 mmol/L     
               

 

 Carbon dioxide            26 mmol/L            21-32        

 

 Serum or plasma anion gap determination (moles/volume)            9 mmol/L    
        5-14        

 

 Serum or plasma urea nitrogen measurement (mass/volume)            15 mg/dL   
         7-18        

 

 Serum or plasma creatinine measurement (mass/volume)            1.31 mg/dL    
        0.60-1.30        

 

 Serum or plasma urea nitrogen/creatinine mass ratio            11             
NRG        

 

 Serum or plasma creatinine measurement with calculation of estimated 
glomerular filtration rate            53             NRG        

 

 Serum or plasma glucose measurement (mass/volume)            105 mg/dL        
            

 

 Serum or plasma calcium measurement (mass/volume)            9.4 mg/dL        
    8.5-10.1        

 

 Serum or plasma total bilirubin measurement (mass/volume)            2.0 mg/dL
            0.1-1.0        

 

 Serum or plasma alkaline phosphatase measurement (enzymatic activity/volume)  
          75 U/L                    

 

 Serum or plasma aspartate aminotransferase measurement (enzymatic activity/
volume)            18 U/L            5-34        

 

 Serum or plasma alanine aminotransferase measurement (enzymatic activity/volume
)            15 U/L            0-55        

 

 Serum or plasma protein measurement (mass/volume)            7.2 g/dL         
   6.4-8.2        

 

 Serum or plasma albumin measurement (mass/volume)            4.4 g/dL         
   3.2-4.5        









 Lipid 1996 panel - 16 11:30         









 Serum or plasma triglyceride measurement (mass/volume)            95 mg/dL    
        <150        

 

 Serum or plasma cholesterol measurement (mass/volume)            133 mg/dL    
        < 200        

 

 Serum or plasma cholesterol in HDL measurement (mass/volume)            37 mg/
dL            40-60        

 

 Cholesterol in LDL [mass/volume] in serum or plasma by direct assay            
81 mg/dL            1-129        

 

 Serum or plasma cholesterol in VLDL measurement (mass/volume)            19 mg/
dL            5-40        









 PT panel in platelet poor plasma by coagulation assay - 16 11:30         









 Prothrombin time (PT) in platelet poor plasma by coagulation assay            
13.9 s            12.2-14.7        

 

 INR in platelet poor plasma or blood by coagulation assay            1.1      
       0.8-1.4        









 Activated partial thromboplastin time (aPTT) in platelet poor plasma 
bycoagulation assay - 16 11:30         









 Activated partial thromboplastin time (aPTT) in platelet poor plasma 
bycoagulation assay            24 s            24-35        









 Methicillin resistant Staphylococcus aureus (MRSA) screening culture -  11:30         









 Methicillin resistant Staphylococcus aureus (MRSA) screening culture          
  NEG             NRG        









 Blood lactic acid measurement (moles/volume) - 17 18:25         









 Blood lactic acid measurement (moles/volume)            1.47 mmol/L            
0.50-2.00        









 PT panel in platelet poor plasma by coagulation assay - 17 18:25         









 Prothrombin time (PT) in platelet poor plasma by coagulation assay            
28.3 s            12.2-14.7        

 

 INR in platelet poor plasma or blood by coagulation assay            2.7      
       0.8-1.4        









 Activated partial thromboplastin time (aPTT) in platelet poor plasma 
bycoagulation assay - 17 18:25         









 Activated partial thromboplastin time (aPTT) in platelet poor plasma 
bycoagulation assay            36 s            24-35        









 Comprehensive metabolic panel - 17 18:25         









 Serum or plasma sodium measurement (moles/volume)            142 mmol/L       
     135-145        

 

 Serum or plasma potassium measurement (moles/volume)            4.0 mmol/L    
        3.6-5.0        

 

 Serum or plasma chloride measurement (moles/volume)            104 mmol/L     
               

 

 Carbon dioxide            26 mmol/L            21-32        

 

 Serum or plasma anion gap determination (moles/volume)            12 mmol/L   
         5-14        

 

 Serum or plasma urea nitrogen measurement (mass/volume)            21 mg/dL   
         7-18        

 

 Serum or plasma creatinine measurement (mass/volume)            0.98 mg/dL    
        0.60-1.30        

 

 Serum or plasma urea nitrogen/creatinine mass ratio            21             
NRG        

 

 Serum or plasma creatinine measurement with calculation of estimated 
glomerular filtration rate            >             NRG        

 

 Serum or plasma glucose measurement (mass/volume)            130 mg/dL        
            

 

 Serum or plasma calcium measurement (mass/volume)            9.0 mg/dL        
    8.5-10.1        

 

 Serum or plasma total bilirubin measurement (mass/volume)            1.6 mg/dL
            0.1-1.0        

 

 Serum or plasma alkaline phosphatase measurement (enzymatic activity/volume)  
          59 U/L                    

 

 Serum or plasma aspartate aminotransferase measurement (enzymatic activity/
volume)            31 U/L            5-34        

 

 Serum or plasma alanine aminotransferase measurement (enzymatic activity/volume
)            67 U/L            0-55        

 

 Serum or plasma protein measurement (mass/volume)            5.9 g/dL         
   6.4-8.2        

 

 Serum or plasma albumin measurement (mass/volume)            3.8 g/dL         
   3.2-4.5        









 Magnesium - 17 18:25         









 Magnesium            2.5 mg/dL            1.8-2.4        









 Serum or plasma creatine kinase measurement (enzymatic activity/volume) -  18:25         









 Serum or plasma creatine kinase measurement (enzymatic activity/volume)       
     43 U/L                    









 Serum or plasma creatine kinase MB measurement (enzymatic activity/volume) -  18:25         









 Serum or plasma creatine kinase MB measurement (enzymatic activity/volume)    
        1.5 ng/mL            <6.6        









 Serum or plasma troponin i.cardiac measurement (mass/volume) - 17 18:25 
        









 Serum or plasma troponin i.cardiac measurement (mass/volume)            < ng/
mL            <0.30        









 Serum or plasma thyrotropin measurement by detection limit <=0.05 miu/l (units/
volume) - 17 18:25         









 Serum or plasma thyrotropin measurement by detection limit <=0.05 miu/l (units/
volume)            0.37 u[iU]/mL            0.35-4.94        









 Bacterial blood culture - 17 18:25         









 Bacterial blood culture            NG             NRG        









 Complete blood count (CBC) with automated white blood cell (WBC) differential 
- 17 19:08         









 Blood leukocytes automated count (number/volume)            9.8 10*3/uL       
     4.3-11.0        

 

 Blood erythrocytes automated count (number/volume)            4.65 10*6/uL    
        4.35-5.85        

 

 Venous blood hemoglobin measurement (mass/volume)            14.0 g/dL        
    13.3-17.7        

 

 Blood hematocrit (volume fraction)            42 %            40-54        

 

 Automated erythrocyte mean corpuscular volume            91 [foz_us]          
  80-99        

 

 Automated erythrocyte mean corpuscular hemoglobin (mass per erythrocyte)      
      30 pg            25-34        

 

 Automated erythrocyte mean corpuscular hemoglobin concentration measurement (
mass/volume)            33 g/dL            32-36        

 

 Automated erythrocyte distribution width ratio            14.1 %            
10.0-14.5        

 

 Automated blood platelet count (count/volume)            180 10*3/uL          
  130-400        

 

 Automated blood platelet mean volume measurement            11.9 [foz_us]     
       7.4-10.4        

 

 Automated blood neutrophils/100 leukocytes            84 %            42-75   
     

 

 Automated blood lymphocytes/100 leukocytes            4 %            12-44    
    

 

 Blood monocytes/100 leukocytes            11 %            0-12        

 

 Automated blood eosinophils/100 leukocytes            0 %            0-10     
   

 

 Automated blood basophils/100 leukocytes            0 %            0-10        

 

 Blood neutrophils automated count (number/volume)            8.2 10*3         
   1.8-7.8        

 

 Blood lymphocytes automated count (number/volume)            0.4 10*3         
   1.0-4.0        

 

 Blood monocytes automated count (number/volume)            1.1 10*3            
0.0-1.0        

 

 Automated eosinophil count            0.0 10*3/uL            0.0-0.3        

 

 Automated blood basophil count (count/volume)            0.0 10*3/uL          
  0.0-0.1        









 Blood manual differential performed detection - 17 19:08         









 Blood monocytes/100 leukocytes            0 %            NRG        

 

 Manual blood segmented neutrophils/100 leukocytes            89 %            
NRG        

 

 Blood band neutrophils/100 leukocytes            0 %            NRG        

 

 Manual blood lymphocytes/100 leukocytes            11 %            NRG        

 

 Manual eosinophils/100 leukocytes in nose            0 %            NRG        

 

 Manual blood basophils/100 leukocytes            0 %            NRG        

 

 Blood erythrocyte morphology finding identification            NORMAL         
    NRG        









 Serum or plasma lithium measurement (moles/volume) - 17 19:08         









 BNP level            668.7 pg/mL            <100.0        









 Bacterial blood culture - 17 19:08         









 Bacterial blood culture            NG             NRG        









 Complete urinalysis with reflex to culture - 17 19:48         









 Urine color determination            YELLOW             NRG        

 

 Urine clarity determination            SLIGHTLY CLOUDY             NRG        

 

 Urine pH measurement by test strip            6             5-9        

 

 Specific gravity of urine by test strip            1.020             1.016-
1.022        

 

 Urine protein assay by test strip, semi-quantitative            NEGATIVE      
       NEGATIVE        

 

 Urine glucose detection by automated test strip            NEGATIVE           
  NEGATIVE        

 

 Erythrocytes detection in urine sediment by light microscopy            
NEGATIVE             NEGATIVE        

 

 Urine ketones detection by automated test strip            NEGATIVE           
  NEGATIVE        

 

 Urine nitrite detection by test strip            NEGATIVE             NEGATIVE
        

 

 Urine total bilirubin detection by test strip            NEGATIVE             
NEGATIVE        

 

 Urine urobilinogen measurement by automated test strip (mass/volume)          
  NORMAL             NORMAL        

 

 Urine leukocyte esterase detection by dipstick            NEGATIVE             
NEGATIVE        

 

 Automated urine sediment erythrocyte count by microscopy (number/high power 
field)            RARE             NRG        

 

 Automated urine sediment leukocyte count by microscopy (number/high power field
)            RARE             NRG        

 

 Bacteria detection in urine sediment by light microscopy            NEGATIVE  
           NRG        

 

 Crystals detection in urine sediment by light microscopy            NONE      
       NRG        

 

 Casts detection in urine sediment by light microscopy            NONE         
    NRG        

 

 Mucus detection in urine sediment by light microscopy            NEGATIVE     
        NRG        

 

 Complete urinalysis with reflex to culture            NO             NRG      
  









 Serum or plasma troponin i.cardiac measurement (mass/volume) - 17 00:30 
        









 Serum or plasma troponin i.cardiac measurement (mass/volume)            < ng/
mL            <0.30        









 Complete blood count (CBC) with automated white blood cell (WBC) differential 
- 17 03:51         









 Blood leukocytes automated count (number/volume)            14.7 10*3/uL      
      4.3-11.0        

 

 Blood erythrocytes automated count (number/volume)            5.00 10*6/uL    
        4.35-5.85        

 

 Venous blood hemoglobin measurement (mass/volume)            14.9 g/dL        
    13.3-17.7        

 

 Blood hematocrit (volume fraction)            45 %            40-54        

 

 Automated erythrocyte mean corpuscular volume            90 [foz_us]          
  80-99        

 

 Automated erythrocyte mean corpuscular hemoglobin (mass per erythrocyte)      
      30 pg            25-34        

 

 Automated erythrocyte mean corpuscular hemoglobin concentration measurement (
mass/volume)            33 g/dL            32-36        

 

 Automated erythrocyte distribution width ratio            14.1 %            
10.0-14.5        

 

 Automated blood platelet count (count/volume)            210 10*3/uL          
  130-400        

 

 Automated blood platelet mean volume measurement            12.0 [foz_us]     
       7.4-10.4        

 

 Automated blood neutrophils/100 leukocytes            90 %            42-75   
     

 

 Automated blood lymphocytes/100 leukocytes            2 %            12-44    
    

 

 Blood monocytes/100 leukocytes            9 %            0-12        

 

 Automated blood eosinophils/100 leukocytes            0 %            0-10     
   

 

 Automated blood basophils/100 leukocytes            0 %            0-10        

 

 Blood neutrophils automated count (number/volume)            13.1 10*3        
    1.8-7.8        

 

 Blood lymphocytes automated count (number/volume)            0.3 10*3         
   1.0-4.0        

 

 Blood monocytes automated count (number/volume)            1.3 10*3            
0.0-1.0        

 

 Automated eosinophil count            0.0 10*3/uL            0.0-0.3        

 

 Automated blood basophil count (count/volume)            0.0 10*3/uL          
  0.0-0.1        









 Whole blood basic metabolic panel - 17 03:51         









 Serum or plasma sodium measurement (moles/volume)            143 mmol/L       
     135-145        

 

 Serum or plasma potassium measurement (moles/volume)            3.4 mmol/L    
        3.6-5.0        

 

 Serum or plasma chloride measurement (moles/volume)            98 mmol/L      
              

 

 Carbon dioxide            32 mmol/L            21-32        

 

 Serum or plasma anion gap determination (moles/volume)            13 mmol/L   
         5-14        

 

 Serum or plasma urea nitrogen measurement (mass/volume)            24 mg/dL   
         7-18        

 

 Serum or plasma creatinine measurement (mass/volume)            1.21 mg/dL    
        0.60-1.30        

 

 Serum or plasma urea nitrogen/creatinine mass ratio            20             
NRG        

 

 Serum or plasma creatinine measurement with calculation of estimated 
glomerular filtration rate            59             NRG        

 

 Serum or plasma glucose measurement (mass/volume)            153 mg/dL        
            

 

 Serum or plasma calcium measurement (mass/volume)            9.2 mg/dL        
    8.5-10.1        









 Serum or plasma phosphate measurement (mass/volume) - 17 03:51         









 Serum or plasma phosphate measurement (mass/volume)            3.5 mg/dL      
      2.3-4.7        









 Magnesium - 17 03:51         









 Magnesium            2.8 mg/dL            1.8-2.4        









 Blood manual differential performed detection - 17 03:51         









 Blood monocytes/100 leukocytes            5 %            NRG        

 

 Manual blood segmented neutrophils/100 leukocytes            88 %            
NRG        

 

 Blood band neutrophils/100 leukocytes            3 %            NRG        

 

 Manual blood lymphocytes/100 leukocytes            2 %            NRG        

 

 Manual eosinophils/100 leukocytes in nose            0 %            NRG        

 

 Manual blood basophils/100 leukocytes            0 %            NRG        

 

 Blood lymphocytes variant/100 leukocytes            2 %            NRG        

 

 Blood anisocytosis detection by light microscopy            SLIGHT             
NRG        

 

 Blood macrocytes detection by light microscopy            SLIGHT             
NRG        

 

 Blood ovalocytes detection by light microscopy            MODERATE             
NRG        

 

 Blood poikilocytosis detection by light microscopy            SLIGHT          
   NRG        

 

 Blood rouleaux detection by light microscopy            SLIGHT             NRG
        









 PT panel in platelet poor plasma by coagulation assay - 17 08:50         









 Prothrombin time (PT) in platelet poor plasma by coagulation assay            
14.9 s            12.2-14.7        

 

 INR in platelet poor plasma or blood by coagulation assay            1.2      
       0.8-1.4        









 Activated partial thromboplastin time (aPTT) in platelet poor plasma 
bycoagulation assay - 17 08:50         









 Activated partial thromboplastin time (aPTT) in platelet poor plasma 
bycoagulation assay            32 s            24-35        









 Comprehensive metabolic panel - 17 17:57         









 Serum or plasma sodium measurement (moles/volume)            143 mmol/L       
     135-145        

 

 Serum or plasma potassium measurement (moles/volume)            3.6 mmol/L    
        3.6-5.0        

 

 Serum or plasma chloride measurement (moles/volume)            98 mmol/L      
              

 

 Carbon dioxide            32 mmol/L            21-32        

 

 Serum or plasma anion gap determination (moles/volume)            13 mmol/L   
         5-14        

 

 Serum or plasma urea nitrogen measurement (mass/volume)            30 mg/dL   
         7-18        

 

 Serum or plasma creatinine measurement (mass/volume)            1.40 mg/dL    
        0.60-1.30        

 

 Serum or plasma urea nitrogen/creatinine mass ratio            21             
NRG        

 

 Serum or plasma creatinine measurement with calculation of estimated 
glomerular filtration rate            50             NRG        

 

 Serum or plasma glucose measurement (mass/volume)            212 mg/dL        
            

 

 Serum or plasma calcium measurement (mass/volume)            9.6 mg/dL        
    8.5-10.1        

 

 Serum or plasma total bilirubin measurement (mass/volume)            2.1 mg/dL
            0.1-1.0        

 

 Serum or plasma alkaline phosphatase measurement (enzymatic activity/volume)  
          63 U/L                    

 

 Serum or plasma aspartate aminotransferase measurement (enzymatic activity/
volume)            16 U/L            5-34        

 

 Serum or plasma alanine aminotransferase measurement (enzymatic activity/volume
)            55 U/L            0-55        

 

 Serum or plasma protein measurement (mass/volume)            6.4 g/dL         
   6.4-8.2        

 

 Serum or plasma albumin measurement (mass/volume)            3.8 g/dL         
   3.2-4.5        









 Complete blood count (CBC) with automated white blood cell (WBC) differential 
- 17 04:36         









 Blood leukocytes automated count (number/volume)            15.6 10*3/uL      
      4.3-11.0        

 

 Blood erythrocytes automated count (number/volume)            4.58 10*6/uL    
        4.35-5.85        

 

 Venous blood hemoglobin measurement (mass/volume)            13.7 g/dL        
    13.3-17.7        

 

 Blood hematocrit (volume fraction)            42 %            40-54        

 

 Automated erythrocyte mean corpuscular volume            91 [foz_us]          
  80-99        

 

 Automated erythrocyte mean corpuscular hemoglobin (mass per erythrocyte)      
      30 pg            25-34        

 

 Automated erythrocyte mean corpuscular hemoglobin concentration measurement (
mass/volume)            33 g/dL            32-36        

 

 Automated erythrocyte distribution width ratio            14.2 %            
10.0-14.5        

 

 Automated blood platelet count (count/volume)            179 10*3/uL          
  130-400        

 

 Automated blood platelet mean volume measurement            11.9 [foz_us]     
       7.4-10.4        

 

 Automated blood neutrophils/100 leukocytes            92 %            42-75   
     

 

 Automated blood lymphocytes/100 leukocytes            2 %            12-44    
    

 

 Blood monocytes/100 leukocytes            6 %            0-12        

 

 Automated blood eosinophils/100 leukocytes            0 %            0-10     
   

 

 Automated blood basophils/100 leukocytes            0 %            0-10        

 

 Blood neutrophils automated count (number/volume)            14.4 10*3        
    1.8-7.8        

 

 Blood lymphocytes automated count (number/volume)            0.3 10*3         
   1.0-4.0        

 

 Blood monocytes automated count (number/volume)            0.9 10*3            
0.0-1.0        

 

 Automated eosinophil count            0.0 10*3/uL            0.0-0.3        

 

 Automated blood basophil count (count/volume)            0.0 10*3/uL          
  0.0-0.1        









 Whole blood basic metabolic panel - 17 04:36         









 Serum or plasma sodium measurement (moles/volume)            142 mmol/L       
     135-145        

 

 Serum or plasma potassium measurement (moles/volume)            3.9 mmol/L    
        3.6-5.0        

 

 Serum or plasma chloride measurement (moles/volume)            101 mmol/L     
               

 

 Carbon dioxide            32 mmol/L            21-32        

 

 Serum or plasma anion gap determination (moles/volume)            9 mmol/L    
        5-14        

 

 Serum or plasma urea nitrogen measurement (mass/volume)            28 mg/dL   
         7-18        

 

 Serum or plasma creatinine measurement (mass/volume)            1.08 mg/dL    
        0.60-1.30        

 

 Serum or plasma urea nitrogen/creatinine mass ratio            26             
NRG        

 

 Serum or plasma creatinine measurement with calculation of estimated 
glomerular filtration rate            >             NRG        

 

 Serum or plasma glucose measurement (mass/volume)            143 mg/dL        
            

 

 Serum or plasma calcium measurement (mass/volume)            9.0 mg/dL        
    8.5-10.1        









 Serum or plasma phosphate measurement (mass/volume) - 17 04:36         









 Serum or plasma phosphate measurement (mass/volume)            3.3 mg/dL      
      2.3-4.7        









 Magnesium - 17 04:36         









 Magnesium            2.3 mg/dL            1.8-2.4        









 Sputum Gram stain - 17 13:15         









 GRAM STAIN SPUTUM            NO BACTERIA             NRG        









 Bacterial sputum culture - 17 13:15         









 FREE TEXT EXTERNAL            PLUS SCANT NORMAL ANISA             NRG        

 

 QUANTITY OF GROWTH            Moderate Growth             NRG        

 

 Bacterial sputum culture            [id number]             NRG        









 Methicillin resistant Staphylococcus aureus (MRSA) screening culture -  06:55         









 Methicillin resistant Staphylococcus aureus (MRSA) screening culture          
  NEG             NRG        









 Whole blood basic metabolic panel - 17 07:04         









 Serum or plasma sodium measurement (moles/volume)            141 mmol/L       
     135-145        

 

 Serum or plasma potassium measurement (moles/volume)            4.3 mmol/L    
        3.6-5.0        

 

 Serum or plasma chloride measurement (moles/volume)            109 mmol/L     
               

 

 Carbon dioxide            19 mmol/L            21-32        

 

 Serum or plasma anion gap determination (moles/volume)            13 mmol/L   
         5-14        

 

 Serum or plasma urea nitrogen measurement (mass/volume)            22 mg/dL   
         7-18        

 

 Serum or plasma creatinine measurement (mass/volume)            1.52 mg/dL    
        0.60-1.30        

 

 Serum or plasma urea nitrogen/creatinine mass ratio            14             
NRG        

 

 Serum or plasma creatinine measurement with calculation of estimated 
glomerular filtration rate            45             NRG        

 

 Serum or plasma glucose measurement (mass/volume)            110 mg/dL        
            

 

 Serum or plasma calcium measurement (mass/volume)            8.8 mg/dL        
    8.5-10.1        









 Methicillin resistant Staphylococcus aureus (MRSA) screening culture - 10/19/
17 10:40         









 MRSA SCREEN RESULT            MRSA NOT ISOLATED             NRG        









 Complete blood count (CBC) with automated white blood cell (WBC) differential 
- 06/10/18 18:55         









 Blood leukocytes automated count (number/volume)            5.2 10*3/uL       
     4.3-11.0        

 

 Blood erythrocytes automated count (number/volume)            4.00 10*6/uL    
        4.35-5.85        

 

 Venous blood hemoglobin measurement (mass/volume)            12.4 g/dL        
    13.3-17.7        

 

 Blood hematocrit (volume fraction)            37 %            40-54        

 

 Automated erythrocyte mean corpuscular volume            92 [foz_us]          
  80-99        

 

 Automated erythrocyte mean corpuscular hemoglobin (mass per erythrocyte)      
      31 pg            25-34        

 

 Automated erythrocyte mean corpuscular hemoglobin concentration measurement (
mass/volume)            34 g/dL            32-36        

 

 Automated erythrocyte distribution width ratio            13.6 %            
10.0-14.5        

 

 Automated blood platelet count (count/volume)            157 10*3/uL          
  130-400        

 

 Automated blood platelet mean volume measurement            11.3 [foz_us]     
       7.4-10.4        

 

 Automated blood neutrophils/100 leukocytes            66 %            42-75   
     

 

 Automated blood lymphocytes/100 leukocytes            16 %            12-44   
     

 

 Blood monocytes/100 leukocytes            14 %            0-12        

 

 Automated blood eosinophils/100 leukocytes            4 %            0-10     
   

 

 Automated blood basophils/100 leukocytes            1 %            0-10        

 

 Blood neutrophils automated count (number/volume)            3.4 10*3         
   1.8-7.8        

 

 Blood lymphocytes automated count (number/volume)            0.8 10*3         
   1.0-4.0        

 

 Blood monocytes automated count (number/volume)            0.7 10*3            
0.0-1.0        

 

 Automated eosinophil count            0.2 10*3/uL            0.0-0.3        

 

 Automated blood basophil count (count/volume)            0.0 10*3/uL          
  0.0-0.1        









 Blood lactic acid measurement (moles/volume) - 06/10/18 18:55         









 Blood lactic acid measurement (moles/volume)            0.95 mmol/L            
0.50-2.00        









 Comprehensive metabolic panel - 06/10/18 18:55         









 Serum or plasma sodium measurement (moles/volume)            143 mmol/L       
     135-145        

 

 Serum or plasma potassium measurement (moles/volume)            4.3 mmol/L    
        3.6-5.0        

 

 Serum or plasma chloride measurement (moles/volume)            107 mmol/L     
               

 

 Carbon dioxide            25 mmol/L            21-32        

 

 Serum or plasma anion gap determination (moles/volume)            11 mmol/L   
         5-14        

 

 Serum or plasma urea nitrogen measurement (mass/volume)            11 mg/dL   
         7-18        

 

 Serum or plasma creatinine measurement (mass/volume)            1.24 mg/dL    
        0.60-1.30        

 

 Serum or plasma urea nitrogen/creatinine mass ratio            9             
NRG        

 

 Serum or plasma creatinine measurement with calculation of estimated 
glomerular filtration rate            57             NRG        

 

 Serum or plasma glucose measurement (mass/volume)            101 mg/dL        
            

 

 Serum or plasma calcium measurement (mass/volume)            9.4 mg/dL        
    8.5-10.1        

 

 Serum or plasma total bilirubin measurement (mass/volume)            1.9 mg/dL
            0.1-1.0        

 

 Serum or plasma alkaline phosphatase measurement (enzymatic activity/volume)  
          61 U/L                    

 

 Serum or plasma aspartate aminotransferase measurement (enzymatic activity/
volume)            14 U/L            5-34        

 

 Serum or plasma alanine aminotransferase measurement (enzymatic activity/volume
)            13 U/L            0-55        

 

 Serum or plasma protein measurement (mass/volume)            6.0 g/dL         
   6.4-8.2        

 

 Serum or plasma albumin measurement (mass/volume)            3.9 g/dL         
   3.2-4.5        









 Serum or plasma C reactive protein measurement (mass/volume) - 06/10/18 18:55 
        









 Serum or plasma C reactive protein measurement (mass/volume)            1.14 mg
/dL            0.00-0.50        









 Serum or plasma lithium measurement (moles/volume) - 06/10/18 18:55         









 BNP level            405.7 pg/mL            <100.0        









 Bacterial blood culture - 06/10/18 18:55         









 Bacterial blood culture            NG             NRG        









 Bacterial blood culture - 06/10/18 19:28         









 Bacterial blood culture            NG             NRG        









 Sputum Gram stain - 18 08:43         









 GRAM STAIN SPUTUM            OBSERVED             NRG        









 Bacteria identification in bronchial specimen by aerobe culture - 18 08:
43         









 Bacteria identification in bronchial specimen by aerobe culture            
NORMAL             NRG        









 Sputum Gram stain - 18 08:43         









 GRAM STAIN SPUTUM            NO WBC OBSERVED             NRG        









 Bacteria identification in bronchial specimen by aerobe culture - 18 08:
43         









 Bacteria identification in bronchial specimen by aerobe culture            NG 
            NRG        









 Mycobacterium species detection by organism specific culture - 18 08:43 
        

 

 Fungus culture - 18 08:43         









 FUNGUS REPORT            NO FUNGUS GROWTH OBSERVED             NRG        









 Fungus culture - 18 08:43         









 Fungus culture            NG             NRG        



                                                                               
                                         



Encounters

      





 ACCT No.            Visit Date/Time            Discharge            Status    
        Pt. Type            Provider            Facility            Loc./Unit  
          Complaint        

 

 [id number]            2018 06:45:00            2018 10:50:00    
        DIS            Outpatient            MYAH OROZCO DO            [facility]            ENDO            HEMOPTYSIS/ABNORMAL CT 
OF THE CHEST/LYMPH NODES EN        

 

 [id number]            2018 05:38:00            2018 13:50:00    
        DIS            Outpatient            MYAH OROZCO DO            [facility]            PREOP            BRONCHOSCOPY        

 

 [id number]            2018 08:38:00            2018 23:59:59    
        CLS            Outpatient            MYAH OROZCO DO            [facility] 
[facility]            RAD            ABNORMAL CT,DYSPNEA,    
    

 

 [id number]            2018 13:10:00            2018 23:59:59    
        CLS            Outpatient            AZEEM HARGROVE          
  [facility]            RAD            HEMOPTYSIS,COPD   
     

 

 [id number]            06/10/2018 17:58:00            06/10/2018 20:41:00    
        DIS            Emergency            AIXA BRIONES, RADHA RODAS            [facility] 
[facility]            ER            SPITTING UP BLOOD        

 

 [id number]            2018 13:59:00            2018 23:59:59    
        CLS            Outpatient            AZEEM HARGROVE APRN          
  [facility]            PULM            J44.9 COPD        

 

 [id number]            2018 20:47:00            2018 06:05:00    
        DIS            Outpatient            AZEEM HARGROVE          
  [facility]            SLEEP            KOLBY        

 

 [id number]            2018 00:09:00            2018 23:59:59    
        CLS            Preadmit            MARIETTA ORTEZ MD            [facility] 
[facility]            ONC                     

 

 [id number]            2018 15:10:00            2018 00:01:00    
        DIS            Outpatient            MARIETTA ORTEZ MD            [facility] 
[facility]            ONC                     

 

 [id number]            2018 14:02:00            2018 23:59:59    
        CLS            Outpatient            JOSÉ METZGER DO            
[facility]            RAD            J18.9,J44.9        

 

 [id number]            2018 09:33:00            2018 23:59:59    
        CLS            Outpatient            AZEEM HARGROVE          
  [facility]            RAD            J44.1        

 

 [id number]            2018 11:23:00            2018 23:59:59    
        CLS            Outpatient            JOSÉ METZGER DO            
[facility]            RAD            J18.9        

 

 [id number]            2018 11:15:00            2018 23:59:59    
        CLS            Outpatient            JOSÉ METZGER DO            
[facility]            RAD            R06.00 J18.9        

 

 [id number]            2018 12:32:00            2018 23:59:59    
        CLS            Outpatient            ELIDIA ANTOINE MD            [facility]            CARD            CAD I25.10        

 

 [id number]            2017 08:54:00            2017 00:01:00    
        DIS            Outpatient            MARIETTA ORTEZ MD            [facility]            ONC                     

 

 [id number]            10/19/2017 10:26:00            10/19/2017 14:55:00    
        DIS            Outpatient            SERENA SALAZAR DO            [facility] 
[facility]            SDC            MALFUNCTIONING PORT     
   

 

 [id number]            10/16/2017 05:33:00            10/16/2017 15:59:00    
        DIS            Outpatient            SERENA SALAZAR DO            [facility] 
[facility]            PREOP            PORT REMOVAL        

 

 [id number]            2017 09:52:00            2017 14:45:00    
        DIS            Outpatient            MARIETTA ORTEZ MD            [facility]            ONC                     

 

 [id number]            2017 13:33:00            2017 23:59:59    
        CLS            Outpatient            RADHAMES LATIF    
        [facility]            CARD            CAD,CHF,HTN
        

 

 [id number]            2017 15:25:00            2017 23:59:59    
        CLS            Preadmit            SABRINA SCHERER MD            [facility] 
[facility]            CARD            VERTIGO        

 

 [id number]            2017 08:09:00            2017 00:01:00    
        DIS            Outpatient            SABRINA SCHERER MD            [facility]            CARD            VERTIGO        

 

 [id number]            2017 12:15:00            2017 23:59:59    
        CLS            Preadmit            TAWIL MD, ELIAS A            [facility] 
[facility]            RAD            PROSTATE CA        

 

 [id number]            2017 12:05:00            2017 23:59:59    
        CLS            Outpatient            TAWIL MD, ELIAS A            [facility]            RAD            PROSTATE CA        

 

 [id number]            2017 06:19:00            2017 10:52:00    
        DIS            Outpatient            TAWIL MD, ELIAS A            [facility] 
[facility]            SDC            BPH WITH ABNORMAL MERCEDEZ, 
POSSIBLE CA        

 

 [id number]            2017 05:28:00            2017 11:03:00    
        DIS            Outpatient            TAWIL MD, ELIAS A            [facility]            PREOP            BPH WITH ABNORMAL MERCEDEZ 
POSSIBLE CA        

 

 [id number]            2017 13:45:00            2017 23:59:59    
        CLS            Outpatient            AZEEM HARGROVE          
  [facility]            RAD            J44.9 COPD        

 

 [id number]            2017 14:30:00            2017 23:59:59    
        CLS            Outpatient            MYAH OROZCO DO            [facility]            PULM            COPD,DYSPNEA        

 

 [id number]            2017 12:34:00            2017 00:01:00    
        DIS            Outpatient            MARIETTA ORTEZ MD            [facility]            ONC                     

 

 [id number]            2017 19:50:00            2017 15:21:00    
        DIS            Inpatient            TISHA ROSE DO            [facility] 
[facility]            4TH            PNEUMONIA,NEW ONSET 
ATRIAL FIB,CHF,COPD        

 

 [id number]            05/15/2017 20:00:00            2017 06:10:00    
        DIS            Outpatient            AZEEM HARGROVE          
  [facility]            SLEEP            KOLBY,PARASOMNIA,
SLEEP DISORDER,HYPERSOMNIA        

 

 [id number]            05/10/2017 12:39:00            05/10/2017 23:59:59    
        CLS            Outpatient            AZEEM HARGROVE          
  [facility] [facility]            RT            COPD,DYSPNEA,
TOBACCO DEPENDENCE IN REMISSION        

 

 [id number]            2017 12:18:00            2017 23:59:59    
        CLS            Outpatient            AZEEM HARGROVE          
  [facility]            RAD            COPD,DYSPNEA      
  

 

 [id number]            2017 11:45:00            2017 00:01:00    
        DIS            Outpatient            MARIETTA ORTEZ MD            [facility]ia 
[facility]            ONC                     

 

 [id number]            2017 14:36:00            2017 23:59:59    
        CLS            Outpatient            ELIDIA ANTOINE MD            [facility] 
[facility]            CARD            CAD,HTN        

 

 [id number]            2016 12:34:00            2016 00:01:00    
        DIS            Outpatient            MARIETTA ORTEZ MD            [facility] 
[facility]            ONC                     

 

 [id number]            2016 20:11:00            2016 21:58:00    
        DIS            Emergency            JOSÉ SORIANO DO            [facility] 
[facility]            ER            HEART CATH BLEED/POST 
HEART CATH PROCEDURE        

 

 [id number]            2016 10:31:00            2016 21:07:00    
        DIS            Outpatient            ELIDIA ANTOINE MD            [facility] 
[facility]            CATH            ABNORMAL ECHO,CAD,
FATIGUE,HTN,HLP        

 

 [id number]            2016 07:38:00            2016 23:59:59    
        CLS            Outpatient            ELIDIA ANTOINE MD            [facility] 
[facility]            CARD            CAD,CHF,HTN,HLP,CAROTID 
ARTERY STENOSIS        

 

 [id number]            2016 14:36:00            2016 00:01:00    
        DIS            Outpatient            MARIETTA ORTEZ MD            [facility] 
[facility]            ONC                     

 

 [id number]            2016 14:04:00            2016 00:01:00    
        DIS            Outpatient            MARIETTA ORTEZ MD            [facility] 
[facility]            ONC                     

 

 [id number]            2016 13:18:00            2016 23:59:59    
        CLS            Outpatient            ELIDIA ANTOINE MD            [facility] 
[facility]            CARD            CAD,HTN,HLP        

 

 [id number]            10/15/2015 15:00:00            10/15/2015 23:59:59    
        CLS            Outpatient            SERENA SALAZAR DO            [facility]ia 
[facility]            SDC            SCREENING; BLOOD IN 
STOOLS        

 

 [id number]            10/14/2015 05:43:00            10/14/2015 23:59:59    
        CLS            Outpatient            SERENA SALAZAR DO            [facility]ia 
[facility]            PREOP            SCREENING; BLOOD IN 
STOOLS        

 

 [id number]            2015 12:52:00            2015 00:01:00    
        DIS            Outpatient            MARIETTA ORTEZ MD            [facility] 
[facility]            ONC                     

 

 [id number]            2015 11:13:00            2015 00:01:00    
        DIS            Outpatient            MARIETTA ORTEZ MD            [facility] 
[facility]            ONC                     

 

 [id number]            2015 08:41:00            2015 23:59:59    
        CLS            Outpatient            ELIDIA ANTOINE MD            [facility] 
[facility]            CARD            CAD,HTN        

 

 [id number]            2015 12:36:00            2015 00:01:00    
        DIS            Outpatient            MARIETTA ORTEZ MD            [facility] 
[facility]            ONC                     

 

 [id number]            10/08/2014 11:01:00            2014 00:01:00    
        DIS            Outpatient            MARIETTA ORTEZ MD            [facility] 
[facility]            ONC                     

 

 [id number]            2014 06:34:00            2014 15:20:00    
        DIS            Outpatient            ELIDIA ANTOINE MD            [facility] 
[facility]            CATH            ABN STRESS,CAD,CHF,HTN,
HLP        

 

 [id number]            2014 12:04:00            2014 23:59:59    
        CLS            Outpatient            ELIDIA ANTOINE MD            [facility] 
[facility]            CARD            CAD HTN HLE        

 

 [id number]            2014 13:44:00            2014 23:59:59    
        CLS            Outpatient            ELIDIA ANTOINE MD            [facility] 
[facility]            CARD            CAD HTN HLE        

 

 [id number]            2014 13:58:00            2014 00:01:00    
        DIS            Outpatient            MARIETTA ORTEZ MD            [facility] 
[facility]            ONC                     

 

 [id number]            2014 13:58:00            2014 00:01:00    
        DIS            Outpatient            MARIETTA ORTEZ MD            [facility] 
[facility]            ONC                     

 

 [id number]            2014 15:19:00            2014 23:59:59    
        CLS            Outpatient            EDGARDO RICHARDSON MD            [facility] 
[facility]            RAD            FLU        

 

 [id number]            2013 14:54:00            02/10/2014 00:01:00    
        DIS            Outpatient            MARIETTA ORTEZ MD            [facility] 
[facility]            ONC                     

 

 [id number]            10/01/2013 13:21:00            10/07/2013 00:01:00    
        DIS            Outpatient            MARIETTA ORTEZ MD            [facility] 
[facility]            ONC                     

 

 [id number]            2013 12:42:00            2013 00:01:00    
        DIS            Outpatient            MARIETTA ORTEZ MD            [facility] 
[facility]            ONC                     

 

 [id number]            2013 09:24:00            2013 23:59:59    
        CLS            Outpatient            MARIETTA ORTEZ MD            [facility] 
[facility]            RAD            RESTAGING HEAD/NECK     
   

 

 [id number]            2013 07:55:00            2013 23:59:59    
        CLS            Outpatient            ELIDIA ANTOINE MD            [facility] 
[facility]            CARD            CAD,CHF        

 

 [id number]            2018 11:35:00                         ACT       
     Inpatient            LEONARD BRIONES, MORE GRAYSON            [facility]            4TH            CHF ACUTE EXACERBATION        

 

 [id number]            2017 08:15:00                                   
   Document Registration                                                       
     

 

 [id number]            2016 13:19:00                                   
   Document Registration                                                       
     

 

 [id number]            2015 17:50:00                                   
   Document Registration                                                       
     

 

 [id number]            02/15/2013 09:00:00                                   
   Document Registration                                                       
     

 

 [id number]            2013 07:36:00                                   
   Document Registration                                                       
     

 

 [id number]            2013 14:03:00                                   
   Document Registration                                                       
     

 

 [id number]            2013 09:55:00                                   
   Document Registration                                                       
     

 

 [id number]            2012 13:52:00                                   
   Document Registration                                                       
     

 

 [id number]            2012 09:06:00                                   
   Document Registration                                                       
     

 

 [id number]            2012 11:40:00                                   
   Document Registration                                                       
     

 

 [id number]            2012 11:06:00                                   
   Document Registration                                                       
     

 

 [id number]            2012 10:45:00                                   
   Document Registration                                                       
     

 

 [id number]            2012 07:25:00                                   
   Document Registration                                                       
     

 

 [id number]            2012 14:17:00                                   
   Document Registration                                                       
     

 

 [id number]            05/15/2012 11:33:00                                   
   Document Registration                                                       
     

 

 [id number]            2012 12:27:00                                   
   Document Registration                                                       
     

 

 [id number]            2012 09:49:00                                   
   Document Registration                                                       
     

 

 [id number]            2012 08:06:00                                   
   Document Registration                                                       
     

 

 [id number]            2012 12:52:00                                   
   Document Registration                                                       
     

 

 [id number]            02/10/2012 05:41:00                                   
   Document Registration                                                       
     

 

 [id number]            2012 12:48:00                                   
   Document Registration                                                       
     

 

 [id number]            2012 07:40:00                                   
   Document Registration                                                       
     

 

 [id number]            2012 09:27:00                                   
   Document Registration                                                       
     

 

 [id number]            2012 05:38:00                                   
   Document Registration                                                       
     

 

 [id number]            01/10/2012 10:41:00                                   
   Document Registration                                                       
     

 

 [id number]            2011 10:29:00                                   
   Document Registration                                                       
     

 

 [id number]            2011 11:13:00                                   
   Document Registration                                                       
     

 

 [id number]            12/15/2010 11:57:00                                   
   Document Registration                                                       
     

 

 [id number]            10/13/2010 10:49:00                                   
   Document Registration                                                       
     

 

 [id number]            10/11/2010 06:50:00                                   
   Document Registration                                                       
     

 

 [id number]            10/08/2010 09:38:00                                   
   Document Registration                                                       
     

 

 [id number]            09/10/2015 07:23:50                         ACT            
Document Registration

## 2018-07-17 NOTE — HISTORY & PHYSICAL-HOSPITALIST
History of Present Illness


HPI/Chief Complaint


The patient is a 78-year-old white male known to me from a previous visit in 

late May.  He was admitted from Dr. Quesada's office this morning after he 

presented with increasing dyspnea.  He uses oxygen at home.  He and his wife 

state that there has been increasing swelling in the legs over the past several 

days.  He denied orthopnea or chest pain.  He is a previous smoker and 

discontinued this habit about 15 years ago.  He has not noted fever or purulent 

appearing sputum.


Date Seen


18


Time Seen by Provider:  17:48


Attending Physician


Franky Valle MD


PCP


Mihir Hall DO


Referring Physician





Date of Admission


2018 at 11:35





Home Medications & Allergies


Home Medications


Reviewed patient Home Medication Reconciliation performed by pharmacy 

medication reconciliations technician and/or nursing.


Patients Allergies have been reviewed.





Allergies





Allergies


Coded Allergies


  No Known Drug Allergies ([id number]/16/17)








Past Medical-Social-Family Hx


Past Med/Social Hx:  Reviewed Nursing Past Med/Soc Hx


Patient Social History


Alcohol Use:  Denies Use


Recreational Drug Use:  No


Smoking Status:  Former Smoker


Former Smoker, Quit:  1998


Type Used:  Cigarettes


2nd Hand Smoke Exposure:  No


Physical Abuse Screen:  No


Sexual Abuse:  No


Recent Foreign Travel:  No


Contact w/other who traveled:  No


Recent Hopitalizations:  No


Recent Infectious Disease Expo:  No





Immunizations Up To Date


Date of Pneumonia Vaccine:  Sep 7, 2015


Date of Influenza Vaccine:  Oct 3, 2016





Seasonal Allergies


Seasonal Allergies:  Yes





Past Medical History


Surgeries:  CABG, Coronary Stent, Joint Replacement


Respiratory:  COPD


Currently Using CPAP:  No


Currently Using BIPAP:  Yes


Cardiac:  Coronary Artery Disease, Heart Attack, High Cholesterol, Hypertension


Reproductive:  No


Sexually Transmitted Disease:  No


HIV/AIDS:  No


Genitourinary:  Benign Prostatic Hyperpl, Prostate Problems


Gastrointestinal:  Gastroesophageal Reflux


Musculoskeletal:  Arthritis


Loss of Vision:  Bilateral


Hearing Impairment:  Hard of Hearing, Bilateral Hearing Aide


Cancer:  Prostate


Did You Recieve Any Treatments:  Yes


What Type of Treatment Did You:  Chemotherapy, Radiation, Surgical Intervention


History of Blood Disorders:  No


Adverse Reaction to Blood Mac:  No (N/A)





Review of Systems


Constitutional:  see HPI


EENTM:  no symptoms reported


Respiratory:  dyspnea on exertion, short of breath


Cardiovascular:  no symptoms reported


Gastrointestinal:  no symptoms reported


Genitourinary:  no symptoms reported


Musculoskeletal:  no symptoms reported


Skin:  other (multiple ecchymoses on his right forearm)


Psychiatric/Neurological:  No Symptoms Reported





Physical Exam


Physical Exam


Vital Signs


Capillary Refill :


Height, Weight, BMI


Height: 5'5.00"


Weight: 162lbs. 0.0oz. 73.813363gc; 27.0 BMI


Method:Stated


General Appearance:  Mild Distress


Eyes:  Bilateral Eye Normal Inspection


HEENT:  Normal ENT Inspection


Neck:  Normal Inspection


Respiratory:  Decreased Breath Sounds


Cardiovascular:  Regular Rate, Rhythm, No Gallop, No JVD, No Murmur, Bradycardia


Gastrointestinal:  Normal Bowel Sounds, No Organomegaly


Back:  Normal Inspection


Extremity:  Normal Capillary Refill, Normal Inspection, Normal Range of Motion, 

Pedal Edema


Skin:  Ecchymosis (right dorsal forearm)


Lymphatic:  No Adenopathy





Results


Results/Procedures


Labs


Patient resulted labs reviewed.





Assessment/Plan


Admission Diagnosis


Hypoxia/COPD.  2.atrial fibrillation.


Admission Status:  Inpatient Order (span 2 midnights)


Reason for Inpatient Admission:  


Failure of outpatient treatment.  Combined cardiac and respiratory disease





Assessment and Plan


Aggressive pulmonary toilet.  2.pulmonary and cardiology consultation.





Clinical Quality Measures


DVT/VTE Risk/Contraindication:


Risk Factor Score Per Nursin


RFS Level Per Nursing on Admit:  4+=Very High











FRANKY VALLE MD 2018 17:52

## 2018-07-17 NOTE — OCCUPATIONAL THERAPY EVAL
OT Evaluation-General/PLF


Medical Diagnosis


Admission Date


Jul 17, 2018 at 11:35


Medical Diagnosis:  CHF exacerbation


Onset Date:  Jul 17, 2018





Therapy Diagnosis


Therapy Diagnosis:  decr activity tolerance





Height/Weight


Height (Feet):  5


Height (Inches):  5.00


Weight (Pounds):  162


Weight (Ounces):  0.0





Precautions


Precautions/Isolations:  Standard Precautions





Referral


Physician:  Sangita


Referral Reason:  Evaluation/Treatment





Medical History


Pertinent Medical History:  Atrial Fib, Arthritis, CABG, CAD, COPD, Heart 

Failure, HTN, MI


Additional Medical History


Bilateral TKR, R medial iliac bone lesion. Tongue cancer - 4 years 

ago.Pneumonia. Cataract surgery. O2 at night. Spider bite


Current History


Admitted with CHF exacerbation


Reviewed History:  Yes





Social History


Home:  Single Level


Current Living Status:  Spouse





ADL-Prior Level of Function


ADL PLOF Comments


P reported that he was previously able to manage all of his basic self care 

needs. He still drives and is retired from [employer], including [employer] and [employer].


DME/Equipment:  [employer]





OT Current Status


Subjective


Pt seen in room, up in bed, agreeable to OT. pain reported 0/10





Appearance


Alert, cooperative





Mental Status/Objective


Attachments:  Oxygen, Saline Lock





Current


Glasses/Contacts:  Yes


Hearing Aids:  Yes


Hand Dominance:  Right (R)


Upper Extremity ROM


Grossly WFL bilat


Upper Extremity Strength


5/5 bilat





ADL-Treatment


ADL-Current


Pt was independent with mobility with PT. He was able to move to sit EOB, take 

slipper socks off and put them back on without help. He reported no problems 

feeding himself. He has been using urinal without help. He was able to 

verbalize techniques that he uses to conserve his energy


              Functional [place] Measure


0=Not Assessed/NA   4=Minimal Assistance


1=Total Assistance   5=Supervision or Setup


2=Maximal Assistance   6=Modified [place]


3=Moderate Assistance   7=Complete IndependenceIRFPAI Quality Coding Scale











6 Independent with activity with or without an assistive device


 


5  Patient requires set up or clean up by helper.  Patient completes activity  

by  themselves


 


4 Supervision or touching assist (CGA). Aydlett provide cues , steadying assist


 


3 The helper provides less than half the effort to complete the activity


 


2 The helper provides more than half the effort to complete the activity


 


1 Dependent.  The helper does all the effort to complete an activity 


 


7 Patient refused to complete or attempt activity


 


9 The patient did not perform the activity before the current illness or injury


 


88 Not attempted due to Medical conditions or safety concerns











Education


OT Patient Education:  Purpose of tx/functional activities, Rehab process


Teaching Recipient:  Patient


Teaching Methods:  Discussion


Response to Teaching:  Verbalize Understanding





OT Education/Plan


Problem List/Assessment


Assessment:  No Skilled OT Needs ID'd


Pt does not have skilled OT needs for ADLs, mobility, strength or activity 

tolerance





Discharge Recommendations


Plan/Recommendations:  Discontinue OT





Treatment Plan/Plan of Care


Treatment,Training & Education:  No (no skilled OT needs)


Patient would benefit from OT for education, treatment and training to promote 

independence in ADL's, mobility, safety and/or upper extremity function for ADL'

s.


Treatment Duration:  Jul 17, 2018


Frequency:  1 time per week


Estimated Hrs Per Day:  Other


Agreement:  Yes


Rehab Potential:  Good





Time/GCodes


Start Time:  13:38


Stop Time:  13:52


Total Time Billed (hr/min):  14


Billed Treatment Time


visit, 14 minutes evaluation low intensity











MELISSA HERNÁNDEZ OT Jul 17, 2018 14:39

## 2018-07-17 NOTE — PHYSICAL THERAPY EVALUATION
PT Evaluation-General


Medical Diagnosis


Admission Date


2018 at 11:35


Medical Diagnosis:  CHF exacerbation


Onset Date:  2018





Therapy Diagnosis


Therapy Diagnosis:  debility





Height/Weight


Height (Feet):  5


Height (Inches):  5.00


Weight (Pounds):  162


Weight (Ounces):  0.0





Precautions


Precautions/Isolations:  Standard Precautions





Weight Bear Status


Right Lower Extremity:  Right


Full Weight Bearing


Left Lower Extremity:  Left


Full Weight Bearing





Referral


Physician:  Sangita


Reason for Referral:  Evaluation/Treatment





Medical History


Pertinent Medical History:  CABG, COPD, Heart Failure, HTN, MI


Current History


Direct admit from Dr. Quesada's office


Reviewed History:  Yes





Social History


Home:  Single Level


Current Living Status:  Spouse





Prior/Core FIM


Prior Level of Function


              Functional [place] Measure


0=Not Assessed/NA   4=Minimal Assistance


1=Total Assistance   5=Supervision or Setup


2=Maximal Assistance   6=Modified [place]


3=Moderate Assistance   7=Complete [place]


Bed Mobility:  7


Transfers (B,C,W/C) (FIM):  7


Gait:  7


Locomotion:  7





PT Evaluation-Current


Subjective


Agrees to PT.





Pain





   Numeric Pain Scale:  0-No Pain


   Location:  No Pain Reported





Objective


Patient Orientation:  Normal For Age


Problem Solving:  Good


Attachments:  Oxygen (3L O2 NC)





ROM/Strength


ROM Lower Extremities


bilateral LE WFL


Strength Lower Extremities


5/5 grossly bilaterally





Integumentary/Posture


Integumentary


refer to nursing notes


Bowel Incontinence:  No


Bladder Incontinence:  No


Posture


WFL





Neuromuscular


(Tone, Coordination, Reflexes)


grossly intact





Sensory


Vision:  Wears Glasses


Hearing:  Hearing Aid/Aides


Sensation Right Lower Extremit:  Intact


Sensation Left Lower Extremity:  Intact





Transfers


              Functional [place] Measure


0=Not Assessed/NA   4=Minimal Assistance


1=Total Assistance   5=Supervision or Setup


2=Maximal Assistance   6=Modified [place]


3=Moderate Assistance   7=Complete [place]


Transfers (B, C, W/C) (FIM):  7


Scootin


Rollin


Supine to/from Sit:  7


Sit to/from Stand:  7





Gait


Mode of Locomotion:  Walk


Anticipated Mode of Locomotion:  Walk


Gait (FIM):  7


Distance (FIM):  3=150 ft


Distance:  400'


Gait Level of Assist:  7


Gait Assistive Device:  None


Comments/Gait Description


pulls O2 tank independently





Balance


Sitting Static:  Normal


Sitting Dynamic:  Normal


Standing Static:  Normal


 Standing Dynamic:  Normal





Assessment/Needs


78 y.o. male, is currently at [facility] with all gross motor skills and 

does not requires skilled PT intervention.  Patient has been instructed to 

ambulate PRN in hallway and RN notified.


Rehab Potential:  Fair





PT Plan


Treatment/Plan


Treatment Plan:  Discontinue PT


Treatment Plan:  Other


Treatment Duration:  2018


Frequency:  1 time per week


Estimated Hrs Per Day:  .5 hour per day


Patient and/or Family Agrees t:  Yes





Time/GCodes


Time In:  1240


Time Out:  1259


Total Billed Treatment Time:  19


Total Billed Treatment


1 visit


[facility] 19 min











NATHALY PAUL PT 2018 13:06

## 2018-07-17 NOTE — DIAGNOSTIC IMAGING REPORT
INDICATION: 

Congestive heart failure.



EXAMINATION:

PA and lateral chest.



FINDINGS:

There are postop changes from CABG surgery. The heart size and

pulmonary vascularity are normal. There are tiny pleural

effusions.



IMPRESSION: 

Tiny pleural effusions are present bilaterally.



Dictated by: 



  Dictated on workstation # [id number]

## 2018-07-18 VITALS — SYSTOLIC BLOOD PRESSURE: 116 MMHG | DIASTOLIC BLOOD PRESSURE: 66 MMHG

## 2018-07-18 VITALS — DIASTOLIC BLOOD PRESSURE: 59 MMHG | SYSTOLIC BLOOD PRESSURE: 109 MMHG

## 2018-07-18 VITALS — SYSTOLIC BLOOD PRESSURE: 120 MMHG | DIASTOLIC BLOOD PRESSURE: 59 MMHG

## 2018-07-18 VITALS — SYSTOLIC BLOOD PRESSURE: 112 MMHG | DIASTOLIC BLOOD PRESSURE: 78 MMHG

## 2018-07-18 VITALS — SYSTOLIC BLOOD PRESSURE: 119 MMHG | DIASTOLIC BLOOD PRESSURE: 56 MMHG

## 2018-07-18 LAB
BUN/CREAT SERPL: 11
CALCIUM SERPL-MCNC: 9.5 MG/DL (ref 8.5–10.1)
CHLORIDE SERPL-SCNC: 104 MMOL/L (ref 98–107)
CO2 SERPL-SCNC: 28 MMOL/L (ref 21–32)
CREAT SERPL-MCNC: 1.32 MG/DL (ref 0.6–1.3)
ERYTHROCYTE [DISTWIDTH] IN BLOOD BY AUTOMATED COUNT: 13.5 % (ref 10–14.5)
GFR SERPLBLD BASED ON 1.73 SQ M-ARVRAT: 53 ML/MIN
GLUCOSE SERPL-MCNC: 97 MG/DL (ref 70–105)
HCT VFR BLD CALC: 37 % (ref 40–54)
HGB BLD-MCNC: 12.2 G/DL (ref 13.3–17.7)
MAGNESIUM SERPL-MCNC: 2.1 MG/DL (ref 1.8–2.4)
MCH RBC QN AUTO: 29 PG (ref 25–34)
MCHC RBC AUTO-ENTMCNC: 33 G/DL (ref 32–36)
MCV RBC AUTO: 88 FL (ref 80–99)
PHOSPHATE SERPL-MCNC: 3.6 MG/DL (ref 2.3–4.7)
PLATELET # BLD: 134 10^3/UL (ref 130–400)
PMV BLD AUTO: 11.9 FL (ref 7.4–10.4)
POTASSIUM SERPL-SCNC: 3.5 MMOL/L (ref 3.6–5)
RBC # BLD AUTO: 4.17 10^6/UL (ref 4.35–5.85)
SODIUM SERPL-SCNC: 143 MMOL/L (ref 135–145)
WBC # BLD AUTO: 5.6 10^3/UL (ref 4.3–11)

## 2018-07-18 RX ADMIN — IPRATROPIUM BROMIDE AND ALBUTEROL SULFATE SCH ML: .5; 3 SOLUTION RESPIRATORY (INHALATION) at 21:13

## 2018-07-18 RX ADMIN — ASPIRIN 81 MG CHEWABLE TABLET SCH MG: 81 TABLET CHEWABLE at 08:27

## 2018-07-18 RX ADMIN — IPRATROPIUM BROMIDE AND ALBUTEROL SULFATE SCH ML: .5; 3 SOLUTION RESPIRATORY (INHALATION) at 02:11

## 2018-07-18 RX ADMIN — Medication SCH MG: at 08:27

## 2018-07-18 RX ADMIN — UMECLIDINIUM SCH INH: 62.5 AEROSOL, POWDER ORAL at 06:33

## 2018-07-18 RX ADMIN — LEVOTHYROXINE SODIUM SCH MCG: 0.11 TABLET ORAL at 20:37

## 2018-07-18 RX ADMIN — IPRATROPIUM BROMIDE AND ALBUTEROL SULFATE SCH ML: .5; 3 SOLUTION RESPIRATORY (INHALATION) at 14:13

## 2018-07-18 RX ADMIN — ENALAPRIL MALEATE SCH MG: 10 TABLET ORAL at 20:38

## 2018-07-18 RX ADMIN — CARVEDILOL SCH MG: 12.5 TABLET, FILM COATED ORAL at 08:27

## 2018-07-18 RX ADMIN — FUROSEMIDE SCH MG: 10 INJECTION, SOLUTION INTRAVENOUS at 06:28

## 2018-07-18 RX ADMIN — IPRATROPIUM BROMIDE AND ALBUTEROL SULFATE SCH ML: .5; 3 SOLUTION RESPIRATORY (INHALATION) at 18:26

## 2018-07-18 RX ADMIN — ATORVASTATIN CALCIUM SCH MG: 10 TABLET, FILM COATED ORAL at 20:37

## 2018-07-18 RX ADMIN — CARVEDILOL SCH MG: 12.5 TABLET, FILM COATED ORAL at 18:02

## 2018-07-18 RX ADMIN — ENOXAPARIN SODIUM SCH MG: 100 INJECTION SUBCUTANEOUS at 12:42

## 2018-07-18 RX ADMIN — MONTELUKAST SCH MG: 10 TABLET, FILM COATED ORAL at 20:37

## 2018-07-18 RX ADMIN — FLUTICASONE PROPIONATE AND SALMETEROL XINAFOATE SCH PUFF: 115; 21 AEROSOL, METERED RESPIRATORY (INHALATION) at 18:26

## 2018-07-18 RX ADMIN — FLUTICASONE PROPIONATE AND SALMETEROL XINAFOATE SCH PUFF: 115; 21 AEROSOL, METERED RESPIRATORY (INHALATION) at 06:33

## 2018-07-18 RX ADMIN — POTASSIUM CHLORIDE SCH MEQ: 750 TABLET, FILM COATED, EXTENDED RELEASE ORAL at 09:26

## 2018-07-18 RX ADMIN — IPRATROPIUM BROMIDE AND ALBUTEROL SULFATE SCH ML: .5; 3 SOLUTION RESPIRATORY (INHALATION) at 06:33

## 2018-07-18 RX ADMIN — ENALAPRIL MALEATE SCH MG: 10 TABLET ORAL at 08:27

## 2018-07-18 RX ADMIN — FAMOTIDINE SCH MG: 20 TABLET, FILM COATED ORAL at 08:27

## 2018-07-18 RX ADMIN — IPRATROPIUM BROMIDE AND ALBUTEROL SULFATE SCH ML: .5; 3 SOLUTION RESPIRATORY (INHALATION) at 10:41

## 2018-07-18 NOTE — PULMONARY CONSULTATION
History of Present Illness


History of Present Illness


Date of Consultation


7/18/18


 08:43


Time Seen by Provider:  08:43


Date of Admission





History of Present Illness


77yo with hx of severe oxygen dependent COPD and debility admitted from my 

office yesterday secondary to increasing SOB and thick yellow sputum 

production. PT has been waking up SOB over the last 2 nights. He also complains 

of worsening bilateral LE edema over the last 2-3 days. He does take home lasix 

however he just takes it occasionally. He has had previous hospitalizations.





Allergies and Home Medications


Allergies


Coded Allergies:  


     No Known Drug Allergies (Unverified , 10/16/17)





Home Medications


Albuterol Sulfate 18 Gm Hfa.aer.ad, 2 PUFF INH Q4H PRN for SHORTNESS OF BREATH, 

(Reported)


Amlodipine Besylate 10 Mg Tablet, 10 MG PO DAILY, (Reported)


Aspirin 81 Mg Tablet.dr, 81 MG PO DAILY, (Reported)


Atorvastatin Calcium 10 Mg Tablet, 10 MG PO HS, (Reported)


Budesonide/Formoterol Fumarate 10.2 Gm Hfa.aer.ad, 2 PUFF IH BID, (Reported)


Carvedilol 25 Mg Tablet, 25 MG PO BID, (Reported)


Cetirizine HCl 10 Mg Tablet, 10 MG PO DAILY, (Reported)


Cholecalciferol (Vitamin D3) 5,000 Unit Tablet, 5,000 UNIT PO DAILY, (Reported)


Enalapril Maleate 20 Mg Tablet, 20 MG PO BID, (Reported)


Fluticasone Propionate 16 Gm Spray.susp, 2 SPRAYS NS DAILY, (Reported)


Furosemide 20 Mg Tablet, 20 MG PO DAILY PRN for SWELLING, (Reported)


Levothyroxine Sodium 112 Mcg Tablet, 112 MCG PO HS, (Reported)


Montelukast Sodium 10 Mg Tablet, 10 MG PO HS, (Reported)


Omega-3 Fatty Acids/Fish Oil 1 Each Capsule, 1,200 MG PO DAILY, (Reported)


Potassium Chloride 10 Meq Tablet.er, 10 MEQ PO DAILY PRN for WITH LASIX, (

Reported)


Ranitidine HCl 150 Mg Tablet, 150 MG PO BID, (Reported)


Temazepam 30 Mg Capsule, 30 MG PO HS, (Reported)


Tiotropium Bromide 4 Gm Mist.inhal, 2 PUFF IH DAILY, (Reported)





Past Medical-Social-Family Hx


Patient Social History


Alcohol Use:  Denies Use


Recreational Drug Use:  No


Smoking Status:  Former Smoker


Type Used:  Cigarettes


Former Smoker, Quit:  Jan 1, 1998


2nd Hand Smoke Exposure:  No


Recent Foreign Travel:  No


Contact w/Someone Who Travel:  No


Recent Infectious Disease Expo:  No


Recent Hopitalizations:  No





Immunizations Up To Date


Date of Pneumonia Vaccine:  Sep 7, 2015


Date of Influenza Vaccine:  Oct 3, 2016





Seasonal Allergies


Seasonal Allergies:  Yes





Past Medical History


Surgeries:  Yes (SPIDER BITE, FEEDING TUBE/REMOVED, BILAT TKR, INFUSAPORT , 

TUMOR ON TONGUE)


CABG, Coronary Stent, Joint Replacement


Respiratory:  Yes (02 2.5L AT NIGHT and prn )


Asthma, Sleep Apnea, COPD


Currently Using CPAP:  No


Currently Using BIPAP:  Yes


Cardiac:  Yes (CARDIAC CATHS/CABG)


Coronary Artery Disease, Heart Attack, High Cholesterol, Hypertension


Neurological:  No


Reproductive Disorders:  No


Sexually Transmitted Disease:  No


HIV/AIDS:  No


Benign Prostatic Hyperpl, Prostate Problems


Gastrointestinal:  Yes


Gastroesophageal Reflux


Musculoskeletal:  Yes (RIGHT MEDIAL ILIAC BONE LESION)


Arthritis


Endocrine:  Yes


HEENT:  Yes


Loss of Vision:  Bilateral


Hearing Impairment:  Hard of Hearing, Bilateral Hearing Aide


Cancer:  Yes (TONGUE CANCER--S/P SURGERY 2012)


Prostate


Did You Recieve Any Treatments:  Yes


What Type of Treatment Did You:  Chemotherapy, Radiation, Surgical Intervention


Psychosocial:  No


Integumentary:  No


Blood Disorders:  No


Adverse Reaction/Blood Tranf:  No (N/A)





Review of Systems


Time Seen by Provider:  08:53


Constitutional:  Sweats, Weakness, Malaise; No: Fever, Chills, Other


Eyes:  No: Pain, Vision change, Conjunctivae inflammation, Eyelid inflammation, 

Other, Redness


ENT:  Nose congestion; No: Ear pain, Ear discharge, Nose pain, Nose discharge, 

Mouth pain, Mouth swelling, Throat pain, Throat swelling, Other


Respiratory:  Cough, Shortness of breath, SOB with excertion, Wheezing


Cardiovascular:  Palpitations, Orthopnea, Paroxysmal Noc. Dyspnea, Edema, Lt 

Headedness


Gastrointestinal:  No: Nausea, Vomiting, Abdominal Pain, Diarrhea, Constipation

, Melena, Hematochezia, Other


Neurological:  Weakness





Sepsis Event


Evaluation


Height, Weight, BMI


Height: 5'5.00"


Weight: 172lbs. 0.0oz. 78.641936xu; 28.6 BMI


Method:Stated





Exam


Exam





Vital Signs








  Date Time  Temp Pulse Resp B/P (MAP) Pulse Ox O2 Delivery O2 Flow Rate FiO2


 


7/18/18 07:00  55      


 


7/18/18 06:35      Nasal Cannula 2.00 


 


7/18/18 06:34     93 Nasal Cannula 2.00 


 


7/18/18 04:00 99.5 57 16 109/59 (76) 94 Nasal Cannula 2.00 


 


7/18/18 02:12     94 Nasal Cannula 2.00 


 


7/18/18 01:00  67      


 


7/18/18 00:30 99.7 51 16 120/59 (79) 92 Nasal Cannula 2.00 


 


7/17/18 21:38      Nasal Cannula 2.00 


 


7/17/18 20:34     99 Nasal Cannula 2.00 


 


7/17/18 20:29     95 Nasal Cannula 2.00 


 


7/17/18 20:02 98.9 93 20 125/59 (81) 95 Nasal Cannula 2.00 


 


7/17/18 19:00  65      


 


7/17/18 18:01     93 Nasal Cannula 2.00 


 


7/17/18 16:39 98.0 56 20 126/79 (95) 95 Nasal Cannula 2.00 


 


7/17/18 15:59  61      


 


7/17/18 15:23     98 Nasal Cannula 2.00 


 


7/17/18 11:30 98.9 58 20 140/66 (90) 95 Nasal Cannula 2.00 














I & O 


 


 7/18/18





 06:59


 


Intake Total 660 ml


 


Output Total 1575 ml


 


Balance -915 ml








Height & Weight


Height: 5'5.00"


Weight: 172lbs. 0.0oz. 78.958902iy; 28.6 BMI


Method:Stated


General Appearance:  Mild Distress


HEENT:  Normal ENT Inspection


Neck:  Normal Inspection


Respiratory:  Decreased Breath Sounds


Cardiovascular:  Regular Rate, Rhythm, No Gallop, No JVD, No Murmur


Gastrointestinal:  normal bowel sounds, non tender, soft, no organomegaly


Extremity:  Normal Capillary Refill, Normal Inspection, Normal Range of Motion, 

Pedal Edema


Neurologic/Psychiatric:  Alert, Oriented x3


Skin:  Ecchymosis (right dorsal forearm)


Lymphatic:  No Adenopathy





Results


Lab


Laboratory Tests


7/17/18 12:30








7/18/18 05:34











Assessment/Plan


Assessment/Plan


Acute on chronic respiratory failure 


   -Oxygen


   -SVNs 


Dyspnea with pleural effusions and pulmonary edema 


   -lasix, oxygen, SVNS


CHFAE systolic 


   -echo shows EF 40-45% 7/17/18 


   -Continue lasix 


   -troponins neg x 3 


mediastinal lymphadenopathy s/p bronch with EBUS 


   -Negative Cytology


   -will continue to follow as out patient. 


   -HE will need a repeat CT scan in 3 months 


   -Could consider repeating bronch with percepta brushing at later date. 


Hypokalemia secondary to Lasix 


   -Replace and recheck 


debility 


   -PT/OT


CKD


   -monitor close secondary to Lasix











MYAH OROZCO DO Jul 18, 2018 08:48

## 2018-07-18 NOTE — DIAGNOSTIC IMAGING REPORT
Indication: Congestive failure.



Comparison: 07/17/2018.



Findings: Obstructive interstitial lung disease is again noted.

Cardiomegaly with median sternotomy changes remain present. There

has been slight decrease in bilateral pleural effusions since

previous exam. No acute infiltrates have developed.



Impression:

1. Obstructive pulmonary disease with cardiomegaly.

2. Decreasing pleural effusions bilaterally.



Dictated by: 



  Dictated on workstation # [id number]

## 2018-07-18 NOTE — PROGRESS NOTE-HOSPITALIST
Subjective


HPI/CC On Admission


Date Seen by Provider:  2018


Time Seen by Provider:  11:30


The patient is a 78-year-old white male known to me from a previous visit in 

late May.  He was admitted from Dr. Quesada's office this morning after he 

presented with increasing dyspnea.  He uses oxygen at home.  He and his wife 

state that there has been increasing swelling in the legs over the past several 

days.  He denied orthopnea or chest pain.  He is a previous smoker and 

discontinued this habit about 15 years ago.  He has not noted fever or purulent 

appearing sputum.


Subjective/Events-last exam


Patient is feeling much better this morning.  He has had excellent urine 

output.  He has much less pedal edema.  He feels like he will be ready to go in 

the morning.





Review of Systems


Pulmonary:  Dyspnea, Cough





Objective


Exam


Vital Signs





Vital Signs








  Date Time  Temp Pulse Resp B/P (MAP) Pulse Ox O2 Delivery O2 Flow Rate FiO2


 


18 13:00  56      


 


18 10:41     93 Nasal Cannula 2.00 


 


18 08:00 98.4  16 119/56 (77)    





Capillary Refill :


General Appearance:  Chronically ill


Neck:  Normal Inspection, Non Tender, Supple


Respiratory:  Lungs Clear, No Accessory Muscle Use, No Respiratory Distress, 

Decreased Breath Sounds, Wheezing (Expiratory)


Cardiovascular:  Bradycardia, Systolic Murmur (3/6), Irregularly Irregular


Gastrointestinal:  Normal Bowel Sounds, No Organomegaly, No Pulsatile Mass, Non 

Tender, Soft


Rectal:  Deferred


Back:  Normal Inspection


Extremity:  No Pedal Edema


Neurologic/Psychiatric:  Alert, Oriented x3, Normal Mood/Affect


Skin:  Normal Color, Warm/Dry





Results/Procedures


Lab


Laboratory Tests


18 05:34








Patient resulted labs reviewed.


Imaging:  Reviewed Imaging Report





Assessment/Plan


Assessment and Plan


Assess & Plan/Chief Complaint


1.  Acute on chronic respiratory failure.-Improving


2.  Exacerbation of CHF-EF 40 percent


3.  History of lymphadenopathy with  EBUS that's negative.


4.  Chronic renal insufficiency.





Plan for discharge in the morning





Clinical Quality Measures


DVT/VTE Risk/Contraindication:


Risk Factor Score Per Nursin


RFS Level Per Nursing on Admit:  4+=Very High











CUCA DIEZ MD 2018 13:48

## 2018-07-19 VITALS — SYSTOLIC BLOOD PRESSURE: 111 MMHG | DIASTOLIC BLOOD PRESSURE: 55 MMHG

## 2018-07-19 VITALS — SYSTOLIC BLOOD PRESSURE: 140 MMHG | DIASTOLIC BLOOD PRESSURE: 63 MMHG

## 2018-07-19 VITALS — DIASTOLIC BLOOD PRESSURE: 67 MMHG | SYSTOLIC BLOOD PRESSURE: 122 MMHG

## 2018-07-19 VITALS — DIASTOLIC BLOOD PRESSURE: 54 MMHG | SYSTOLIC BLOOD PRESSURE: 130 MMHG

## 2018-07-19 VITALS — SYSTOLIC BLOOD PRESSURE: 122 MMHG | DIASTOLIC BLOOD PRESSURE: 87 MMHG

## 2018-07-19 VITALS — SYSTOLIC BLOOD PRESSURE: 117 MMHG | DIASTOLIC BLOOD PRESSURE: 57 MMHG

## 2018-07-19 VITALS — SYSTOLIC BLOOD PRESSURE: 123 MMHG | DIASTOLIC BLOOD PRESSURE: 60 MMHG

## 2018-07-19 LAB
ALBUMIN SERPL-MCNC: 3.7 GM/DL (ref 3.2–4.5)
ALP SERPL-CCNC: 65 U/L (ref 40–136)
ALT SERPL-CCNC: 10 U/L (ref 0–55)
BASOPHILS # BLD AUTO: 0 10^3/UL (ref 0–0.1)
BASOPHILS NFR BLD AUTO: 1 % (ref 0–10)
BILIRUB SERPL-MCNC: 1.5 MG/DL (ref 0.1–1)
BUN/CREAT SERPL: 12
CALCIUM SERPL-MCNC: 9.6 MG/DL (ref 8.5–10.1)
CHLORIDE SERPL-SCNC: 105 MMOL/L (ref 98–107)
CO2 SERPL-SCNC: 28 MMOL/L (ref 21–32)
CREAT SERPL-MCNC: 1.44 MG/DL (ref 0.6–1.3)
EOSINOPHIL # BLD AUTO: 0.2 10^3/UL (ref 0–0.3)
EOSINOPHIL NFR BLD AUTO: 4 % (ref 0–10)
ERYTHROCYTE [DISTWIDTH] IN BLOOD BY AUTOMATED COUNT: 13.8 % (ref 10–14.5)
GFR SERPLBLD BASED ON 1.73 SQ M-ARVRAT: 48 ML/MIN
GLUCOSE SERPL-MCNC: 118 MG/DL (ref 70–105)
HCT VFR BLD CALC: 37 % (ref 40–54)
HGB BLD-MCNC: 11.8 G/DL (ref 13.3–17.7)
LYMPHOCYTES # BLD AUTO: 0.8 X 10^3 (ref 1–4)
LYMPHOCYTES NFR BLD AUTO: 16 % (ref 12–44)
MAGNESIUM SERPL-MCNC: 2.3 MG/DL (ref 1.8–2.4)
MANUAL DIFFERENTIAL PERFORMED BLD QL: NO
MCH RBC QN AUTO: 29 PG (ref 25–34)
MCHC RBC AUTO-ENTMCNC: 32 G/DL (ref 32–36)
MCV RBC AUTO: 89 FL (ref 80–99)
MONOCYTES # BLD AUTO: 0.7 X 10^3 (ref 0–1)
MONOCYTES NFR BLD AUTO: 15 % (ref 0–12)
NEUTROPHILS # BLD AUTO: 3 X 10^3 (ref 1.8–7.8)
NEUTROPHILS NFR BLD AUTO: 64 % (ref 42–75)
PHOSPHATE SERPL-MCNC: 3.2 MG/DL (ref 2.3–4.7)
PLATELET # BLD: 138 10^3/UL (ref 130–400)
PMV BLD AUTO: 12.3 FL (ref 7.4–10.4)
POTASSIUM SERPL-SCNC: 3.7 MMOL/L (ref 3.6–5)
PROT SERPL-MCNC: 5.3 GM/DL (ref 6.4–8.2)
RBC # BLD AUTO: 4.14 10^6/UL (ref 4.35–5.85)
SODIUM SERPL-SCNC: 143 MMOL/L (ref 135–145)
WBC # BLD AUTO: 4.7 10^3/UL (ref 4.3–11)

## 2018-07-19 RX ADMIN — Medication SCH MG: at 11:40

## 2018-07-19 RX ADMIN — ASPIRIN 81 MG CHEWABLE TABLET SCH MG: 81 TABLET CHEWABLE at 08:00

## 2018-07-19 RX ADMIN — UMECLIDINIUM SCH INH: 62.5 AEROSOL, POWDER ORAL at 06:54

## 2018-07-19 RX ADMIN — IPRATROPIUM BROMIDE AND ALBUTEROL SULFATE SCH ML: .5; 3 SOLUTION RESPIRATORY (INHALATION) at 11:05

## 2018-07-19 RX ADMIN — FAMOTIDINE SCH MG: 20 TABLET, FILM COATED ORAL at 08:00

## 2018-07-19 RX ADMIN — MONTELUKAST SCH MG: 10 TABLET, FILM COATED ORAL at 20:55

## 2018-07-19 RX ADMIN — ENALAPRIL MALEATE SCH MG: 10 TABLET ORAL at 20:55

## 2018-07-19 RX ADMIN — CARVEDILOL SCH MG: 12.5 TABLET, FILM COATED ORAL at 11:40

## 2018-07-19 RX ADMIN — POTASSIUM CHLORIDE SCH MEQ: 750 TABLET, FILM COATED, EXTENDED RELEASE ORAL at 08:00

## 2018-07-19 RX ADMIN — FUROSEMIDE SCH MG: 10 INJECTION, SOLUTION INTRAVENOUS at 06:39

## 2018-07-19 RX ADMIN — IPRATROPIUM BROMIDE AND ALBUTEROL SULFATE SCH ML: .5; 3 SOLUTION RESPIRATORY (INHALATION) at 22:35

## 2018-07-19 RX ADMIN — IPRATROPIUM BROMIDE AND ALBUTEROL SULFATE SCH ML: .5; 3 SOLUTION RESPIRATORY (INHALATION) at 14:38

## 2018-07-19 RX ADMIN — IPRATROPIUM BROMIDE AND ALBUTEROL SULFATE SCH ML: .5; 3 SOLUTION RESPIRATORY (INHALATION) at 19:11

## 2018-07-19 RX ADMIN — IPRATROPIUM BROMIDE AND ALBUTEROL SULFATE SCH ML: .5; 3 SOLUTION RESPIRATORY (INHALATION) at 01:22

## 2018-07-19 RX ADMIN — ENOXAPARIN SODIUM SCH MG: 100 INJECTION SUBCUTANEOUS at 13:00

## 2018-07-19 RX ADMIN — FLUTICASONE PROPIONATE AND SALMETEROL XINAFOATE SCH PUFF: 115; 21 AEROSOL, METERED RESPIRATORY (INHALATION) at 06:54

## 2018-07-19 RX ADMIN — FLUTICASONE PROPIONATE AND SALMETEROL XINAFOATE SCH PUFF: 115; 21 AEROSOL, METERED RESPIRATORY (INHALATION) at 19:12

## 2018-07-19 RX ADMIN — ENALAPRIL MALEATE SCH MG: 10 TABLET ORAL at 11:40

## 2018-07-19 RX ADMIN — ATORVASTATIN CALCIUM SCH MG: 10 TABLET, FILM COATED ORAL at 20:55

## 2018-07-19 RX ADMIN — LEVOTHYROXINE SODIUM SCH MCG: 0.11 TABLET ORAL at 20:55

## 2018-07-19 RX ADMIN — IPRATROPIUM BROMIDE AND ALBUTEROL SULFATE SCH ML: .5; 3 SOLUTION RESPIRATORY (INHALATION) at 06:54

## 2018-07-19 NOTE — DIAGNOSTIC IMAGING REPORT
Indication: Congestive heart failure.



Portable chest 3:25 AM



There are postop changes from CABG surgery. There is some right

perihilar atelectasis. There are emphysematous changes in the

lungs. Heart size and pulmonary vascularity normal.



Impression: COPD. Coronary artery disease. Right lower lobe

atelectasis. No evidence of pulmonary venous hypertension.



Dictated by: 



  Dictated on workstation # [id number]

## 2018-07-19 NOTE — CONSULTATION-CARDIOLOGY
HPI-Cardiology


Cardiology Consultation:


Date of Consultation


18


Date of Admission





Attending Physician


Franky Lopes MD


Admitting Physician


Mihir Hall DO


Consulting Physician


SABRINA ENAMORADO MD





HPI:


Time Seen by Provider:  12:45


Chief Complaint:


Lightheadedness


This is a 78-year-old gentleman who has history of COPD, CABG, PCI, carotid 

artery disease.  Echocardiogram shows an EF of 40 percent.  He presented with 

shortness of breath has been treated for pneumonia.  He was found to be 

significantly bradycardic with frequent PVCs.  He was on bedrest with no 

significant activity.  He denied syncope or near syncope.  However he has been 

resting since admission.  Patient denied any chest pain.





Review of Systems-Cardiology


Review of Systems


Constitutional:  As described under HPI; No As described under HPI, No no 

symptoms reported, No chills, No fever, No lightheadedness


Eyes:  No As described under HPI, No no symptoms reported, No blindness, No 

blurred vision, No contact lenses, No drainage, No decreased acuity, No foreign 

body sensation, No pain, No vision change


Ears/Nose/Throat:  No As described under HPI, No no symptoms reported, No 

chronic hearing loss, No ear discharge, No ear pain, No nasal drainage, No 

ulcerations


Respiratory:  No no symptoms reported; As described under HPI; No As described 

under HPI, No cough, No orthopnea, No shortness of breath, No SOB with excertion


Cardiovascular:  No no symptoms reported; As described under HPI; No As 

described under HPI, No chest pain, No edema, No irregular heart rate, No 

lightheadedness, No palpitations


Gastrointestinal:  No no symptoms reported, No As described under HPI, No 

abdomen distended, No abdominal pain, No blood streaked bowels, No constipation

, No diarrhea, No nausea, No vomiting, No stool coloration changes


Genitourinary:  No As described under HPI, No burning, No dysuria, No discharge

, No frequency, No flank pain, No hematuria, No urgency


Skin:  No rash, No skin related problems, No ulcerations


Psychiatric/Neurological:  No anxiety, No depression, No seizure, No focal 

weakness, No syncope


Hematologic:  No bleeding abnormalities





PMH-Social-Family Hx


Patient Social History


Alcohol Use:  Denies Use


Recreational Drug Use:  No


Smoking Status:  Former Smoker


Type Used:  Cigarettes


2nd Hand Smoke Exposure:  No


Recent Foreign Travel:  No


Recent Infectious Disease Expo:  No


Physical Abuse Screen:  No


Sexual Abuse:  No





Immunizations Up To Date


Date of Pneumonia Vaccine:  Sep 7, 2015


Date of Influenza Vaccine:  Oct 3, 2016





Past Medical History


PMH


As described under Assessment.





Allergies and Home Medications


Allergies


Coded Allergies:  


     No Known Drug Allergies (Unverified , 10/16/17)





Home Medications


Albuterol Sulfate 18 Gm Hfa.aer.ad, 2 PUFF INH Q4H PRN for SHORTNESS OF BREATH, 

(Reported)


Amlodipine Besylate 10 Mg Tablet, 10 MG PO DAILY, (Reported)


Aspirin 81 Mg Tablet.dr, 81 MG PO DAILY, (Reported)


Atorvastatin Calcium 10 Mg Tablet, 10 MG PO HS, (Reported)


Budesonide/Formoterol Fumarate 10.2 Gm Hfa.aer.ad, 2 PUFF IH BID, (Reported)


Carvedilol 25 Mg Tablet, 25 MG PO BID, (Reported)


Cetirizine HCl 10 Mg Tablet, 10 MG PO DAILY, (Reported)


Cholecalciferol (Vitamin D3) 5,000 Unit Tablet, 5,000 UNIT PO DAILY, (Reported)


Enalapril Maleate 20 Mg Tablet, 20 MG PO BID, (Reported)


Fluticasone Propionate 16 Gm Spray.susp, 2 SPRAYS NS DAILY, (Reported)


Furosemide 20 Mg Tablet, 20 MG PO DAILY PRN for SWELLING, (Reported)


Levothyroxine Sodium 112 Mcg Tablet, 112 MCG PO HS, (Reported)


Montelukast Sodium 10 Mg Tablet, 10 MG PO HS, (Reported)


Omega-3 Fatty Acids/Fish Oil 1 Each Capsule, 1,200 MG PO DAILY, (Reported)


Potassium Chloride 10 Meq Tablet.er, 10 MEQ PO DAILY PRN for WITH LASIX, (

Reported)


Ranitidine HCl 150 Mg Tablet, 150 MG PO BID, (Reported)


Temazepam 30 Mg Capsule, 30 MG PO HS, (Reported)


Tiotropium Bromide 4 Gm Mist.inhal, 2 PUFF IH DAILY, (Reported)





Patient Home Medication List


Home Medication List Reviewed:  Yes





Physical Exam-Cardiology


Physical Exam


Vital Signs/I&O











 18





 06:18 07:00 08:00 09:00


 


Temp   99.0 98.4


 


Pulse  34 67 41


 


Resp   18 17


 


B/P (MAP)   126/60 (82) 134/74 (94)


 


Pulse Ox 97  96 97


 


O2 Delivery Nasal Cannula  Nasal Cannula Nasal Cannula


 


O2 Flow Rate 2.00  2.00 2.00


 


    





 18





 09:00 10:24 11:57 13:00


 


Temp   99.3 


 


Pulse   55 71


 


Resp   20 


 


B/P (MAP)   135/65 (88) 


 


Pulse Ox 97 95 96 


 


O2 Delivery Nasal Cannula Nasal Cannula Nasal Cannula 


 


O2 Flow Rate 2.00 2.00 2.00 


 


    





 18





 16:00 16:15 16:30 16:45


 


Temp 99.0   


 


Pulse 65 43 45 41


 


Resp 18   


 


B/P (MAP) 124/64 (84) 114/59 (77)  118/64 (82)


 


Pulse Ox 98 93 93 94


 


O2 Delivery Nasal Cannula Nasal Cannula Nasal Cannula Nasal Cannula


 


O2 Flow Rate 2.00 2.00 2.00 2.00


 


    





 18   





 17:01   


 


Pulse 57   


 


B/P (MAP) 124/55 (78)   


 


Pulse Ox 94   


 


O2 Delivery Nasal Cannula   


 


O2 Flow Rate 2.00   














 18





 00:00


 


Intake Total 1170 ml


 


Output Total 2176 ml


 


Balance -1006 ml





Capillary Refill :


Constitutional:  AAO x 3


HEENT:  PERRL; No normal ENT inspection, No TMs normal, No pharynx normal, No 

scleral icterus (R), No scleral icterus (L), No pale conjunctivae (R), No pale 

conjunctivae (L), No photophobia, No TM abnormal (R), No TM abnormal (L), No 

pharyngeal erythema, No tonsillar exudate, No other, No discharge, No EOMI; 

hearing is well preserved; No hard of hearing; oral hygience is good; No 

ulceration, No xanthelasmas are seen


Neck:  No non-tender, No full range of motion, No supple, No normal inspection, 

No carotid bruit, No limited range of motion, No lymphadenopathy (R), No 

lymphadenopathy (L), No tender lateral, No tender midline, No thyromegaly, No 

other; carotid pulses are 2 + bilaterally; No with good upstrokes


Respiratory:  chest is bilaterally symmetric, lungs clear to auscultation


Cardiovascular:  regular rate-rhythm, bradycardia, S1 and S2


Gastrointestinal:  No tender, No soft, No round, No distended, No pulsatile mass

, No organomegaly, No guarding, No rebound, No tenderness, No hernia, No mass, 

No audible bowel sounds, No abnormal bowel sounds, No abdominal bruits, No 

spleenomegaly, No other


Rectal:  deferred


Extremities:  No normal range of motion, No non-tender, No normal inspection, 

No pedal edema, No calf tenderness, No normal capillary refill, No pelvis stable

, No calf tenderness, No inflammation, No pedal edema, No slow capillary refill

, No swelling, No other, No abrasion, No clubbing, No cyanosis, No ecchymosis, 

No laceration, No no lower extremity edema bilateral, No significant edema, No 

tenderness, No wound


Neurologic/Psychiatric:  no motor/sensory deficits, alert, normal mood/affect, 

oriented x 3, power is 5/5 both on sides


Skin:  No normal color, No warm/dry, No cyanosis, No cool, No diaphoresis, No 

damp, No ecchymosis, No jaundice, No mottled, No pallor, No rash, No tattoos/

piercings, No ulcerations, No rash on exposed areas, No ulcerations on exposed 

areas, No other





Data Review


Labs


Laboratory Tests


18 05:00: 


White Blood Count 5.4, Red Blood Count 4.23L, Hemoglobin 12.3L, Hematocrit 37L, 

Mean Corpuscular Volume 88, Mean Corpuscular Hemoglobin 29, Mean Corpuscular 

Hemoglobin Concent 33, Red Cell Distribution Width 13.8, Platelet Count 142, 

Mean Platelet Volume 11.6H, Sodium Level 141, Potassium Level 3.9, Chloride 

Level 103, Carbon Dioxide Level 29, Anion Gap 9, Blood Urea Nitrogen 14, 

Creatinine 1.30, Estimat Glomerular Filtration Rate 54, BUN/Creatinine Ratio 11

, Glucose Level 110H, Calcium Level 9.4, Phosphorus Level 3.2, Magnesium Level 

2.2








ECG Impression


ECG


Initial ECG Rhythm:  S.Rajiv, PVC





A/P-Cardiology


Assessment/Admission Diagnosis


Sinus node dysfunction,


Bradycardia,


CAD,


COPD,


Carotid stenosis,


Frequent PVCs





Plan


Sinus node dysfunction, we will continue telemetry off beta blockers.


Bradycardia,


CAD, continue aspirin, statin, ACE inhibitor.


COPD, for to primary team.


Carotid stenosis, active issues.


Frequent PVCs and ventricular bigeminy pattern.  We could not give beta 

blockers due to significant sinus bradycardia.








Thank you for your consultation. Please call me if you have any questions.








YESENIA Enamorado MD, FACP, FACC, FSCAI, FHRS, CCDS


Interventional Cardiology


Cardiac Electrophysiology


Vascular Medicine and Endovascular Interventions





Clinical Quality Measures


DVT/VTE Risk/Contraindication:


Risk Factor Score Per Nursin


RFS Level Per Nursing on Admit:  4+=Very High











SABRINA ENAMORADO MD 2018 13:31

## 2018-07-19 NOTE — PULMONARY PROGRESS NOTE
Subjective


Time Seen by Provider:  06:34


Subjective/Events-last exam


Pt is feeling better. SOB has improved.





Sepsis Event


Evaluation


Height, Weight, BMI


Height: 5'5.00"


Weight: 172lbs. 0.0oz. 78.224573ex; 28.6 BMI


Method:Stated





Exam


Exam





Vital Signs








  Date Time  Temp Pulse Resp B/P (MAP) Pulse Ox O2 Delivery O2 Flow Rate FiO2


 


7/19/18 04:00 98.7 35 19 130/54 (79) 96 Nasal Cannula 4.00 


 


7/19/18 01:22     95 NIV CPAP 2.00 


 


7/19/18 01:00  61      


 


7/19/18 00:00 98.4 55 18 111/55 (73) 95 Nasal Cannula 2.00 


 


7/18/18 21:13     95 Nasal Cannula 2.00 


 


7/18/18 21:00      Nasal Cannula 2.00 


 


7/18/18 19:36 98.9 91 16 112/78 (89) 93 Nasal Cannula 2.00 


 


7/18/18 19:00  65      


 


7/18/18 18:31     95 Nasal Cannula 2.00 


 


7/18/18 18:27     94 Nasal Cannula 2.00 


 


7/18/18 15:32 100.2 67 16 116/66 (83) 96 Nasal Cannula 2.00 


 


7/18/18 14:13     94 Nasal Cannula 2.00 


 


7/18/18 13:00  56      


 


7/18/18 10:41     93 Nasal Cannula 2.00 


 


7/18/18 09:00      Nasal Cannula 2.00 


 


7/18/18 08:00 98.4 88 16 119/56 (77) 93 Nasal Cannula 2.00 


 


7/18/18 07:00  55      


 


7/18/18 06:35      Nasal Cannula 2.00 


 


7/18/18 06:34     93 Nasal Cannula 2.00 














I & O 


 


 7/19/18





 07:00


 


Intake Total 2022 ml


 


Output Total 2600 ml


 


Balance -578 ml








Height & Weight


Height: 5'5.00"


Weight: 172lbs. 0.0oz. 78.195921df; 28.6 BMI


Method:Stated


General Appearance:  No Apparent Distress, Chronically ill


HEENT:  Normal ENT Inspection


Neck:  Normal Inspection, Non Tender, Supple


Respiratory:  Lungs Clear, No Accessory Muscle Use, No Respiratory Distress, 

Decreased Breath Sounds, Wheezing (Expiratory)


Cardiovascular:  Bradycardia, Systolic Murmur (3/6), Irregularly Irregular


Gastrointestinal:  normal bowel sounds, non tender, soft, no organomegaly


Extremity:  No Pedal Edema


Neurologic/Psychiatric:  Alert, Oriented x3, Normal Mood/Affect


Skin:  Normal Color, Warm/Dry


Lymphatic:  No Adenopathy





Results


Lab


Laboratory Tests


7/17/18 12:30








7/18/18 05:34








7/19/18 04:55











Assessment/Plan


Assessment/Plan


Acute on chronic respiratory failure 


   -Oxygen


   -SVNs 


Dyspnea with pleural effusions and pulmonary edema -- improving with lasix 


   -lasix, oxygen, SVNS


CHFAE systolic 


   -echo shows EF 40-45% 7/17/18 


   -Continue lasix 


   -troponins neg x 3 


mediastinal lymphadenopathy s/p bronch with EBUS 


   -Negative Cytology


   -will continue to follow as out patient. 


   -HE will need a repeat CT scan in 3 months 


   -Could consider repeating bronch with percepta brushing at later date. 


debility 


   -PT/OT


CKD


   -monitor close secondary to Lasix








PT is doing better. He is ok for discharge from my standpoint.











MYAH OROZCO DO Jul 19, 2018 06:29

## 2018-07-20 VITALS — DIASTOLIC BLOOD PRESSURE: 77 MMHG | SYSTOLIC BLOOD PRESSURE: 123 MMHG

## 2018-07-20 VITALS — DIASTOLIC BLOOD PRESSURE: 64 MMHG | SYSTOLIC BLOOD PRESSURE: 118 MMHG

## 2018-07-20 VITALS — SYSTOLIC BLOOD PRESSURE: 124 MMHG | DIASTOLIC BLOOD PRESSURE: 56 MMHG

## 2018-07-20 VITALS — DIASTOLIC BLOOD PRESSURE: 61 MMHG | SYSTOLIC BLOOD PRESSURE: 140 MMHG

## 2018-07-20 VITALS — SYSTOLIC BLOOD PRESSURE: 129 MMHG | DIASTOLIC BLOOD PRESSURE: 65 MMHG

## 2018-07-20 VITALS — SYSTOLIC BLOOD PRESSURE: 126 MMHG | DIASTOLIC BLOOD PRESSURE: 63 MMHG

## 2018-07-20 VITALS — SYSTOLIC BLOOD PRESSURE: 125 MMHG | DIASTOLIC BLOOD PRESSURE: 58 MMHG

## 2018-07-20 VITALS — SYSTOLIC BLOOD PRESSURE: 124 MMHG | DIASTOLIC BLOOD PRESSURE: 55 MMHG

## 2018-07-20 VITALS — SYSTOLIC BLOOD PRESSURE: 114 MMHG | DIASTOLIC BLOOD PRESSURE: 59 MMHG

## 2018-07-20 VITALS — SYSTOLIC BLOOD PRESSURE: 134 MMHG | DIASTOLIC BLOOD PRESSURE: 74 MMHG

## 2018-07-20 VITALS — SYSTOLIC BLOOD PRESSURE: 126 MMHG | DIASTOLIC BLOOD PRESSURE: 60 MMHG

## 2018-07-20 VITALS — DIASTOLIC BLOOD PRESSURE: 65 MMHG | SYSTOLIC BLOOD PRESSURE: 135 MMHG

## 2018-07-20 VITALS — SYSTOLIC BLOOD PRESSURE: 124 MMHG | DIASTOLIC BLOOD PRESSURE: 64 MMHG

## 2018-07-20 LAB
BUN/CREAT SERPL: 11
CALCIUM SERPL-MCNC: 9.4 MG/DL (ref 8.5–10.1)
CHLORIDE SERPL-SCNC: 103 MMOL/L (ref 98–107)
CO2 SERPL-SCNC: 29 MMOL/L (ref 21–32)
CREAT SERPL-MCNC: 1.3 MG/DL (ref 0.6–1.3)
ERYTHROCYTE [DISTWIDTH] IN BLOOD BY AUTOMATED COUNT: 13.8 % (ref 10–14.5)
GFR SERPLBLD BASED ON 1.73 SQ M-ARVRAT: 54 ML/MIN
GLUCOSE SERPL-MCNC: 110 MG/DL (ref 70–105)
HCT VFR BLD CALC: 37 % (ref 40–54)
HGB BLD-MCNC: 12.3 G/DL (ref 13.3–17.7)
MAGNESIUM SERPL-MCNC: 2.2 MG/DL (ref 1.8–2.4)
MCH RBC QN AUTO: 29 PG (ref 25–34)
MCHC RBC AUTO-ENTMCNC: 33 G/DL (ref 32–36)
MCV RBC AUTO: 88 FL (ref 80–99)
PHOSPHATE SERPL-MCNC: 3.2 MG/DL (ref 2.3–4.7)
PLATELET # BLD: 142 10^3/UL (ref 130–400)
PMV BLD AUTO: 11.6 FL (ref 7.4–10.4)
POTASSIUM SERPL-SCNC: 3.9 MMOL/L (ref 3.6–5)
RBC # BLD AUTO: 4.23 10^6/UL (ref 4.35–5.85)
SODIUM SERPL-SCNC: 141 MMOL/L (ref 135–145)
WBC # BLD AUTO: 5.4 10^3/UL (ref 4.3–11)

## 2018-07-20 PROCEDURE — 02H63JZ INSERTION OF PACEMAKER LEAD INTO RIGHT ATRIUM, PERCUTANEOUS APPROACH: ICD-10-PCS | Performed by: INTERNAL MEDICINE

## 2018-07-20 PROCEDURE — 0JH606Z INSERTION OF PACEMAKER, DUAL CHAMBER INTO CHEST SUBCUTANEOUS TISSUE AND FASCIA, OPEN APPROACH: ICD-10-PCS | Performed by: INTERNAL MEDICINE

## 2018-07-20 PROCEDURE — B51N1ZZ FLUOROSCOPY OF LEFT UPPER EXTREMITY VEINS USING LOW OSMOLAR CONTRAST: ICD-10-PCS | Performed by: INTERNAL MEDICINE

## 2018-07-20 PROCEDURE — 02HK3JZ INSERTION OF PACEMAKER LEAD INTO RIGHT VENTRICLE, PERCUTANEOUS APPROACH: ICD-10-PCS | Performed by: INTERNAL MEDICINE

## 2018-07-20 RX ADMIN — IPRATROPIUM BROMIDE AND ALBUTEROL SULFATE SCH ML: .5; 3 SOLUTION RESPIRATORY (INHALATION) at 18:52

## 2018-07-20 RX ADMIN — POTASSIUM CHLORIDE SCH MEQ: 750 TABLET, FILM COATED, EXTENDED RELEASE ORAL at 09:12

## 2018-07-20 RX ADMIN — FLUTICASONE PROPIONATE AND SALMETEROL XINAFOATE SCH PUFF: 115; 21 AEROSOL, METERED RESPIRATORY (INHALATION) at 06:17

## 2018-07-20 RX ADMIN — IPRATROPIUM BROMIDE AND ALBUTEROL SULFATE SCH ML: .5; 3 SOLUTION RESPIRATORY (INHALATION) at 06:16

## 2018-07-20 RX ADMIN — ATORVASTATIN CALCIUM SCH MG: 10 TABLET, FILM COATED ORAL at 20:57

## 2018-07-20 RX ADMIN — FLUTICASONE PROPIONATE AND SALMETEROL XINAFOATE SCH PUFF: 115; 21 AEROSOL, METERED RESPIRATORY (INHALATION) at 18:52

## 2018-07-20 RX ADMIN — IPRATROPIUM BROMIDE AND ALBUTEROL SULFATE SCH ML: .5; 3 SOLUTION RESPIRATORY (INHALATION) at 21:48

## 2018-07-20 RX ADMIN — IPRATROPIUM BROMIDE AND ALBUTEROL SULFATE SCH ML: .5; 3 SOLUTION RESPIRATORY (INHALATION) at 13:55

## 2018-07-20 RX ADMIN — SODIUM CHLORIDE SCH MLS/HR: 900 INJECTION INTRAVENOUS at 21:43

## 2018-07-20 RX ADMIN — ENALAPRIL MALEATE SCH MG: 10 TABLET ORAL at 09:47

## 2018-07-20 RX ADMIN — MONTELUKAST SCH MG: 10 TABLET, FILM COATED ORAL at 20:57

## 2018-07-20 RX ADMIN — FUROSEMIDE SCH MG: 10 INJECTION, SOLUTION INTRAVENOUS at 06:25

## 2018-07-20 RX ADMIN — LEVOTHYROXINE SODIUM SCH MCG: 0.11 TABLET ORAL at 20:57

## 2018-07-20 RX ADMIN — IPRATROPIUM BROMIDE AND ALBUTEROL SULFATE SCH ML: .5; 3 SOLUTION RESPIRATORY (INHALATION) at 10:24

## 2018-07-20 RX ADMIN — Medication SCH MG: at 09:47

## 2018-07-20 RX ADMIN — ASPIRIN 81 MG CHEWABLE TABLET SCH MG: 81 TABLET CHEWABLE at 09:12

## 2018-07-20 RX ADMIN — ENALAPRIL MALEATE SCH MG: 10 TABLET ORAL at 20:57

## 2018-07-20 RX ADMIN — FAMOTIDINE SCH MG: 20 TABLET, FILM COATED ORAL at 09:12

## 2018-07-20 RX ADMIN — IPRATROPIUM BROMIDE AND ALBUTEROL SULFATE SCH ML: .5; 3 SOLUTION RESPIRATORY (INHALATION) at 01:50

## 2018-07-20 RX ADMIN — UMECLIDINIUM SCH INH: 62.5 AEROSOL, POWDER ORAL at 06:18

## 2018-07-20 NOTE — PROGRESS NOTE-HOSPITALIST
Subjective


HPI/CC On Admission


Date Seen by Provider:  2018


Time Seen by Provider:  12:00


The patient is a 78-year-old white male known to me from a previous visit in 

late May.  He was admitted from Dr. Quesada's office this morning after he 

presented with increasing dyspnea.  He uses oxygen at home.  He and his wife 

state that there has been increasing swelling in the legs over the past several 

days.  He denied orthopnea or chest pain.  He is a previous smoker and 

discontinued this habit about 15 years ago.  He has not noted fever or purulent 

appearing sputum.


Subjective/Events-last exam


Patient's heart rate his gotten slower and slower with bigeminy and a rate down 

into the 30s.  Patient is symptomatic weak and has more trouble keeping the 

fluid off.  Dr. Enamorado has already seen the patient and has pacemaker placement 

scheduled.  The patient is relieved that something is being done to help him 

and that we have found the etiology of his problems.  Otherwise he is without 

complaint this morning





Review of Systems


Pulmonary:  Dyspnea


Neurological:  Weakness





Objective


Exam


Vital Signs





Vital Signs








  Date Time  Temp Pulse Resp B/P (MAP) Pulse Ox O2 Delivery O2 Flow Rate FiO2


 


18 11:57 99.3 55 20 135/65 (88) 96 Nasal Cannula 2.00 





Capillary Refill :


General Appearance:  No Apparent Distress, WD/WN


HEENT:  Normal ENT Inspection, Other


Neck:  Non Tender, Supple


Respiratory:  Chest Non Tender, No Accessory Muscle Use, No Respiratory Distress

, Wheezing


Cardiovascular:  Bradycardia, Systolic Murmur, Irregularly Irregular


Gastrointestinal:  Normal Bowel Sounds, No Organomegaly, Non Tender, Soft


Back:  Normal Inspection


Extremity:  Normal Range of Motion, Non Tender, No Pedal Edema


Neurologic/Psychiatric:  Alert, Oriented x3, No Motor/Sensory Deficits, Normal 

Mood/Affect


Skin:  Normal Color, Warm/Dry


Lymphatic:  No Adenopathy





Results/Procedures


Lab


Laboratory Tests


18 05:00








Patient resulted labs reviewed.


Imaging:  Reviewed Imaging Report





Assessment/Plan


Assessment and Plan


Assess & Plan/Chief Complaint


1.  Acute on chronic respiratory failure.-Improving


2.  Exacerbation of CHF-EF 40 percent


3.  History of lymphadenopathy with  EBUS that's negative.


4.  Chronic renal insufficiency.


5.  Sick sinus syndrome awaiting pacemaker placement





Plan for discharge in the morning if okay with cardiology





Clinical Quality Measures


DVT/VTE Risk/Contraindication:


Risk Factor Score Per Nursin


RFS Level Per Nursing on Admit:  4+=Very High











CUCA DIEZ MD 2018 12:26

## 2018-07-20 NOTE — DIAGNOSTIC IMAGING REPORT
INDICATION: Congestive heart failure, followup.



TECHNIQUE: Single view chest 5:47 a.m.



CORRELATION STUDY: 07/19/2018



FINDINGS: 

Poststernotomy changes. Cardiac enlargement stable. Vasculature

appearing slightly increased from prior study. Mildly prominent

interstitial markings also slightly increased.  The lungs are

clear with no consolidating infiltrate. There is no significant

effusion or pneumothorax. 



IMPRESSION: 

1. Slight increasing severity vascular congestion.

2. Parenchymal density in the right lung base may reflective of

underlying edema versus patchy of pneumonia appears increased as

well.     



Dictated by: 



  Dictated on workstation # [id number]

## 2018-07-20 NOTE — CARDIOLOGY PROGRESS NOTE
Cardiology SOAP Progress Note


Subjective:


lightheadedness this morning





Objective:


I&O/Vital Signs











 7/20/18 7/20/18 7/20/18 7/20/18





 06:18 07:00 08:00 09:00


 


Temp   99.0 98.4


 


Pulse  34 67 41


 


Resp   18 17


 


B/P (MAP)   126/60 (82) 134/74 (94)


 


Pulse Ox 97  96 97


 


O2 Delivery Nasal Cannula  Nasal Cannula Nasal Cannula


 


O2 Flow Rate 2.00  2.00 2.00


 


    





 7/20/18 7/20/18 7/20/18 7/20/18





 09:00 10:24 11:57 13:00


 


Temp   99.3 


 


Pulse   55 71


 


Resp   20 


 


B/P (MAP)   135/65 (88) 


 


Pulse Ox 97 95 96 


 


O2 Delivery Nasal Cannula Nasal Cannula Nasal Cannula 


 


O2 Flow Rate 2.00 2.00 2.00 


 


    





 7/20/18 7/20/18 7/20/18 7/20/18





 16:00 16:15 16:30 16:45


 


Temp 99.0   


 


Pulse 65 43 45 41


 


Resp 18   


 


B/P (MAP) 124/64 (84) 114/59 (77)  118/64 (82)


 


Pulse Ox 98 93 93 94


 


O2 Delivery Nasal Cannula Nasal Cannula Nasal Cannula Nasal Cannula


 


O2 Flow Rate 2.00 2.00 2.00 2.00


 


    





 7/20/18   





 17:01   


 


Pulse 57   


 


B/P (MAP) 124/55 (78)   


 


Pulse Ox 94   


 


O2 Delivery Nasal Cannula   


 


O2 Flow Rate 2.00   














 7/20/18





 00:00


 


Intake Total 1170 ml


 


Output Total 2176 ml


 


Balance -1006 ml








Weight (Pounds):  172


Weight (Ounces):  0.0


Weight (Calculated Kilograms):  78.898629


Constitutional:  No appears stated age; AAO x 3; No apparent distress, No PERRL

, No well-developed, No well-nourished, No other


Respiratory:  No accessory muscle use, No respiratory distress, No chest tender

, No chest expansion is symmetric; chest is bilaterally symmetric; No lungs 

clear to percussion; lungs clear to auscultation; No crackles, No rhonchi, No 

rales, No stridor, No wheezing, No pleural rub, No other


Cardiovascular:  regular rate-rhythm; No irregularly irregular, No extra beats, 

No parasternal heave is noted, No JVD, No edema; bradycardia; No tachycardia, 

No point of maximal impulse, No cardiac thrills are palpable; S1 and S2; No 

gallop/S3, No gallop/S4, No diastolic murmur, No systolic murmur, No friction 

rub, No click, No other


Gastrointestional:  No tender, No soft, No round, No distended, No pulsatile 

mass, No organomegaly, No guarding, No rebound, No tenderness, No hernia, No 

mass, No audible bowel sounds, No abnormal bowel sounds, No abdominal bruits, 

No spleenomegaly, No other


Extremities:  No normal range of motion, No non-tender, No normal inspection, 

No pedal edema, No calf tenderness, No normal capillary refill, No pelvis stable

, No calf tenderness, No inflammation, No pedal edema, No slow capillary refill

, No swelling, No other, No abrasion, No clubbing, No cyanosis, No ecchymosis, 

No laceration, No no lower extremity edema bilateral, No significant edema, No 

tenderness, No wound


Neurologic/Psychiatric:  No CNs II-XII nml as tested; no motor/sensory deficits

, alert, normal mood/affect, oriented x 3; No abnormal cerebellar tests, No 

abnormal CNs II-XII, No abnormal gait, No aphasia, No EOM palsy, No facial droop

, No motor weakness, No sensory deficit, No depressed affect, No disoriented x 3

, No other, No grossly intact, No power is 5/5 both on sides





Results/Procedures:


Labs


Laboratory Tests


7/20/18 05:00: 


White Blood Count 5.4, Red Blood Count 4.23L, Hemoglobin 12.3L, Hematocrit 37L, 

Mean Corpuscular Volume 88, Mean Corpuscular Hemoglobin 29, Mean Corpuscular 

Hemoglobin Concent 33, Red Cell Distribution Width 13.8, Platelet Count 142, 

Mean Platelet Volume 11.6H, Sodium Level 141, Potassium Level 3.9, Chloride 

Level 103, Carbon Dioxide Level 29, Anion Gap 9, Blood Urea Nitrogen 14, 

Creatinine 1.30, Estimat Glomerular Filtration Rate 54, BUN/Creatinine Ratio 11

, Glucose Level 110H, Calcium Level 9.4, Phosphorus Level 3.2, Magnesium Level 

2.2








A/P:


Assessment/Dx:


Severe symptomatic sinus node dysfunction,


Bradycardia,


CAD,


COPD,


Carotid stenosis,


Frequent PVCs


Plan:


Severe symptomatic sinus node dysfunction with bradycardia (HR in the 30s), 

with frequent PVCs in bigeminy pattern and CABG/CAD requiring BB. Dual chamber 

permanent pacemaker is recommended.


CAD, continue aspirin, statin, ACE inhibitor.


COPD, for to primary team.


Carotid stenosis, active issues.


Frequent PVCs and ventricular bigeminy pattern.  We could not give beta 

blockers due to significant sinus bradycardia.








Thank you for your consultation. Please call me if you have any questions.








YESENIA Enamorado MD, FACP, FACC, FSCAI, FHRS, CCDS


Interventional Cardiology


Cardiac Electrophysiology


Vascular Medicine and Endovascular Interventions











SABRINA ENAMORADO MD Jul 20, 2018 10:26

## 2018-07-20 NOTE — PERMANENT PACEMAKER IMPLANT
Dual Chamber Pacemaker Implant


PROCEDURE PHYSICIAN:   YESENIA Enamorado MD


 


DUAL CHAMBER PACEMAKER IMPLANTATION: 


 


DATE OF PROCEDURE:  7/20/18 


 


INDICATION: Severe symptomatic sinus node dysfunction with ventricular bigeminy 

and severe CAD requiring beta blockers.





PREOPERATIVE DIAGNOSIS: Severe symptomatic sinus node dysfunction with 

ventricular bigeminy and severe CAD requiring beta blockers.





POSTOPERATIVE DIAGNOSIS: Successful dual-chamber permanent pacemaker 

implantation.


 


HISTORY:


This is a 78-year-old gentleman with history of CABG and PCI.  He was found to 

be in severe bradycardia and ventricular bigeminy.  Heart rates were in the 

30s.  Complains of dizziness.  Patient requires beta blocker for high burden of 

PVCs and CAD but cannot tolerate due to symptomatic sinus node dysfunction.  

Dual-chamber permanent pacemaker was recommended. 





PROCEDURE PERFORMED:


1.   Dual-chamber permanent pacemaker implantation. 


2.   Fluoroscopy. 


3.   Central venous access. 


4.  Left upper extremity venogram; done since the patient has previous history 

of for port placement.  Venogram did not show any significant stenosis.


 


ANESTHESIA:  


Local anesthesia, conscious sedation. 





COMPLICATIONS:


None.





ESTIMATED BLOOD LOSS:20 mL.


SPECIMENS: None.


ORAL ANTICOAGULATION: None.


FLUOROSCOPY TIME: 8.52 minutes


FLUOROSCOPY DOSE: 136 mgy.


CONTRAST DOSE: 20 mL.





PROCEDURE DETAILS:


The patient is a 78  male  and after all of the patients questions were 

answered, the patient was brought to the EP Lab. The patient's left chest was 

prepped and draped in sterile fashion. A 2 inch horizontal incision was made 1 

cm below the clavicle and dissection carried down to the pectoralis fascia.  


Using the modified Seldinger technique and under fluoroscopy guidance, the 

anterior aspect of the left axillary vein was accessed 2 times. The J wires 

were secured to the drapes with a mosquito clamp.  A 6-Honduran sheath was 

introduced over one of the J-wires. The RV lead was then inserted. The RV lead 

was directed across the tricuspid valve to the apical septal portion of the 

right ventricle. The position was checked in [place] and ZAMORA views. The screw was 

deployed and the lead connected to the . Close sensing and pacing 

thresholds were obtained. Diaphragmatic pacing was ruled out. The lead was 

secured with 2-0 silk ties to the underlying muscle and fascia. 


Next, a 6-Honduran sheath was introduced through the remaining J-wire. An atrial 

lead was then introduced and guided to the level of the right appendage.  The 

screw was deployed and the lead was connected to the interrogator.  Good 

sensing and pacing thresholds were obtained. Diaphragmatic pacing was ruled 

out. The leads were secured with 2-0 silk ties to the underlying muscle and 

fascia. 


The leads were connected to the device in a hermetic fashion. The device and 

leads were placed in the pocket. Aggressive irrigation with saline solution was 

done. The device was secured to the underlying muscle and fascia with a 2-0 

silk tie. interrogation of the device revealed good integrity of all the leads 

and good connections.  


 


The wound was then closed using 2 layers. The first layer was interrupted 2-0 

absorbable Vicryl suture. The last layer was a single subcuticular layer with 4-

0 Vicryl suture.  Half inch Steri-Strips and a small dressing were then applied 

to the wound. The patient tolerated the procedure well and was returned to the 

recovery room in stable condition with stable vital signs. 


 


DEVICE INFORMATION:  Biotronik Edora 8 DRT.  Reference number 834410, serial 

number [id number], PID 73.


RA LEAD:  Solia S 53 Biotronik.  Reference [id number].  Serial number [id number].


RV LEAD:  Solia S 60 Biotronik, reference number [id number], serial number [id number].





PER-OPERATIVE DEVICE INTERROGATION:    RA sensing 1.2 mV, impedance 489 ohms, 

threshold 0.9 towards at 0.4 ms.


RV sensing 12.8 mV, impedance 702 ohms, threshold 0.7 V at 0.4 ms.





IMMEDIATE POSTOPERATIVE DEVICE INTERROGATION:  RA sensing 1.2 mV, impedance 468 

ohms, threshold 0.6 towards at 0.4 ms.


RV sensing 14.5 mV, impedance 721 ohms, threshold 0.8 towards at 0.4 ms.


DDDR 39062.  PA V3 100, PRASAD 275 ms. 





PLAN:


The patient transferred to the ICU.  We will continue with two more doses of IV 

antibiotics. We will check a chest x-ray and interrogate the device in the 

morning. The patient will continue on oral antibiotics for 5 days. 





YESENIA Enamorado MD, RS, CCDS


Cardiac Electrophysiology











SABRINA ENAMORADO MD Jul 20, 2018 3:29 pm

## 2018-07-20 NOTE — PULMONARY PROGRESS NOTE
Subjective


Time Seen by Provider:  05:41


Subjective/Events-last exam


pt started having bradycardia last night while sleeping.





Sepsis Event


Evaluation


Height, Weight, BMI


Height: 5'5.00"


Weight: 172lbs. 0.0oz. 78.850620ou; 28.6 BMI


Method:Stated





Exam


Exam





Vital Signs








  Date Time  Temp Pulse Resp B/P (MAP) Pulse Ox O2 Delivery O2 Flow Rate FiO2


 


7/20/18 04:48 98.4 41 17 134/74 (94) 97 Nasal Cannula 2.00 


 


7/20/18 01:50     96 Nasal Cannula 2.00 


 


7/20/18 01:00  45      


 


7/20/18 00:19 99.1 42 19 126/63 (84) 94 Nasal Cannula 2.00 


 


7/19/18 22:50    140/63 (88)    


 


7/19/18 22:35     93 Nasal Cannula 2.00 


 


7/19/18 21:00     95 Nasal Cannula 2.00 


 


7/19/18 20:00 99.0 45 18 122/87 (99) 95 Nasal Cannula 2.00 


 


7/19/18 19:20     95 Nasal Cannula 2.00 


 


7/19/18 19:00  60      


 


7/19/18 16:00 97.0 46 18 123/60 (81) 97 Nasal Cannula 2.00 


 


7/19/18 14:41     91 Nasal Cannula 2.00 


 


7/19/18 13:00  66      


 


7/19/18 12:00 99.7 50 18 122/67 (85) 98 Nasal Cannula 2.00 


 


7/19/18 11:06     96  2.00 


 


7/19/18 09:00      Nasal Cannula 2.00 


 


7/19/18 08:00 98.3 52 18 117/57 (77) 97 Nasal Cannula 2.00 


 


7/19/18 07:00  62      


 


7/19/18 06:58     92 Nasal Cannula 2.00 














I & O 


 


 7/20/18





 07:00


 


Intake Total 1170 ml


 


Output Total 2176 ml


 


Balance -1006 ml








Height & Weight


Height: 5'5.00"


Weight: 172lbs. 0.0oz. 78.962595vl; 28.6 BMI


Method:Stated


General Appearance:  No Apparent Distress, Chronically ill


HEENT:  Normal ENT Inspection


Neck:  Normal Inspection, Non Tender, Supple


Respiratory:  Lungs Clear, No Accessory Muscle Use, No Respiratory Distress, 

Decreased Breath Sounds, Wheezing (Expiratory)


Cardiovascular:  Bradycardia, Systolic Murmur (3/6), Irregularly Irregular


Gastrointestinal:  normal bowel sounds, non tender, soft, no organomegaly


Extremity:  No Pedal Edema


Neurologic/Psychiatric:  Alert, Oriented x3, Normal Mood/Affect


Skin:  Normal Color, Warm/Dry


Lymphatic:  No Adenopathy





Results


Lab


Laboratory Tests


7/19/18 04:55








7/20/18 05:00











Assessment/Plan


Assessment/Plan


Acute on chronic respiratory failure 


   -Oxygen


   -SVNs 


Bradycardia - started last night


   -RN notified cardiology 


Dyspnea with pleural effusions and pulmonary edema -- improving with lasix 


   -lasix, oxygen, SVNS


CHFAE systolic


   -echo shows EF 40-45% 7/17/18 


   - lasix 


   -troponins neg x 3 


mediastinal lymphadenopathy s/p bronch with EBUS 


   -Negative Cytology


   -will continue to follow as out patient. 


   -HE will need a repeat CT scan in 3 months 


   -Could consider repeating bronch with percepta brushing at later date. 


debility 


   -PT/OT


CKD


   -monitor close secondary to Lasix











MYAH OROZCO DO Jul 20, 2018 05:42

## 2018-07-20 NOTE — DIAGNOSTIC IMAGING REPORT
INDICATION: 

Arrhythmia.



EXAMINATION:

Portable chest at 4:39 PM.



FINDINGS:

There are postop changes from CABG surgery. There is a

dual-chamber pacemaker. The heart size and pulmonary vascularity

are normal. The lungs are clear. There are no effusions or

pneumothoraces.



IMPRESSION: 

No acute abnormalities in the chest.



Dictated by: 



  Dictated on workstation # [id number]

## 2018-07-20 NOTE — CARDIAC PROCEDURE NOTE-CS/ASA
Pre-Procedure Note


Pre-Op Procedure Note


H&P Reviewed


The H&P was reviewed, patient examined and no changes noted.


Date H&P Reviewed:  Jul 20, 2018


Time H&P Reviewed:  12:30





Conscious Sedation Pre-Proced


Time Reviewed:  12:30


ASA Class:  3











Airway Mallampati Classification: ([place] appropriate class) I.  II.  III,  IV


 


Lungs 


 


Heart 


 


 ASA score


 


 ASA 1: a normal healthy patient


 


 ASA 2:  a patient with a mild systemic disease (mid diabetes, controlled 

hypertension, obesity 


 


 ASA 3:  a patient with a severe systemic disease that limits activity  (angina

, COPD, prior Myocardial infarction)


 


 ASA 4:  a patient with an incapacitating disease that is a constant threat to 

life (CHF, renal failure)


 


 ASA 5:  a moribund patient not expected to survive 24 hrs.  (ruptured aneurysm)


 


 ASA 6:  a declared brain dead patient whose organs are being harvested.


 


 For emergent operations, add the letter E after the classification








Grade 1


Sedation Plan:  Analgesia, Amnesia, Plan communicated to team members, 

Discussed options with patient/fam, Discussed risks with patient/fam


Note


The patient is an appropriate candidate to undergo the planned procedure, 

sedation, and anesthesia.





The patient immediately re-assessed prior to indication.











SABRINA SCHERER MD Jul 20, 2018 3:21 pm

## 2018-07-21 VITALS — SYSTOLIC BLOOD PRESSURE: 124 MMHG | DIASTOLIC BLOOD PRESSURE: 61 MMHG

## 2018-07-21 VITALS — DIASTOLIC BLOOD PRESSURE: 61 MMHG | SYSTOLIC BLOOD PRESSURE: 121 MMHG

## 2018-07-21 VITALS — SYSTOLIC BLOOD PRESSURE: 121 MMHG | DIASTOLIC BLOOD PRESSURE: 66 MMHG

## 2018-07-21 VITALS — DIASTOLIC BLOOD PRESSURE: 65 MMHG | SYSTOLIC BLOOD PRESSURE: 136 MMHG

## 2018-07-21 LAB
BUN/CREAT SERPL: 13
CALCIUM SERPL-MCNC: 9 MG/DL (ref 8.5–10.1)
CHLORIDE SERPL-SCNC: 105 MMOL/L (ref 98–107)
CO2 SERPL-SCNC: 28 MMOL/L (ref 21–32)
CREAT SERPL-MCNC: 1.24 MG/DL (ref 0.6–1.3)
ERYTHROCYTE [DISTWIDTH] IN BLOOD BY AUTOMATED COUNT: 13.8 % (ref 10–14.5)
GFR SERPLBLD BASED ON 1.73 SQ M-ARVRAT: 57 ML/MIN
GLUCOSE SERPL-MCNC: 113 MG/DL (ref 70–105)
HCT VFR BLD CALC: 35 % (ref 40–54)
HGB BLD-MCNC: 11.5 G/DL (ref 13.3–17.7)
MAGNESIUM SERPL-MCNC: 2.2 MG/DL (ref 1.8–2.4)
MCH RBC QN AUTO: 29 PG (ref 25–34)
MCHC RBC AUTO-ENTMCNC: 33 G/DL (ref 32–36)
MCV RBC AUTO: 89 FL (ref 80–99)
PHOSPHATE SERPL-MCNC: 3.1 MG/DL (ref 2.3–4.7)
PLATELET # BLD: 157 10^3/UL (ref 130–400)
PMV BLD AUTO: 12.2 FL (ref 7.4–10.4)
POTASSIUM SERPL-SCNC: 4.2 MMOL/L (ref 3.6–5)
RBC # BLD AUTO: 3.96 10^6/UL (ref 4.35–5.85)
SODIUM SERPL-SCNC: 140 MMOL/L (ref 135–145)
WBC # BLD AUTO: 6.3 10^3/UL (ref 4.3–11)

## 2018-07-21 RX ADMIN — Medication SCH MG: at 09:08

## 2018-07-21 RX ADMIN — IPRATROPIUM BROMIDE AND ALBUTEROL SULFATE SCH ML: .5; 3 SOLUTION RESPIRATORY (INHALATION) at 06:52

## 2018-07-21 RX ADMIN — FAMOTIDINE SCH MG: 20 TABLET, FILM COATED ORAL at 09:17

## 2018-07-21 RX ADMIN — ENALAPRIL MALEATE SCH MG: 10 TABLET ORAL at 09:08

## 2018-07-21 RX ADMIN — IPRATROPIUM BROMIDE AND ALBUTEROL SULFATE SCH ML: .5; 3 SOLUTION RESPIRATORY (INHALATION) at 10:47

## 2018-07-21 RX ADMIN — UMECLIDINIUM SCH INH: 62.5 AEROSOL, POWDER ORAL at 06:53

## 2018-07-21 RX ADMIN — SODIUM CHLORIDE SCH MLS/HR: 900 INJECTION INTRAVENOUS at 06:13

## 2018-07-21 RX ADMIN — FUROSEMIDE SCH MG: 10 INJECTION, SOLUTION INTRAVENOUS at 06:13

## 2018-07-21 RX ADMIN — SODIUM CHLORIDE SCH MLS/HR: 900 INJECTION INTRAVENOUS at 13:07

## 2018-07-21 RX ADMIN — POTASSIUM CHLORIDE SCH MEQ: 750 TABLET, FILM COATED, EXTENDED RELEASE ORAL at 09:17

## 2018-07-21 RX ADMIN — IPRATROPIUM BROMIDE AND ALBUTEROL SULFATE SCH ML: .5; 3 SOLUTION RESPIRATORY (INHALATION) at 02:17

## 2018-07-21 RX ADMIN — ASPIRIN 81 MG CHEWABLE TABLET SCH MG: 81 TABLET CHEWABLE at 09:17

## 2018-07-21 RX ADMIN — FLUTICASONE PROPIONATE AND SALMETEROL XINAFOATE SCH PUFF: 115; 21 AEROSOL, METERED RESPIRATORY (INHALATION) at 06:53

## 2018-07-21 NOTE — CARDIOLOGY PROGRESS NOTE
Cardiology SOAP Progress Note


Subjective:


No cardiac complaints. No lightheadedness or dizziness.





Objective:


I&O/Vital Signs











 7/21/18 7/21/18 7/21/18 7/21/18





 02:17 03:43 06:56 07:00


 


Temp  97.1  


 


Pulse  53  72


 


Resp  18  


 


B/P (MAP)  121/61 (81)  


 


Pulse Ox 95 94 94 


 


O2 Delivery Nasal Cannula Nasal Cannula Nasal Cannula 


 


O2 Flow Rate 1.50 2.00 1.50 


 


    





 7/21/18 7/21/18 7/21/18 7/21/18





 08:00 08:00 10:48 12:00


 


Temp 98.0   99.1


 


Pulse 74   65


 


Resp 18   22


 


B/P (MAP) 124/61 (82)   136/65 (88)


 


Pulse Ox 94 97 95 96


 


O2 Delivery Nasal Cannula Nasal Cannula Nasal Cannula Nasal Cannula


 


O2 Flow Rate 2.00 2.00 1.50 2.00














 7/21/18





 00:00


 


Intake Total 1020 ml


 


Output Total 750 ml


 


Balance 270 ml








Weight (Pounds):  172


Weight (Ounces):  0.0


Weight (Calculated Kilograms):  78.147327


Device Insertion Site:  without hematoma


Swelling:  mild amount of swelling


Drainage:  No


Bruising:  large amount of bruising


Constitutional:  AAO x 3


Respiratory:  chest is bilaterally symmetric, lungs clear to auscultation


Cardiovascular:  regular rate-rhythm, bradycardia, S1 and S2


Gastrointestional:  No tender, No soft, No round, No distended, No pulsatile 

mass, No organomegaly, No guarding, No rebound, No tenderness, No hernia, No 

mass, No audible bowel sounds, No abnormal bowel sounds, No abdominal bruits, 

No spleenomegaly, No other


Extremities:  No normal range of motion, No non-tender, No normal inspection, 

No pedal edema, No calf tenderness, No normal capillary refill, No pelvis stable

, No calf tenderness, No inflammation, No pedal edema, No slow capillary refill

, No swelling, No other, No abrasion, No clubbing, No cyanosis, No ecchymosis, 

No laceration, No no lower extremity edema bilateral, No significant edema, No 

tenderness, No wound


Neurologic/Psychiatric:  no motor/sensory deficits, alert, normal mood/affect, 

oriented x 3


Skin:  No normal color, No warm/dry, No cyanosis, No cool, No diaphoresis, No 

damp, No ecchymosis, No jaundice, No mottled, No pallor, No rash, No tattoos/

piercings, No ulcerations, No rash on exposed areas, No ulcerations on exposed 

areas, No other





Results/Procedures:


Labs


Laboratory Tests


7/21/18 04:55: 


White Blood Count 6.3, Red Blood Count 3.96L, Hemoglobin 11.5L, Hematocrit 35L, 

Mean Corpuscular Volume 89, Mean Corpuscular Hemoglobin 29, Mean Corpuscular 

Hemoglobin Concent 33, Red Cell Distribution Width 13.8, Platelet Count 157, 

Mean Platelet Volume 12.2H, Sodium Level 140, Potassium Level 4.2, Chloride 

Level 105, Carbon Dioxide Level 28, Anion Gap 7, Blood Urea Nitrogen 16, 

Creatinine 1.24, Estimat Glomerular Filtration Rate 57, BUN/Creatinine Ratio 13

, Glucose Level 113H, Calcium Level 9.0, Phosphorus Level 3.1, Magnesium Level 

2.2








A/P:


Assessment/Dx:


Severe symptomatic sinus node dysfunction, s/p dual chamber permanent pacemaker 

yesterday (biotronik).


Bradycardia,


CAD,


COPD,


Carotid stenosis,


Frequent PVCs


Plan:


Severe symptomatic sinus node dysfunction with bradycardia (HR in the 30s), 

with frequent PVCs in bigeminy pattern and CABG/CAD requiring BB. Dual chamber 

permanent pacemaker done. left chest looks good. 


CAD, continue aspirin, statin, ACE inhibitor.


COPD, for to primary team.


Carotid stenosis, active issues.


Frequent PVCs and ventricular bigeminy pattern.  Increase dose of betablockers 

- add extra dose of metoprolol 50mg x1 now. discharge on metoprolol 75mg. 


Device changes: we have changed the baseline atrial pacing rate to 70bpm. also 

PVARP has been increased. Instantly we saw decrease in the rate of PVCs. dont 

want to give amiodarone due to lung condition.








Thank you for your consultation. Please call me if you have any questions.








YESENIA Enamorado MD, FACP, FACC, FSCAI, FHRS, CCDS


Interventional Cardiology


Cardiac Electrophysiology


Vascular Medicine and Endovascular Interventions











SABRINA ENAMORADO MD Jul 21, 2018 1:32 pm

## 2018-07-21 NOTE — PROGRESS NOTE-HOSPITALIST
Subjective


HPI/CC On Admission


Date Seen by Provider:  2018


Time Seen by Provider:  01:50


The patient is a 78-year-old white male known to me from a previous visit in 

late May.  He was admitted from Dr. Quesada's office this morning after he 

presented with increasing dyspnea.  He uses oxygen at home.  He and his wife 

state that there has been increasing swelling in the legs over the past several 

days.  He denied orthopnea or chest pain.  He is a previous smoker and 

discontinued this habit about 15 years ago.  He has not noted fever or purulent 

appearing sputum.


Subjective/Events-last exam


Patient is status post pacemaker placement .  Evidently overnight there was 

some question about capture but after the pacemaker was interrogated it 

appeared to be functioning normally.   is following this closely.  The 

patient has no complaints other than some soreness with a pacemaker was placed.

  He says his dizziness is much better.  Chest x-ray does show some atelectasis 

in the right lower lobe-we'll put him on incentive spirometry





Objective


Exam


Vital Signs





Vital Signs








  Date Time  Temp Pulse Resp B/P (MAP) Pulse Ox O2 Delivery O2 Flow Rate FiO2


 


18 08:00     97 Nasal Cannula 2.00 


 


18 08:00 98.0 74 18 124/61 (82)    





Capillary Refill :


General Appearance:  No Apparent Distress, WD/WN


HEENT:  Normal ENT Inspection


Neck:  Normal Inspection, Non Tender, Supple


Respiratory:  Lungs Clear, Normal Breath Sounds, No Accessory Muscle Use, No 

Respiratory Distress


Cardiovascular:  Regular Rate, Rhythm, No Gallop, No JVD, Normal Peripheral 

Pulses, Systolic Murmur


Gastrointestinal:  Normal Bowel Sounds, No Organomegaly, No Pulsatile Mass, Non 

Tender, Soft


Rectal:  Deferred


Back:  Normal Inspection, No CVA Tenderness, No Vertebral Tenderness


Extremity:  Normal Inspection, Normal Range of Motion, Non Tender, No Calf 

Tenderness, No Pedal Edema


Neurologic/Psychiatric:  Alert, Oriented x3, No Motor/Sensory Deficits, Normal 

Mood/Affect


Skin:  Normal Color, Warm/Dry, Other (Pacemaker site looks good)





Results/Procedures


Lab


Laboratory Tests


18 04:55








Patient resulted labs reviewed.


Imaging:  Reviewed Imaging Report





Assessment/Plan


Assessment and Plan


Assess & Plan/Chief Complaint


1.  Acute on chronic respiratory failure.-Improving-we'll add incentive 

spirometry


2.  Exacerbation of CHF-EF 40 percent- has discontinued the ACE 

inhibitor and is placing him on a beta blocker


3.  History of lymphadenopathy with  EBUS that's negative.


4.  Chronic renal insufficiency.


5.  Sick sinus syndrome s/p pacemaker placement





Plan for discharge if okay with cardiology but may hold overnight.





Clinical Quality Measures


DVT/VTE Risk/Contraindication:


Risk Factor Score Per Nursin


RFS Level Per Nursing on Admit:  4+=Very High











CUCA DIEZ MD 2018 09:33

## 2018-07-21 NOTE — DIAGNOSTIC IMAGING REPORT
INDICATION: Congestive heart failure.



COMPARISON: 07/20/2018.



FINDINGS:  

Mild cardiomegaly. There is right basilar atelectasis and/or

pneumonitis. There is no pleural effusion or pneumothorax. The

mediastinum is unremarkable. There has been a previous sternotomy

and a coronary bypass graft. Pacemaker overlies the left thorax. 



IMPRESSION:



Right basilar atelectasis and/or pneumonitis.



Mild cardiomegaly.



Dictated by: 



  Dictated on workstation # [id number]

## 2018-07-21 NOTE — DISCHARGE INST-POST DEVICE
Discharge Inst-Post Device


Follow up/Plan


Dr Enamorado in one week


Heart Healthy Diet





Do not lift arm on side of device placement above head for 4 weeks. 


Do not push and pull heavy objects for 4 weeks.


Activity as tolerated.





Leave dressing on until follow up at the office.











SABRINA ENAMORADO MD Jul 21, 2018 1:36 pm

## 2018-07-31 ENCOUNTER — HOSPITAL ENCOUNTER (INPATIENT)
Dept: HOSPITAL 75 - ER | Age: 78
LOS: 7 days | Discharge: HOME | DRG: 308 | End: 2018-08-07
Attending: INTERNAL MEDICINE | Admitting: INTERNAL MEDICINE
Payer: MEDICARE

## 2018-07-31 VITALS — DIASTOLIC BLOOD PRESSURE: 75 MMHG | SYSTOLIC BLOOD PRESSURE: 96 MMHG

## 2018-07-31 VITALS — SYSTOLIC BLOOD PRESSURE: 117 MMHG | DIASTOLIC BLOOD PRESSURE: 87 MMHG

## 2018-07-31 VITALS — BODY MASS INDEX: 25.72 KG/M2 | WEIGHT: 154.37 LBS | HEIGHT: 65 IN

## 2018-07-31 VITALS — DIASTOLIC BLOOD PRESSURE: 70 MMHG | SYSTOLIC BLOOD PRESSURE: 103 MMHG

## 2018-07-31 VITALS — DIASTOLIC BLOOD PRESSURE: 67 MMHG | SYSTOLIC BLOOD PRESSURE: 92 MMHG

## 2018-07-31 VITALS — DIASTOLIC BLOOD PRESSURE: 94 MMHG | SYSTOLIC BLOOD PRESSURE: 115 MMHG

## 2018-07-31 VITALS — SYSTOLIC BLOOD PRESSURE: 104 MMHG | DIASTOLIC BLOOD PRESSURE: 81 MMHG

## 2018-07-31 VITALS — DIASTOLIC BLOOD PRESSURE: 68 MMHG | SYSTOLIC BLOOD PRESSURE: 96 MMHG

## 2018-07-31 VITALS — SYSTOLIC BLOOD PRESSURE: 95 MMHG | DIASTOLIC BLOOD PRESSURE: 73 MMHG

## 2018-07-31 DIAGNOSIS — K59.00: ICD-10-CM

## 2018-07-31 DIAGNOSIS — T82.855D: ICD-10-CM

## 2018-07-31 DIAGNOSIS — N18.3: ICD-10-CM

## 2018-07-31 DIAGNOSIS — N40.0: ICD-10-CM

## 2018-07-31 DIAGNOSIS — I13.0: ICD-10-CM

## 2018-07-31 DIAGNOSIS — G47.33: ICD-10-CM

## 2018-07-31 DIAGNOSIS — E78.5: ICD-10-CM

## 2018-07-31 DIAGNOSIS — I49.3: ICD-10-CM

## 2018-07-31 DIAGNOSIS — Z87.891: ICD-10-CM

## 2018-07-31 DIAGNOSIS — J43.9: ICD-10-CM

## 2018-07-31 DIAGNOSIS — I08.0: ICD-10-CM

## 2018-07-31 DIAGNOSIS — I65.23: ICD-10-CM

## 2018-07-31 DIAGNOSIS — I25.2: ICD-10-CM

## 2018-07-31 DIAGNOSIS — Z95.1: ICD-10-CM

## 2018-07-31 DIAGNOSIS — I50.23: ICD-10-CM

## 2018-07-31 DIAGNOSIS — Z95.5: ICD-10-CM

## 2018-07-31 DIAGNOSIS — I25.10: ICD-10-CM

## 2018-07-31 DIAGNOSIS — E78.00: ICD-10-CM

## 2018-07-31 DIAGNOSIS — I48.0: Primary | ICD-10-CM

## 2018-07-31 DIAGNOSIS — Z95.0: ICD-10-CM

## 2018-07-31 DIAGNOSIS — Z99.81: ICD-10-CM

## 2018-07-31 DIAGNOSIS — I49.5: ICD-10-CM

## 2018-07-31 DIAGNOSIS — K21.9: ICD-10-CM

## 2018-07-31 DIAGNOSIS — D64.9: ICD-10-CM

## 2018-07-31 DIAGNOSIS — Z85.810: ICD-10-CM

## 2018-07-31 LAB
ALBUMIN SERPL-MCNC: 4.1 GM/DL (ref 3.2–4.5)
ALP SERPL-CCNC: 81 U/L (ref 40–136)
ALT SERPL-CCNC: 16 U/L (ref 0–55)
APTT BLD: 27 SEC (ref 24–35)
BASOPHILS # BLD AUTO: 0 10^3/UL (ref 0–0.1)
BASOPHILS NFR BLD AUTO: 0 % (ref 0–10)
BASOPHILS NFR BLD MANUAL: 0 %
BILIRUB SERPL-MCNC: 2.3 MG/DL (ref 0.1–1)
BUN/CREAT SERPL: 12
CALCIUM SERPL-MCNC: 10.3 MG/DL (ref 8.5–10.1)
CHLORIDE SERPL-SCNC: 107 MMOL/L (ref 98–107)
CO2 SERPL-SCNC: 25 MMOL/L (ref 21–32)
CREAT SERPL-MCNC: 1.63 MG/DL (ref 0.6–1.3)
EOSINOPHIL # BLD AUTO: 0.1 10^3/UL (ref 0–0.3)
EOSINOPHIL NFR BLD AUTO: 1 % (ref 0–10)
EOSINOPHIL NFR BLD MANUAL: 1 %
ERYTHROCYTE [DISTWIDTH] IN BLOOD BY AUTOMATED COUNT: 14.9 % (ref 10–14.5)
GFR SERPLBLD BASED ON 1.73 SQ M-ARVRAT: 41 ML/MIN
GLUCOSE SERPL-MCNC: 125 MG/DL (ref 70–105)
HCT VFR BLD CALC: 36 % (ref 40–54)
HGB BLD-MCNC: 11.4 G/DL (ref 13.3–17.7)
INR PPP: 1.2 (ref 0.8–1.4)
LYMPHOCYTES # BLD AUTO: 0.6 X 10^3 (ref 1–4)
LYMPHOCYTES NFR BLD AUTO: 6 % (ref 12–44)
MAGNESIUM SERPL-MCNC: 2.3 MG/DL (ref 1.8–2.4)
MANUAL DIFFERENTIAL PERFORMED BLD QL: YES
MCH RBC QN AUTO: 28 PG (ref 25–34)
MCHC RBC AUTO-ENTMCNC: 32 G/DL (ref 32–36)
MCV RBC AUTO: 89 FL (ref 80–99)
MONOCYTES # BLD AUTO: 0.9 X 10^3 (ref 0–1)
MONOCYTES NFR BLD AUTO: 10 % (ref 0–12)
MONOCYTES NFR BLD: 7 %
MYOGLOBIN SERPL-MCNC: 66.8 NG/ML (ref 10–92)
NEUTROPHILS # BLD AUTO: 7.1 X 10^3 (ref 1.8–7.8)
NEUTROPHILS NFR BLD AUTO: 82 % (ref 42–75)
NEUTS BAND NFR BLD MANUAL: 83 %
NEUTS BAND NFR BLD: 0 %
OVALOCYTES BLD QL SMEAR: SLIGHT
PLATELET # BLD: 167 10^3/UL (ref 130–400)
PMV BLD AUTO: 11.9 FL (ref 7.4–10.4)
POTASSIUM SERPL-SCNC: 4.9 MMOL/L (ref 3.6–5)
PROT SERPL-MCNC: 6.2 GM/DL (ref 6.4–8.2)
PROTHROMBIN TIME: 15.5 SEC (ref 12.2–14.7)
RBC # BLD AUTO: 4.03 10^6/UL (ref 4.35–5.85)
SODIUM SERPL-SCNC: 140 MMOL/L (ref 135–145)
VARIANT LYMPHS NFR BLD MANUAL: 9 %
WBC # BLD AUTO: 8.7 10^3/UL (ref 4.3–11)

## 2018-07-31 PROCEDURE — 93005 ELECTROCARDIOGRAM TRACING: CPT

## 2018-07-31 PROCEDURE — 93320 DOPPLER ECHO COMPLETE: CPT

## 2018-07-31 PROCEDURE — 84484 ASSAY OF TROPONIN QUANT: CPT

## 2018-07-31 PROCEDURE — 83880 ASSAY OF NATRIURETIC PEPTIDE: CPT

## 2018-07-31 PROCEDURE — 93325 DOPPLER ECHO COLOR FLOW MAPG: CPT

## 2018-07-31 PROCEDURE — 96361 HYDRATE IV INFUSION ADD-ON: CPT

## 2018-07-31 PROCEDURE — 85007 BL SMEAR W/DIFF WBC COUNT: CPT

## 2018-07-31 PROCEDURE — 87081 CULTURE SCREEN ONLY: CPT

## 2018-07-31 PROCEDURE — 71046 X-RAY EXAM CHEST 2 VIEWS: CPT

## 2018-07-31 PROCEDURE — 96365 THER/PROPH/DIAG IV INF INIT: CPT

## 2018-07-31 PROCEDURE — 94760 N-INVAS EAR/PLS OXIMETRY 1: CPT

## 2018-07-31 PROCEDURE — 93041 RHYTHM ECG TRACING: CPT

## 2018-07-31 PROCEDURE — 36415 COLL VENOUS BLD VENIPUNCTURE: CPT

## 2018-07-31 PROCEDURE — 94640 AIRWAY INHALATION TREATMENT: CPT

## 2018-07-31 PROCEDURE — 85730 THROMBOPLASTIN TIME PARTIAL: CPT

## 2018-07-31 PROCEDURE — 85025 COMPLETE CBC W/AUTO DIFF WBC: CPT

## 2018-07-31 PROCEDURE — 80053 COMPREHEN METABOLIC PANEL: CPT

## 2018-07-31 PROCEDURE — 80162 ASSAY OF DIGOXIN TOTAL: CPT

## 2018-07-31 PROCEDURE — 85610 PROTHROMBIN TIME: CPT

## 2018-07-31 PROCEDURE — 84100 ASSAY OF PHOSPHORUS: CPT

## 2018-07-31 PROCEDURE — 85027 COMPLETE CBC AUTOMATED: CPT

## 2018-07-31 PROCEDURE — 80048 BASIC METABOLIC PNL TOTAL CA: CPT

## 2018-07-31 PROCEDURE — 83735 ASSAY OF MAGNESIUM: CPT

## 2018-07-31 PROCEDURE — 83874 ASSAY OF MYOGLOBIN: CPT

## 2018-07-31 RX ADMIN — SODIUM CHLORIDE SCH MLS/HR: 900 INJECTION, SOLUTION INTRAVENOUS at 17:39

## 2018-07-31 RX ADMIN — DILTIAZEM HYDROCHLORIDE SCH MLS/HR: 5 INJECTION INTRAVENOUS at 17:39

## 2018-07-31 RX ADMIN — APIXABAN SCH MG: 5 TABLET, FILM COATED ORAL at 20:25

## 2018-07-31 NOTE — DIAGNOSTIC IMAGING REPORT
INDICATION: 

Shortness of breath and arrhythmia.



EXAMINATION:

PA and lateral chest.



FINDINGS:

The patient has postop changes from CABG surgery. There is a

dual-chamber pacemaker. There are small bilateral pleural

effusions. The heart size and pulmonary vascularity are within

normal limits.



IMPRESSION: 

Small pleural effusion with probable basilar atelectasis.



Dictated by: 



  Dictated on workstation # [id number]

## 2018-07-31 NOTE — CONSULTATION-CARDIOLOGY
HPI-Cardiology


Cardiology Consultation:


Date of Consultation


18


Date of Admission





Attending Physician


Franky Lopes MD


Admitting Physician


Mihir Hall DO


Consulting Physician


SABRINA ENAMORADO MD





HPI:


Time Seen by Provider:  16:30


Chief Complaint:


Shortness of breath


This is a 78-year-old gentleman with history of severe symptomatic sinus node 

dysfunction with ventricular bigeminy and CAD requiring beta blockers.  However 

the patient did not tolerate beta blockers and developed severe symptomatic 

bradycardia with heart rate in the 30s.  Dual-chamber permanent pacemaker was 

implanted on 2018.  Patient presented to the ER with complains of 

shortness of breath since yesterday which was getting worse.  The patient does 

have history COPD and requires home oxygen.  According to him his oxygen 

saturations were low even while on home oxygen.  Patient denied any chest pain.

  He was found to be in atrial fibrillation with rapid ventricular rate.  

Primary cardiologist is Dr. Hammonds, I follow him for electrophysiology.





Review of Systems-Cardiology


Review of Systems


Constitutional:  As described under HPI; No As described under HPI, No no 

symptoms reported, No chills, No fever, No lightheadedness


Eyes:  No As described under HPI, No no symptoms reported, No blindness, No 

blurred vision, No contact lenses, No drainage, No decreased acuity, No foreign 

body sensation, No pain, No vision change


Ears/Nose/Throat:  No As described under HPI, No no symptoms reported, No 

chronic hearing loss, No ear discharge, No ear pain, No nasal drainage, No 

ulcerations


Respiratory:  No no symptoms reported; As described under HPI; No As described 

under HPI, No cough; orthopnea; No shortness of breath, No SOB with excertion


Cardiovascular:  No no symptoms reported; As described under HPI; No As 

described under HPI, No chest pain, No edema, No irregular heart rate, No 

lightheadedness; palpitations


Gastrointestinal:  No no symptoms reported, No As described under HPI, No 

abdomen distended, No abdominal pain, No blood streaked bowels, No constipation

, No diarrhea, No nausea, No vomiting, No stool coloration changes


Genitourinary:  No As described under HPI, No burning, No dysuria, No discharge

, No frequency, No flank pain, No hematuria, No urgency


Musculoskeletal:  No no symptoms reported, No As describe under HPI, No back 

pain, No gout, No joint pain, No joint swelling, No muscle pain, No muscle 

stiffness, No neck pain, No other


Skin:  No no symptoms reported, No As described under HPI, No change in color, 

No change in hair/nails, No dryness, No lesions, No lumps, No rash, No other, 

No skin related problems, No ulcerations, No rash on exposed areas, No 

ulcerations on exposed areas


Psychiatric/Neurological:  No anxiety, No depression, No seizure, No focal 

weakness, No syncope


Hematologic:  No bleeding abnormalities





All Other Systems Reviewed


Negative Unless Noted:  Yes





PMH-Social-Family Hx


Patient Social History


Alcohol Use:  Denies Use


Recreational Drug Use:  No


Smoking Status:  Former Smoker


Type Used:  Cigarettes


2nd Hand Smoke Exposure:  No (quit 20 years ago)


Recent Foreign Travel:  No


Recent Infectious Disease Expo:  No


Hospitalization with Isolation:  Denies


Physical Abuse Screen:  No


Sexual Abuse:  No





Immunizations Up To Date


Tetanus Booster (TDap):  Unknown


Date of Pneumonia Vaccine:  Sep 7, 2015


Date of Influenza Vaccine:  Oct 3, 2016





Past Medical History


PMH


As described under Assessment.





Family Medical History


Family History:  


FH: COPD (chronic obstructive pulmonary disease)


  G8 BROTHER


FH: breast cancer


  G8 SISTER


FH: lung disease


  G8 BROTHER


FH: lupus


  G8 SISTER


Myocardial infarction


  19 FATHER, Onset:65


Scarlet fever


  G8 SISTER, Onset:13





Allergies and Home Medications


Allergies


Coded Allergies:  


     No Known Drug Allergies (Unverified , 10/16/17)





Home Medications


Albuterol Sulfate 18 Gm Hfa.aer.ad, 2 PUFF INH Q4H PRN for SHORTNESS OF BREATH, 

(Reported)


Aspirin 81 Mg Tablet.dr, 81 MG PO DAILY, (Reported)


Atorvastatin Calcium 10 Mg Tablet, 10 MG PO HS, (Reported)


Budesonide/Formoterol Fumarate 10.2 Gm Hfa.aer.ad, 2 PUFF IH BID, (Reported)


Cetirizine HCl 10 Mg Tablet, 10 MG PO DAILY, (Reported)


Cholecalciferol (Vitamin D3) 5,000 Unit Tablet, 5,000 UNIT PO DAILY, (Reported)


Enalapril Maleate 20 Mg Tablet, 20 MG PO BID, (Reported)


Fluticasone Propionate 16 Gm Spray.susp, 2 SPRAYS NS DAILY, (Reported)


Furosemide 20 Mg Tablet, 20 MG PO DAILY PRN for SWELLING, (Reported)


Levothyroxine Sodium 112 Mcg Tablet, 112 MCG PO HS, (Reported)


Metoprolol Tartrate 50 Mg Tablet, 75 MG PO BID


   Prescribed by: SABRINA ENAMORADO on 18 1336


Montelukast Sodium 10 Mg Tablet, 10 MG PO HS, (Reported)


Omega-3 Fatty Acids/Fish Oil 1 Each Capsule, 1,200 MG PO DAILY, (Reported)


Potassium Chloride 10 Meq Tablet.er, 10 MEQ PO DAILY PRN for WITH LASIX, (

Reported)


Ranitidine HCl 150 Mg Tablet, 150 MG PO BID, (Reported)


Temazepam 30 Mg Capsule, 30 MG PO HS, (Reported)


Tiotropium Bromide 4 Gm Mist.inhal, 2 PUFF IH DAILY, (Reported)





Patient Home Medication List


Home Medication List Reviewed:  Yes





Physical Exam-Cardiology


Physical Exam


Vital Signs/I&O











 18





 13:34 13:34 14:30 16:25


 


Temp 97.0   97.9


 


Pulse 116   96


 


Resp 17   18


 


B/P (MAP) 113/91 (98)   110/86


 


Pulse Ox 98 98 99 99


 


O2 Delivery Nasal Cannula Nasal Cannula Simple Mask Nasal Cannula


 


O2 Flow Rate 5.00 5.00 6.00 3.00


 


    





 18





 16:45 16:45 16:48 17:00


 


Temp 98.8   


 


Pulse 115  109 121


 


Resp 13   24


 


B/P (MAP) 115/94 (101)   117/87 (97)


 


Pulse Ox 95   95


 


O2 Delivery Nasal Cannula Nasal Cannula  Nasal Cannula


 


O2 Flow Rate 3.00 3.00  3.00


 


    





 18





 17:30 18:00 19:00 19:00


 


Pulse 105 105 113 100


 


Resp 14 19  24


 


B/P (MAP)  96/68 (77)  96/75 (82)


 


Pulse Ox 92 98  98


 


O2 Delivery Nasal Cannula Nasal Cannula  Nasal Cannula


 


O2 Flow Rate 3.00 3.00  3.00





 18  





 19:46 20:00  


 


Temp 98.5   


 


Pulse  110  


 


Resp  14  


 


B/P (MAP)  95/73 (80)  


 


Pulse Ox  97  


 


O2 Delivery  Nasal Cannula  


 


O2 Flow Rate  3.00  





Capillary Refill : Less Than 3 Seconds


Constitutional:  appears stated age, AAO x 3; No apparent distress; well-

developed, well-nourished


HEENT:  PERRL; No normal ENT inspection, No TMs normal, No pharynx normal, No 

scleral icterus (R), No scleral icterus (L), No pale conjunctivae (R), No pale 

conjunctivae (L), No photophobia, No TM abnormal (R), No TM abnormal (L), No 

pharyngeal erythema, No tonsillar exudate, No other, No discharge, No EOMI; 

hearing is well preserved; No hard of hearing; oral hygience is good; No 

ulceration, No xanthelasmas are seen


Neck:  No non-tender, No full range of motion, No supple, No normal inspection, 

No carotid bruit, No limited range of motion, No lymphadenopathy (R), No 

lymphadenopathy (L), No tender lateral, No tender midline, No thyromegaly, No 

other; carotid pulses are 2 + bilaterally; No with good upstrokes


Respiratory:  No accessory muscle use, No respiratory distress, No chest tender

, No chest expansion is symmetric; chest is bilaterally symmetric; No lungs 

clear to percussion; lungs clear to auscultation; No crackles, No rhonchi, No 

rales, No stridor, No wheezing, No pleural rub, No other


Cardiovascular:  irregularly irregular, tachycardia, S1 and S2


Gastrointestinal:  No tender, No soft, No round, No distended, No pulsatile mass

, No organomegaly, No guarding, No rebound, No tenderness, No hernia, No mass, 

No audible bowel sounds, No abnormal bowel sounds, No abdominal bruits, No 

spleenomegaly, No other


Rectal:  deferred


Extremities:  No normal range of motion, No non-tender, No normal inspection, 

No pedal edema, No calf tenderness, No normal capillary refill, No pelvis stable

, No calf tenderness, No inflammation, No pedal edema, No slow capillary refill

, No swelling, No other, No abrasion, No clubbing, No cyanosis, No ecchymosis, 

No laceration, No no lower extremity edema bilateral, No significant edema, No 

tenderness, No wound


Neurologic/Psychiatric:  no motor/sensory deficits, alert, normal mood/affect, 

oriented x 3, power is 5/5 both on sides


Skin:  No normal color, No warm/dry, No cyanosis, No cool, No diaphoresis, No 

damp, No ecchymosis, No jaundice, No mottled, No pallor, No rash, No tattoos/

piercings, No ulcerations, No rash on exposed areas, No ulcerations on exposed 

areas, No other





Data Review


Labs


Laboratory Tests


18 14:38: 


White Blood Count 8.7, Red Blood Count 4.03L, Hemoglobin 11.4L, Hematocrit 36L, 

Mean Corpuscular Volume 89, Mean Corpuscular Hemoglobin 28, Mean Corpuscular 

Hemoglobin Concent 32, Red Cell Distribution Width 14.9H, Platelet Count 167, 

Mean Platelet Volume 11.9H, Neutrophils (%) (Auto) 82H, Lymphocytes (%) (Auto) 

6L, Monocytes (%) (Auto) 10, Eosinophils (%) (Auto) 1, Basophils (%) (Auto) 0, 

Neutrophils # (Auto) 7.1, Lymphocytes # (Auto) 0.6L, Monocytes # (Auto) 0.9, 

Eosinophils # (Auto) 0.1, Basophils # (Auto) 0.0, Neutrophils % (Manual) 83, 

Lymphocytes % (Manual) 9, Monocytes % (Manual) 7, Eosinophils % (Manual) 1, 

Basophils % (Manual) 0, Band Neutrophils 0, Elliptocytes SLIGHT, Prothrombin 

Time 15.5H, INR Comment 1.2, Activated Partial Thromboplast Time 27, Sodium 

Level 140, Potassium Level 4.9, Chloride Level 107, Carbon Dioxide Level 25, 

Anion Gap 8, Blood Urea Nitrogen 19H, Creatinine 1.63H, Estimat Glomerular 

Filtration Rate 41, BUN/Creatinine Ratio 12, Glucose Level 125H, Calcium Level 

10.3H, Magnesium Level 2.3, Total Bilirubin 2.3H, Aspartate Amino Transf (AST/

SGOT) 17, Alanine Aminotransferase (ALT/SGPT) 16, Alkaline Phosphatase 81, 

Myoglobin 66.8, Troponin I < 0.30, Total Protein 6.2L, Albumin 4.1








ECG Impression


ECG


Initial ECG Impression:  Atrial Fibrillation w/RVR





A/P-Cardiology


Assessment/Admission Diagnosis


Shortness of breath,


Atrial fibrillation with rapid ventricular rate,


Recent pacemaker for severe sinus node dysfunction,


History of CAD,


Ventricular bigeminy





Plan


Shortness of breath, likely multifactorial.  Patient has severe COPD.  Cardiac 

etiologies contribution ring may include atrial fibrillation with rapid 

ventricular rate and mild CHF.





Atrial fibrillation with RVR: Cardizem bolus followed by Cardizem IV infusion 

given.  We'll start the patient on Eliquis.





Pacemaker: Device interrogation in the morning.





History of CAD: No active issues.  First troponin is negative.





Ventricular bigeminy: We'll start beta blocker tomorrow.








Thank you for your consultation. Please call me if you have any questions.








YESENIA Enamorado MD, FACP, FACC, FSCAI, FHRS, CCDS


Interventional Cardiology


Cardiac Electrophysiology


Vascular Medicine and Endovascular Interventions





Clinical Quality Measures


DVT/VTE Risk/Contraindication:


Risk Factor Score Per Nursin


RFS Level Per Nursing on Admit:  2=Moderate











SABRINA ENAMORADO MD 2018 10:00 pm

## 2018-07-31 NOTE — ED RESPIRATORY
General


Chief Complaint:  Respiratory Problems


Stated Complaint:  SOA


Source:  patient


Exam Limitations:  no limitations





History of Present Illness


Date Seen by Provider:  2018


Time Seen by Provider:  14:05


Initial Comments


Here with report of shortness of air that has gone on since yesterday but much 

worse today.  Has been on oxygen overnight.  EMS and fire arrived and found the 

patient to have O2 saturation in the 80s while on his home O2.  They placed him 

on nonrebreather and his O2 saturation improved to 92 percent.  EMS arrived and 

gave him a DuoNeb and he improved into the upper 90s.  Does have history of COPD

/emphysema.  Also has cardiac history and had pacemaker placed last week.  

States it's been going okay.  Pacemaker placed for bradycardia.  Denies chest 

pain otherwise.  Denies sweating or dizziness but did have dry heaves earlier.  

This has resolved.  States otherwise feels okay and feels improved now.  Denies 

fevers but has history of multiple episodes of pneumonia.  Patient's primary 

cardiologist is Dr. Hammonds and had pacemaker placed by Dr. Enamorado.


Timing/Duration:  yesterday, getting worse


Severity:  moderate


Prior Episodes/Possible Cause:  occasional episodes


Modifying Factors:  Improves With Activity


Associated Symptoms:  No chest pain/soreness, No cough, No fever/chills, No 

nasal congestion; shortness of breath, wheezing





Allergies and Home Medications


Allergies


Coded Allergies:  


     No Known Drug Allergies (Unverified , 10/16/17)





Home Medications


Albuterol Sulfate 18 Gm Hfa.aer.ad, 2 PUFF INH Q4H PRN for SHORTNESS OF BREATH, 

(Reported)


Aspirin 81 Mg Tablet.dr, 81 MG PO DAILY, (Reported)


Atorvastatin Calcium 10 Mg Tablet, 10 MG PO HS, (Reported)


Budesonide/Formoterol Fumarate 10.2 Gm Hfa.aer.ad, 2 PUFF IH BID, (Reported)


Cephalexin 500 Mg Capsule, 500 MG PO TID


   Prescribed by: SABRINA ENAMORADO on 18 1336


Cetirizine HCl 10 Mg Tablet, 10 MG PO DAILY, (Reported)


Cholecalciferol (Vitamin D3) 5,000 Unit Tablet, 5,000 UNIT PO DAILY, (Reported)


Enalapril Maleate 20 Mg Tablet, 20 MG PO BID, (Reported)


Fluticasone Propionate 16 Gm Spray.susp, 2 SPRAYS NS DAILY, (Reported)


Furosemide 20 Mg Tablet, 20 MG PO DAILY PRN for SWELLING, (Reported)


Levothyroxine Sodium 112 Mcg Tablet, 112 MCG PO HS, (Reported)


Metoprolol Tartrate 50 Mg Tablet, 75 MG PO BID


   Prescribed by: SABRINA ENAMORADO on 18 1336


Montelukast Sodium 10 Mg Tablet, 10 MG PO HS, (Reported)


Omega-3 Fatty Acids/Fish Oil 1 Each Capsule, 1,200 MG PO DAILY, (Reported)


Potassium Chloride 10 Meq Tablet.er, 10 MEQ PO DAILY PRN for WITH LASIX, (

Reported)


Ranitidine HCl 150 Mg Tablet, 150 MG PO BID, (Reported)


Temazepam 30 Mg Capsule, 30 MG PO HS, (Reported)


Tiotropium Bromide 4 Gm Mist.inhal, 2 PUFF IH DAILY, (Reported)





Patient Home Medication List


Home Medication List Reviewed:  Yes





Review of Systems


Constitutional:  see HPI; No chills, No fever


EENTM:  no symptoms reported


Respiratory:  short of breath, wheezing


Cardiovascular:  Hx of Intervention, palpitations


Gastrointestinal:  No abdominal pain, No constipation; vomiting


Genitourinary:  no symptoms reported


Musculoskeletal:  no symptoms reported


All Other Systems Reviewed


Negative Unless Noted:  Yes





Past Medical-Social-Family Hx


Past Med/Social Hx:  Reviewed Nursing Past Med/Soc Hx


Patient Social History


Alcohol Use:  Denies Use


Recreational Drug Use:  No


Smoking Status:  Former Smoker


Type Used:  Cigarettes


Former Smoker, Quit:  1998


2nd Hand Smoke Exposure:  No


Recent Hopitalizations:  No





Immunizations Up To Date


Date of Pneumonia Vaccine:  Sep 7, 2015


Date of Influenza Vaccine:  Oct 3, 2016





Seasonal Allergies


Seasonal Allergies:  Yes





Past Medical History


Surgeries:  Yes (SPIDER BITE, FEEDING TUBE/REMOVED, BILAT TKR, INFUSAPORT , 

TUMOR ON TONGUE)


CABG, Coronary Stent, Joint Replacement


Respiratory:  Yes (02 2.5L AT NIGHT and prn )


Asthma, Sleep Apnea, COPD


Currently Using CPAP:  No


Currently Using BIPAP:  Yes


Cardiac:  Yes (CARDIAC CATHS/CABG)


Coronary Artery Disease, Heart Attack, High Cholesterol, Hypertension


Neurological:  No


Reproductive Disorders:  No


Sexually Transmitted Disease:  No


HIV/AIDS:  No


Benign Prostatic Hyperpl, Prostate Problems


Gastrointestinal:  Yes


Gastroesophageal Reflux


Musculoskeletal:  Yes (RIGHT MEDIAL ILIAC BONE LESION)


Arthritis


Endocrine:  Yes


HEENT:  Yes


Loss of Vision:  Bilateral


Hearing Impairment:  Hard of Hearing, Bilateral Hearing Aide


Cancer:  Yes (TONGUE CANCER--S/P SURGERY )


Prostate


Did You Recieve Any Treatments:  Yes


What Type of Treatment Did You:  Chemotherapy, Radiation, Surgical Intervention


Psychosocial:  No


Integumentary:  No


Blood Disorders:  No


Adverse Reaction/Blood Tranf:  No (N/A)





Family Medical History


Reviewed Nursing Family Hx





Physical Exam





Vital Signs - First Documented




















Capillary Refill :


Height: 5'5.00"


Weight: 172lbs. 0.0oz. [id number]; 28.6 BMI


Method:Stated


General Appearance:  WD/WN, no apparent distress


HEENT:  PERRL/EOMI, pharynx normal


Neck:  full range of motion, supple


Respiratory:  lungs clear, normal breath sounds


Cardiovascular:  no murmur, tachycardia, irregularly irregular


Gastrointestinal:  non tender, soft


Extremities:  non-tender, normal inspection


Neurologic/Psychiatric:  alert, oriented x 3


Skin:  normal color, warm/dry





Progress/Results/Core Measures


Suspected Sepsis


SIRS


Temperature: 


Pulse:  


Respiratory Rate: 


 


Laboratory Tests


18 14:38: White Blood Count 8.7


Blood Pressure  / 


Mean: 


 


Laboratory Tests


18 14:38: 


Creatinine 1.63H, INR Comment 1.2, Platelet Count 167, Total Bilirubin 2.3H








Results/Orders


Lab Results





Laboratory Tests








Test


 18


14:38 Range/Units


 


 


White Blood Count


 8.7 


 4.3-11.0


10^3/uL


 


Red Blood Count


 4.03 L


 4.35-5.85


10^6/uL


 


Hemoglobin 11.4 L 13.3-17.7  G/DL


 


Hematocrit 36 L 40-54  %


 


Mean Corpuscular Volume 89  80-99  FL


 


Mean Corpuscular Hemoglobin 28  25-34  PG


 


Mean Corpuscular Hemoglobin


Concent 32 


 32-36  G/DL





 


Red Cell Distribution Width 14.9 H 10.0-14.5  %


 


Platelet Count


 167 


 130-400


10^3/uL


 


Mean Platelet Volume 11.9 H 7.4-10.4  FL


 


Neutrophils (%) (Auto) 82 H 42-75  %


 


Lymphocytes (%) (Auto) 6 L 12-44  %


 


Monocytes (%) (Auto) 10  0-12  %


 


Eosinophils (%) (Auto) 1  0-10  %


 


Basophils (%) (Auto) 0  0-10  %


 


Neutrophils # (Auto) 7.1  1.8-7.8  X 10^3


 


Lymphocytes # (Auto) 0.6 L 1.0-4.0  X 10^3


 


Monocytes # (Auto) 0.9  0.0-1.0  X 10^3


 


Eosinophils # (Auto)


 0.1 


 0.0-0.3


10^3/uL


 


Basophils # (Auto)


 0.0 


 0.0-0.1


10^3/uL


 


Neutrophils % (Manual) 83   %


 


Lymphocytes % (Manual) 9   %


 


Monocytes % (Manual) 7   %


 


Eosinophils % (Manual) 1   %


 


Basophils % (Manual) 0   %


 


Band Neutrophils 0   %


 


Elliptocytes SLIGHT   


 


Prothrombin Time 15.5 H 12.2-14.7  SEC


 


INR Comment 1.2  0.8-1.4  


 


Activated Partial


Thromboplast Time 27 


 24-35  SEC





 


Sodium Level 140  135-145  MMOL/L


 


Potassium Level 4.9  3.6-5.0  MMOL/L


 


Chloride Level 107    MMOL/L


 


Carbon Dioxide Level 25  21-32  MMOL/L


 


Anion Gap 8  5-14  MMOL/L


 


Blood Urea Nitrogen 19 H 7-18  MG/DL


 


Creatinine


 1.63 H


 0.60-1.30


MG/DL


 


Estimat Glomerular Filtration


Rate 41 


  





 


BUN/Creatinine Ratio 12   


 


Glucose Level 125 H   MG/DL


 


Calcium Level 10.3 H 8.5-10.1  MG/DL


 


Magnesium Level 2.3  1.8-2.4  MG/DL


 


Total Bilirubin 2.3 H 0.1-1.0  MG/DL


 


Aspartate Amino Transf


(AST/SGOT) 17 


 5-34  U/L





 


Alanine Aminotransferase


(ALT/SGPT) 16 


 0-55  U/L





 


Alkaline Phosphatase 81    U/L


 


Myoglobin


 66.8 


 10.0-92.0


NG/ML


 


Troponin I < 0.30  <0.30  NG/ML


 


Total Protein 6.2 L 6.4-8.2  GM/DL


 


Albumin 4.1  3.2-4.5  GM/DL








My Orders





Orders - LAXMI WALL MD


Chest Pa/Lat (2 View) (18 14:15)


Cbc With Automated Diff (18 14:15)


Magnesium (18 14:15)


Ekg Tracing (18 14:15)


Cardiac Profile 1 (18 14:15)


Comprehensive Metabolic Panel (18 14:15)


Myoglobin Serum (18 14:15)


Protime With Inr (18 14:15)


Partial Thromboplastin Time (18 14:15)


O2 (18 14:15)


Monitor-Rhythm Ecg Trace Only (18 14:15)


Saline Lock/Iv-Start (18 14:15)


Albuterol  Pre-Mix Nebs (Rt) (Proventil (18 14:15)


Svn Small Volume Nebulizer (18 14:15)


Ns Iv 500 Ml (Sodium Chloride 0.9%) (18 14:32)


Manual Differential (18 14:38)


Diltiazem Injection (Cardizem Injection) (18 15:30)


D5w 100 Ml Ivpb (De... W/Diltiazem Injec (18 15:30)





Medications Given in ED





Current Medications








 Medications  Dose


 Ordered  Sig/Igor


 Route  Start Time


 Stop Time Status Last Admin


Dose Admin


 


 Diltiazem HCl  10 mg  ONCE  ONCE


 IVP  18 15:30


 18 15:31 DC 18 15:35


10 MG


 


 Sodium Chloride  500 ml @   STK-MED ONCE


 .ROUTE  18 14:32


 18 14:34 DC 18 14:36


500 MLS/HR








Vital Signs/I&O











 18





 13:34 13:34 14:30


 


Temp 97.0  


 


Pulse 116  


 


Resp 17  


 


B/P (MAP) 113/91 (98)  


 


Pulse Ox 98 98 99


 


O2 Delivery Nasal Cannula Nasal Cannula Simple Mask


 


O2 Flow Rate 5.00 5.00 6.00





Capillary Refill :


Progress Note :  


Progress Note


Seen and evaluated.   IV by EMS.  Labs, EKG and chest x-ray ordered.  Albuterol 

neb ordered.  Patient  noted to be tachycardic with a regular rate and 

intermittently paced.  Appears to be underlying atrial fibrillation on the 

monitor.  We will continue to monitor.  1440: Blood pressure noted to be 90/75.

  Normal saline 500 mL bolus.  Monitor patient.  Blood pressure improved.  I 

discussed the case with Dr. Enamorado at 1507.  We will initiate Cardizem drip.  

1520: I discussed the case with the hospitalist, Dr. VALLE.  Patient to be 

admitted to the ICU on Cardizem drip with cardiology on consult.  Dr. VALLE 

accepts patient for admission, inpatient status.  We will continue RT map 

protocol due to his emphysema.  Overall patient is improved currently.





ECG


Initial ECG Impression Date:  2018


Initial ECG Impression Time:  14:30


Initial ECG Rate:  131


Initial ECG Rhythm:  A Fib/Flutter


Initial ECG Comparisson:  Changed


Comment


Ventricular paced rhythm with what appears to be underlying atrial fibrillation 

and PVCs.  No evidence of ST elevation MI.  Interpreted by me.





Diagnostic Imaging





   Diagonstic Imaging:  Xray


   Plain Films/CT/US/NM/MRI:  chest


Comments


 [facility].


 [place], [place]





NAME:   RU RAGLAND


MED REC#:   [id number]


ACCOUNT#:   [id number]


PT STATUS:   REG ER


:   1940


PHYSICIAN:   LAXMI WALL MD


ADMIT DATE:   18/ER


 ***Draft***


Date of Exam:18





CHEST PA/LAT (2 VIEW)








INDICATION: 


Shortness of breath and arrhythmia.





EXAMINATION:


PA and lateral chest.





FINDINGS:


The patient has postop changes from CABG surgery. There is a


dual-chamber pacemaker. There are small bilateral pleural


effusions. The heart size and pulmonary vascularity are within


normal limits.





IMPRESSION: 


Small pleural effusion with probable basilar atelectasis.





  Dictated on workstation # [id number]








Dict:   [id number]


Trans:   18 1503


 [id number]





Interpreted by:     LAXMI BRADY MD


Electronically signed by:





Departure


Communication (Admissions)


Time/Spoke to Admitting Phy:  15:20


Time/Spoke to Consulting Phy:  15:07





Impression





 Primary Impression:  


 Atrial fibrillation with rapid ventricular response


Disposition:   ADMITTED AS INPATIENT


Condition:  Stable





Admissions


Decision to Admit Reason:  Admit from ER (General)


Decision to Admit/Date:  2018


Time/Decision to Admit Time:  15:07





Departure-Patient Inst.


Referrals:  


JOSÉ METZGER DO (PCP/Family)


Primary Care Physician











LAXMI WALL MD 2018 14:56

## 2018-08-01 VITALS — DIASTOLIC BLOOD PRESSURE: 69 MMHG | SYSTOLIC BLOOD PRESSURE: 101 MMHG

## 2018-08-01 VITALS — DIASTOLIC BLOOD PRESSURE: 76 MMHG | SYSTOLIC BLOOD PRESSURE: 95 MMHG

## 2018-08-01 VITALS — DIASTOLIC BLOOD PRESSURE: 69 MMHG | SYSTOLIC BLOOD PRESSURE: 104 MMHG

## 2018-08-01 VITALS — SYSTOLIC BLOOD PRESSURE: 107 MMHG | DIASTOLIC BLOOD PRESSURE: 81 MMHG

## 2018-08-01 VITALS — DIASTOLIC BLOOD PRESSURE: 84 MMHG | SYSTOLIC BLOOD PRESSURE: 100 MMHG

## 2018-08-01 VITALS — SYSTOLIC BLOOD PRESSURE: 93 MMHG | DIASTOLIC BLOOD PRESSURE: 77 MMHG

## 2018-08-01 VITALS — DIASTOLIC BLOOD PRESSURE: 57 MMHG | SYSTOLIC BLOOD PRESSURE: 98 MMHG

## 2018-08-01 VITALS — DIASTOLIC BLOOD PRESSURE: 59 MMHG | SYSTOLIC BLOOD PRESSURE: 82 MMHG

## 2018-08-01 VITALS — SYSTOLIC BLOOD PRESSURE: 97 MMHG | DIASTOLIC BLOOD PRESSURE: 88 MMHG

## 2018-08-01 VITALS — DIASTOLIC BLOOD PRESSURE: 67 MMHG | SYSTOLIC BLOOD PRESSURE: 106 MMHG

## 2018-08-01 VITALS — SYSTOLIC BLOOD PRESSURE: 101 MMHG | DIASTOLIC BLOOD PRESSURE: 86 MMHG

## 2018-08-01 VITALS — SYSTOLIC BLOOD PRESSURE: 108 MMHG | DIASTOLIC BLOOD PRESSURE: 57 MMHG

## 2018-08-01 VITALS — DIASTOLIC BLOOD PRESSURE: 70 MMHG | SYSTOLIC BLOOD PRESSURE: 101 MMHG

## 2018-08-01 VITALS — DIASTOLIC BLOOD PRESSURE: 80 MMHG | SYSTOLIC BLOOD PRESSURE: 109 MMHG

## 2018-08-01 VITALS — DIASTOLIC BLOOD PRESSURE: 76 MMHG | SYSTOLIC BLOOD PRESSURE: 96 MMHG

## 2018-08-01 VITALS — SYSTOLIC BLOOD PRESSURE: 101 MMHG | DIASTOLIC BLOOD PRESSURE: 79 MMHG

## 2018-08-01 VITALS — SYSTOLIC BLOOD PRESSURE: 105 MMHG | DIASTOLIC BLOOD PRESSURE: 80 MMHG

## 2018-08-01 VITALS — SYSTOLIC BLOOD PRESSURE: 88 MMHG | DIASTOLIC BLOOD PRESSURE: 69 MMHG

## 2018-08-01 VITALS — SYSTOLIC BLOOD PRESSURE: 103 MMHG | DIASTOLIC BLOOD PRESSURE: 75 MMHG

## 2018-08-01 VITALS — DIASTOLIC BLOOD PRESSURE: 84 MMHG | SYSTOLIC BLOOD PRESSURE: 108 MMHG

## 2018-08-01 VITALS — SYSTOLIC BLOOD PRESSURE: 105 MMHG | DIASTOLIC BLOOD PRESSURE: 73 MMHG

## 2018-08-01 VITALS — DIASTOLIC BLOOD PRESSURE: 71 MMHG | SYSTOLIC BLOOD PRESSURE: 96 MMHG

## 2018-08-01 VITALS — DIASTOLIC BLOOD PRESSURE: 84 MMHG | SYSTOLIC BLOOD PRESSURE: 118 MMHG

## 2018-08-01 VITALS — SYSTOLIC BLOOD PRESSURE: 106 MMHG | DIASTOLIC BLOOD PRESSURE: 91 MMHG

## 2018-08-01 VITALS — DIASTOLIC BLOOD PRESSURE: 67 MMHG | SYSTOLIC BLOOD PRESSURE: 95 MMHG

## 2018-08-01 VITALS — SYSTOLIC BLOOD PRESSURE: 97 MMHG | DIASTOLIC BLOOD PRESSURE: 78 MMHG

## 2018-08-01 VITALS — DIASTOLIC BLOOD PRESSURE: 73 MMHG | SYSTOLIC BLOOD PRESSURE: 96 MMHG

## 2018-08-01 VITALS — SYSTOLIC BLOOD PRESSURE: 100 MMHG | DIASTOLIC BLOOD PRESSURE: 71 MMHG

## 2018-08-01 VITALS — SYSTOLIC BLOOD PRESSURE: 96 MMHG | DIASTOLIC BLOOD PRESSURE: 66 MMHG

## 2018-08-01 VITALS — SYSTOLIC BLOOD PRESSURE: 109 MMHG | DIASTOLIC BLOOD PRESSURE: 86 MMHG

## 2018-08-01 VITALS — DIASTOLIC BLOOD PRESSURE: 82 MMHG | SYSTOLIC BLOOD PRESSURE: 102 MMHG

## 2018-08-01 VITALS — DIASTOLIC BLOOD PRESSURE: 40 MMHG | SYSTOLIC BLOOD PRESSURE: 120 MMHG

## 2018-08-01 VITALS — SYSTOLIC BLOOD PRESSURE: 107 MMHG | DIASTOLIC BLOOD PRESSURE: 75 MMHG

## 2018-08-01 VITALS — DIASTOLIC BLOOD PRESSURE: 54 MMHG | SYSTOLIC BLOOD PRESSURE: 104 MMHG

## 2018-08-01 VITALS — SYSTOLIC BLOOD PRESSURE: 110 MMHG | DIASTOLIC BLOOD PRESSURE: 78 MMHG

## 2018-08-01 VITALS — SYSTOLIC BLOOD PRESSURE: 91 MMHG | DIASTOLIC BLOOD PRESSURE: 64 MMHG

## 2018-08-01 VITALS — DIASTOLIC BLOOD PRESSURE: 70 MMHG | SYSTOLIC BLOOD PRESSURE: 94 MMHG

## 2018-08-01 VITALS — DIASTOLIC BLOOD PRESSURE: 70 MMHG | SYSTOLIC BLOOD PRESSURE: 82 MMHG

## 2018-08-01 VITALS — SYSTOLIC BLOOD PRESSURE: 113 MMHG | DIASTOLIC BLOOD PRESSURE: 80 MMHG

## 2018-08-01 LAB
ALBUMIN SERPL-MCNC: 3.9 GM/DL (ref 3.2–4.5)
ALP SERPL-CCNC: 70 U/L (ref 40–136)
ALT SERPL-CCNC: 19 U/L (ref 0–55)
BASOPHILS # BLD AUTO: 0 10^3/UL (ref 0–0.1)
BASOPHILS NFR BLD AUTO: 0 % (ref 0–10)
BILIRUB SERPL-MCNC: 2 MG/DL (ref 0.1–1)
BUN/CREAT SERPL: 13
CALCIUM SERPL-MCNC: 8.8 MG/DL (ref 8.5–10.1)
CHLORIDE SERPL-SCNC: 110 MMOL/L (ref 98–107)
CO2 SERPL-SCNC: 22 MMOL/L (ref 21–32)
CREAT SERPL-MCNC: 1.33 MG/DL (ref 0.6–1.3)
EOSINOPHIL # BLD AUTO: 0.1 10^3/UL (ref 0–0.3)
EOSINOPHIL NFR BLD AUTO: 2 % (ref 0–10)
ERYTHROCYTE [DISTWIDTH] IN BLOOD BY AUTOMATED COUNT: 14.9 % (ref 10–14.5)
GFR SERPLBLD BASED ON 1.73 SQ M-ARVRAT: 52 ML/MIN
GLUCOSE SERPL-MCNC: 136 MG/DL (ref 70–105)
HCT VFR BLD CALC: 34 % (ref 40–54)
HGB BLD-MCNC: 11 G/DL (ref 13.3–17.7)
LYMPHOCYTES # BLD AUTO: 0.6 X 10^3 (ref 1–4)
LYMPHOCYTES NFR BLD AUTO: 7 % (ref 12–44)
MAGNESIUM SERPL-MCNC: 1.9 MG/DL (ref 1.8–2.4)
MANUAL DIFFERENTIAL PERFORMED BLD QL: NO
MCH RBC QN AUTO: 29 PG (ref 25–34)
MCHC RBC AUTO-ENTMCNC: 33 G/DL (ref 32–36)
MCV RBC AUTO: 90 FL (ref 80–99)
MONOCYTES # BLD AUTO: 0.9 X 10^3 (ref 0–1)
MONOCYTES NFR BLD AUTO: 12 % (ref 0–12)
NEUTROPHILS # BLD AUTO: 6.4 X 10^3 (ref 1.8–7.8)
NEUTROPHILS NFR BLD AUTO: 80 % (ref 42–75)
PHOSPHATE SERPL-MCNC: 3 MG/DL (ref 2.3–4.7)
PLATELET # BLD: 158 10^3/UL (ref 130–400)
PMV BLD AUTO: 12 FL (ref 7.4–10.4)
POTASSIUM SERPL-SCNC: 3.7 MMOL/L (ref 3.6–5)
PROT SERPL-MCNC: 5.8 GM/DL (ref 6.4–8.2)
RBC # BLD AUTO: 3.76 10^6/UL (ref 4.35–5.85)
SODIUM SERPL-SCNC: 142 MMOL/L (ref 135–145)
WBC # BLD AUTO: 8 10^3/UL (ref 4.3–11)

## 2018-08-01 RX ADMIN — DILTIAZEM HYDROCHLORIDE SCH MLS/HR: 5 INJECTION INTRAVENOUS at 11:25

## 2018-08-01 RX ADMIN — FLUTICASONE PROPIONATE AND SALMETEROL XINAFOATE SCH PUFF: 115; 21 AEROSOL, METERED RESPIRATORY (INHALATION) at 19:54

## 2018-08-01 RX ADMIN — DILTIAZEM HYDROCHLORIDE SCH MG: 120 CAPSULE, COATED, EXTENDED RELEASE ORAL at 08:07

## 2018-08-01 RX ADMIN — APIXABAN SCH MG: 5 TABLET, FILM COATED ORAL at 08:07

## 2018-08-01 RX ADMIN — LACTULOSE SCH GM: 20 SOLUTION ORAL at 20:10

## 2018-08-01 RX ADMIN — DOCUSATE SODIUM SCH MG: 100 CAPSULE ORAL at 20:08

## 2018-08-01 RX ADMIN — SODIUM CHLORIDE SCH MLS/HR: 900 INJECTION, SOLUTION INTRAVENOUS at 13:34

## 2018-08-01 RX ADMIN — DOCUSATE SODIUM SCH MG: 100 CAPSULE ORAL at 10:54

## 2018-08-01 RX ADMIN — DOCUSATE SODIUM AND SENNOSIDES SCH EA: 8.6; 5 TABLET, FILM COATED ORAL at 10:54

## 2018-08-01 RX ADMIN — MONTELUKAST SCH MG: 10 TABLET, FILM COATED ORAL at 20:08

## 2018-08-01 RX ADMIN — APIXABAN SCH MG: 5 TABLET, FILM COATED ORAL at 20:09

## 2018-08-01 RX ADMIN — ATORVASTATIN CALCIUM SCH MG: 10 TABLET, FILM COATED ORAL at 20:09

## 2018-08-01 RX ADMIN — LEVOTHYROXINE SODIUM SCH MCG: 0.11 TABLET ORAL at 20:09

## 2018-08-01 RX ADMIN — FAMOTIDINE SCH MG: 20 TABLET, FILM COATED ORAL at 20:09

## 2018-08-01 RX ADMIN — MAGNESIUM SULFATE IN DEXTROSE SCH MLS/HR: 10 INJECTION, SOLUTION INTRAVENOUS at 06:12

## 2018-08-01 RX ADMIN — DOCUSATE SODIUM AND SENNOSIDES SCH EA: 8.6; 5 TABLET, FILM COATED ORAL at 20:09

## 2018-08-01 RX ADMIN — POTASSIUM CHLORIDE SCH MEQ: 1500 TABLET, EXTENDED RELEASE ORAL at 06:13

## 2018-08-01 RX ADMIN — POTASSIUM CHLORIDE SCH MLS/HR: 200 INJECTION, SOLUTION INTRAVENOUS at 06:12

## 2018-08-01 RX ADMIN — DILTIAZEM HYDROCHLORIDE SCH MLS/HR: 5 INJECTION INTRAVENOUS at 00:26

## 2018-08-01 RX ADMIN — LACTULOSE SCH GM: 20 SOLUTION ORAL at 10:54

## 2018-08-01 NOTE — CARDIOLOGY PROGRESS NOTE
Subjective


Date Seen by Provider:  Aug 1, 2018


Time Seen by Provider:  08:20


Subjective/Events-last exam


Patient is sitting up in bed eating breakfast. Denies any CP. Complains of 

ongoing dyspnea and some orthopnea last night. Some improvement with Lasix 

given overnight.





Objective-Cardiology


Exam


Last Set of Vital Signs





Vital Signs








 18





 08:08 09:00


 


Temp 98.8 


 


Pulse  101


 


Resp  13


 


B/P (MAP)  82/59 (67)


 


Pulse Ox  89


 


O2 Delivery  Nasal Cannula


 


O2 Flow Rate  3.00





Capillary Refill : Less Than 3 Seconds


I&O











Intake and Output 


 


 18





 00:00


 


Intake Total 960 ml


 


Output Total 125 ml


 


Balance 835 ml


 


 


 


Intake Oral 460 ml


 


IV Total 500 ml


 


Output Urine Total 125 ml


 


Daily Weight Change No








General:  Alert


HEENT:  Atraumatic, PERRLA


Neck:  Supple, No JVD, No Thyromegaly


Lungs:  Clear to Auscultation, Normal Air Movement


Heart:  Regular Rate, Normal S1, Normal S2, No Murmurs


Abdomen:  Normal Bowel Sounds, Soft, No Tenderness, No Hepatosplenomegaly, No 

Masses


Extremities:  No Clubbing, No Cyanosis, No Edema, Normal Pulses, No Tenderness/

Swelling


Skin:  No Rashes, No Breakdown, No Significant Lesion


Neuro:  Normal Gait, Normal Speech, Strength at 5/5 X4 Ext, Normal Tone, 

Sensation Intact


Psych/Mental Status:  Mental Status NL





Results


Lab


Laboratory Tests


18 14:38








18 03:05














A/P-Cardiology


Admission Diagnosis


Afib with RVR\


CHF


CAD


HTN





Assessment/Plan


Shortness of breath, likely multifactorial.  Patient has severe COPD.  Cardiac 

etiologies contribution include atrial fibrillation with rapid ventricular rate 

and mild CHF. Diurese today and continue to monitor. 





Atrial fibrillation with RVR: Cardizem bolus followed by Cardizem IV infusion 

given. Has history of paroxysmal atrial fibrillation, had a transient episode 

of atrial fibrillation while he was in the hospital in May 2017. Telemetry 

reveals Afib with HR in the 100's. Dr. Enamorado following.





Coronary artery disease, history of CABG 2 with LIMA to LAD and vein graft 

RCA.  Patient had PTCA and stent placement using 2.25 x 12 mm Atom to the left 

circumflex in 2010.  Cardiac catheterization on 2014 revealed 

patent DARBY to LAD, patent vein graft to right PDA, and patent stent in the 

circumflex artery.  Small vessel disease distally otherwise, nonobstructive 

disease repeat cardiac catheterization was done in 2016 showing 

patent LIMA to LAD, vein graft to the right coronary artery, patent stent in 

the mid circumflex artery with mild in-stent restenosis.  Nonobstructive 

disease.  continue on current medications and monitor. Consider stress test as 

outpatient. 





Frequent PVCs with ventricular bigeminy, ventricular couplets, was admitted 

last months with symptomatic bradycardia and frequent PVC's. S/p PPM 

implantation by Dr. Enamorado, pacemaker interrogation to be done this morning. 





Acute on chronic Congestive heart failure, LV systolic dysfunction with most 

recent EF is 45-50 percent, maintained on ACE-I and beta blocker as outpatient. 

Restart home medications and continue to monitor. Diurese today.





Mild to moderate aortic valve stenosis, last echocardiogram was done in 2018 estimated the peak gradient across the aortic valve of 30 mmHg, mean 

gradient of 15 mmHg, Platelet valve area 1.2 cm.  Ejection fraction 45-50, 

moderate mitral regurgitation.  Continue on current medications and monitor.  





Severe COPD, oxygen dependent, having worsening dyspnea, continue to monitor. 





Hypertension, controlled.  Continue monitor blood pressure/heart rate.





Hyperlipidemia, continue to monitor lipids





History carcinoma of the tongue, squamous cell carcinoma, followed and managed 

by Dr. Redman





Hypothyroidism, followed and managed by primary care physician





Severe right carotid stenosis, mild on the left, last ultrasound was done in 

2016.  Patient has been evaluated by Dr. Ornelas in the past.  CTA done 

2016 revealed stenosis of 40-50 percent bilaterally.  Continues to follow 

with Dr. Ornelas





Clinical Quality Measures


DVT/VTE Risk/Contraindication:


Risk Factor Score Per Nursin


RFS Level Per Nursing on Admit:  2=Moderate











RADHAMES YOUNG Aug 1, 2018 08:44

## 2018-08-01 NOTE — HISTORY & PHYSICAL-HOSPITALIST
History of Present Illness


HPI/Chief Complaint


CC: Fast heart rate





HPI: This is a 78yoWM clinic patient of Dr Hall and Dr Hammonds who presented 

to the ER w/palpitations and found to have AF w/RVR. He recently had a 

pacemaker placement 1 week ago and had done well since that time until this 

issue arose. Patient denies pain at this current time. Pt is requiring IV 

Cardizem and currently 117 heart rate. Pacemaker will be interrogated today. Dr Hammonds gave Lasix IV for volume overload. I reviewed home meds and will restart 

all if possible. He does report constipation so will initiate meds for that.


Source:  patient


Exam Limitations:  no limitations


Date Seen


18


Time Seen by Provider:  10:00


Attending Physician


Franky Lopes MD


PCP


Mihir Hall DO


Referring Physician





Date of Admission


2018 at 16:21





Home Medications & Allergies


Home Medications


Reviewed patient Home Medication Reconciliation performed by pharmacy 

medication reconciliations technician and/or nursing.


Patients Allergies have been reviewed.





Allergies





Allergies


Coded Allergies


  No Known Drug Allergies ([id number])








Past Medical-Social-Family Hx


Past Med/Social Hx:  Reviewed Nursing Past Med/Soc Hx, Reviewed and Corrections 

made


Patient Social History


Marrital Status:  


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


Alcohol Use:  Denies Use


Recreational Drug Use:  No


Smoking Status:  Former Smoker


Former Smoker, Quit:  1998


Type Used:  Cigarettes


2nd Hand Smoke Exposure:  No (quit 20 years ago)


Physical Abuse Screen:  No


Sexual Abuse:  No


Recent Foreign Travel:  No


Contact w/other who traveled:  No


Recent Hopitalizations:  Yes (Pacemaker placed last week )


Recent Infectious Disease Expo:  No





Immunizations Up To Date


Tetanus Booster (TDap):  Unknown


Date of Pneumonia Vaccine:  Sep 7, 2015


Date of Influenza Vaccine:  Oct 3, 2016





Seasonal Allergies


Seasonal Allergies:  Yes





Past Medical History


Surgeries:  CABG, Coronary Stent, Joint Replacement, Pacemaker


Respiratory:  COPD, Sleep Apnea


Currently Using CPAP:  Yes


Currently Using BIPAP:  Yes


Cardiac:  Atrial Fibrillation, Coronary Artery Disease, Heart Attack, High 

Cholesterol, Hypertension


Reproductive:  No


Sexually Transmitted Disease:  No


HIV/AIDS:  No


Genitourinary:  Benign Prostatic Hyperpl, Prostate Problems


Gastrointestinal:  Gastroesophageal Reflux


Musculoskeletal:  Arthritis


Loss of Vision:  Bilateral


Hearing Impairment:  Hard of Hearing, Bilateral Hearing Aide


Cancer:  Prostate


Did You Recieve Any Treatments:  Yes


What Type of Treatment Did You:  Chemotherapy, Radiation, Surgical Intervention


History of Blood Disorders:  No


Adverse Reaction to Blood Mac:  No (N/A)





Family History


Reviewed Nursing Family Hx





FH: COPD (chronic obstructive pulmonary disease)


  G8 BROTHER


FH: breast cancer


  G8 SISTER


FH: lung disease


  G8 BROTHER


FH: lupus


  G8 SISTER


Myocardial infarction


  19 FATHER, Onset:65


Scarlet fever


  G8 SISTER, Onset:13





Review of Systems


Constitutional:  see HPI, weakness


EENTM:  no symptoms reported


Respiratory:  dyspnea on exertion


Cardiovascular:  palpitations


Gastrointestinal:  no symptoms reported


Genitourinary:  no symptoms reported


Musculoskeletal:  no symptoms reported


Skin:  no symptoms reported


Psychiatric/Neurological:  No Symptoms Reported


All Other Systems Reviewed


Negative Unless Noted:  Yes





Physical Exam


Physical Exam


Vital Signs





Vital Signs - First Documented




















Capillary Refill : Less Than 3 Seconds


Height, Weight, BMI


Height: 5'5.00"


Weight: 155lbs. 2.0oz. 70.623056vh; 26.2 BMI


Method:Stated


General Appearance:  No Apparent Distress, WD/WN, Chronically ill


Eyes:  Bilateral Eye Normal Inspection, Bilateral Eye PERRL


HEENT:  PERRL/EOMI, TMs Normal, Normal ENT Inspection, Pharynx Normal


Neck:  Full Range of Motion, Normal Inspection, Non Tender, Supple, Carotid 

Bruit


Respiratory:  Chest Non Tender, Lungs Clear, Normal Breath Sounds, No Accessory 

Muscle Use, No Respiratory Distress


Cardiovascular:  No Edema, No Gallop, No JVD, No Murmur, Normal Peripheral 

Pulses, Irregularly Irregular, Tachycardia


Gastrointestinal:  Normal Bowel Sounds, No Organomegaly, No Pulsatile Mass, Non 

Tender, Soft


Back:  Normal Inspection, No CVA Tenderness, No Vertebral Tenderness


Extremity:  Normal Capillary Refill, Normal Inspection, Normal Range of Motion, 

Non Tender, No Calf Tenderness, No Pedal Edema


Neurologic/Psychiatric:  Alert, Oriented x3, No Motor/Sensory Deficits, Normal 

Mood/Affect


Skin:  Normal Color, Warm/Dry


Lymphatic:  No Adenopathy





Results


Results/Procedures


Labs


Laboratory Tests


18 14:38








18 03:05








Patient resulted labs reviewed.





Assessment/Plan


Admission Diagnosis


AFw/RVR


COPD


KOLBY





Plan:


Home meds


Cardizem drip


Interrogate pacemaker


Appreciate Cardiology expertise.


Admission Status:  Inpatient Order (span 2 midnights)


Reason for Inpatient Admission:  


AF w/RVR and recent pacemaker will require 3 midnights to control


tachycardia





Diagnosis/Problems


Diagnosis/Problems





(1) Atrial fibrillation with rapid ventricular response


Status:  Acute


(2) COPD (chronic obstructive pulmonary disease)


Status:  Chronic


Qualifiers:  


   COPD type:  unspecified COPD  Qualified Codes:  J44.9 - Chronic obstructive 

pulmonary disease, unspecified


(3) KOLBY on CPAP


Status:  Chronic


(4) Hypertension


Status:  Chronic


Qualifiers:  


   Hypertension type:  essential hypertension  Qualified Codes:  I10 - 

Essential (primary) hypertension


(5) Hyperlipidemia


Status:  Chronic


Qualifiers:  


   Hyperlipidemia type:  mixed hyperlipidemia  Qualified Codes:  E78.2 - Mixed 

hyperlipidemia


(6) Constipation


Status:  Acute


Qualifiers:  


   Constipation type:  unspecified constipation type  Qualified Codes:  K59.00 

- Constipation, unspecified


(7) CRI (chronic renal insufficiency)


Status:  Chronic


Qualifiers:  


   Chronic kidney disease stage:  stage 3 (moderate)  Qualified Codes:  N18.3 - 

Chronic kidney disease, stage 3 (moderate)


(8) Anemia


Status:  Chronic


Qualifiers:  


   Anemia type:  unspecified type  Qualified Codes:  D64.9 - Anemia, unspecified


(9) Elevated brain natriuretic peptide (BNP) level


Status:  Acute





Clinical Quality Measures


DVT/VTE Risk/Contraindication:


Risk Factor Score Per Nursin


RFS Level Per Nursing on Admit:  2=Moderate











TISHA ROSE DO Aug 1, 2018 10:40

## 2018-08-01 NOTE — PHYSICIAN QUERY CLARIFICATION
PQ-CHF Specificity


The medical record reflects the following clinical scenario:


History/Risk Factors:


Atrial fibrillation, PVC's, CAD, Aortic stenosis/Mitral regurgitation, 

Emphysema O2 dependence


Clinical Findings:


SOB, dyspnea, orthopnea, EF 45-50%


Treatment:


IVP Lasix 20 mg





Question:  Can you further specify the acuity &/or type of CHF per the clinical 

indicators above?  Please document a response below


PHYSICIAN RESPONSE


Acuity:  Acute on Chronic


Type:  Systolic








In responding to this query, please exercise your independent professional 

judgment.  The purpose of this communication is to more accurately reflect the 

complexity of your patients condition. The fact that a question is asked does 

not imply that any particular answer is desired or expected.  





Thank you for your timely response to this clarification.      


   


Requestors name: Belle   





Phone # [phone]








THIS PHYSICIAN QUERY FORM IS A PERMANENT PART OF THE MEDICAL RECORD











BELLE DUNBAR Aug 1, 2018 10:13


RADHAMES YOUNG Aug 2, 2018 08:56

## 2018-08-01 NOTE — CARDIOLOGY PROGRESS NOTE
Cardiology SOAP Progress Note


Subjective:


Shortness of breath better.





Objective:


I&O/Vital Signs











 8/1/18 8/1/18 8/1/18 8/1/18





 04:00 04:00 04:10 05:00


 


Temp   98.1 


 


Pulse  112  114


 


Resp  25  19


 


B/P (MAP)  118/84 (95)  


 


Pulse Ox 98 98  96


 


O2 Delivery Nasal Cannula Nasal Cannula  Nasal Cannula


 


O2 Flow Rate 3.00 3.00  3.00


 


    





 8/1/18 8/1/18 8/1/18 8/1/18





 06:00 07:00 07:00 08:00


 


Pulse 100 111 11 107


 


Resp 23 20  25


 


B/P (MAP) 107/75 (86) 106/91 (96)  113/80 (91)


 


Pulse Ox 95 96  95


 


O2 Delivery Nasal Cannula Nasal Cannula  Nasal Cannula


 


O2 Flow Rate 3.00 3.00  3.00





 8/1/18 8/1/18 8/1/18 8/1/18





 08:08 09:00 10:00 10:57


 


Temp 98.8   


 


Pulse  101 108 


 


Resp  13 19 


 


B/P (MAP)  82/59 (67) 91/64 (73) 


 


Pulse Ox  89 94 


 


O2 Delivery Nasal Cannula Nasal Cannula Nasal Cannula Nasal Cannula


 


O2 Flow Rate 3.00 3.00 3.00 2.00


 


    





 8/1/18 8/1/18 8/1/18 8/1/18





 11:00 12:00 12:40 13:00


 


Temp   97.9 


 


Pulse 105 104  102


 


Resp 13 18  


 


B/P (MAP) 96/73 (81) 107/81 (90)  


 


Pulse Ox 98 94  


 


O2 Delivery Nasal Cannula Nasal Cannula Nasal Cannula 


 


O2 Flow Rate 3.00 3.00 3.00 


 


    





 8/1/18   





 13:00   


 


Pulse 102   


 


Resp 10   


 


B/P (MAP) 100/71 (81)   


 


Pulse Ox 98   


 


O2 Delivery Nasal Cannula   


 


O2 Flow Rate 3.00   














 8/1/18





 00:00


 


Intake Total 960 ml


 


Output Total 125 ml


 


Balance 835 ml








Weight (Pounds):  155


Weight (Ounces):  2.0


Weight (Calculated Kilograms):  70.796048


Constitutional:  appears stated age, AAO x 3; No apparent distress; well-

developed, well-nourished


Respiratory:  No accessory muscle use, No respiratory distress, No chest tender

, No chest expansion is symmetric; chest is bilaterally symmetric; No lungs 

clear to percussion; lungs clear to auscultation; No crackles, No rhonchi, No 

rales, No stridor, No wheezing, No pleural rub, No other


Cardiovascular:  irregularly irregular, tachycardia, S1 and S2


Gastrointestional:  No tender, No soft, No round, No distended, No pulsatile 

mass, No organomegaly, No guarding, No rebound, No tenderness, No hernia, No 

mass, No audible bowel sounds, No abnormal bowel sounds, No abdominal bruits, 

No spleenomegaly, No other


Extremities:  No normal range of motion, No non-tender, No normal inspection, 

No pedal edema, No calf tenderness, No normal capillary refill, No pelvis stable

, No calf tenderness, No inflammation, No pedal edema, No slow capillary refill

, No swelling, No other, No abrasion, No clubbing, No cyanosis, No ecchymosis, 

No laceration, No no lower extremity edema bilateral, No significant edema, No 

tenderness, No wound


Neurologic/Psychiatric:  no motor/sensory deficits, alert, normal mood/affect, 

oriented x 3, power is 5/5 both on sides


Skin:  No normal color, No warm/dry, No cyanosis, No cool, No diaphoresis, No 

damp, No ecchymosis, No jaundice, No mottled, No pallor, No rash, No tattoos/

piercings, No ulcerations, No rash on exposed areas, No ulcerations on exposed 

areas, No other





Results/Procedures:


Labs


Laboratory Tests


8/1/18 03:05: 


White Blood Count 8.0, Red Blood Count 3.76L, Hemoglobin 11.0L, Hematocrit 34L, 

Mean Corpuscular Volume 90, Mean Corpuscular Hemoglobin 29, Mean Corpuscular 

Hemoglobin Concent 33, Red Cell Distribution Width 14.9H, Platelet Count 158, 

Mean Platelet Volume 12.0H, Neutrophils (%) (Auto) 80H, Lymphocytes (%) (Auto) 

7L, Monocytes (%) (Auto) 12, Eosinophils (%) (Auto) 2, Basophils (%) (Auto) 0, 

Neutrophils # (Auto) 6.4, Lymphocytes # (Auto) 0.6L, Monocytes # (Auto) 0.9, 

Eosinophils # (Auto) 0.1, Basophils # (Auto) 0.0, Sodium Level 142, Potassium 

Level 3.7, Chloride Level 110H, Carbon Dioxide Level 22, Anion Gap 10, Blood 

Urea Nitrogen 17, Creatinine 1.33H, Estimat Glomerular Filtration Rate 52, BUN/

Creatinine Ratio 13, Glucose Level 136H, Calcium Level 8.8, Phosphorus Level 3.0

, Magnesium Level 1.9, Total Bilirubin 2.0H, Aspartate Amino Transf (AST/SGOT) 

17, Alanine Aminotransferase (ALT/SGPT) 19, Alkaline Phosphatase 70, Total 

Protein 5.8L, Albumin 3.9


8/1/18 10:45: B-Type Natriuretic Peptide 209.2H








A/P:


Assessment/Dx:


Shortness of breath,


Atrial fibrillation with rapid ventricular rate,


Recent pacemaker for severe sinus node dysfunction,


History of CAD,


Ventricular bigeminy


Plan:


Shortness of breath, likely multifactorial.  Defer to Dr. Hammonds.





Atrial fibrillation with RVR: Cardizem bolus followed by Cardizem IV infusion 

given.  Eliquis started.  We will start beta blocker and gradually increase its 

dose and decrease Cardizem.





Pacemaker: Device interrogation in the morning.





History of CAD: Deferred to Dr. Hammonds





Ventricular bigeminy: Beta blocker started.  We'll see the response.








Thank you for your consultation. Please call me if you have any questions.








YESENIA Enamorado MD, FACP, FACC, FSCAI, FHRS, CCDS


Interventional Cardiology


Cardiac Electrophysiology


Vascular Medicine and Endovascular Interventions











SABRINA ENAMORADO MD Aug 1, 2018 14:51

## 2018-08-01 NOTE — CARDIOLOGY PROGRESS NOTE
Subjective


Date Seen by Provider:  Aug 1, 2018


Time Seen by Provider:  11:05


Subjective/Events-last exam


Patient is sitting in a chair, feeling better, still having dyspnea on 

exertion.  No palpitation, heart rate is still borderline tachycardiac.  Denied 

any chest pain.


Review of Systems


General:  No Chills, No Night Sweats, No Fatigue, No Malaise, No Appetite, No 

Other


HEENT:  No Head Aches, No Visual Changes, No Eye Pain, No Ear Pain, No Dysphasia

, No Sinus Congestion, No Post Nasal Drip, No Sore Throat, No Other


Pulmonary:  Dyspnea; No Cough, No Pleuritic Chest Pain, No Other


Cardiovascular:  No: Chest Pain, Palpitations, Orthopnea, Paroxysmal Noc. 

Dyspnea, Edema, Lt Headedness, Other





Objective-Cardiology


Exam


Last Set of Vital Signs





Vital Signs








 18





 08:08 10:00 10:57


 


Temp 98.8  


 


Pulse  108 


 


Resp  19 


 


B/P (MAP)  91/64 (73) 


 


Pulse Ox  94 


 


O2 Delivery   Nasal Cannula


 


O2 Flow Rate   2.00





Capillary Refill : Less Than 3 Seconds


I&O











Intake and Output 


 


 18





 00:00


 


Intake Total 960 ml


 


Output Total 125 ml


 


Balance 835 ml


 


 


 


Intake Oral 460 ml


 


IV Total 500 ml


 


Output Urine Total 125 ml


 


Daily Weight Change No








General:  Alert


HEENT:  Atraumatic, PERRLA


Neck:  Supple, No JVD, No Thyromegaly


Lungs:  Normal Air Movement, Other (Wet rales at the bases)


Heart:  Normal S1, Normal S2, Other (Atrial fibrillation, systolic murmur at 

the left sternal border)


Abdomen:  Normal Bowel Sounds, Soft, No Tenderness, No Hepatosplenomegaly, No 

Masses


Extremities:  No Clubbing, No Cyanosis, No Edema, Normal Pulses, No Tenderness/

Swelling


Skin:  No Rashes, No Breakdown, No Significant Lesion


Neuro:  Normal Gait, Normal Speech, Strength at 5/5 X4 Ext, Normal Tone, 

Sensation Intact


Psych/Mental Status:  Mental Status NL





Results


Lab


Laboratory Tests


18 14:38








18 03:05














A/P-Cardiology


Admission Diagnosis


Afib with RVR


CHF


CAD


HTN





Assessment/Plan


Shortness of breath, likely multifactorial, no onset atrial fibrillation, 

dyspnea on exertion, underlying severe COPD using oxygen at home.  Possible 

cardioversion if patient did not convert with Cardizem drip and rate 

controlling medications.





Atrial fibrillation with RVR, had another episode of atrial fibrillation in May 

2017, currently on Cardizem orally and IV drip with borderline tachycardia and 

hypotension.  Beta blocker on hold due to hypotension.  Managed by Dr. Enamorado





Coronary artery disease, history of CABG 2 with LIMA to LAD and vein graft 

RCA.  Patient had PTCA and stent placement using 2.25 x 12 mm Atom to the left 

circumflex in 2010.  Cardiac catheterization on 2014 revealed 

patent DARBY to LAD, patent vein graft to right PDA, and patent stent in the 

circumflex artery.  Small vessel disease distally otherwise, nonobstructive 

disease repeat cardiac catheterization was done in 2016 showing 

patent LIMA to LAD, vein graft to the right coronary artery, patent stent in 

the mid circumflex artery with mild in-stent restenosis.  Nonobstructive 

disease.  continue on current medications and monitor. Consider stress test as 

outpatient. 





Frequent PVCs with ventricular bigeminy, ventricular couplets, was admitted 

last months with symptomatic bradycardia and frequent PVC's. S/p PPM 

implantation by Dr. Enamorado, pacemaker interrogation to be done today





Acute on chronic Congestive heart failure, LV systolic dysfunction with most 

recent EF is 45-50 percent, maintained on ACE-I and beta blocker as outpatient.

  ACE inhibitor and beta blockers on hold due to hypotension.  Planning to 

restart medications once ration can't tolerate it





Mild to moderate aortic valve stenosis, last echocardiogram was done in 2018 estimated the peak gradient across the aortic valve of 30 mmHg, mean 

gradient of 15 mmHg, Platelet valve area 1.2 cm.  Ejection fraction 45-50, 

moderate mitral regurgitation.  Repeat echo in 2018 showed significant 

changes, continue to monitor





Severe COPD, oxygen dependent, having worsening dyspnea, continue to monitor. 





Hypertension, currently borderline hypotensive.  Medications on hold other than 

calcium channel blockers.  Continue to monitor





Hyperlipidemia, continue to monitor lipids





History carcinoma of the tongue, squamous cell carcinoma, followed and managed 

by Dr. Redman





Hypothyroidism, followed and managed by primary care physician





Severe right carotid stenosis, mild on the left, last ultrasound was done in 

2016.  Patient has been evaluated by Dr. Ornelas in the past.  CTA done 

2016 revealed stenosis of 40-50 percent bilaterally.  Continues to follow 

with Dr. Ornelas





Clinical Quality Measures


DVT/VTE Risk/Contraindication:


Risk Factor Score Per Nursin


RFS Level Per Nursing on Admit:  2=Moderate











ELIDIA ANTOINE MD Aug 1, 2018 11:10

## 2018-08-02 VITALS — DIASTOLIC BLOOD PRESSURE: 68 MMHG | SYSTOLIC BLOOD PRESSURE: 101 MMHG

## 2018-08-02 VITALS — DIASTOLIC BLOOD PRESSURE: 60 MMHG | SYSTOLIC BLOOD PRESSURE: 94 MMHG

## 2018-08-02 VITALS — DIASTOLIC BLOOD PRESSURE: 84 MMHG | SYSTOLIC BLOOD PRESSURE: 102 MMHG

## 2018-08-02 VITALS — DIASTOLIC BLOOD PRESSURE: 76 MMHG | SYSTOLIC BLOOD PRESSURE: 107 MMHG

## 2018-08-02 VITALS — SYSTOLIC BLOOD PRESSURE: 109 MMHG | DIASTOLIC BLOOD PRESSURE: 79 MMHG

## 2018-08-02 VITALS — DIASTOLIC BLOOD PRESSURE: 86 MMHG | SYSTOLIC BLOOD PRESSURE: 110 MMHG

## 2018-08-02 VITALS — SYSTOLIC BLOOD PRESSURE: 113 MMHG | DIASTOLIC BLOOD PRESSURE: 80 MMHG

## 2018-08-02 VITALS — DIASTOLIC BLOOD PRESSURE: 65 MMHG | SYSTOLIC BLOOD PRESSURE: 106 MMHG

## 2018-08-02 VITALS — SYSTOLIC BLOOD PRESSURE: 94 MMHG | DIASTOLIC BLOOD PRESSURE: 66 MMHG

## 2018-08-02 VITALS — DIASTOLIC BLOOD PRESSURE: 59 MMHG | SYSTOLIC BLOOD PRESSURE: 102 MMHG

## 2018-08-02 VITALS — SYSTOLIC BLOOD PRESSURE: 101 MMHG | DIASTOLIC BLOOD PRESSURE: 65 MMHG

## 2018-08-02 VITALS — SYSTOLIC BLOOD PRESSURE: 98 MMHG | DIASTOLIC BLOOD PRESSURE: 84 MMHG

## 2018-08-02 VITALS — DIASTOLIC BLOOD PRESSURE: 71 MMHG | SYSTOLIC BLOOD PRESSURE: 116 MMHG

## 2018-08-02 VITALS — SYSTOLIC BLOOD PRESSURE: 100 MMHG | DIASTOLIC BLOOD PRESSURE: 67 MMHG

## 2018-08-02 VITALS — DIASTOLIC BLOOD PRESSURE: 81 MMHG | SYSTOLIC BLOOD PRESSURE: 121 MMHG

## 2018-08-02 VITALS — SYSTOLIC BLOOD PRESSURE: 115 MMHG | DIASTOLIC BLOOD PRESSURE: 85 MMHG

## 2018-08-02 VITALS — SYSTOLIC BLOOD PRESSURE: 103 MMHG | DIASTOLIC BLOOD PRESSURE: 88 MMHG

## 2018-08-02 VITALS — DIASTOLIC BLOOD PRESSURE: 87 MMHG | SYSTOLIC BLOOD PRESSURE: 104 MMHG

## 2018-08-02 VITALS — SYSTOLIC BLOOD PRESSURE: 109 MMHG | DIASTOLIC BLOOD PRESSURE: 56 MMHG

## 2018-08-02 VITALS — DIASTOLIC BLOOD PRESSURE: 66 MMHG | SYSTOLIC BLOOD PRESSURE: 110 MMHG

## 2018-08-02 VITALS — SYSTOLIC BLOOD PRESSURE: 101 MMHG | DIASTOLIC BLOOD PRESSURE: 74 MMHG

## 2018-08-02 VITALS — DIASTOLIC BLOOD PRESSURE: 87 MMHG | SYSTOLIC BLOOD PRESSURE: 98 MMHG

## 2018-08-02 VITALS — SYSTOLIC BLOOD PRESSURE: 94 MMHG | DIASTOLIC BLOOD PRESSURE: 76 MMHG

## 2018-08-02 VITALS — SYSTOLIC BLOOD PRESSURE: 89 MMHG | DIASTOLIC BLOOD PRESSURE: 80 MMHG

## 2018-08-02 VITALS — SYSTOLIC BLOOD PRESSURE: 116 MMHG | DIASTOLIC BLOOD PRESSURE: 93 MMHG

## 2018-08-02 VITALS — SYSTOLIC BLOOD PRESSURE: 87 MMHG | DIASTOLIC BLOOD PRESSURE: 72 MMHG

## 2018-08-02 VITALS — DIASTOLIC BLOOD PRESSURE: 54 MMHG | SYSTOLIC BLOOD PRESSURE: 90 MMHG

## 2018-08-02 VITALS — DIASTOLIC BLOOD PRESSURE: 90 MMHG | SYSTOLIC BLOOD PRESSURE: 117 MMHG

## 2018-08-02 VITALS — SYSTOLIC BLOOD PRESSURE: 112 MMHG | DIASTOLIC BLOOD PRESSURE: 66 MMHG

## 2018-08-02 VITALS — DIASTOLIC BLOOD PRESSURE: 84 MMHG | SYSTOLIC BLOOD PRESSURE: 106 MMHG

## 2018-08-02 VITALS — SYSTOLIC BLOOD PRESSURE: 101 MMHG | DIASTOLIC BLOOD PRESSURE: 90 MMHG

## 2018-08-02 VITALS — DIASTOLIC BLOOD PRESSURE: 74 MMHG | SYSTOLIC BLOOD PRESSURE: 95 MMHG

## 2018-08-02 VITALS — SYSTOLIC BLOOD PRESSURE: 110 MMHG | DIASTOLIC BLOOD PRESSURE: 82 MMHG

## 2018-08-02 VITALS — DIASTOLIC BLOOD PRESSURE: 68 MMHG | SYSTOLIC BLOOD PRESSURE: 95 MMHG

## 2018-08-02 VITALS — DIASTOLIC BLOOD PRESSURE: 69 MMHG | SYSTOLIC BLOOD PRESSURE: 105 MMHG

## 2018-08-02 VITALS — DIASTOLIC BLOOD PRESSURE: 83 MMHG | SYSTOLIC BLOOD PRESSURE: 102 MMHG

## 2018-08-02 VITALS — SYSTOLIC BLOOD PRESSURE: 92 MMHG | DIASTOLIC BLOOD PRESSURE: 78 MMHG

## 2018-08-02 VITALS — DIASTOLIC BLOOD PRESSURE: 85 MMHG | SYSTOLIC BLOOD PRESSURE: 101 MMHG

## 2018-08-02 VITALS — SYSTOLIC BLOOD PRESSURE: 90 MMHG | DIASTOLIC BLOOD PRESSURE: 75 MMHG

## 2018-08-02 VITALS — SYSTOLIC BLOOD PRESSURE: 97 MMHG | DIASTOLIC BLOOD PRESSURE: 64 MMHG

## 2018-08-02 VITALS — DIASTOLIC BLOOD PRESSURE: 76 MMHG | SYSTOLIC BLOOD PRESSURE: 121 MMHG

## 2018-08-02 VITALS — DIASTOLIC BLOOD PRESSURE: 80 MMHG | SYSTOLIC BLOOD PRESSURE: 100 MMHG

## 2018-08-02 VITALS — DIASTOLIC BLOOD PRESSURE: 72 MMHG | SYSTOLIC BLOOD PRESSURE: 96 MMHG

## 2018-08-02 LAB
BASOPHILS # BLD AUTO: 0 10^3/UL (ref 0–0.1)
BASOPHILS NFR BLD AUTO: 1 % (ref 0–10)
BUN/CREAT SERPL: 11
CALCIUM SERPL-MCNC: 9 MG/DL (ref 8.5–10.1)
CHLORIDE SERPL-SCNC: 107 MMOL/L (ref 98–107)
CO2 SERPL-SCNC: 24 MMOL/L (ref 21–32)
CREAT SERPL-MCNC: 1.27 MG/DL (ref 0.6–1.3)
EOSINOPHIL # BLD AUTO: 0.3 10^3/UL (ref 0–0.3)
EOSINOPHIL NFR BLD AUTO: 4 % (ref 0–10)
ERYTHROCYTE [DISTWIDTH] IN BLOOD BY AUTOMATED COUNT: 15.1 % (ref 10–14.5)
GFR SERPLBLD BASED ON 1.73 SQ M-ARVRAT: 55 ML/MIN
GLUCOSE SERPL-MCNC: 102 MG/DL (ref 70–105)
HCT VFR BLD CALC: 33 % (ref 40–54)
HGB BLD-MCNC: 10.7 G/DL (ref 13.3–17.7)
LYMPHOCYTES # BLD AUTO: 0.6 X 10^3 (ref 1–4)
LYMPHOCYTES NFR BLD AUTO: 10 % (ref 12–44)
MAGNESIUM SERPL-MCNC: 2 MG/DL (ref 1.8–2.4)
MANUAL DIFFERENTIAL PERFORMED BLD QL: NO
MCH RBC QN AUTO: 29 PG (ref 25–34)
MCHC RBC AUTO-ENTMCNC: 33 G/DL (ref 32–36)
MCV RBC AUTO: 89 FL (ref 80–99)
MONOCYTES # BLD AUTO: 0.8 X 10^3 (ref 0–1)
MONOCYTES NFR BLD AUTO: 13 % (ref 0–12)
NEUTROPHILS # BLD AUTO: 4.4 X 10^3 (ref 1.8–7.8)
NEUTROPHILS NFR BLD AUTO: 72 % (ref 42–75)
PHOSPHATE SERPL-MCNC: 3.1 MG/DL (ref 2.3–4.7)
PLATELET # BLD: 164 10^3/UL (ref 130–400)
PMV BLD AUTO: 11 FL (ref 7.4–10.4)
POTASSIUM SERPL-SCNC: 3.3 MMOL/L (ref 3.6–5)
RBC # BLD AUTO: 3.7 10^6/UL (ref 4.35–5.85)
SODIUM SERPL-SCNC: 141 MMOL/L (ref 135–145)
WBC # BLD AUTO: 6.2 10^3/UL (ref 4.3–11)

## 2018-08-02 RX ADMIN — LORATADINE SCH MG: 10 TABLET ORAL at 07:39

## 2018-08-02 RX ADMIN — APIXABAN SCH MG: 5 TABLET, FILM COATED ORAL at 21:25

## 2018-08-02 RX ADMIN — FLUTICASONE PROPIONATE AND SALMETEROL XINAFOATE SCH PUFF: 115; 21 AEROSOL, METERED RESPIRATORY (INHALATION) at 21:06

## 2018-08-02 RX ADMIN — MONTELUKAST SCH MG: 10 TABLET, FILM COATED ORAL at 21:26

## 2018-08-02 RX ADMIN — LACTULOSE SCH GM: 20 SOLUTION ORAL at 07:36

## 2018-08-02 RX ADMIN — DOCUSATE SODIUM SCH MG: 100 CAPSULE ORAL at 21:26

## 2018-08-02 RX ADMIN — FLUTICASONE PROPIONATE AND SALMETEROL XINAFOATE SCH PUFF: 115; 21 AEROSOL, METERED RESPIRATORY (INHALATION) at 05:57

## 2018-08-02 RX ADMIN — DOCUSATE SODIUM AND SENNOSIDES SCH EA: 8.6; 5 TABLET, FILM COATED ORAL at 21:26

## 2018-08-02 RX ADMIN — ASPIRIN SCH MG: 81 TABLET ORAL at 07:39

## 2018-08-02 RX ADMIN — SOTALOL HYDROCHLORIDE SCH MG: 80 TABLET ORAL at 21:25

## 2018-08-02 RX ADMIN — UMECLIDINIUM SCH INH: 62.5 AEROSOL, POWDER ORAL at 05:57

## 2018-08-02 RX ADMIN — DILTIAZEM HYDROCHLORIDE SCH MG: 120 CAPSULE, COATED, EXTENDED RELEASE ORAL at 07:39

## 2018-08-02 RX ADMIN — ALBUTEROL SULFATE PRN MG: 2.5 SOLUTION RESPIRATORY (INHALATION) at 21:06

## 2018-08-02 RX ADMIN — FAMOTIDINE SCH MG: 20 TABLET, FILM COATED ORAL at 21:25

## 2018-08-02 RX ADMIN — MAGNESIUM SULFATE IN DEXTROSE SCH MLS/HR: 10 INJECTION, SOLUTION INTRAVENOUS at 06:01

## 2018-08-02 RX ADMIN — FLUTICASONE PROPIONATE SCH SPRAY: 50 SPRAY, METERED NASAL at 07:38

## 2018-08-02 RX ADMIN — DOCUSATE SODIUM SCH MG: 100 CAPSULE ORAL at 07:38

## 2018-08-02 RX ADMIN — OMEGA-3 FATTY ACIDS CAP 1000 MG SCH MG: 1000 CAP at 07:39

## 2018-08-02 RX ADMIN — POTASSIUM CHLORIDE SCH MEQ: 1500 TABLET, EXTENDED RELEASE ORAL at 06:01

## 2018-08-02 RX ADMIN — POTASSIUM CHLORIDE SCH MLS/HR: 200 INJECTION, SOLUTION INTRAVENOUS at 06:01

## 2018-08-02 RX ADMIN — Medication SCH UNIT: at 07:40

## 2018-08-02 RX ADMIN — METOPROLOL SUCCINATE SCH MG: 50 TABLET, EXTENDED RELEASE ORAL at 07:41

## 2018-08-02 RX ADMIN — ATORVASTATIN CALCIUM SCH MG: 10 TABLET, FILM COATED ORAL at 21:25

## 2018-08-02 RX ADMIN — LEVOTHYROXINE SODIUM SCH MCG: 0.11 TABLET ORAL at 21:26

## 2018-08-02 RX ADMIN — APIXABAN SCH MG: 5 TABLET, FILM COATED ORAL at 07:41

## 2018-08-02 RX ADMIN — DILTIAZEM HYDROCHLORIDE SCH MLS/HR: 5 INJECTION INTRAVENOUS at 10:12

## 2018-08-02 RX ADMIN — ALBUTEROL SULFATE PRN MG: 2.5 SOLUTION RESPIRATORY (INHALATION) at 05:56

## 2018-08-02 RX ADMIN — LACTULOSE SCH GM: 20 SOLUTION ORAL at 21:27

## 2018-08-02 RX ADMIN — DOCUSATE SODIUM AND SENNOSIDES SCH EA: 8.6; 5 TABLET, FILM COATED ORAL at 07:37

## 2018-08-02 NOTE — CARDIOLOGY PROGRESS NOTE
Cardiology SOAP Progress Note


Subjective:


Palpitations, mild shortness of breath.





Objective:


I&O/Vital Signs











 8/2/18 8/2/18 8/2/18 8/2/18





 10:00 11:00 11:22 11:24


 


Temp   98.9 


 


Pulse 131 116 115 


 


Resp 25 23 20 


 


B/P (MAP) 101/90 (94) 106/84 (91) 106/84 (91) 


 


Pulse Ox 94 97 97 93


 


O2 Delivery Nasal Cannula Nasal Cannula Nasal Cannula Nasal Cannula


 


O2 Flow Rate 3.00 3.00 3.00 3.00


 


    





 8/2/18 8/2/18 8/2/18 8/2/18





 12:28 13:00 14:00 15:00


 


Pulse 115 106 121 87


 


Resp  28 30 24


 


B/P (MAP)  102/84 (90) 121/81 (94) 104/87 (93)


 


Pulse Ox  98 98 97


 


O2 Delivery  Nasal Cannula Nasal Cannula Nasal Cannula


 


O2 Flow Rate  3.00 3.00 3.00





 8/2/18 8/2/18 8/2/18 8/2/18





 15:30 15:33 17:00 18:00


 


Temp 98.9   


 


Pulse 90  89 95


 


Resp 22  25 29


 


B/P (MAP) 116/93 (101)  121/76 (91) 110/86 (94)


 


Pulse Ox 99 99 92 91


 


O2 Delivery Nasal Cannula Nasal Cannula Nasal Cannula Nasal Cannula


 


O2 Flow Rate 3.00 3.00 3.00 3.00


 


    





 8/2/18 8/2/18 8/2/18 8/2/18





 19:00 19:42 21:06 21:10


 


Temp  98.5  


 


Pulse 98   


 


Pulse Ox   98 95


 


O2 Delivery   Nasal Cannula Nasal Cannula


 


O2 Flow Rate   2.00 2.00














 8/2/18





 00:00


 


Intake Total 2180 ml


 


Output Total 1325 ml


 


Balance 855 ml








Weight (Pounds):  157


Weight (Ounces):  7.0


Weight (Calculated Kilograms):  71.698245


Constitutional:  appears stated age, AAO x 3; No apparent distress; well-

developed, well-nourished


Respiratory:  No accessory muscle use, No respiratory distress, No chest tender

, No chest expansion is symmetric; chest is bilaterally symmetric; No lungs 

clear to percussion; lungs clear to auscultation; No crackles, No rhonchi, No 

rales, No stridor, No wheezing, No pleural rub, No other


Cardiovascular:  irregularly irregular, tachycardia, S1 and S2


Gastrointestional:  No tender, No soft, No round, No distended, No pulsatile 

mass, No organomegaly, No guarding, No rebound, No tenderness, No hernia, No 

mass, No audible bowel sounds, No abnormal bowel sounds, No abdominal bruits, 

No spleenomegaly, No other


Extremities:  No normal range of motion, No non-tender, No normal inspection, 

No pedal edema, No calf tenderness, No normal capillary refill, No pelvis stable

, No calf tenderness, No inflammation, No pedal edema, No slow capillary refill

, No swelling, No other, No abrasion, No clubbing, No cyanosis, No ecchymosis, 

No laceration, No no lower extremity edema bilateral, No significant edema, No 

tenderness, No wound


Neurologic/Psychiatric:  no motor/sensory deficits, alert, normal mood/affect, 

oriented x 3, power is 5/5 both on sides


Skin:  No normal color, No warm/dry, No cyanosis, No cool, No diaphoresis, No 

damp, No ecchymosis, No jaundice, No mottled, No pallor, No rash, No tattoos/

piercings, No ulcerations, No rash on exposed areas, No ulcerations on exposed 

areas, No other





Results/Procedures:


Labs


Laboratory Tests


8/2/18 03:12: 


White Blood Count 6.2, Red Blood Count 3.70L, Hemoglobin 10.7L, Hematocrit 33L, 

Mean Corpuscular Volume 89, Mean Corpuscular Hemoglobin 29, Mean Corpuscular 

Hemoglobin Concent 33, Red Cell Distribution Width 15.1H, Platelet Count 164, 

Mean Platelet Volume 11.0H, Neutrophils (%) (Auto) 72, Lymphocytes (%) (Auto) 

10L, Monocytes (%) (Auto) 13H, Eosinophils (%) (Auto) 4, Basophils (%) (Auto) 1

, Neutrophils # (Auto) 4.4, Lymphocytes # (Auto) 0.6L, Monocytes # (Auto) 0.8, 

Eosinophils # (Auto) 0.3, Basophils # (Auto) 0.0, Sodium Level 141, Potassium 

Level 3.3L, Chloride Level 107, Carbon Dioxide Level 24, Anion Gap 10, Blood 

Urea Nitrogen 14, Creatinine 1.27, Estimat Glomerular Filtration Rate 55, BUN/

Creatinine Ratio 11, Glucose Level 102, Calcium Level 9.0, Phosphorus Level 3.1

, Magnesium Level 2.0





Microbiology


7/31/18 MRSA Screen - Final, Complete


          MRSA not isolated








A/P:


Assessment/Dx:


Shortness of breath,


Atrial fibrillation with rapid ventricular rate,


Recent pacemaker for severe sinus node dysfunction,


History of CAD,


Ventricular bigeminy


Plan:


Shortness of breath, likely multifactorial.  Defer to Dr. Hammonds.





Atrial fibrillation with RVR: Eliquis started.  Difficult to control on 

Cardizem by mouth only.  Therefore Cardizem infusion started again.  Toprol-XL 

50 mg given as well.  Still difficult to control heart rate therefore digoxin 

started today.  We are trying to avoid electrical cardioversion till at least 

one month after the pacemaker implantation.  Transesophageal echocardiogram to 

rule out left atrial thrombus and then can start sotalol.  Transesophageal 

echocardiogram showed no evidence of left atrial or left atrial appendage 

thrombus.  EF 40-45 percent.  Severe mitral regurgitation and likely aortic 

stenosis.  No evidence of ASD or PFO.  Sotalol will be started.  In presence of 

severe mitral regurgitation it is unlikely that we will be able to convert and 

keep the patient in sinus rhythm.  Patient may require mitral valve surgery.  

Will defer to Dr. Hammonds.





Pacemaker: Device interrogation





History of CAD: Deferred to Dr. Hammonds





Ventricular bigeminy: Beta blocker started.  We'll see the response.








Thank you for your consultation. Please call me if you have any questions.








YESENIA Enamorado MD, FACP, FACC, FSCAI, FHRS, CCDS


Interventional Cardiology


Cardiac Electrophysiology


Vascular Medicine and Endovascular Interventions











SABRINA ENAMORADO MD Aug 2, 2018 13:25

## 2018-08-02 NOTE — CARDIOLOGY PROGRESS NOTE
Subjective


Date Seen by Provider:  Aug 2, 2018


Time Seen by Provider:  08:03


Subjective/Events-last exam


Patient is in bed, still tachycardic, still having episodes of shortness of 

breath, no chest pain


Review of Systems


General:  No Chills, No Night Sweats, No Fatigue, No Malaise, No Appetite, No 

Other


HEENT:  No Head Aches, No Visual Changes, No Eye Pain, No Ear Pain, No Dysphasia

, No Sinus Congestion, No Post Nasal Drip, No Sore Throat, No Other


Pulmonary:  Dyspnea; No Cough, No Pleuritic Chest Pain, No Other


Cardiovascular:  Lt Headedness; No: Chest Pain, Palpitations, Orthopnea, 

Paroxysmal Noc. Dyspnea, Edema, Other





Objective-Cardiology


Exam


Last Set of Vital Signs





Vital Signs








 18





 07:30


 


Temp 99.2


 


Pulse 108


 


Resp 22


 


B/P (MAP) 103/88 (93)


 


Pulse Ox 93


 


O2 Delivery Nasal Cannula


 


O2 Flow Rate 3.00





Capillary Refill : Less Than 3 Seconds


I&O











Intake and Output 


 


 18





 00:00


 


Intake Total 2880 ml


 


Output Total 2925 ml


 


Balance -45 ml


 


 


 


Intake Oral 1130 ml


 


IV Total 1750 ml


 


Output Urine Total 2925 ml


 


# Voids 2


 


# Bowel Movements 1








General:  Alert, Oriented X3, Cooperative, Mild Distress


HEENT:  Atraumatic, PERRLA


Neck:  Supple, No JVD, No Thyromegaly


Lungs:  Clear to Auscultation, Normal Air Movement


Heart:  Normal S1, Normal S2, Other (Atrial fibrillation, systolic murmur at 

the left sternal border)


Abdomen:  Normal Bowel Sounds, Soft, No Tenderness, No Hepatosplenomegaly, No 

Masses


Extremities:  No Clubbing, No Cyanosis, No Edema, Normal Pulses, No Tenderness/

Swelling


Skin:  No Rashes, No Breakdown, No Significant Lesion


Neuro:  Normal Gait, Normal Speech, Strength at 5/5 X4 Ext, Normal Tone, 

Sensation Intact


Psych/Mental Status:  Mental Status NL





Results


Lab


Laboratory Tests


18 03:12














A/P-Cardiology


Admission Diagnosis


Afib with RVR


CHF


CAD


HTN





Assessment/Plan


Shortness of breath, likely multifactorial,  still in atrial fibrillation, 

dyspnea on exertion, underlying severe COPD using oxygen at home.  Will 

consider CELI and cardioversion (Chemical or electrical) and monitor response





Atrial fibrillation with RVR, had another episode of atrial fibrillation in May 

2017, Pacemaker interrogation showed Chronic atrial fibrillation at least since 

the implant of pacemaker in mid July





Anemia, slightly worse, continue to monitor





Coronary artery disease, history of CABG 2 with LIMA to LAD and vein graft 

RCA.  Patient had PTCA and stent placement using 2.25 x 12 mm Atom to the left 

circumflex in 2010.  Cardiac catheterization on 2014 revealed 

patent DARBY to LAD, patent vein graft to right PDA, and patent stent in the 

circumflex artery.  Small vessel disease distally otherwise, nonobstructive 

disease repeat cardiac catheterization was done in 2016 showing 

patent LIMA to LAD, vein graft to the right coronary artery, patent stent in 

the mid circumflex artery with mild in-stent restenosis.  Nonobstructive 

disease.  continue on current medications and monitor. Planning for stress test 

as an outpatient





Frequent PVCs with ventricular bigeminy, ventricular couplets, was admitted 

last months with symptomatic bradycardia and frequent PVC's. S/p PPM 

implantation by Dr. Enamorado





Acute on chronic Congestive heart failure, LV systolic dysfunction with most 

recent EF is 45-50 percent, maintained on ACE-I and beta blocker as outpatient.

  borderline hypotension, hod Lasix for now





Mild to moderate aortic valve stenosis, last echocardiogram was done in 2018 estimated the peak gradient across the aortic valve of 30 mmHg, mean 

gradient of 15 mmHg, Platelet valve area 1.2 cm.  Ejection fraction 45-50, 

moderate mitral regurgitation.  Repeat echo in 2018 showed significant 

changes, continue to monitor





Severe COPD, oxygen dependent, having worsening dyspnea, continue to monitor. 





Hypertension, currently borderline hypotensive.  Medications on hold other than 

calcium channel blockers.  Continue to monitor





Hyperlipidemia, continue to monitor lipids





History carcinoma of the tongue, squamous cell carcinoma, followed and managed 

by Dr. Redman





Hypothyroidism, followed and managed by primary care physician





Severe right carotid stenosis, mild on the left, last ultrasound was done in 

2016.  Patient has been evaluated by Dr. Ornelas in the past.  CTA done 

2016 revealed stenosis of 40-50 percent bilaterally.  Continues to follow 

with Dr. Ornelas





Clinical Quality Measures


DVT/VTE Risk/Contraindication:


Risk Factor Score Per Nursin


RFS Level Per Nursing on Admit:  2=Moderate











ELIDIA ANTOINE MD Aug 2, 2018 08:17

## 2018-08-02 NOTE — PROGRESS NOTE-HOSPITALIST
Subjective


HPI/CC On Admission


Date Seen by Provider:  Aug 2, 2018


Time Seen by Provider:  09:30


CC: Fast heart rate





HPI: This is a 78yoWM clinic patient of Dr Hall and Dr Hammonds who presented 

to the ER w/palpitations and found to have AF w/RVR. He recently had a 

pacemaker placement 1 week ago and had done well since that time until this 

issue arose. Patient denies pain at this current time. Pt is requiring IV 

Cardizem and currently 117 heart rate. Pacemaker will be interrogated today. Dr Hammonds gave Lasix IV for volume overload. I reviewed home meds and will restart 

all if possible. He does report constipation so will initiate meds for that.


Subjective/Events-last exam


Patient still having rapid ventricular response due to atrial fibrillation


Cardiology placed him back on Cardizem drip


Digoxin was started but still tachycardic


Bowel movement yesterday after meds given


Denies any pain


May need to be cardioverted but risk of pacemaker wires being displaced is a 

real possibility that that will be done in the Cath Lab


Checked meds and labs





Review of Systems


Cardiovascular:  Palpitations





Objective


Exam


Vital Signs





Vital Signs








  Date Time  Temp Pulse Resp B/P (MAP) Pulse Ox O2 Delivery O2 Flow Rate FiO2


 


18 08:00     93 Nasal Cannula 3.00 


 


18 07:30 99.2 108 22 103/88 (93)    





Capillary Refill : Less Than 3 Seconds


General Appearance:  No Apparent Distress, WD/WN, Chronically ill


Respiratory:  Lungs Clear, Normal Breath Sounds


Cardiovascular:  Irregularly Irregular, Tachycardia


Neurologic/Psychiatric:  Alert, Oriented x3, No Motor/Sensory Deficits, Normal 

Mood/Affect





Results/Procedures


Lab


Laboratory Tests


18 03:12








Patient resulted labs reviewed.





Assessment/Plan


Assessment and Plan


Assess & Plan/Chief Complaint


Atrial fibrillation with rapid ventricular response resistant to Cardizem drip 

and digoxin and other medical treatments may need cardioverted





Plan:


Appreciate cardiology management


Monitor closely


Maintain Cardizem drip and digoxin





Diagnosis/Problems


Diagnosis/Problems





(1) Atrial fibrillation with rapid ventricular response


Status:  Acute


(2) COPD (chronic obstructive pulmonary disease)


Status:  Chronic


Qualifiers:  


   COPD type:  unspecified COPD  Qualified Codes:  J44.9 - Chronic obstructive 

pulmonary disease, unspecified


(3) KOLBY on CPAP


Status:  Chronic


(4) Hypertension


Status:  Chronic


Qualifiers:  


   Hypertension type:  essential hypertension  Qualified Codes:  I10 - 

Essential (primary) hypertension


(5) Hyperlipidemia


Status:  Chronic


Qualifiers:  


   Hyperlipidemia type:  mixed hyperlipidemia  Qualified Codes:  E78.2 - Mixed 

hyperlipidemia


(6) Constipation


Status:  Resolved


Qualifiers:  


   Constipation type:  unspecified constipation type  Qualified Codes:  K59.00 

- Constipation, unspecified


(7) CRI (chronic renal insufficiency)


Status:  Chronic


Qualifiers:  


   Chronic kidney disease stage:  stage 3 (moderate)  Qualified Codes:  N18.3 - 

Chronic kidney disease, stage 3 (moderate)


(8) Anemia


Status:  Chronic


Qualifiers:  


   Anemia type:  unspecified type  Qualified Codes:  D64.9 - Anemia, unspecified


(9) Elevated brain natriuretic peptide (BNP) level


Status:  Acute





Clinical Quality Measures


DVT/VTE Risk/Contraindication:


Risk Factor Score Per Nursin


RFS Level Per Nursing on Admit:  2=Moderate











TISHA ROSE DO Aug 2, 2018 10:05

## 2018-08-02 NOTE — ANESTHESIA-PROCEDURE NOTE
Procedures/Interventions


Procedure Start/Stop/Diagnosis


Date of Procedure:  Aug 2, 2018


Start Time:  16:10


Referring Physician:  Dr Enamorado


Preprocedural Diagnosis:  A Fib


Brief History


Called to ICU 12 for sedation for CELI. Pt S/E. Brief history from patient and 

Dr Enamorado. He had breakfast at 0800 for NPO status. Propofol 120 mg IV given in 

divided doses for sedation. Monitors on including EtCO2. VSS throughout and he 

maintained spontaneous ventilation.


Stop Time:  16:30


Postprocedural Diagnosis:  same





CELI/Cardioversion


Anesthesia Type:  MAC


ASA Class:  3


Medications


Propofol 120 mg IV in divided doses.


Monitors and Equipment:  BP Cuff - Left, Continuous EKG, End Tidal CO2, IV, 

Pulse Oximeter











REYNALDO HUMMEL DO Aug 2, 2018 16:50

## 2018-08-02 NOTE — DISCHARGE SUMMARY-HOSPITALIST
Diagnosis/Chief Complaint


Date of Admission


2018 at 11:35


Date of Discharge


2018 at 14:30


Discharge Date:  2018


Discharge Time:  1700


Admission Diagnosis


CHF


Exacerbation of COPD


O2 dependent COPD


Cancer of Prostate


Aortic Stenosis


Bradycardia


Discharge Diagnosis


CHF- ischemic, EF 40 %


Aortic stenosis -Moderate


Exacerbation of COPD


O2 dependent COPD 


Sinus bradycardia-symptomatic


Dizziness secondary to bradycardia


PVC's


Cancer of Prostate





Discharge Summary


Procedures/Consulations


Pacemaker placement


echocardiogram


Dr. Fei Quesada





Discharge Physical Exam


Allergies:  


Coded Allergies:  


     No Known Drug Allergies (Unverified , 10/16/17)


General Appearance:  Alert, Oriented X3, Cooperative, No Acute Distress


Respiratory:  Other (decreased air movement , crackles right base)


Cardiovascular:  Regular Rate, Other (3/6 latonya)


Abdominal:  Normal Bowel Sounds, Soft


Skin:  No Rashes


Neuro:  Normal Gait, Normal Speech, Strength at 5/5 X4 Ext


Psych/Mental Status:  Mental Status NL





Hospital Course


Pt. was admitted with increases boles and found to be in decompensated CHF. 

Throughout the hospitalization the pt. had bradycardia that despite 

discontinuation of his Beta blocker, became worse. Because he became dizzy with 

orthostatic symptoms, the patient required pacemaker placement. In addition he 

received increased lasix to diurese. CXR showed RLL atelectasis before d/c , 

this was not felt to be pneumonia since his WBC and temp were normal. Dr. Enamorado placed a pacemaker with improvement in pt's symptoms and a beta blocker 

was reinstituted. The patient was d/c on his previous O2 settings and changing 

to Toprol.


Labs (last 24 hrs)





Microbiology


18 MRSA Screen - Final, Complete


          MRSA not isolated


Patient resulted labs reviewed.


Imaging:  Reviewed Imaging Report





Discussion & Recommendations


Discharge Planning:  >30 minutes discharge planning





Discharge


Home Medications:





Active Scripts


Active


Metoprolol Tartrate 50 Mg Tablet 75 Mg PO BID 90 Days


Reported


Temazepam 30 Mg Capsule 30 Mg PO HS


Aspir 81 (Aspirin) 81 Mg Tablet.dr 81 Mg PO DAILY


Ranitidine HCl 150 Mg Tablet 150 Mg PO BID


Klor-Con 10 (Potassium Chloride) 10 Meq Tablet.er 10 Meq PO DAILY PRN


Enalapril Maleate 20 Mg Tablet 20 Mg PO BID


Furosemide 20 Mg Tablet 20 Mg PO DAILY PRN


Cetirizine HCl 10 Mg Tablet 10 Mg PO DAILY


Symbicort 160-4.5 Mcg Inhaler (Budesonide/Formoterol Fumarate) 10.2 Gm 

Hfa.aer.ad 2 Puff IH BID


Spiriva Respimat 2.5MCG/ACTUATION (Tiotropium Bromide) 4 Gm Mist.inhal 2 Puff 

IH DAILY


Ventolin Hfa (Albuterol Sulfate) 18 Gm Hfa.aer.ad 2 Puff INH Q4H PRN


Montelukast Sodium 10 Mg Tablet 10 Mg PO HS


Fluticasone Propionate 16 Gm Spray.susp 2 Sprays NS DAILY


Vitamin D3 (Cholecalciferol (Vitamin D3)) 5,000 Unit Tablet 5,000 Unit PO DAILY


Fish Oil 1,200 mg Softgel (Omega-3 Fatty Acids/Fish Oil) 1 Each Capsule 1,200 

Mg PO DAILY


Levothyroxine Sodium 112 Mcg Tablet 112 Mcg PO HS


Atorvastatin Calcium 10 Mg Tablet 10 Mg PO HS





Condition at discharge


stable


Instructions to patient/family


Please see electronic discharge instructions given to patient.





Clinical Quality Measures


Admission Status


Admission Status:  Inpatient Order (span 2 midnights)


Reason for Inpatient Admission:  


decompensated CHF with end stage COPD , acute renal failure, aortic


stenosis with sick sinus syndrome





AMI/AHF:


Ejection Fraction:  Above/Equal to 40


D/C Medications Addressed:  Ace inhibitors, Beta blocker, Diuretic





DVT/VTE Risk/Contraindication:


VTE Addressed:  Yes


Risk Factor Score Per Nursin


RFS Level Per Nursing on Admit:  4+=Very High





Copy


Copies To 1:   JOSÉ METZGER KATHLEEN M MD Aug 2, 2018 10:11

## 2018-08-03 VITALS — SYSTOLIC BLOOD PRESSURE: 113 MMHG | DIASTOLIC BLOOD PRESSURE: 87 MMHG

## 2018-08-03 VITALS — SYSTOLIC BLOOD PRESSURE: 115 MMHG | DIASTOLIC BLOOD PRESSURE: 88 MMHG

## 2018-08-03 VITALS — DIASTOLIC BLOOD PRESSURE: 92 MMHG | SYSTOLIC BLOOD PRESSURE: 122 MMHG

## 2018-08-03 VITALS — DIASTOLIC BLOOD PRESSURE: 93 MMHG | SYSTOLIC BLOOD PRESSURE: 121 MMHG

## 2018-08-03 VITALS — DIASTOLIC BLOOD PRESSURE: 94 MMHG | SYSTOLIC BLOOD PRESSURE: 122 MMHG

## 2018-08-03 VITALS — DIASTOLIC BLOOD PRESSURE: 93 MMHG | SYSTOLIC BLOOD PRESSURE: 108 MMHG

## 2018-08-03 VITALS — SYSTOLIC BLOOD PRESSURE: 106 MMHG | DIASTOLIC BLOOD PRESSURE: 90 MMHG

## 2018-08-03 VITALS — DIASTOLIC BLOOD PRESSURE: 79 MMHG | SYSTOLIC BLOOD PRESSURE: 115 MMHG

## 2018-08-03 VITALS — SYSTOLIC BLOOD PRESSURE: 115 MMHG | DIASTOLIC BLOOD PRESSURE: 92 MMHG

## 2018-08-03 VITALS — SYSTOLIC BLOOD PRESSURE: 134 MMHG | DIASTOLIC BLOOD PRESSURE: 84 MMHG

## 2018-08-03 VITALS — DIASTOLIC BLOOD PRESSURE: 93 MMHG | SYSTOLIC BLOOD PRESSURE: 117 MMHG

## 2018-08-03 VITALS — SYSTOLIC BLOOD PRESSURE: 107 MMHG | DIASTOLIC BLOOD PRESSURE: 94 MMHG

## 2018-08-03 VITALS — SYSTOLIC BLOOD PRESSURE: 113 MMHG | DIASTOLIC BLOOD PRESSURE: 95 MMHG

## 2018-08-03 VITALS — DIASTOLIC BLOOD PRESSURE: 72 MMHG | SYSTOLIC BLOOD PRESSURE: 112 MMHG

## 2018-08-03 VITALS — DIASTOLIC BLOOD PRESSURE: 95 MMHG | SYSTOLIC BLOOD PRESSURE: 105 MMHG

## 2018-08-03 VITALS — DIASTOLIC BLOOD PRESSURE: 98 MMHG | SYSTOLIC BLOOD PRESSURE: 113 MMHG

## 2018-08-03 VITALS — DIASTOLIC BLOOD PRESSURE: 83 MMHG | SYSTOLIC BLOOD PRESSURE: 117 MMHG

## 2018-08-03 VITALS — DIASTOLIC BLOOD PRESSURE: 103 MMHG | SYSTOLIC BLOOD PRESSURE: 119 MMHG

## 2018-08-03 VITALS — DIASTOLIC BLOOD PRESSURE: 79 MMHG | SYSTOLIC BLOOD PRESSURE: 111 MMHG

## 2018-08-03 VITALS — SYSTOLIC BLOOD PRESSURE: 113 MMHG | DIASTOLIC BLOOD PRESSURE: 92 MMHG

## 2018-08-03 VITALS — SYSTOLIC BLOOD PRESSURE: 113 MMHG | DIASTOLIC BLOOD PRESSURE: 78 MMHG

## 2018-08-03 VITALS — DIASTOLIC BLOOD PRESSURE: 86 MMHG | SYSTOLIC BLOOD PRESSURE: 113 MMHG

## 2018-08-03 VITALS — DIASTOLIC BLOOD PRESSURE: 97 MMHG | SYSTOLIC BLOOD PRESSURE: 118 MMHG

## 2018-08-03 VITALS — SYSTOLIC BLOOD PRESSURE: 92 MMHG | DIASTOLIC BLOOD PRESSURE: 67 MMHG

## 2018-08-03 VITALS — DIASTOLIC BLOOD PRESSURE: 77 MMHG | SYSTOLIC BLOOD PRESSURE: 127 MMHG

## 2018-08-03 VITALS — DIASTOLIC BLOOD PRESSURE: 82 MMHG | SYSTOLIC BLOOD PRESSURE: 121 MMHG

## 2018-08-03 LAB
BASOPHILS # BLD AUTO: 0.1 10^3/UL (ref 0–0.1)
BASOPHILS NFR BLD AUTO: 1 % (ref 0–10)
BUN/CREAT SERPL: 12
CALCIUM SERPL-MCNC: 9.6 MG/DL (ref 8.5–10.1)
CHLORIDE SERPL-SCNC: 110 MMOL/L (ref 98–107)
CO2 SERPL-SCNC: 20 MMOL/L (ref 21–32)
CREAT SERPL-MCNC: 1.2 MG/DL (ref 0.6–1.3)
EOSINOPHIL # BLD AUTO: 0.4 10^3/UL (ref 0–0.3)
EOSINOPHIL NFR BLD AUTO: 4 % (ref 0–10)
ERYTHROCYTE [DISTWIDTH] IN BLOOD BY AUTOMATED COUNT: 15 % (ref 10–14.5)
GFR SERPLBLD BASED ON 1.73 SQ M-ARVRAT: 59 ML/MIN
GLUCOSE SERPL-MCNC: 109 MG/DL (ref 70–105)
HCT VFR BLD CALC: 37 % (ref 40–54)
HGB BLD-MCNC: 12.2 G/DL (ref 13.3–17.7)
LYMPHOCYTES # BLD AUTO: 0.6 X 10^3 (ref 1–4)
LYMPHOCYTES NFR BLD AUTO: 6 % (ref 12–44)
MAGNESIUM SERPL-MCNC: 2.3 MG/DL (ref 1.8–2.4)
MANUAL DIFFERENTIAL PERFORMED BLD QL: NO
MCH RBC QN AUTO: 30 PG (ref 25–34)
MCHC RBC AUTO-ENTMCNC: 33 G/DL (ref 32–36)
MCV RBC AUTO: 90 FL (ref 80–99)
MONOCYTES # BLD AUTO: 1.1 X 10^3 (ref 0–1)
MONOCYTES NFR BLD AUTO: 11 % (ref 0–12)
NEUTROPHILS # BLD AUTO: 7.3 X 10^3 (ref 1.8–7.8)
NEUTROPHILS NFR BLD AUTO: 78 % (ref 42–75)
PHOSPHATE SERPL-MCNC: 2.8 MG/DL (ref 2.3–4.7)
PLATELET # BLD: 208 10^3/UL (ref 130–400)
PMV BLD AUTO: 11.9 FL (ref 7.4–10.4)
POTASSIUM SERPL-SCNC: 4.7 MMOL/L (ref 3.6–5)
RBC # BLD AUTO: 4.08 10^6/UL (ref 4.35–5.85)
SODIUM SERPL-SCNC: 140 MMOL/L (ref 135–145)
WBC # BLD AUTO: 9.3 10^3/UL (ref 4.3–11)

## 2018-08-03 RX ADMIN — ASPIRIN SCH MG: 81 TABLET ORAL at 08:28

## 2018-08-03 RX ADMIN — POTASSIUM CHLORIDE SCH MEQ: 1500 TABLET, EXTENDED RELEASE ORAL at 06:11

## 2018-08-03 RX ADMIN — DOCUSATE SODIUM AND SENNOSIDES SCH EA: 8.6; 5 TABLET, FILM COATED ORAL at 20:18

## 2018-08-03 RX ADMIN — FUROSEMIDE PRN MG: 20 TABLET ORAL at 01:55

## 2018-08-03 RX ADMIN — FLUTICASONE PROPIONATE SCH SPRAY: 50 SPRAY, METERED NASAL at 08:31

## 2018-08-03 RX ADMIN — Medication SCH UNIT: at 08:28

## 2018-08-03 RX ADMIN — DOCUSATE SODIUM AND SENNOSIDES SCH EA: 8.6; 5 TABLET, FILM COATED ORAL at 08:30

## 2018-08-03 RX ADMIN — POTASSIUM CHLORIDE SCH MLS/HR: 200 INJECTION, SOLUTION INTRAVENOUS at 06:11

## 2018-08-03 RX ADMIN — LACTULOSE SCH GM: 20 SOLUTION ORAL at 08:30

## 2018-08-03 RX ADMIN — DOCUSATE SODIUM SCH MG: 100 CAPSULE ORAL at 20:18

## 2018-08-03 RX ADMIN — DILTIAZEM HYDROCHLORIDE SCH MLS/HR: 5 INJECTION INTRAVENOUS at 09:07

## 2018-08-03 RX ADMIN — LACTULOSE SCH GM: 20 SOLUTION ORAL at 20:18

## 2018-08-03 RX ADMIN — APIXABAN SCH MG: 5 TABLET, FILM COATED ORAL at 08:29

## 2018-08-03 RX ADMIN — SOTALOL HYDROCHLORIDE SCH MG: 80 TABLET ORAL at 08:28

## 2018-08-03 RX ADMIN — DILTIAZEM HYDROCHLORIDE SCH MG: 120 CAPSULE, COATED, EXTENDED RELEASE ORAL at 20:18

## 2018-08-03 RX ADMIN — ALBUTEROL SULFATE PRN MG: 2.5 SOLUTION RESPIRATORY (INHALATION) at 02:05

## 2018-08-03 RX ADMIN — APIXABAN SCH MG: 5 TABLET, FILM COATED ORAL at 20:18

## 2018-08-03 RX ADMIN — DILTIAZEM HYDROCHLORIDE SCH MG: 120 CAPSULE, COATED, EXTENDED RELEASE ORAL at 09:07

## 2018-08-03 RX ADMIN — DIGOXIN SCH MG: 250 TABLET ORAL at 08:29

## 2018-08-03 RX ADMIN — LEVOTHYROXINE SODIUM SCH MCG: 0.11 TABLET ORAL at 20:18

## 2018-08-03 RX ADMIN — LORATADINE SCH MG: 10 TABLET ORAL at 08:29

## 2018-08-03 RX ADMIN — ATORVASTATIN CALCIUM SCH MG: 10 TABLET, FILM COATED ORAL at 20:18

## 2018-08-03 RX ADMIN — METOPROLOL SUCCINATE SCH MG: 50 TABLET, EXTENDED RELEASE ORAL at 08:28

## 2018-08-03 RX ADMIN — DOCUSATE SODIUM SCH MG: 100 CAPSULE ORAL at 08:28

## 2018-08-03 RX ADMIN — FLUTICASONE PROPIONATE AND SALMETEROL XINAFOATE SCH PUFF: 115; 21 AEROSOL, METERED RESPIRATORY (INHALATION) at 19:31

## 2018-08-03 RX ADMIN — MONTELUKAST SCH MG: 10 TABLET, FILM COATED ORAL at 20:18

## 2018-08-03 RX ADMIN — DILTIAZEM HYDROCHLORIDE SCH MG: 120 CAPSULE, COATED, EXTENDED RELEASE ORAL at 08:29

## 2018-08-03 RX ADMIN — UMECLIDINIUM SCH INH: 62.5 AEROSOL, POWDER ORAL at 06:52

## 2018-08-03 RX ADMIN — FAMOTIDINE SCH MG: 20 TABLET, FILM COATED ORAL at 20:18

## 2018-08-03 RX ADMIN — FLUTICASONE PROPIONATE AND SALMETEROL XINAFOATE SCH PUFF: 115; 21 AEROSOL, METERED RESPIRATORY (INHALATION) at 06:52

## 2018-08-03 RX ADMIN — OMEGA-3 FATTY ACIDS CAP 1000 MG SCH MG: 1000 CAP at 08:28

## 2018-08-03 RX ADMIN — MAGNESIUM SULFATE IN DEXTROSE SCH MLS/HR: 10 INJECTION, SOLUTION INTRAVENOUS at 06:11

## 2018-08-03 NOTE — DIAGNOSTIC IMAGING REPORT
INDICATION: 

Dyspnea and cough.



TIME OF EXAMINATION:    

3:31 PM.



COMPARISON:       

07/31/2018.



FINDINGS:

There are changes of median sternotomy. A cardiac pacemaker is in

place. There are small bilateral pleural effusions. There may be

some minimal infiltrate or atelectasis in the right base, similar

to the prior exam. The upper lung fields are clear. No

pneumothorax is seen.



IMPRESSION: 

Stable bilateral pleural effusions and right basilar

infiltrate/atelectasis when compared with the exam of 3 days

earlier.



Dictated by: 



  Dictated on workstation # [id number]

## 2018-08-03 NOTE — PROGRESS NOTE-HOSPITALIST
Subjective


HPI/CC On Admission


Date Seen by Provider:  Aug 3, 2018


Time Seen by Provider:  09:30


CC: Fast heart rate





HPI: This is a 78yoWM clinic patient of Dr Hall and Dr Hammonds who presented 

to the ER w/palpitations and found to have AF w/RVR. He recently had a 

pacemaker placement 1 week ago and had done well since that time until this 

issue arose. Patient denies pain at this current time. Pt is requiring IV 

Cardizem and currently 117 heart rate. Pacemaker will be interrogated today. Dr Hammonds gave Lasix IV for volume overload. I reviewed home meds and will restart 

all if possible. He does report constipation so will initiate meds for that.


Subjective/Events-last exam


Transesophageal echocardiogram revealed no thrombus


Mitral valve is very leaky and regurgitates a great deal likely a factor in the 

resistant a fibrillation with rapid ventricular response


Bowels are moving well


They discontinue metoprolol and started on Cardizem twice daily and added 

sotalol


Has difficulty with shortness of breath when he gets up at 2 o'clock in the 

morning and urinates but then it resolves





Review of Systems


Cardiovascular:  Palpitations





Objective


Exam


Vital Signs





Vital Signs








  Date Time  Temp Pulse Resp B/P (MAP) Pulse Ox O2 Delivery O2 Flow Rate FiO2


 


8/3/18 11:37 99.4     Nasal Cannula 3.00 


 


8/3/18 11:00  109 25 113/87 (96) 97   





Capillary Refill : Less Than 3 Seconds


General Appearance:  No Apparent Distress, WD/WN, Chronically ill


Respiratory:  Lungs Clear, Normal Breath Sounds


Cardiovascular:  Irregularly Irregular, Tachycardia





Results/Procedures


Lab


Laboratory Tests


8/3/18 03:21








Patient resulted labs reviewed.





Assessment/Plan


Assessment and Plan


Assess & Plan/Chief Complaint


Atrial fibrillation with rapid ventricular response resistant to Cardizem drip 

and digoxin and other medical treatments may need cardioverted





Plan:


Appreciate cardiology management


Monitor closely


Maintain Cardizem PO and Sotalol





Diagnosis/Problems


Diagnosis/Problems





(1) Atrial fibrillation with rapid ventricular response


Status:  Acute


(2) COPD (chronic obstructive pulmonary disease)


Status:  Chronic


Qualifiers:  


   COPD type:  unspecified COPD  Qualified Codes:  J44.9 - Chronic obstructive 

pulmonary disease, unspecified


(3) KOLBY on CPAP


Status:  Chronic


(4) Hypertension


Status:  Chronic


Qualifiers:  


   Hypertension type:  essential hypertension  Qualified Codes:  I10 - 

Essential (primary) hypertension


(5) Hyperlipidemia


Status:  Chronic


Qualifiers:  


   Hyperlipidemia type:  mixed hyperlipidemia  Qualified Codes:  E78.2 - Mixed 

hyperlipidemia


(6) Constipation


Status:  Resolved


Qualifiers:  


   Constipation type:  unspecified constipation type  Qualified Codes:  K59.00 

- Constipation, unspecified


(7) CRI (chronic renal insufficiency)


Status:  Chronic


Qualifiers:  


   Chronic kidney disease stage:  stage 3 (moderate)  Qualified Codes:  N18.3 - 

Chronic kidney disease, stage 3 (moderate)


(8) Anemia


Status:  Chronic


Qualifiers:  


   Anemia type:  unspecified type  Qualified Codes:  D64.9 - Anemia, unspecified


(9) Elevated brain natriuretic peptide (BNP) level


Status:  Acute





Clinical Quality Measures


DVT/VTE Risk/Contraindication:


Risk Factor Score Per Nursin


RFS Level Per Nursing on Admit:  2=Moderate











TISHA ROSE DO Aug 3, 2018 11:00

## 2018-08-03 NOTE — CARDIOLOGY PROGRESS NOTE
Subjective


Date Seen by Provider:  Aug 3, 2018


Time Seen by Provider:  08:53


Subjective/Events-last exam


Patient is feeling better at this time, had another episode of shortness of 

breath last night.  Heart rate is still elevated.  Tolerating sotalol well.


Review of Systems


General:  No Chills, No Night Sweats, No Fatigue, No Malaise, No Appetite, No 

Other


HEENT:  No Head Aches, No Visual Changes, No Eye Pain, No Ear Pain, No Dysphasia

, No Sinus Congestion, No Post Nasal Drip, No Sore Throat, No Other


Pulmonary:  Dyspnea; No Cough, No Pleuritic Chest Pain, No Other


Cardiovascular:  No: Chest Pain, Palpitations, Orthopnea, Paroxysmal Noc. 

Dyspnea, Edema, Lt Headedness, Other





Objective-Cardiology


Exam


Last Set of Vital Signs





Vital Signs








 8/3/18 8/3/18





 03:51 08:00


 


Temp 97.3 


 


Pulse  110


 


Resp  20


 


B/P (MAP)  117/83 (94)


 


Pulse Ox  96


 


O2 Delivery  Nasal Cannula


 


O2 Flow Rate  3.00





Capillary Refill : Less Than 3 Seconds


I&O











Intake and Output 


 


 8/3/18





 00:00


 


Intake Total 1675 ml


 


Output Total 425 ml


 


Balance 1250 ml


 


 


 


Intake Oral 275 ml


 


IV Total 1400 ml


 


Output Urine Total 425 ml


 


# Voids 1


 


# Bowel Movements 1








General:  Alert, Oriented X3, Cooperative, Mild Distress


HEENT:  Atraumatic, PERRLA


Neck:  Supple, No JVD, No Thyromegaly


Lungs:  Clear to Auscultation, Normal Air Movement


Heart:  Normal S1, Normal S2, Other (Atrial fibrillation, systolic murmur at 

the left sternal border)


Abdomen:  Normal Bowel Sounds, Soft, No Tenderness, No Hepatosplenomegaly, No 

Masses


Extremities:  No Clubbing, No Cyanosis, No Edema, Normal Pulses, No Tenderness/

Swelling


Skin:  No Rashes, No Breakdown, No Significant Lesion


Neuro:  Normal Gait, Normal Speech, Strength at 5/5 X4 Ext, Normal Tone, 

Sensation Intact


Psych/Mental Status:  Mental Status NL





Results


Lab


Laboratory Tests


8/3/18 03:21














A/P-Cardiology


Admission Diagnosis


Afib with RVR


CHF


CAD


HTN





Assessment/Plan


Shortness of breath, likely multifactorial,  still in atrial fibrillation, 

dyspnea on exertion, underlying severe COPD using oxygen at home.  Started on 

sotalol, continue current medications and monitor





Atrial fibrillation with RVR, had another episode of atrial fibrillation in May 

2017, Pacemaker interrogation showed Chronic atrial fibrillation at least since 

the implant of pacemaker in mid July, difficult to control, enlarged left 

atrium on CELI with no clot or thrombus, started on sotalol, I will increase 

Cardizem and stop Toprol at this time and monitor her tolerance and response





Anemia, continue to monitor





Coronary artery disease, history of CABG 2 with LIMA to LAD and vein graft 

RCA.  Patient had PTCA and stent placement using 2.25 x 12 mm Atom to the left 

circumflex in 2010.  Cardiac catheterization on 2014 revealed 

patent DARBY to LAD, patent vein graft to right PDA, and patent stent in the 

circumflex artery.  Small vessel disease distally otherwise, nonobstructive 

disease repeat cardiac catheterization was done in 2016 showing 

patent LIMA to LAD, vein graft to the right coronary artery, patent stent in 

the mid circumflex artery with mild in-stent restenosis.  Nonobstructive 

disease.  continue on current medications and monitor. Planning for stress test 

as an outpatient





Frequent PVCs with ventricular bigeminy, ventricular couplets, was admitted 

last months with symptomatic bradycardia and frequent PVC's. S/p PPM 

implantation by Dr. Enamorado





Acute on chronic Congestive heart failure, LV systolic dysfunction with most 

recent EF is 45-50 percent, maintained on ACE-I and beta blocker as outpatient.

  Lasix is on hold due to hypotension, I will monitor closely and restart Lasix 

orally.





Mild to moderate aortic valve stenosis, severe mitral regurgitation with 

enlarged left atrium, status post CELI done on 2018.





Severe COPD, oxygen dependent, having worsening dyspnea, continue to monitor. 





Hypertension, currently borderline hypotensive.  Monitor blood pressure closely





Hyperlipidemia, continue to monitor lipids





History carcinoma of the tongue, squamous cell carcinoma, followed and managed 

by Dr. Redman





Hypothyroidism, followed and managed by primary care physician





Severe right carotid stenosis, mild on the left, last ultrasound was done in 

2016.  Patient has been evaluated by Dr. Ornelas in the past.  CTA done 

2016 revealed stenosis of 40-50 percent bilaterally.  Continues to follow 

with Dr. Ornelas





Clinical Quality Measures


DVT/VTE Risk/Contraindication:


Risk Factor Score Per Nursin


RFS Level Per Nursing on Admit:  2=Moderate











ELIDIA ANTOINE MD Aug 3, 2018 08:57

## 2018-08-04 VITALS — DIASTOLIC BLOOD PRESSURE: 72 MMHG | SYSTOLIC BLOOD PRESSURE: 122 MMHG

## 2018-08-04 VITALS — DIASTOLIC BLOOD PRESSURE: 81 MMHG | SYSTOLIC BLOOD PRESSURE: 128 MMHG

## 2018-08-04 VITALS — DIASTOLIC BLOOD PRESSURE: 73 MMHG | SYSTOLIC BLOOD PRESSURE: 131 MMHG

## 2018-08-04 VITALS — DIASTOLIC BLOOD PRESSURE: 74 MMHG | SYSTOLIC BLOOD PRESSURE: 122 MMHG

## 2018-08-04 VITALS — SYSTOLIC BLOOD PRESSURE: 118 MMHG | DIASTOLIC BLOOD PRESSURE: 86 MMHG

## 2018-08-04 VITALS — SYSTOLIC BLOOD PRESSURE: 132 MMHG | DIASTOLIC BLOOD PRESSURE: 82 MMHG

## 2018-08-04 VITALS — DIASTOLIC BLOOD PRESSURE: 73 MMHG | SYSTOLIC BLOOD PRESSURE: 119 MMHG

## 2018-08-04 VITALS — SYSTOLIC BLOOD PRESSURE: 123 MMHG | DIASTOLIC BLOOD PRESSURE: 78 MMHG

## 2018-08-04 VITALS — SYSTOLIC BLOOD PRESSURE: 136 MMHG | DIASTOLIC BLOOD PRESSURE: 74 MMHG

## 2018-08-04 VITALS — DIASTOLIC BLOOD PRESSURE: 59 MMHG | SYSTOLIC BLOOD PRESSURE: 131 MMHG

## 2018-08-04 VITALS — SYSTOLIC BLOOD PRESSURE: 119 MMHG | DIASTOLIC BLOOD PRESSURE: 77 MMHG

## 2018-08-04 VITALS — SYSTOLIC BLOOD PRESSURE: 108 MMHG | DIASTOLIC BLOOD PRESSURE: 78 MMHG

## 2018-08-04 VITALS — DIASTOLIC BLOOD PRESSURE: 96 MMHG | SYSTOLIC BLOOD PRESSURE: 132 MMHG

## 2018-08-04 VITALS — SYSTOLIC BLOOD PRESSURE: 136 MMHG | DIASTOLIC BLOOD PRESSURE: 95 MMHG

## 2018-08-04 VITALS — SYSTOLIC BLOOD PRESSURE: 141 MMHG | DIASTOLIC BLOOD PRESSURE: 100 MMHG

## 2018-08-04 VITALS — DIASTOLIC BLOOD PRESSURE: 73 MMHG | SYSTOLIC BLOOD PRESSURE: 107 MMHG

## 2018-08-04 VITALS — SYSTOLIC BLOOD PRESSURE: 131 MMHG | DIASTOLIC BLOOD PRESSURE: 83 MMHG

## 2018-08-04 VITALS — DIASTOLIC BLOOD PRESSURE: 73 MMHG | SYSTOLIC BLOOD PRESSURE: 135 MMHG

## 2018-08-04 VITALS — DIASTOLIC BLOOD PRESSURE: 83 MMHG | SYSTOLIC BLOOD PRESSURE: 131 MMHG

## 2018-08-04 VITALS — DIASTOLIC BLOOD PRESSURE: 61 MMHG | SYSTOLIC BLOOD PRESSURE: 103 MMHG

## 2018-08-04 VITALS — DIASTOLIC BLOOD PRESSURE: 84 MMHG | SYSTOLIC BLOOD PRESSURE: 125 MMHG

## 2018-08-04 VITALS — DIASTOLIC BLOOD PRESSURE: 62 MMHG | SYSTOLIC BLOOD PRESSURE: 122 MMHG

## 2018-08-04 VITALS — SYSTOLIC BLOOD PRESSURE: 129 MMHG | DIASTOLIC BLOOD PRESSURE: 98 MMHG

## 2018-08-04 VITALS — DIASTOLIC BLOOD PRESSURE: 64 MMHG | SYSTOLIC BLOOD PRESSURE: 103 MMHG

## 2018-08-04 LAB
ALBUMIN SERPL-MCNC: 3.9 GM/DL (ref 3.2–4.5)
ALP SERPL-CCNC: 69 U/L (ref 40–136)
ALT SERPL-CCNC: 17 U/L (ref 0–55)
BASOPHILS # BLD AUTO: 0.1 10^3/UL (ref 0–0.1)
BASOPHILS NFR BLD AUTO: 1 % (ref 0–10)
BILIRUB SERPL-MCNC: 2.3 MG/DL (ref 0.1–1)
BUN/CREAT SERPL: 12
CALCIUM SERPL-MCNC: 9.4 MG/DL (ref 8.5–10.1)
CHLORIDE SERPL-SCNC: 105 MMOL/L (ref 98–107)
CO2 SERPL-SCNC: 27 MMOL/L (ref 21–32)
CREAT SERPL-MCNC: 1.14 MG/DL (ref 0.6–1.3)
DIGOXIN SERPL-MCNC: 0.82 NG/ML (ref 0.8–2)
EOSINOPHIL # BLD AUTO: 0.5 10^3/UL (ref 0–0.3)
EOSINOPHIL NFR BLD AUTO: 8 % (ref 0–10)
ERYTHROCYTE [DISTWIDTH] IN BLOOD BY AUTOMATED COUNT: 14.9 % (ref 10–14.5)
GFR SERPLBLD BASED ON 1.73 SQ M-ARVRAT: > 60 ML/MIN
GLUCOSE SERPL-MCNC: 103 MG/DL (ref 70–105)
HCT VFR BLD CALC: 36 % (ref 40–54)
HGB BLD-MCNC: 11.6 G/DL (ref 13.3–17.7)
LYMPHOCYTES # BLD AUTO: 0.7 X 10^3 (ref 1–4)
LYMPHOCYTES NFR BLD AUTO: 12 % (ref 12–44)
MAGNESIUM SERPL-MCNC: 2.2 MG/DL (ref 1.8–2.4)
MANUAL DIFFERENTIAL PERFORMED BLD QL: NO
MCH RBC QN AUTO: 28 PG (ref 25–34)
MCHC RBC AUTO-ENTMCNC: 32 G/DL (ref 32–36)
MCV RBC AUTO: 88 FL (ref 80–99)
MONOCYTES # BLD AUTO: 0.6 X 10^3 (ref 0–1)
MONOCYTES NFR BLD AUTO: 10 % (ref 0–12)
NEUTROPHILS # BLD AUTO: 4 X 10^3 (ref 1.8–7.8)
NEUTROPHILS NFR BLD AUTO: 68 % (ref 42–75)
PHOSPHATE SERPL-MCNC: 3.1 MG/DL (ref 2.3–4.7)
PLATELET # BLD: 209 10^3/UL (ref 130–400)
PMV BLD AUTO: 11.1 FL (ref 7.4–10.4)
POTASSIUM SERPL-SCNC: 3.8 MMOL/L (ref 3.6–5)
PROT SERPL-MCNC: 5.9 GM/DL (ref 6.4–8.2)
RBC # BLD AUTO: 4.09 10^6/UL (ref 4.35–5.85)
SODIUM SERPL-SCNC: 140 MMOL/L (ref 135–145)
WBC # BLD AUTO: 5.9 10^3/UL (ref 4.3–11)

## 2018-08-04 RX ADMIN — DILTIAZEM HYDROCHLORIDE SCH MG: 120 CAPSULE, COATED, EXTENDED RELEASE ORAL at 08:52

## 2018-08-04 RX ADMIN — SOTALOL HYDROCHLORIDE SCH MG: 80 TABLET ORAL at 09:22

## 2018-08-04 RX ADMIN — MAGNESIUM SULFATE IN DEXTROSE SCH MLS/HR: 10 INJECTION, SOLUTION INTRAVENOUS at 05:12

## 2018-08-04 RX ADMIN — DILTIAZEM HYDROCHLORIDE SCH MG: 120 CAPSULE, COATED, EXTENDED RELEASE ORAL at 20:29

## 2018-08-04 RX ADMIN — UMECLIDINIUM SCH INH: 62.5 AEROSOL, POWDER ORAL at 07:32

## 2018-08-04 RX ADMIN — DOCUSATE SODIUM AND SENNOSIDES SCH EA: 8.6; 5 TABLET, FILM COATED ORAL at 20:29

## 2018-08-04 RX ADMIN — LACTULOSE SCH GM: 20 SOLUTION ORAL at 20:28

## 2018-08-04 RX ADMIN — FLUTICASONE PROPIONATE SCH SPRAY: 50 SPRAY, METERED NASAL at 08:52

## 2018-08-04 RX ADMIN — ASPIRIN SCH MG: 81 TABLET ORAL at 08:52

## 2018-08-04 RX ADMIN — DOCUSATE SODIUM SCH MG: 100 CAPSULE ORAL at 20:29

## 2018-08-04 RX ADMIN — Medication SCH UNIT: at 08:52

## 2018-08-04 RX ADMIN — POTASSIUM CHLORIDE SCH MLS/HR: 200 INJECTION, SOLUTION INTRAVENOUS at 05:11

## 2018-08-04 RX ADMIN — DIGOXIN SCH MG: 250 TABLET ORAL at 08:51

## 2018-08-04 RX ADMIN — LEVOTHYROXINE SODIUM SCH MCG: 0.11 TABLET ORAL at 20:29

## 2018-08-04 RX ADMIN — MONTELUKAST SCH MG: 10 TABLET, FILM COATED ORAL at 20:29

## 2018-08-04 RX ADMIN — ATORVASTATIN CALCIUM SCH MG: 10 TABLET, FILM COATED ORAL at 20:29

## 2018-08-04 RX ADMIN — DOCUSATE SODIUM AND SENNOSIDES SCH EA: 8.6; 5 TABLET, FILM COATED ORAL at 08:47

## 2018-08-04 RX ADMIN — FAMOTIDINE SCH MG: 20 TABLET, FILM COATED ORAL at 20:28

## 2018-08-04 RX ADMIN — APIXABAN SCH MG: 5 TABLET, FILM COATED ORAL at 08:51

## 2018-08-04 RX ADMIN — OMEGA-3 FATTY ACIDS CAP 1000 MG SCH MG: 1000 CAP at 08:52

## 2018-08-04 RX ADMIN — LORATADINE SCH MG: 10 TABLET ORAL at 08:51

## 2018-08-04 RX ADMIN — APIXABAN SCH MG: 5 TABLET, FILM COATED ORAL at 20:29

## 2018-08-04 RX ADMIN — DOCUSATE SODIUM SCH MG: 100 CAPSULE ORAL at 08:47

## 2018-08-04 RX ADMIN — FLUTICASONE PROPIONATE AND SALMETEROL XINAFOATE SCH PUFF: 115; 21 AEROSOL, METERED RESPIRATORY (INHALATION) at 07:32

## 2018-08-04 RX ADMIN — FLUTICASONE PROPIONATE AND SALMETEROL XINAFOATE SCH PUFF: 115; 21 AEROSOL, METERED RESPIRATORY (INHALATION) at 19:05

## 2018-08-04 RX ADMIN — TEMAZEPAM PRN MG: 15 CAPSULE ORAL at 20:28

## 2018-08-04 RX ADMIN — LACTULOSE SCH GM: 20 SOLUTION ORAL at 08:47

## 2018-08-04 RX ADMIN — POTASSIUM CHLORIDE SCH MEQ: 1500 TABLET, EXTENDED RELEASE ORAL at 05:12

## 2018-08-04 NOTE — CARDIOLOGY PROGRESS NOTE
Subjective


Date Seen by Provider:  Aug 4, 2018


Time Seen by Provider:  09:19


Subjective/Events-last exam


Patient is sitting in bed, feeling better.  Denied any chest.  Heart rate is 

slightly better but still tachycardic, tolerating the higher dose of Cardizem.


Review of Systems


General:  No Chills, No Night Sweats, No Fatigue, No Malaise, No Appetite, No 

Other


HEENT:  No Head Aches, No Visual Changes, No Eye Pain, No Ear Pain, No Dysphasia

, No Sinus Congestion, No Post Nasal Drip, No Sore Throat, No Other


Pulmonary:  No Dyspnea, No Cough, No Pleuritic Chest Pain, No Other


Cardiovascular:  No: Chest Pain, Palpitations, Orthopnea, Paroxysmal Noc. 

Dyspnea, Edema, Lt Headedness, Other





Objective-Cardiology


Exam


Last Set of Vital Signs





Vital Signs








 18





 08:00


 


Temp 99.0


 


Pulse 123


 


Resp 23


 


B/P (MAP) 131/83 (99)


 


Pulse Ox 97


 


O2 Delivery Nasal Cannula


 


O2 Flow Rate 2.00





Capillary Refill : Less Than 3 Seconds


I&O











Intake and Output 


 


 18





 00:00


 


Intake Total 647 ml


 


Output Total 1375 ml


 


Balance -728 ml


 


 


 


Intake Oral 647 ml


 


Output Urine Total 1375 ml


 


# Voids 4


 


# Bowel Movements 3








General:  Alert, Oriented X3, Cooperative, Mild Distress


HEENT:  Atraumatic, PERRLA


Neck:  Supple, No JVD, No Thyromegaly


Lungs:  Clear to Auscultation, Normal Air Movement


Heart:  Normal S1, Normal S2, Other (Atrial fibrillation, systolic murmur at 

the left sternal border)


Abdomen:  Normal Bowel Sounds, Soft, No Tenderness, No Hepatosplenomegaly, No 

Masses


Extremities:  No Clubbing, No Cyanosis, No Edema, Normal Pulses, No Tenderness/

Swelling


Skin:  No Rashes, No Breakdown, No Significant Lesion


Neuro:  Normal Gait, Normal Speech, Strength at 5/5 X4 Ext, Normal Tone, 

Sensation Intact


Psych/Mental Status:  Mental Status NL





Results


Lab


Laboratory Tests


18 03:05














A/P-Cardiology


Admission Diagnosis


Afib with RVR


CHF


CAD


HTN





Assessment/Plan


Shortness of breath, likely multifactorial,  still in atrial fibrillation, 

dyspnea on exertion, underlying severe COPD using oxygen at home.  Started on 

sotalol, I will give one dose of Lasix today.  Continue to monitor





Atrial fibrillation with RVR, had another episode of atrial fibrillation in May 

2017, Pacemaker interrogation showed Chronic atrial fibrillation at least since 

the implant of pacemaker in , difficult to control, enlarged left 

atrium on CELI with no clot or thrombus, started on sotalol, continue to monitor 

QT interval, tolerating the higher dose of Cardizem.





Anemia, continue to monitor





Coronary artery disease, history of CABG 2 with LIMA to LAD and vein graft 

RCA.  Patient had PTCA and stent placement using 2.25 x 12 mm Atom to the left 

circumflex in 2010.  Cardiac catheterization on 2014 revealed 

patent DARBY to LAD, patent vein graft to right PDA, and patent stent in the 

circumflex artery.  Small vessel disease distally otherwise, nonobstructive 

disease repeat cardiac catheterization was done in 2016 showing 

patent LIMA to LAD, vein graft to the right coronary artery, patent stent in 

the mid circumflex artery with mild in-stent restenosis.  Nonobstructive 

disease.  continue on current medications and monitor. Planning for stress test 

as an outpatient





Frequent PVCs with ventricular bigeminy, ventricular couplets, was admitted 

last months with symptomatic bradycardia and frequent PVC's. S/p PPM 

implantation by Dr. Enamorado





Acute on chronic Congestive heart failure, LV systolic dysfunction with most 

recent EF is 45-50 percent, maintained on ACE-I and beta blocker as outpatient.

  Lasix is on hold due to hypotension, I will monitor closely and restart Lasix 

orally.





Mild to moderate aortic valve stenosis, severe mitral regurgitation with 

enlarged left atrium, status post CELI done on 2018.





Severe COPD, oxygen dependent, having worsening dyspnea, continue to monitor. 





Hypertension, currently borderline hypotensive.  Monitor blood pressure closely





Hyperlipidemia, continue to monitor lipids





History carcinoma of the tongue, squamous cell carcinoma, followed and managed 

by Dr. Redman





Hypothyroidism, followed and managed by primary care physician





Severe right carotid stenosis, mild on the left, last ultrasound was done in 

2016.  Patient has been evaluated by Dr. Ornelas in the past.  CTA done 

2016 revealed stenosis of 40-50 percent bilaterally.  Continues to follow 

with Dr. Ornelas





Clinical Quality Measures


DVT/VTE Risk/Contraindication:


Risk Factor Score Per Nursin


RFS Level Per Nursing on Admit:  2=Moderate











ELIDIA ANTOINE MD Aug 4, 2018 09:20

## 2018-08-04 NOTE — PROGRESS NOTE-HOSPITALIST
Progress Note


Progress Notes/Assess & Plan


Date Seen


8/4/18


Time Seen by Provider:  11:49


Assessment & Plan


The patient is a 78-year-old white male known to me from a recent previous 

admission.  He suffers from COPD and also from atrial fibrillation.  He 

presented to the emergency room at this time with shortness of breath and was 

found to have atrial fibrillation with rapid ventricular response.  He has 

subsequently been relatively controlled but remains in atrial fibrillation.  

The rate is  at this time.  On the previous admission in addition to his 

tachycardia arrhythmia he was found that after admission to have a bradycardia 

with the rate as low as the 30s.  He subsequently had a pacemaker placed to 

control the bradycardia issue and to allow for medications to control 

tachycardia.





He is sitting up in the bedside chair.  He has no complaints.  Lungs are clear 

to auscultation.  CV is slightly irregular.  Extremities show no pedal edema.





Impression: Tachybradycardia syndrome, post implantation of permanent 

pacemaker.  2.atrial fibrillation with reasonably controlled rate at this time.

  3.COPD.





Plan: Continue present medications.  I am told Dr. Hammonds is planning 

cardioversion, perhaps on Monday











MORE VALLE MD Aug 4, 2018 11:52

## 2018-08-05 VITALS — SYSTOLIC BLOOD PRESSURE: 116 MMHG | DIASTOLIC BLOOD PRESSURE: 70 MMHG

## 2018-08-05 VITALS — SYSTOLIC BLOOD PRESSURE: 129 MMHG | DIASTOLIC BLOOD PRESSURE: 77 MMHG

## 2018-08-05 VITALS — SYSTOLIC BLOOD PRESSURE: 109 MMHG | DIASTOLIC BLOOD PRESSURE: 84 MMHG

## 2018-08-05 VITALS — SYSTOLIC BLOOD PRESSURE: 124 MMHG | DIASTOLIC BLOOD PRESSURE: 78 MMHG

## 2018-08-05 VITALS — SYSTOLIC BLOOD PRESSURE: 143 MMHG | DIASTOLIC BLOOD PRESSURE: 81 MMHG

## 2018-08-05 VITALS — SYSTOLIC BLOOD PRESSURE: 130 MMHG | DIASTOLIC BLOOD PRESSURE: 74 MMHG

## 2018-08-05 VITALS — SYSTOLIC BLOOD PRESSURE: 129 MMHG | DIASTOLIC BLOOD PRESSURE: 76 MMHG

## 2018-08-05 VITALS — DIASTOLIC BLOOD PRESSURE: 79 MMHG | SYSTOLIC BLOOD PRESSURE: 130 MMHG

## 2018-08-05 VITALS — SYSTOLIC BLOOD PRESSURE: 130 MMHG | DIASTOLIC BLOOD PRESSURE: 93 MMHG

## 2018-08-05 VITALS — DIASTOLIC BLOOD PRESSURE: 76 MMHG | SYSTOLIC BLOOD PRESSURE: 118 MMHG

## 2018-08-05 VITALS — DIASTOLIC BLOOD PRESSURE: 85 MMHG | SYSTOLIC BLOOD PRESSURE: 132 MMHG

## 2018-08-05 VITALS — SYSTOLIC BLOOD PRESSURE: 121 MMHG | DIASTOLIC BLOOD PRESSURE: 76 MMHG

## 2018-08-05 VITALS — DIASTOLIC BLOOD PRESSURE: 98 MMHG | SYSTOLIC BLOOD PRESSURE: 116 MMHG

## 2018-08-05 VITALS — DIASTOLIC BLOOD PRESSURE: 92 MMHG | SYSTOLIC BLOOD PRESSURE: 116 MMHG

## 2018-08-05 VITALS — DIASTOLIC BLOOD PRESSURE: 78 MMHG | SYSTOLIC BLOOD PRESSURE: 128 MMHG

## 2018-08-05 VITALS — SYSTOLIC BLOOD PRESSURE: 126 MMHG | DIASTOLIC BLOOD PRESSURE: 86 MMHG

## 2018-08-05 VITALS — DIASTOLIC BLOOD PRESSURE: 91 MMHG | SYSTOLIC BLOOD PRESSURE: 112 MMHG

## 2018-08-05 VITALS — SYSTOLIC BLOOD PRESSURE: 121 MMHG | DIASTOLIC BLOOD PRESSURE: 75 MMHG

## 2018-08-05 VITALS — SYSTOLIC BLOOD PRESSURE: 135 MMHG | DIASTOLIC BLOOD PRESSURE: 90 MMHG

## 2018-08-05 VITALS — DIASTOLIC BLOOD PRESSURE: 95 MMHG | SYSTOLIC BLOOD PRESSURE: 134 MMHG

## 2018-08-05 VITALS — SYSTOLIC BLOOD PRESSURE: 120 MMHG | DIASTOLIC BLOOD PRESSURE: 77 MMHG

## 2018-08-05 VITALS — DIASTOLIC BLOOD PRESSURE: 70 MMHG | SYSTOLIC BLOOD PRESSURE: 130 MMHG

## 2018-08-05 VITALS — DIASTOLIC BLOOD PRESSURE: 64 MMHG | SYSTOLIC BLOOD PRESSURE: 111 MMHG

## 2018-08-05 VITALS — DIASTOLIC BLOOD PRESSURE: 72 MMHG | SYSTOLIC BLOOD PRESSURE: 128 MMHG

## 2018-08-05 LAB
BASOPHILS # BLD AUTO: 0 10^3/UL (ref 0–0.1)
BASOPHILS NFR BLD AUTO: 1 % (ref 0–10)
BUN/CREAT SERPL: 13
CALCIUM SERPL-MCNC: 9.1 MG/DL (ref 8.5–10.1)
CHLORIDE SERPL-SCNC: 104 MMOL/L (ref 98–107)
CO2 SERPL-SCNC: 26 MMOL/L (ref 21–32)
CREAT SERPL-MCNC: 1.09 MG/DL (ref 0.6–1.3)
EOSINOPHIL # BLD AUTO: 0.4 10^3/UL (ref 0–0.3)
EOSINOPHIL NFR BLD AUTO: 7 % (ref 0–10)
ERYTHROCYTE [DISTWIDTH] IN BLOOD BY AUTOMATED COUNT: 14.7 % (ref 10–14.5)
GFR SERPLBLD BASED ON 1.73 SQ M-ARVRAT: > 60 ML/MIN
GLUCOSE SERPL-MCNC: 109 MG/DL (ref 70–105)
HCT VFR BLD CALC: 36 % (ref 40–54)
HGB BLD-MCNC: 11.9 G/DL (ref 13.3–17.7)
LYMPHOCYTES # BLD AUTO: 0.7 X 10^3 (ref 1–4)
LYMPHOCYTES NFR BLD AUTO: 12 % (ref 12–44)
MAGNESIUM SERPL-MCNC: 2.3 MG/DL (ref 1.8–2.4)
MANUAL DIFFERENTIAL PERFORMED BLD QL: NO
MCH RBC QN AUTO: 29 PG (ref 25–34)
MCHC RBC AUTO-ENTMCNC: 33 G/DL (ref 32–36)
MCV RBC AUTO: 88 FL (ref 80–99)
MONOCYTES # BLD AUTO: 0.7 X 10^3 (ref 0–1)
MONOCYTES NFR BLD AUTO: 12 % (ref 0–12)
NEUTROPHILS # BLD AUTO: 3.8 X 10^3 (ref 1.8–7.8)
NEUTROPHILS NFR BLD AUTO: 68 % (ref 42–75)
PHOSPHATE SERPL-MCNC: 3.6 MG/DL (ref 2.3–4.7)
PLATELET # BLD: 211 10^3/UL (ref 130–400)
PMV BLD AUTO: 11.5 FL (ref 7.4–10.4)
POTASSIUM SERPL-SCNC: 3.5 MMOL/L (ref 3.6–5)
RBC # BLD AUTO: 4.05 10^6/UL (ref 4.35–5.85)
SODIUM SERPL-SCNC: 142 MMOL/L (ref 135–145)
WBC # BLD AUTO: 5.5 10^3/UL (ref 4.3–11)

## 2018-08-05 RX ADMIN — APIXABAN SCH MG: 5 TABLET, FILM COATED ORAL at 20:41

## 2018-08-05 RX ADMIN — FLUTICASONE PROPIONATE AND SALMETEROL XINAFOATE SCH PUFF: 115; 21 AEROSOL, METERED RESPIRATORY (INHALATION) at 18:57

## 2018-08-05 RX ADMIN — DOCUSATE SODIUM AND SENNOSIDES SCH EA: 8.6; 5 TABLET, FILM COATED ORAL at 08:15

## 2018-08-05 RX ADMIN — APIXABAN SCH MG: 5 TABLET, FILM COATED ORAL at 08:14

## 2018-08-05 RX ADMIN — DOCUSATE SODIUM AND SENNOSIDES SCH EA: 8.6; 5 TABLET, FILM COATED ORAL at 21:00

## 2018-08-05 RX ADMIN — FLUTICASONE PROPIONATE AND SALMETEROL XINAFOATE SCH PUFF: 115; 21 AEROSOL, METERED RESPIRATORY (INHALATION) at 07:48

## 2018-08-05 RX ADMIN — DOCUSATE SODIUM SCH MG: 100 CAPSULE ORAL at 20:41

## 2018-08-05 RX ADMIN — LACTULOSE SCH GM: 20 SOLUTION ORAL at 08:14

## 2018-08-05 RX ADMIN — LACTULOSE SCH GM: 20 SOLUTION ORAL at 21:00

## 2018-08-05 RX ADMIN — SOTALOL HYDROCHLORIDE SCH MG: 80 TABLET ORAL at 08:14

## 2018-08-05 RX ADMIN — OMEGA-3 FATTY ACIDS CAP 1000 MG SCH MG: 1000 CAP at 08:14

## 2018-08-05 RX ADMIN — ASPIRIN SCH MG: 81 TABLET ORAL at 08:14

## 2018-08-05 RX ADMIN — DILTIAZEM HYDROCHLORIDE SCH MG: 120 CAPSULE, COATED, EXTENDED RELEASE ORAL at 20:41

## 2018-08-05 RX ADMIN — FAMOTIDINE SCH MG: 20 TABLET, FILM COATED ORAL at 20:41

## 2018-08-05 RX ADMIN — DILTIAZEM HYDROCHLORIDE SCH MG: 120 CAPSULE, COATED, EXTENDED RELEASE ORAL at 08:14

## 2018-08-05 RX ADMIN — LORATADINE SCH MG: 10 TABLET ORAL at 08:14

## 2018-08-05 RX ADMIN — SOTALOL HYDROCHLORIDE SCH MG: 80 TABLET ORAL at 20:40

## 2018-08-05 RX ADMIN — MONTELUKAST SCH MG: 10 TABLET, FILM COATED ORAL at 20:41

## 2018-08-05 RX ADMIN — Medication SCH UNIT: at 08:14

## 2018-08-05 RX ADMIN — FLUTICASONE PROPIONATE SCH SPRAY: 50 SPRAY, METERED NASAL at 08:14

## 2018-08-05 RX ADMIN — POTASSIUM CHLORIDE SCH MEQ: 1500 TABLET, EXTENDED RELEASE ORAL at 06:35

## 2018-08-05 RX ADMIN — DOCUSATE SODIUM SCH MG: 100 CAPSULE ORAL at 08:15

## 2018-08-05 RX ADMIN — LEVOTHYROXINE SODIUM SCH MCG: 0.11 TABLET ORAL at 20:41

## 2018-08-05 RX ADMIN — MAGNESIUM SULFATE IN DEXTROSE SCH MLS/HR: 10 INJECTION, SOLUTION INTRAVENOUS at 06:35

## 2018-08-05 RX ADMIN — TEMAZEPAM PRN MG: 15 CAPSULE ORAL at 22:57

## 2018-08-05 RX ADMIN — DIGOXIN SCH MG: 250 TABLET ORAL at 08:14

## 2018-08-05 RX ADMIN — UMECLIDINIUM SCH INH: 62.5 AEROSOL, POWDER ORAL at 07:48

## 2018-08-05 RX ADMIN — ATORVASTATIN CALCIUM SCH MG: 10 TABLET, FILM COATED ORAL at 20:41

## 2018-08-05 RX ADMIN — POTASSIUM CHLORIDE SCH MLS/HR: 200 INJECTION, SOLUTION INTRAVENOUS at 06:35

## 2018-08-05 NOTE — PROGRESS NOTE-HOSPITALIST
Progress Note


Progress Notes/Assess & Plan


Date Seen


8/5/18


Time Seen by Provider:  10:57


Assessment & Plan


The patient appears quite comfortable today.  Notably his heart rate has been 

controlled since Dr. Hammonds increased his dose of beta blocker.  He has no real 

complaints today.  The monitor shows an irregular rhythm in the 80s with 

appropriate pacemaker spikes intermittently.





Physical exam: He is sitting in a chair at the bedside.  Lungs are clear to 

auscultation.  CV is somewhat irregular with a controlled rate.  Extremities 

show no pedal edema.





Impression: Atrial fibrillation.  Tachybradycardia syndrome.  3.controlled rate 

at this time.  4.COPD





Plan: Observe for today and look to discharge.











MORE VALLE MD Aug 5, 2018 11:01

## 2018-08-06 VITALS — SYSTOLIC BLOOD PRESSURE: 117 MMHG | DIASTOLIC BLOOD PRESSURE: 71 MMHG

## 2018-08-06 VITALS — SYSTOLIC BLOOD PRESSURE: 139 MMHG | DIASTOLIC BLOOD PRESSURE: 110 MMHG

## 2018-08-06 VITALS — SYSTOLIC BLOOD PRESSURE: 138 MMHG | DIASTOLIC BLOOD PRESSURE: 81 MMHG

## 2018-08-06 VITALS — DIASTOLIC BLOOD PRESSURE: 68 MMHG | SYSTOLIC BLOOD PRESSURE: 125 MMHG

## 2018-08-06 VITALS — SYSTOLIC BLOOD PRESSURE: 125 MMHG | DIASTOLIC BLOOD PRESSURE: 90 MMHG

## 2018-08-06 VITALS — DIASTOLIC BLOOD PRESSURE: 80 MMHG | SYSTOLIC BLOOD PRESSURE: 132 MMHG

## 2018-08-06 VITALS — DIASTOLIC BLOOD PRESSURE: 75 MMHG | SYSTOLIC BLOOD PRESSURE: 112 MMHG

## 2018-08-06 VITALS — SYSTOLIC BLOOD PRESSURE: 120 MMHG | DIASTOLIC BLOOD PRESSURE: 85 MMHG

## 2018-08-06 VITALS — DIASTOLIC BLOOD PRESSURE: 57 MMHG | SYSTOLIC BLOOD PRESSURE: 119 MMHG

## 2018-08-06 VITALS — DIASTOLIC BLOOD PRESSURE: 84 MMHG | SYSTOLIC BLOOD PRESSURE: 142 MMHG

## 2018-08-06 VITALS — SYSTOLIC BLOOD PRESSURE: 128 MMHG | DIASTOLIC BLOOD PRESSURE: 84 MMHG

## 2018-08-06 VITALS — SYSTOLIC BLOOD PRESSURE: 117 MMHG | DIASTOLIC BLOOD PRESSURE: 94 MMHG

## 2018-08-06 VITALS — DIASTOLIC BLOOD PRESSURE: 89 MMHG | SYSTOLIC BLOOD PRESSURE: 136 MMHG

## 2018-08-06 VITALS — DIASTOLIC BLOOD PRESSURE: 89 MMHG | SYSTOLIC BLOOD PRESSURE: 120 MMHG

## 2018-08-06 VITALS — DIASTOLIC BLOOD PRESSURE: 80 MMHG | SYSTOLIC BLOOD PRESSURE: 127 MMHG

## 2018-08-06 VITALS — DIASTOLIC BLOOD PRESSURE: 88 MMHG | SYSTOLIC BLOOD PRESSURE: 127 MMHG

## 2018-08-06 VITALS — SYSTOLIC BLOOD PRESSURE: 141 MMHG | DIASTOLIC BLOOD PRESSURE: 95 MMHG

## 2018-08-06 VITALS — DIASTOLIC BLOOD PRESSURE: 61 MMHG | SYSTOLIC BLOOD PRESSURE: 117 MMHG

## 2018-08-06 VITALS — DIASTOLIC BLOOD PRESSURE: 93 MMHG | SYSTOLIC BLOOD PRESSURE: 124 MMHG

## 2018-08-06 VITALS — DIASTOLIC BLOOD PRESSURE: 75 MMHG | SYSTOLIC BLOOD PRESSURE: 122 MMHG

## 2018-08-06 VITALS — SYSTOLIC BLOOD PRESSURE: 130 MMHG | DIASTOLIC BLOOD PRESSURE: 80 MMHG

## 2018-08-06 VITALS — DIASTOLIC BLOOD PRESSURE: 88 MMHG | SYSTOLIC BLOOD PRESSURE: 112 MMHG

## 2018-08-06 VITALS — SYSTOLIC BLOOD PRESSURE: 138 MMHG | DIASTOLIC BLOOD PRESSURE: 102 MMHG

## 2018-08-06 VITALS — DIASTOLIC BLOOD PRESSURE: 80 MMHG | SYSTOLIC BLOOD PRESSURE: 135 MMHG

## 2018-08-06 LAB
BASOPHILS # BLD AUTO: 0.1 10^3/UL (ref 0–0.1)
BASOPHILS NFR BLD AUTO: 1 % (ref 0–10)
BUN/CREAT SERPL: 13
CALCIUM SERPL-MCNC: 9.2 MG/DL (ref 8.5–10.1)
CHLORIDE SERPL-SCNC: 105 MMOL/L (ref 98–107)
CO2 SERPL-SCNC: 28 MMOL/L (ref 21–32)
CREAT SERPL-MCNC: 1.1 MG/DL (ref 0.6–1.3)
EOSINOPHIL # BLD AUTO: 0.4 10^3/UL (ref 0–0.3)
EOSINOPHIL NFR BLD AUTO: 7 % (ref 0–10)
ERYTHROCYTE [DISTWIDTH] IN BLOOD BY AUTOMATED COUNT: 14.9 % (ref 10–14.5)
GFR SERPLBLD BASED ON 1.73 SQ M-ARVRAT: > 60 ML/MIN
GLUCOSE SERPL-MCNC: 118 MG/DL (ref 70–105)
HCT VFR BLD CALC: 36 % (ref 40–54)
HGB BLD-MCNC: 11.3 G/DL (ref 13.3–17.7)
LYMPHOCYTES # BLD AUTO: 0.8 X 10^3 (ref 1–4)
LYMPHOCYTES NFR BLD AUTO: 14 % (ref 12–44)
MAGNESIUM SERPL-MCNC: 2.1 MG/DL (ref 1.8–2.4)
MANUAL DIFFERENTIAL PERFORMED BLD QL: NO
MCH RBC QN AUTO: 28 PG (ref 25–34)
MCHC RBC AUTO-ENTMCNC: 32 G/DL (ref 32–36)
MCV RBC AUTO: 88 FL (ref 80–99)
MONOCYTES # BLD AUTO: 0.7 X 10^3 (ref 0–1)
MONOCYTES NFR BLD AUTO: 12 % (ref 0–12)
NEUTROPHILS # BLD AUTO: 3.7 X 10^3 (ref 1.8–7.8)
NEUTROPHILS NFR BLD AUTO: 67 % (ref 42–75)
PHOSPHATE SERPL-MCNC: 3.5 MG/DL (ref 2.3–4.7)
PLATELET # BLD: 222 10^3/UL (ref 130–400)
PMV BLD AUTO: 11.2 FL (ref 7.4–10.4)
POTASSIUM SERPL-SCNC: 3.6 MMOL/L (ref 3.6–5)
RBC # BLD AUTO: 4.04 10^6/UL (ref 4.35–5.85)
SODIUM SERPL-SCNC: 142 MMOL/L (ref 135–145)
WBC # BLD AUTO: 5.6 10^3/UL (ref 4.3–11)

## 2018-08-06 RX ADMIN — FLUTICASONE PROPIONATE SCH SPRAY: 50 SPRAY, METERED NASAL at 09:31

## 2018-08-06 RX ADMIN — DILTIAZEM HYDROCHLORIDE SCH MG: 120 CAPSULE, COATED, EXTENDED RELEASE ORAL at 09:32

## 2018-08-06 RX ADMIN — DOCUSATE SODIUM AND SENNOSIDES SCH EA: 8.6; 5 TABLET, FILM COATED ORAL at 21:22

## 2018-08-06 RX ADMIN — FAMOTIDINE SCH MG: 20 TABLET, FILM COATED ORAL at 21:21

## 2018-08-06 RX ADMIN — POTASSIUM CHLORIDE SCH MLS/HR: 200 INJECTION, SOLUTION INTRAVENOUS at 05:06

## 2018-08-06 RX ADMIN — LORATADINE SCH MG: 10 TABLET ORAL at 09:32

## 2018-08-06 RX ADMIN — ATORVASTATIN CALCIUM SCH MG: 10 TABLET, FILM COATED ORAL at 21:22

## 2018-08-06 RX ADMIN — OMEGA-3 FATTY ACIDS CAP 1000 MG SCH MG: 1000 CAP at 09:31

## 2018-08-06 RX ADMIN — FLUTICASONE PROPIONATE AND SALMETEROL XINAFOATE SCH PUFF: 115; 21 AEROSOL, METERED RESPIRATORY (INHALATION) at 08:17

## 2018-08-06 RX ADMIN — LEVOTHYROXINE SODIUM SCH MCG: 0.11 TABLET ORAL at 21:22

## 2018-08-06 RX ADMIN — SOTALOL HYDROCHLORIDE SCH MG: 80 TABLET ORAL at 21:22

## 2018-08-06 RX ADMIN — DIGOXIN SCH MG: 250 TABLET ORAL at 09:31

## 2018-08-06 RX ADMIN — APIXABAN SCH MG: 5 TABLET, FILM COATED ORAL at 21:21

## 2018-08-06 RX ADMIN — Medication SCH UNIT: at 09:31

## 2018-08-06 RX ADMIN — MAGNESIUM SULFATE IN DEXTROSE SCH MLS/HR: 10 INJECTION, SOLUTION INTRAVENOUS at 05:06

## 2018-08-06 RX ADMIN — SOTALOL HYDROCHLORIDE SCH MG: 80 TABLET ORAL at 09:31

## 2018-08-06 RX ADMIN — LACTULOSE SCH GM: 20 SOLUTION ORAL at 09:31

## 2018-08-06 RX ADMIN — DOCUSATE SODIUM AND SENNOSIDES SCH EA: 8.6; 5 TABLET, FILM COATED ORAL at 09:31

## 2018-08-06 RX ADMIN — DILTIAZEM HYDROCHLORIDE SCH MG: 120 CAPSULE, COATED, EXTENDED RELEASE ORAL at 21:22

## 2018-08-06 RX ADMIN — LACTULOSE SCH GM: 20 SOLUTION ORAL at 21:00

## 2018-08-06 RX ADMIN — APIXABAN SCH MG: 5 TABLET, FILM COATED ORAL at 09:32

## 2018-08-06 RX ADMIN — UMECLIDINIUM SCH INH: 62.5 AEROSOL, POWDER ORAL at 08:21

## 2018-08-06 RX ADMIN — POTASSIUM CHLORIDE SCH MEQ: 1500 TABLET, EXTENDED RELEASE ORAL at 05:06

## 2018-08-06 RX ADMIN — TEMAZEPAM PRN MG: 15 CAPSULE ORAL at 23:11

## 2018-08-06 RX ADMIN — DOCUSATE SODIUM SCH MG: 100 CAPSULE ORAL at 21:21

## 2018-08-06 RX ADMIN — ASPIRIN SCH MG: 81 TABLET ORAL at 09:32

## 2018-08-06 RX ADMIN — DOCUSATE SODIUM SCH MG: 100 CAPSULE ORAL at 09:35

## 2018-08-06 RX ADMIN — MONTELUKAST SCH MG: 10 TABLET, FILM COATED ORAL at 21:21

## 2018-08-06 RX ADMIN — FLUTICASONE PROPIONATE AND SALMETEROL XINAFOATE SCH PUFF: 115; 21 AEROSOL, METERED RESPIRATORY (INHALATION) at 20:38

## 2018-08-06 NOTE — PROGRESS NOTE-HOSPITALIST
Subjective


HPI/CC On Admission


Date Seen by Provider:  Aug 6, 2018


Time Seen by Provider:  09:20


CC: Fast heart rate





HPI: This is a 78yoWM clinic patient of Dr Hall and Dr Hammonds who presented 

to the ER w/palpitations and found to have AF w/RVR. He recently had a 

pacemaker placement 1 week ago and had done well since that time until this 

issue arose. Patient denies pain at this current time. Pt is requiring IV 

Cardizem and currently 117 heart rate. Pacemaker will be interrogated today. Dr Hammonds gave Lasix IV for volume overload. I reviewed home meds and will restart 

all if possible. He does report constipation so will initiate meds for that.


Subjective/Events-last exam


Pt reports feeling well. No complaints. Per RN- no events overnight.





Objective


Exam


Vital Signs





Vital Signs








  Date Time  Temp Pulse Resp B/P (MAP) Pulse Ox O2 Delivery O2 Flow Rate FiO2


 


18 08:00     96 Nasal Cannula 2.00 


 


18 07:00  84 25 123/73 (90)    


 


18 04:16 97.4       





Capillary Refill : Less Than 3 Seconds


General Appearance:  No Apparent Distress, WD/WN


Respiratory:  Lungs Clear, No Respiratory Distress


Cardiovascular:  Regular Rate, Rhythm, No Murmur


Gastrointestinal:  Normal Bowel Sounds, Soft


Extremity:  No Calf Tenderness, No Pedal Edema


Neurologic/Psychiatric:  Alert, Oriented x3





Results/Procedures


Lab


Laboratory Tests


18 04:00








Patient resulted labs reviewed.





Assessment/Plan


Assessment and Plan


Assess & Plan/Chief Complaint


a-fib with RVR- now resolved


   On Digoxin, Sotalol, and Cardizem


   Discussed with Dr Hammonds- appreciate recs


   Will need to monitor for one more day since starting Sotalol for QTC 

prolongation


   Continue Eliqu





Clinical Quality Measures


DVT/VTE Risk/Contraindication:


Risk Factor Score Per Nursin


RFS Level Per Nursing on Admit:  2=Moderate











JAN PARSONS MD Aug 6, 2018 09:24

## 2018-08-06 NOTE — CARDIOLOGY PROGRESS NOTE
Cardiology SOAP Progress Note


Subjective:


No significant cardiac complaints.





Objective:


I&O/Vital Signs











 8/6/18 8/6/18 8/6/18 8/6/18





 04:00 04:00 04:00 05:00


 


Temp 97.6   


 


Pulse  94  79


 


Resp  30  17


 


B/P (MAP)  132/80 (97)  112/88 (96)


 


Pulse Ox   96 


 


O2 Delivery  Nasal Cannula Nasal Cannula Nasal Cannula


 


O2 Flow Rate  2.00 2.00 2.00


 


    





 8/6/18 8/6/18 8/6/18 8/6/18





 06:00 07:00 07:00 08:00


 


Pulse 79 79 79 


 


Resp 21  22 


 


B/P (MAP) 119/57 (77)  120/85 (97) 


 


Pulse Ox    96


 


O2 Delivery Nasal Cannula  Nasal Cannula Nasal Cannula


 


O2 Flow Rate 2.00  2.00 2.00





 8/6/18 8/6/18 8/6/18 8/6/18





 08:00 08:00 08:17 08:21


 


Temp  98.6  


 


Pulse 79   


 


Resp 20   


 


B/P (MAP) 138/81 (100)   


 


Pulse Ox   97 97


 


O2 Delivery Nasal Cannula  Nasal Cannula Nasal Cannula


 


O2 Flow Rate 2.00  2.00 2.00


 


    





 8/6/18 8/6/18 8/6/18 8/6/18





 09:00 10:00 11:00 12:00


 


Pulse 85 93 82 


 


Resp 23 20 21 


 


B/P (MAP) 142/84 (103) 120/89 (99) 130/80 (97) 


 


Pulse Ox    96


 


O2 Delivery Nasal Cannula Nasal Cannula Nasal Cannula Nasal Cannula


 


O2 Flow Rate 2.00 2.00 2.00 2.00





 8/6/18 8/6/18  





 12:00 12:00  


 


Temp  98.4  


 


Pulse 82   


 


Resp 22   


 


B/P (MAP) 124/93 (103)   


 


O2 Delivery Nasal Cannula   


 


O2 Flow Rate 2.00   














 8/6/18





 00:00


 


Intake Total 825 ml


 


Output Total 300 ml


 


Balance 525 ml








Weight (Pounds):  157


Weight (Ounces):  7.0


Weight (Calculated Kilograms):  71.711867


Constitutional:  appears stated age, AAO x 3; No apparent distress; well-

developed, well-nourished


Respiratory:  No accessory muscle use, No respiratory distress, No chest tender

, No chest expansion is symmetric; chest is bilaterally symmetric; No lungs 

clear to percussion; lungs clear to auscultation; No crackles, No rhonchi, No 

rales, No stridor, No wheezing, No pleural rub, No other


Cardiovascular:  irregularly irregular, tachycardia, S1 and S2


Gastrointestional:  No tender, No soft, No round, No distended, No pulsatile 

mass, No organomegaly, No guarding, No rebound, No tenderness, No hernia, No 

mass, No audible bowel sounds, No abnormal bowel sounds, No abdominal bruits, 

No spleenomegaly, No other


Extremities:  No normal range of motion, No non-tender, No normal inspection, 

No pedal edema, No calf tenderness, No normal capillary refill, No pelvis stable

, No calf tenderness, No inflammation, No pedal edema, No slow capillary refill

, No swelling, No other, No abrasion, No clubbing, No cyanosis, No ecchymosis, 

No laceration, No no lower extremity edema bilateral, No significant edema, No 

tenderness, No wound


Neurologic/Psychiatric:  no motor/sensory deficits, alert, normal mood/affect, 

oriented x 3, power is 5/5 both on sides


Skin:  No normal color, No warm/dry, No cyanosis, No cool, No diaphoresis, No 

damp, No ecchymosis, No jaundice, No mottled, No pallor, No rash, No tattoos/

piercings, No ulcerations, No rash on exposed areas, No ulcerations on exposed 

areas, No other





Results/Procedures:


Labs


Laboratory Tests


8/6/18 03:45: 


White Blood Count 5.6, Red Blood Count 4.04L, Hemoglobin 11.3L, Hematocrit 36L, 

Mean Corpuscular Volume 88, Mean Corpuscular Hemoglobin 28, Mean Corpuscular 

Hemoglobin Concent 32, Red Cell Distribution Width 14.9H, Platelet Count 222, 

Mean Platelet Volume 11.2H, Neutrophils (%) (Auto) 67, Lymphocytes (%) (Auto) 14

, Monocytes (%) (Auto) 12, Eosinophils (%) (Auto) 7, Basophils (%) (Auto) 1, 

Neutrophils # (Auto) 3.7, Lymphocytes # (Auto) 0.8L, Monocytes # (Auto) 0.7, 

Eosinophils # (Auto) 0.4H, Basophils # (Auto) 0.1, Sodium Level 142, Potassium 

Level 3.6, Chloride Level 105, Carbon Dioxide Level 28, Anion Gap 9, Blood Urea 

Nitrogen 14, Creatinine 1.10, Estimat Glomerular Filtration Rate > 60, BUN/

Creatinine Ratio 13, Glucose Level 118H, Calcium Level 9.2, Phosphorus Level 3.5

, Magnesium Level 2.1





Microbiology


7/31/18 MRSA Screen - Final, Complete


          MRSA not isolated








A/P:


Assessment/Dx:


Shortness of breath,


Atrial fibrillation with controlled ventricular rate,


Recent pacemaker for severe sinus node dysfunction,


History of CAD,


Ventricular bigeminy


Plan:


Shortness of breath, likely multifactorial.  Defer to Dr. Hammonds.





Atrial fibrillation with an trolled ventricular rate.  On Eliquis.  Digoxin.  

Sotalol started.   Much better controlled.  Transesophageal echocardiogram 

showed no left atrial clot.  Severe mitral regurgitation.





Pacemaker: Device interrogation





History of CAD: Deferred to Dr. Hammonds





Ventricular bigeminy: Beta blocker started.  We'll see the response.








Thank you for your consultation. Please call me if you have any questions.








YESENIA Enamorado MD, FACP, FACC, FSCAI, FHRS, CCDS


Interventional Cardiology


Cardiac Electrophysiology


Vascular Medicine and Endovascular Interventions











SABRINA ENAMORADO MD Aug 6, 2018 3:49 pm

## 2018-08-06 NOTE — CARDIOLOGY PROGRESS NOTE
Subjective


Date Seen by Provider:  Aug 6, 2018


Time Seen by Provider:  08:29


Subjective/Events-last exam


Patient is sitting up in bed, no new complaints. Denies any CP or dyspnea. 

Asking to go home.





Objective-Cardiology


Exam


Last Set of Vital Signs





Vital Signs








 18





 04:00 06:00 08:21


 


Temp 97.6  


 


Pulse  79 


 


Resp  21 


 


B/P (MAP)  119/57 (77) 


 


Pulse Ox   97


 


O2 Delivery   Nasal Cannula


 


O2 Flow Rate   2.00





Capillary Refill : Less Than 3 Seconds


I&O











Intake and Output 


 


 18





 00:00


 


Intake Total 1425 ml


 


Output Total 950 ml


 


Balance 475 ml


 


 


 


Intake Oral 1425 ml


 


Output Urine Total 950 ml


 


# Voids 2


 


# Bowel Movements 2








General:  Alert, Oriented X3, Cooperative, Mild Distress


HEENT:  Atraumatic, PERRLA


Neck:  Supple, No JVD, No Thyromegaly


Lungs:  Clear to Auscultation, Normal Air Movement


Heart:  Normal S1, Normal S2, Other (irregular rhythm. SM at [facility])


Abdomen:  Normal Bowel Sounds, Soft, No Tenderness, No Hepatosplenomegaly, No 

Masses


Extremities:  No Clubbing, No Cyanosis, No Edema, Normal Pulses, No Tenderness/

Swelling


Skin:  No Rashes, No Breakdown, No Significant Lesion


Neuro:  Normal Gait, Normal Speech, Strength at 5/5 X4 Ext, Normal Tone, 

Sensation Intact


Psych/Mental Status:  Mental Status NL





Results


Lab


Laboratory Tests


18 03:45














A/P-Cardiology


Admission Diagnosis


Afib with RVR


CHF


CAD


HTN





Assessment/Plan


Shortness of breath, reporting improvement.  Continue to monitor





Atrial fibrillation with RVR, had another episode of atrial fibrillation in May 

2017, Pacemaker interrogation showed Chronic atrial fibrillation at least since 

the implant of pacemaker in mid July, difficult to control, enlarged left 

atrium on CELI with no clot or thrombus, heart rate is better controlled, on 

Sotalol 80mg BID. Continue to monitor. 





Anemia, continue to monitor





Coronary artery disease, history of CABG 2 with LIMA to LAD and vein graft 

RCA.  Patient had PTCA and stent placement using 2.25 x 12 mm Atom to the left 

circumflex in 2010.  Cardiac catheterization on 2014 revealed 

patent DARBY to LAD, patent vein graft to right PDA, and patent stent in the 

circumflex artery.  Small vessel disease distally otherwise, nonobstructive 

disease repeat cardiac catheterization was done in 2016 showing 

patent LIMA to LAD, vein graft to the right coronary artery, patent stent in 

the mid circumflex artery with mild in-stent restenosis.  Nonobstructive 

disease.  continue on current medications and monitor. Planning for stress test 

as an outpatient





Frequent PVCs with ventricular bigeminy, ventricular couplets, was admitted 

last months with symptomatic bradycardia and frequent PVC's. S/p PPM 

implantation by Dr. Enamorado





Acute on chronic Congestive heart failure, LV systolic dysfunction with most 

recent EF is 45-50 percent, maintained on ACE-I and beta blocker as outpatient.

  Lasix is on hold due to hypotension, I will monitor closely and restart Lasix 

orally.





Mild to moderate aortic valve stenosis, severe mitral regurgitation with 

enlarged left atrium, status post CELI done on 2018.  





Severe COPD, oxygen dependent, having worsening dyspnea, continue to monitor. 





Hypertension, blood pressure is better at this time.  Continue to monitor





Hyperlipidemia, continue to monitor lipids





History carcinoma of the tongue, squamous cell carcinoma, followed and managed 

by Dr. Redman





Hypothyroidism, followed and managed by primary care physician





Severe right carotid stenosis, mild on the left, last ultrasound was done in 

2016.  Patient has been evaluated by Dr. Ornelas in the past.  CTA done 

2016 revealed stenosis of 40-50 percent bilaterally.  Continues to follow 

with Dr. Ornelas





Clinical Quality Measures


DVT/VTE Risk/Contraindication:


Risk Factor Score Per Nursin


RFS Level Per Nursing on Admit:  2=Moderate











RADHAMES YOUNG Aug 6, 2018 08:31

## 2018-08-06 NOTE — CARDIOLOGY PROGRESS NOTE
Subjective


Date Seen by Provider:  Aug 6, 2018


Time Seen by Provider:  09:44


Subjective/Events-last exam


patient is in bed, feeling better, HR is better, no chest pain


Review of Systems


General:  No Chills, No Night Sweats, No Fatigue, No Malaise, No Appetite, No 

Other


HEENT:  No Head Aches, No Visual Changes, No Eye Pain, No Ear Pain, No Dysphasia

, No Sinus Congestion, No Post Nasal Drip, No Sore Throat, No Other


Pulmonary:  No Dyspnea, No Cough, No Pleuritic Chest Pain; Other


Cardiovascular:  No: Chest Pain, Palpitations, Orthopnea, Paroxysmal Noc. 

Dyspnea, Edema, Lt Headedness, Other





Objective-Cardiology


Exam


Last Set of Vital Signs





Vital Signs








 18





 04:00 06:00 08:21


 


Temp 97.6  


 


Pulse  79 


 


Resp  21 


 


B/P (MAP)  119/57 (77) 


 


Pulse Ox   97


 


O2 Delivery   Nasal Cannula


 


O2 Flow Rate   2.00





Capillary Refill : Less Than 3 Seconds


I&O











Intake and Output 


 


 18





 00:00


 


Intake Total 1425 ml


 


Output Total 950 ml


 


Balance 475 ml


 


 


 


Intake Oral 1425 ml


 


Output Urine Total 950 ml


 


# Voids 2


 


# Bowel Movements 2








General:  Alert, Oriented X3, Cooperative, Mild Distress


HEENT:  Atraumatic, PERRLA


Neck:  Supple, No JVD, No Thyromegaly


Lungs:  Clear to Auscultation, Normal Air Movement


Heart:  Normal S1, Normal S2, Other (irregular rhythm. SM at LSB)


Abdomen:  Normal Bowel Sounds, Soft, No Tenderness, No Hepatosplenomegaly, No 

Masses


Extremities:  No Clubbing, No Cyanosis, No Edema, Normal Pulses, No Tenderness/

Swelling


Skin:  No Rashes, No Breakdown, No Significant Lesion


Neuro:  Normal Gait, Normal Speech, Strength at 5/5 X4 Ext, Normal Tone, 

Sensation Intact


Psych/Mental Status:  Mental Status NL





Results


Lab


Laboratory Tests


18 03:45














A/P-Cardiology


Admission Diagnosis


Afib with RVR


CHF


CAD


HTN





Assessment/Plan


Shortness of breath, reporting improvement, continue to monitor





Atrial fibrillation with RVR, had another episode of atrial fibrillation in May 

2017, Pacemaker interrogation showed Chronic atrial fibrillation at least since 

the implant of pacemaker in mid July, difficult to control, enlarged left 

atrium on CELI with no clot or thrombus, heart rate is better controlled, on 

Sotalol 80mg BID, dose was increased yesterday, continue to monitor QTC. Rate 

is better, still having frequent PVC's, V paced beats





Anemia, continue to monitor





Coronary artery disease, history of CABG 2 with LIMA to LAD and vein graft 

RCA.  Patient had PTCA and stent placement using 2.25 x 12 mm Atom to the left 

circumflex in 2010.  Cardiac catheterization on 2014 revealed 

patent DARBY to LAD, patent vein graft to right PDA, and patent stent in the 

circumflex artery.  Small vessel disease distally otherwise, nonobstructive 

disease repeat cardiac catheterization was done in 2016 showing 

patent LIMA to LAD, vein graft to the right coronary artery, patent stent in 

the mid circumflex artery with mild in-stent restenosis.  Nonobstructive 

disease.  continue on current medications and monitor. Planning for stress test 

as an outpatient





Frequent PVCs with ventricular bigeminy, ventricular couplets, was admitted 

last months with symptomatic bradycardia and frequent PVC's. S/p PPM 

implantation by Dr. Enamorado





Acute on chronic Congestive heart failure, LV systolic dysfunction with most 

recent EF is 45-50 percent, maintained on ACE-I and beta blocker as outpatient.

  Lasix is on hold due to hypotension, restart Lasix and monitor





Mild to moderate aortic valve stenosis, severe mitral regurgitation with 

enlarged left atrium, status post CELI done on 2018.  





Severe COPD, oxygen dependent, having worsening dyspnea, continue to monitor. 





Hypertension, blood pressure is better at this time.  Continue to monitor





Hyperlipidemia, continue to monitor lipids





History carcinoma of the tongue, squamous cell carcinoma, followed and managed 

by Dr. Redman





Hypothyroidism, followed and managed by primary care physician





Severe right carotid stenosis, mild on the left, last ultrasound was done in 

2016.  Patient has been evaluated by Dr. Ornelas in the past.  CTA done 

2016 revealed stenosis of 40-50 percent bilaterally.  Continues to follow 

with Dr. Ornelas





Clinical Quality Measures


DVT/VTE Risk/Contraindication:


Risk Factor Score Per Nursin


RFS Level Per Nursing on Admit:  2=Moderate











ELIDIA ANTOINE MD Aug 6, 2018 09:47

## 2018-08-07 VITALS — SYSTOLIC BLOOD PRESSURE: 123 MMHG | DIASTOLIC BLOOD PRESSURE: 73 MMHG

## 2018-08-07 VITALS — SYSTOLIC BLOOD PRESSURE: 141 MMHG | DIASTOLIC BLOOD PRESSURE: 71 MMHG

## 2018-08-07 VITALS — SYSTOLIC BLOOD PRESSURE: 118 MMHG | DIASTOLIC BLOOD PRESSURE: 65 MMHG

## 2018-08-07 VITALS — SYSTOLIC BLOOD PRESSURE: 103 MMHG | DIASTOLIC BLOOD PRESSURE: 91 MMHG

## 2018-08-07 VITALS — DIASTOLIC BLOOD PRESSURE: 87 MMHG | SYSTOLIC BLOOD PRESSURE: 141 MMHG

## 2018-08-07 VITALS — DIASTOLIC BLOOD PRESSURE: 87 MMHG | SYSTOLIC BLOOD PRESSURE: 126 MMHG

## 2018-08-07 VITALS — SYSTOLIC BLOOD PRESSURE: 140 MMHG | DIASTOLIC BLOOD PRESSURE: 81 MMHG

## 2018-08-07 VITALS — SYSTOLIC BLOOD PRESSURE: 120 MMHG | DIASTOLIC BLOOD PRESSURE: 84 MMHG

## 2018-08-07 LAB
BASOPHILS # BLD AUTO: 0.1 10^3/UL (ref 0–0.1)
BASOPHILS NFR BLD AUTO: 1 % (ref 0–10)
BUN/CREAT SERPL: 12
CALCIUM SERPL-MCNC: 9.7 MG/DL (ref 8.5–10.1)
CHLORIDE SERPL-SCNC: 106 MMOL/L (ref 98–107)
CO2 SERPL-SCNC: 29 MMOL/L (ref 21–32)
CREAT SERPL-MCNC: 1.05 MG/DL (ref 0.6–1.3)
EOSINOPHIL # BLD AUTO: 0.4 10^3/UL (ref 0–0.3)
EOSINOPHIL NFR BLD AUTO: 5 % (ref 0–10)
ERYTHROCYTE [DISTWIDTH] IN BLOOD BY AUTOMATED COUNT: 15 % (ref 10–14.5)
GFR SERPLBLD BASED ON 1.73 SQ M-ARVRAT: > 60 ML/MIN
GLUCOSE SERPL-MCNC: 106 MG/DL (ref 70–105)
HCT VFR BLD CALC: 39 % (ref 40–54)
HGB BLD-MCNC: 12.6 G/DL (ref 13.3–17.7)
LYMPHOCYTES # BLD AUTO: 0.6 X 10^3 (ref 1–4)
LYMPHOCYTES NFR BLD AUTO: 9 % (ref 12–44)
MAGNESIUM SERPL-MCNC: 2.3 MG/DL (ref 1.8–2.4)
MANUAL DIFFERENTIAL PERFORMED BLD QL: NO
MCH RBC QN AUTO: 28 PG (ref 25–34)
MCHC RBC AUTO-ENTMCNC: 32 G/DL (ref 32–36)
MCV RBC AUTO: 89 FL (ref 80–99)
MONOCYTES # BLD AUTO: 0.7 X 10^3 (ref 0–1)
MONOCYTES NFR BLD AUTO: 11 % (ref 0–12)
NEUTROPHILS # BLD AUTO: 4.8 X 10^3 (ref 1.8–7.8)
NEUTROPHILS NFR BLD AUTO: 74 % (ref 42–75)
PHOSPHATE SERPL-MCNC: 3.8 MG/DL (ref 2.3–4.7)
PLATELET # BLD: 238 10^3/UL (ref 130–400)
PMV BLD AUTO: 10.8 FL (ref 7.4–10.4)
POTASSIUM SERPL-SCNC: 4 MMOL/L (ref 3.6–5)
RBC # BLD AUTO: 4.45 10^6/UL (ref 4.35–5.85)
SODIUM SERPL-SCNC: 143 MMOL/L (ref 135–145)
WBC # BLD AUTO: 6.5 10^3/UL (ref 4.3–11)

## 2018-08-07 RX ADMIN — LORATADINE SCH MG: 10 TABLET ORAL at 09:00

## 2018-08-07 RX ADMIN — FLUTICASONE PROPIONATE SCH SPRAY: 50 SPRAY, METERED NASAL at 09:01

## 2018-08-07 RX ADMIN — APIXABAN SCH MG: 5 TABLET, FILM COATED ORAL at 09:03

## 2018-08-07 RX ADMIN — OMEGA-3 FATTY ACIDS CAP 1000 MG SCH MG: 1000 CAP at 09:00

## 2018-08-07 RX ADMIN — DOCUSATE SODIUM AND SENNOSIDES SCH EA: 8.6; 5 TABLET, FILM COATED ORAL at 09:00

## 2018-08-07 RX ADMIN — DOCUSATE SODIUM SCH MG: 100 CAPSULE ORAL at 09:00

## 2018-08-07 RX ADMIN — Medication SCH UNIT: at 09:00

## 2018-08-07 RX ADMIN — SOTALOL HYDROCHLORIDE SCH MG: 80 TABLET ORAL at 09:00

## 2018-08-07 RX ADMIN — FLUTICASONE PROPIONATE AND SALMETEROL XINAFOATE SCH PUFF: 115; 21 AEROSOL, METERED RESPIRATORY (INHALATION) at 05:51

## 2018-08-07 RX ADMIN — FUROSEMIDE PRN MG: 20 TABLET ORAL at 09:03

## 2018-08-07 RX ADMIN — ASPIRIN SCH MG: 81 TABLET ORAL at 09:00

## 2018-08-07 RX ADMIN — UMECLIDINIUM SCH INH: 62.5 AEROSOL, POWDER ORAL at 05:51

## 2018-08-07 RX ADMIN — DILTIAZEM HYDROCHLORIDE SCH MG: 120 CAPSULE, COATED, EXTENDED RELEASE ORAL at 09:01

## 2018-08-07 RX ADMIN — LACTULOSE SCH GM: 20 SOLUTION ORAL at 09:01

## 2018-08-07 RX ADMIN — POTASSIUM CHLORIDE SCH MEQ: 1500 TABLET, EXTENDED RELEASE ORAL at 05:03

## 2018-08-07 RX ADMIN — DIGOXIN SCH MG: 250 TABLET ORAL at 09:00

## 2018-08-07 RX ADMIN — POTASSIUM CHLORIDE SCH MLS/HR: 200 INJECTION, SOLUTION INTRAVENOUS at 05:03

## 2018-08-07 RX ADMIN — MAGNESIUM SULFATE IN DEXTROSE SCH MLS/HR: 10 INJECTION, SOLUTION INTRAVENOUS at 05:03

## 2018-08-07 NOTE — PULMONARY CONSULTATION
History of Present Illness


History of Present Illness


Date of Consultation


8/7/18


 09:02


Time Seen by Provider:  09:02


Date of Admission





History of Present Illness


This is a 78yoWM clinic patient of Dr Hall and Dr Hammonds who presented to 

the ER w/palpitations and found to have AF w/RVR. He recently had a pacemaker 

placement 1 week ago and had done well since that time until this issue arose. 

Patient denies pain at this current time. Pt is requiring IV Cardizem and 

currently 117 heart rate. Pacemaker will be interrogated today. Dr Hammonds gave 

Lasix IV for volume overload. I reviewed home meds and will restart all if 

possible. He does report constipation so will initiate meds for that.





Allergies and Home Medications


Allergies


Coded Allergies:  


     No Known Drug Allergies (Unverified , 10/16/17)





Home Medications


Albuterol Sulfate 18 Gm Hfa.aer.ad, 2 PUFF INH Q4H PRN for SHORTNESS OF BREATH, 

(Reported)


Apixaban 5 Mg Tablet, 5 MG PO BID


   Prescribed by: NAYAN JUAREZ on 8/7/18 1047


Aspirin 81 Mg Tablet.dr, 81 MG PO DAILY, (Reported)


Atorvastatin Calcium 10 Mg Tablet, 10 MG PO HS, (Reported)


Budesonide/Formoterol Fumarate 10.2 Gm Hfa.aer.ad, 2 PUFF IH BID, (Reported)


Cetirizine HCl 10 Mg Tablet, 10 MG PO DAILY, (Reported)


Cholecalciferol (Vitamin D3) 5,000 Unit Tablet, 5,000 UNIT PO DAILY, (Reported)


Digoxin 250 Mcg Tablet, 0.25 MG PO DAILY


   Prescribed by: NAYAN JUAREZ on 8/7/18 1047


Enalapril Maleate 20 Mg Tablet, 20 MG PO BID, (Reported)


Fluticasone Propionate 16 Gm Spray.susp, 2 SPRAYS NS DAILY, (Reported)


Furosemide 20 Mg Tablet, 20 MG PO DAILY PRN for SWELLING, (Reported)


Levothyroxine Sodium 112 Mcg Tablet, 112 MCG PO HS, (Reported)


Metoprolol Tartrate 50 Mg Tablet, 75 MG PO BID


   Prescribed by: SABRINA SCHERER on 7/21/18 1336


Montelukast Sodium 10 Mg Tablet, 10 MG PO HS, (Reported)


Nystatin 100,000 Unit/1 Ml Oral.susp, 5 ML PO Q6HR


   Prescribed by: NAYAN JUAREZ on 8/7/18 1049


Omega-3 Fatty Acids/Fish Oil 1 Each Capsule, 1,200 MG PO DAILY, (Reported)


Potassium Chloride 10 Meq Tablet.er, 10 MEQ PO DAILY PRN for WITH LASIX, (

Reported)


Ranitidine HCl 150 Mg Tablet, 150 MG PO BID, (Reported)


Temazepam 30 Mg Capsule, 30 MG PO HS, (Reported)


Tiotropium Bromide 4 Gm Mist.inhal, 2 PUFF IH DAILY, (Reported)


[Sotalol Hcl] 80 MG TAB, 80 MG PO BID


   Prescribed by: NAYAN JUAREZ on 8/7/18 1047





Past Medical-Social-Family Hx


Past Med/Social Hx:  Reviewed Nursing Past Med/Soc Hx, Reviewed and Corrections 

made


Patient Social History


Alcohol Use:  Denies Use


Recreational Drug Use:  No


Smoking Status:  Former Smoker


Type Used:  Cigarettes


Former Smoker, Quit:  Jan 1, 1998


2nd Hand Smoke Exposure:  No (quit 20 years ago)


Recent Foreign Travel:  No


Contact w/Someone Who Travel:  No


Recent Infectious Disease Expo:  No


Recent Hopitalizations:  Yes (Pacemaker placed last week 7/18)


Physical Abuse:  No


Sexual Abuse:  No





Immunizations Up To Date


Tetanus Booster (TDap):  Unknown


Date of Pneumonia Vaccine:  Sep 7, 2015


Date of Influenza Vaccine:  Oct 3, 2016





Seasonal Allergies


Seasonal Allergies:  Yes





Past Medical History


Surgeries:  Yes (SPIDER BITE, FEEDING TUBE/REMOVED, BILAT TKR, INFUSAPORT , 

TUMOR ON TONGUE)


CABG, Coronary Stent, Joint Replacement, Pacemaker


Respiratory:  Yes (02 2.5L AT NIGHT and prn )


Asthma, Sleep Apnea, COPD, Emphysema


Currently Using CPAP:  Yes


Currently Using BIPAP:  Yes


Cardiac:  Yes (CARDIAC CATHS/CABG)


Atrial Fibrillation, Coronary Artery Disease, Heart Attack, High Cholesterol, 

Hypertension


Neurological:  No


Reproductive Disorders:  No


Sexually Transmitted Disease:  No


HIV/AIDS:  No


Genitourinary:  No


Benign Prostatic Hyperpl, Prostate Problems


Gastrointestinal:  Yes


Gastroesophageal Reflux


Musculoskeletal:  Yes (RIGHT MEDIAL ILIAC BONE LESION)


Arthritis


Endocrine:  Yes


HEENT:  Yes


Loss of Vision:  Bilateral


Hearing Impairment:  Hard of Hearing, Bilateral Hearing Aide


Cancer:  Yes (TONGUE CANCER--S/P SURGERY 2012)


Prostate


Did You Recieve Any Treatments:  Yes


What Type of Treatment Did You:  Chemotherapy, Radiation, Surgical Intervention


Psychosocial:  No


Nursing Suicide Risk Score:  0


Integumentary:  No


Blood Disorders:  No


Adverse Reaction/Blood Tranf:  No (N/A)





Family Medical History


Reviewed Nursing Family Hx





FH: COPD (chronic obstructive pulmonary disease)


  G8 BROTHER


FH: breast cancer


  G8 SISTER


FH: lung disease


  G8 BROTHER


FH: lupus


  G8 SISTER


Myocardial infarction


  19 FATHER, Onset:65


Scarlet fever


  G8 SISTER, Onset:13





Review of Systems


Time Seen by Provider:  10:39


Constitutional:  Weakness, Malaise


Respiratory:  Cough, Shortness of breath





Sepsis Event


Evaluation


Height, Weight, BMI


Height: 5'5.00"


Weight: 154lbs. 6.0oz. 70.357363og; 26.2 BMI


Method:Stated





Exam


Exam





Vital Signs








  Date Time  Temp Pulse Resp B/P (MAP) Pulse Ox O2 Delivery O2 Flow Rate FiO2


 


8/7/18 07:00  87      


 


8/7/18 06:00  87 29 103/91 (95) 91 Nasal Cannula 2.00 


 


8/7/18 05:49     96 Nasal Cannula 2.00 


 


8/7/18 05:00  84 21 118/65 (82) 97 Nasal Cannula 2.00 


 


8/7/18 04:16 97.4       


 


8/7/18 04:00     93 Nasal Cannula 2.00 


 


8/7/18 04:00  80 17 141/87 (105) 98 Nasal Cannula 2.00 


 


8/7/18 03:00  90 25 140/81 (100) 95 Nasal Cannula 2.00 


 


8/7/18 02:00  80 21 120/84 (96) 97 Nasal Cannula 2.00 


 


8/7/18 01:00  93      


 


8/7/18 01:00  93 23 141/71 (94) 96 Nasal Cannula 2.00 


 


8/7/18 00:00     96 Nasal Cannula 2.00 


 


8/7/18 00:00 97.9       


 


8/7/18 00:00  82 25 126/87 (100) 96 Nasal Cannula 2.00 


 


8/6/18 23:00  87 23 135/80 (98) 97 Nasal Cannula 2.00 


 


8/6/18 22:00  89 23 125/90 (102) 96 Nasal Cannula 2.00 


 


8/6/18 21:00  86 24 127/80 (96) 96 Nasal Cannula 2.00 


 


8/6/18 20:38     92 Nasal Cannula 2.00 


 


8/6/18 20:00     96 Nasal Cannula 2.00 


 


8/6/18 20:00  93 22 139/110 (120) 98 Nasal Cannula 2.00 


 


8/6/18 20:00 98.1       


 


8/6/18 19:00  79 23 141/95 (110) 98 Nasal Cannula 2.00 


 


8/6/18 19:00  79      


 


8/6/18 18:00  89 10 136/89 (105) 98 Nasal Cannula 2.00 


 


8/6/18 17:00  86 24 117/94 (102) 98 Nasal Cannula 2.00 


 


8/6/18 16:00  83 23 125/68 (87) 98 Nasal Cannula 2.00 


 


8/6/18 16:00     94 Nasal Cannula 2.00 


 


8/6/18 15:00  96 24 117/61 (79) 97 Nasal Cannula 2.00 


 


8/6/18 14:00  93 27 138/102 (114)  Nasal Cannula 2.00 


 


8/6/18 13:00  82      


 


8/6/18 13:00  85 20 128/84 (99)  Nasal Cannula 2.00 


 


8/6/18 12:00 98.4       


 


8/6/18 12:00  82 22 124/93 (103)  Nasal Cannula 2.00 


 


8/6/18 12:00     96 Nasal Cannula 2.00 


 


8/6/18 11:00  82 21 130/80 (97)  Nasal Cannula 2.00 


 


8/6/18 10:00  93 20 120/89 (99)  Nasal Cannula 2.00 














I & O 


 


 8/7/18





 07:00


 


Intake Total 1550 ml


 


Output Total 1375 ml


 


Balance 175 ml








Height & Weight


Height: 5'5.00"


Weight: 154lbs. 6.0oz. 70.202933vs; 26.2 BMI


Method:Stated


General Appearance:  No Apparent Distress, WD/WN


HEENT:  PERRL/EOMI, TMs Normal, Normal ENT Inspection, Pharynx Normal


Neck:  Full Range of Motion, Normal Inspection, Non Tender, Supple, Carotid 

Bruit


Respiratory:  Lungs Clear, No Respiratory Distress


Cardiovascular:  Regular Rate, Rhythm, No Murmur


Capillary Refill:  Less Than 3 Seconds


Gastrointestinal:  non tender, soft


Extremity:  No Calf Tenderness, No Pedal Edema


Neurologic/Psychiatric:  Alert, Oriented x3


Skin:  Normal Color, Warm/Dry


Lymphatic:  No Adenopathy





Results


Lab


Laboratory Tests


8/6/18 03:45








8/7/18 04:00











Assessment/Plan


Assessment/Plan


Worsening SOB - maribell worse this AM 


   -Lasix given this AM 


Acute on chronic CHF EF 45-50%


   -Severe mitral regurgitation 


HX of MLA 


   -S/p bronchoscopy done as out patient 


      -Will need post hosp f/u in my office 


HX of severe COPD


   -Oxygen


   -SVNS


hx of Afib 


Anemia 


   -Monitor 


CAD hx CABG


frequent PVC and ventricular bigeminy


History carcinoma of the tongue, squamous cell carcinoma, followed and managed 

by Dr. Redman


Hypothyroidism


Severe right carotid stenosis, mild on the left, last ultrasound was done in 

August 2016.  Patient has been evaluated by Dr. Ornelas in the past.  CTA done 

April 2016 revealed stenosis of 40-50 percent bilaterally.  Continues to follow 

with Dr. Ornelas





CXR and labs reviewed











MYAH OROZCO DO Aug 7, 2018 09:08

## 2018-08-07 NOTE — CARDIOLOGY PROGRESS NOTE
Cardiology SOAP Progress Note


Subjective:


No cardiac complaints.





Objective:


I&O/Vital Signs











 8/7/18 8/7/18 8/7/18 8/7/18





 01:00 01:00 02:00 03:00


 


Pulse 93 93 80 90


 


Resp 23  21 25


 


B/P (MAP) 141/71 (94)  120/84 (96) 140/81 (100)


 


Pulse Ox 96  97 95


 


O2 Delivery Nasal Cannula  Nasal Cannula Nasal Cannula


 


O2 Flow Rate 2.00  2.00 2.00





 8/7/18 8/7/18 8/7/18 8/7/18





 04:00 04:00 04:16 05:00


 


Temp   97.4 


 


Pulse 80   84


 


Resp 17   21


 


B/P (MAP) 141/87 (105)   118/65 (82)


 


Pulse Ox 98 93  97


 


O2 Delivery Nasal Cannula Nasal Cannula  Nasal Cannula


 


O2 Flow Rate 2.00 2.00  2.00


 


    





 8/7/18 8/7/18 8/7/18 8/7/18





 05:49 06:00 07:00 07:00


 


Pulse  87 87 84


 


Resp  29  25


 


B/P (MAP)  103/91 (95)  123/73 (90)


 


Pulse Ox 96 91  95


 


O2 Delivery Nasal Cannula Nasal Cannula  Nasal Cannula


 


O2 Flow Rate 2.00 2.00  2.00














 8/7/18





 00:00


 


Intake Total 750 ml


 


Output Total 525 ml


 


Balance 225 ml








Weight (Pounds):  154


Weight (Ounces):  6.0


Weight (Calculated Kilograms):  70.238564


Constitutional:  appears stated age, AAO x 3; No apparent distress; well-

developed, well-nourished


Respiratory:  No accessory muscle use, No respiratory distress, No chest tender

, No chest expansion is symmetric; chest is bilaterally symmetric; No lungs 

clear to percussion; lungs clear to auscultation; No crackles, No rhonchi, No 

rales, No stridor, No wheezing, No pleural rub, No other


Cardiovascular:  irregularly irregular, tachycardia, S1 and S2


Gastrointestional:  No tender, No soft, No round, No distended, No pulsatile 

mass, No organomegaly, No guarding, No rebound, No tenderness, No hernia, No 

mass, No audible bowel sounds, No abnormal bowel sounds, No abdominal bruits, 

No spleenomegaly, No other


Extremities:  No normal range of motion, No non-tender, No normal inspection, 

No pedal edema, No calf tenderness, No normal capillary refill, No pelvis stable

, No calf tenderness, No inflammation, No pedal edema, No slow capillary refill

, No swelling, No other, No abrasion, No clubbing, No cyanosis, No ecchymosis, 

No laceration, No no lower extremity edema bilateral, No significant edema, No 

tenderness, No wound


Neurologic/Psychiatric:  no motor/sensory deficits, alert, normal mood/affect, 

oriented x 3, power is 5/5 both on sides


Skin:  No normal color, No warm/dry, No cyanosis, No cool, No diaphoresis, No 

damp, No ecchymosis, No jaundice, No mottled, No pallor, No rash, No tattoos/

piercings, No ulcerations, No rash on exposed areas, No ulcerations on exposed 

areas, No other





Results/Procedures:


Labs


Laboratory Tests


8/7/18 04:00: 


White Blood Count 6.5, Red Blood Count 4.45, Hemoglobin 12.6L, Hematocrit 39L, 

Mean Corpuscular Volume 89, Mean Corpuscular Hemoglobin 28, Mean Corpuscular 

Hemoglobin Concent 32, Red Cell Distribution Width 15.0H, Platelet Count 238, 

Mean Platelet Volume 10.8H, Neutrophils (%) (Auto) 74, Lymphocytes (%) (Auto) 9L

, Monocytes (%) (Auto) 11, Eosinophils (%) (Auto) 5, Basophils (%) (Auto) 1, 

Neutrophils # (Auto) 4.8, Lymphocytes # (Auto) 0.6L, Monocytes # (Auto) 0.7, 

Eosinophils # (Auto) 0.4H, Basophils # (Auto) 0.1, Sodium Level 143, Potassium 

Level 4.0, Chloride Level 106, Carbon Dioxide Level 29, Anion Gap 8, Blood Urea 

Nitrogen 13, Creatinine 1.05, Estimat Glomerular Filtration Rate > 60, BUN/

Creatinine Ratio 12, Glucose Level 106H, Calcium Level 9.7, Phosphorus Level 3.8

, Magnesium Level 2.3





Microbiology


7/31/18 MRSA Screen - Final, Complete


          MRSA not isolated








A/P:


Assessment/Dx:


Shortness of breath,


Atrial fibrillation with controlled ventricular rate,


Recent pacemaker for severe sinus node dysfunction,


History of CAD,


Ventricular bigeminy


Plan:


Shortness of breath, likely multifactorial.  Defer to Dr. Hammonds.





Atrial fibrillation with controlled ventricular rate.  On Eliquis.  Digoxin.  

Sotalol 80 mg twice a day.  QTc interval below 500 ms.   Much better 

controlled.  Transesophageal echocardiogram showed no left atrial clot.  Severe 

mitral regurgitation.





Pacemaker: Device interrogation





History of CAD: Deferred to Dr. Hammonds





Ventricular bigeminy: Beta blocker started.  We'll see the response.





Okay to discharge.  EP follow-up with me in 2-3 weeks.  Cardiology follow-up 

with Dr. Hammonds.








Thank you for your consultation. Please call me if you have any questions.








YESENIA Enamorado MD, FACP, FACC, FSCAI, FHRS, CCDS


Interventional Cardiology


Cardiac Electrophysiology


Vascular Medicine and Endovascular Interventions











SABRINA ENAMORADO MD Aug 7, 2018 12:31

## 2018-08-07 NOTE — DISCHARGE SUMMARY-HOSPITALIST
Diagnosis/Chief Complaint


Date of Admission


2018 at 16:21


Date of Discharge





Discharge Date:  Aug 7, 2018


Admission Diagnosis


AFw/RVR


COPD


KOLBY





Plan:


Home meds


Cardizem drip


Interrogate pacemaker


Appreciate Cardiology expertise.


Discharge Diagnosis





(1) Atrial fibrillation with rapid ventricular response


Status:  Acute


(2) COPD (chronic obstructive pulmonary disease)


Status:  Chronic


(3) KOLBY on CPAP


Status:  Chronic


(4) Hypertension


Status:  Chronic


(5) Hyperlipidemia


Status:  Chronic


(6) Constipation


Status:  Resolved


(7) CRI (chronic renal insufficiency)


Status:  Chronic


(8) Anemia


Status:  Chronic


(9) Elevated brain natriuretic peptide (BNP) level


Status:  Acute





Discharge Summary


Procedures/Consulations


Dr Kathy Hammonds- Cardiology


Dr Enamorado- EP





Discharge Physical Exam


Allergies:  


Coded Allergies:  


     No Known Drug Allergies (Unverified , 10/16/17)


Vitals & I&Os





Vital Signs








  Date Time  Temp Pulse Resp B/P (MAP) Pulse Ox O2 Delivery O2 Flow Rate FiO2


 


18 07:00  84 25 123/73 (90) 95 Nasal Cannula 2.00 


 


18 04:16 97.4       








General Appearance:  Alert, Oriented X3


Respiratory:  Clear to Auscultation


Cardiovascular:  Regular Rate





Hospital Course


Pt is a 78yoCM with a PMH of COPD, atrial fibrillation who was admitted to the 

ICU for a-fib with RVR. He underwent pacemaker interrogation with Dr Hammonds and 

his medications were adjusted and he was started on Sotalol. He was observed 

for 3 days while on this to monitor for QT prolongation.


Labs (last 24 hrs)


Laboratory Tests


18 04:00: 


White Blood Count 6.5, Red Blood Count 4.45, Hemoglobin 12.6L, Hematocrit 39L, 

Mean Corpuscular Volume 89, Mean Corpuscular Hemoglobin 28, Mean Corpuscular 

Hemoglobin Concent 32, Red Cell Distribution Width 15.0H, Platelet Count 238, 

Mean Platelet Volume 10.8H, Neutrophils (%) (Auto) 74, Lymphocytes (%) (Auto) 9L

, Monocytes (%) (Auto) 11, Eosinophils (%) (Auto) 5, Basophils (%) (Auto) 1, 

Neutrophils # (Auto) 4.8, Lymphocytes # (Auto) 0.6L, Monocytes # (Auto) 0.7, 

Eosinophils # (Auto) 0.4H, Basophils # (Auto) 0.1, Sodium Level 143, Potassium 

Level 4.0, Chloride Level 106, Carbon Dioxide Level 29, Anion Gap 8, Blood Urea 

Nitrogen 13, Creatinine 1.05, Estimat Glomerular Filtration Rate > 60, BUN/

Creatinine Ratio 12, Glucose Level 106H, Calcium Level 9.7, Phosphorus Level 3.8

, Magnesium Level 2.3





Microbiology


18 MRSA Screen - Final, Complete


          MRSA not isolated


Patient resulted labs reviewed.


Pending Labs


Laboratory Tests


18 04:00: 


White Blood Count 6.5, Red Blood Count 4.45, Hemoglobin 12.6, Hematocrit 39, 

Mean Corpuscular Volume 89, Mean Corpuscular Hemoglobin 28, Mean Corpuscular 

Hemoglobin Concent 32, Red Cell Distribution Width 15.0, Platelet Count 238, 

Mean Platelet Volume 10.8, Neutrophils (%) (Auto) 74, Lymphocytes (%) (Auto) 9, 

Monocytes (%) (Auto) 11, Eosinophils (%) (Auto) 5, Basophils (%) (Auto) 1, 

Neutrophils # (Auto) 4.8, Lymphocytes # (Auto) 0.6, Monocytes # (Auto) 0.7, 

Eosinophils # (Auto) 0.4, Basophils # (Auto) 0.1, Sodium Level 143, Potassium 

Level 4.0, Chloride Level 106, Carbon Dioxide Level 29, Anion Gap 8, Blood Urea 

Nitrogen 13, Creatinine 1.05, Estimat Glomerular Filtration Rate > 60, BUN/

Creatinine Ratio 12, Glucose Level 106, Calcium Level 9.7, Phosphorus Level 3.8

, Magnesium Level 2.3








Discussion & Recommendations


Discharge Planning:  >30 minutes discharge planning





Discharge


Home Medications:





Active Scripts


Active


Nystatin 100,000 Unit/1 Ml Oral.susp 5 Ml PO Q6HR 7 Days


Eliquis (Apixaban) 5 Mg Tablet 5 Mg PO BID


Digox (Digoxin) 250 Mcg Tablet 0.25 Mg PO DAILY


[Sotalol Hcl] 80 MG Tab 80 Mg PO BID


Metoprolol Tartrate 50 Mg Tablet 75 Mg PO BID 90 Days


Reported


Temazepam 30 Mg Capsule 30 Mg PO HS


Aspir 81 (Aspirin) 81 Mg Tablet.dr 81 Mg PO DAILY


Ranitidine HCl 150 Mg Tablet 150 Mg PO BID


Klor-Con 10 (Potassium Chloride) 10 Meq Tablet.er 10 Meq PO DAILY PRN


Enalapril Maleate 20 Mg Tablet 20 Mg PO BID


Furosemide 20 Mg Tablet 20 Mg PO DAILY PRN


Cetirizine HCl 10 Mg Tablet 10 Mg PO DAILY


Symbicort 160-4.5 Mcg Inhaler (Budesonide/Formoterol Fumarate) 10.2 Gm 

Hfa.aer.ad 2 Puff IH BID


Spiriva Respimat 2.5MCG/ACTUATION (Tiotropium Bromide) 4 Gm Mist.inhal 2 Puff 

IH DAILY


Ventolin Hfa (Albuterol Sulfate) 18 Gm Hfa.aer.ad 2 Puff INH Q4H PRN


Montelukast Sodium 10 Mg Tablet 10 Mg PO HS


Fluticasone Propionate 16 Gm Spray.susp 2 Sprays NS DAILY


Vitamin D3 (Cholecalciferol (Vitamin D3)) 5,000 Unit Tablet 5,000 Unit PO DAILY


Fish Oil 1,200 mg Softgel (Omega-3 Fatty Acids/Fish Oil) 1 Each Capsule 1,200 

Mg PO DAILY


Levothyroxine Sodium 112 Mcg Tablet 112 Mcg PO HS


Atorvastatin Calcium 10 Mg Tablet 10 Mg PO HS





Instructions to patient/family


Please see electronic discharge instructions given to patient.





Clinical Quality Measures


DVT/VTE Risk/Contraindication:


Risk Factor Score Per Nursin


RFS Level Per Nursing on Admit:  2=Moderate





Problem Qualifiers





(1) COPD (chronic obstructive pulmonary disease):  


COPD type:  unspecified COPD  Qualified Codes:  J44.9 - Chronic obstructive 

pulmonary disease, unspecified


(2) Hypertension:  


Hypertension type:  essential hypertension  Qualified Codes:  I10 - Essential (

primary) hypertension


(3) Hyperlipidemia:  


Hyperlipidemia type:  mixed hyperlipidemia  Qualified Codes:  E78.2 - Mixed 

hyperlipidemia


(4) Constipation:  


Constipation type:  unspecified constipation type  Qualified Codes:  K59.00 - 

Constipation, unspecified


(5) CRI (chronic renal insufficiency):  


Chronic kidney disease stage:  stage 3 (moderate)  Qualified Codes:  N18.3 - 

Chronic kidney disease, stage 3 (moderate)


(6) Anemia:  


Anemia type:  unspecified type  Qualified Codes:  D64.9 - Anemia, unspecified








JAN PARSONS MD Aug 7, 2018 11:50

## 2018-08-07 NOTE — CARDIOLOGY PROGRESS NOTE
Subjective


Date Seen by Provider:  Aug 7, 2018


Time Seen by Provider:  07:34


Subjective/Events-last exam


Patient is sitting in bed, reporting mild dyspnea today.  Denied any chest 

pain.  Heart rate is better controlled.


Review of Systems


General:  No Chills, No Night Sweats, No Fatigue, No Malaise, No Appetite, No 

Other


HEENT:  No Head Aches, No Visual Changes, No Eye Pain, No Ear Pain, No Dysphasia

, No Sinus Congestion, No Post Nasal Drip, No Sore Throat, No Other


Pulmonary:  Dyspnea; No Cough, No Pleuritic Chest Pain, No Other


Cardiovascular:  No: Chest Pain, Palpitations, Orthopnea, Paroxysmal Noc. 

Dyspnea, Edema, Lt Headedness, Other





Objective-Cardiology


Exam


Last Set of Vital Signs





Vital Signs








 18





 04:16 06:00


 


Temp 97.4 


 


Pulse  87


 


Resp  29


 


B/P (MAP)  103/91 (95)


 


Pulse Ox  91


 


O2 Delivery  Nasal Cannula


 


O2 Flow Rate  2.00





Capillary Refill : Less Than 3 Seconds


I&O











Intake and Output 


 


 18





 00:00


 


Intake Total 1325 ml


 


Output Total 1250 ml


 


Balance 75 ml


 


 


 


Intake Oral 1325 ml


 


Output Urine Total 1250 ml


 


# Voids 3


 


# Bowel Movements 3








General:  Alert, Oriented X3, Cooperative, Mild Distress


HEENT:  Atraumatic, PERRLA


Neck:  Supple, No JVD, No Thyromegaly


Lungs:  Clear to Auscultation, Normal Air Movement


Heart:  Normal S1, Normal S2, Other (irregular rhythm. SM at LSB)


Abdomen:  Normal Bowel Sounds, Soft, No Tenderness, No Hepatosplenomegaly, No 

Masses


Extremities:  No Clubbing, No Cyanosis, No Edema, Normal Pulses, No Tenderness/

Swelling


Skin:  No Rashes, No Breakdown, No Significant Lesion


Neuro:  Normal Gait, Normal Speech, Strength at 5/5 X4 Ext, Normal Tone, 

Sensation Intact


Psych/Mental Status:  Mental Status NL





Results


Lab


Laboratory Tests


18 04:00














A/P-Cardiology


Admission Diagnosis


Afib with RVR


CHF


CAD


HTN





Assessment/Plan


Shortness of breath, slightly worse this morning, I will give him oral Lasix 

dose, evaluate chest x-ray, possible discharge in the afternoon.





Atrial fibrillation with RVR, had another episode of atrial fibrillation in May 

2017, Pacemaker interrogation showed Chronic atrial fibrillation at least since 

the implant of pacemaker in mid July, difficult to control, enlarged left 

atrium on CELI with no clot or thrombus, heart rate is better controlled, on 

Sotalol 80mg BID, dose was increased yesterday, continue to monitor QTC. Rate 

is better, still having frequent PVC's, V paced beats





Anemia, continue to monitor





Coronary artery disease, history of CABG 2 with LIMA to LAD and vein graft 

RCA.  Patient had PTCA and stent placement using 2.25 x 12 mm Atom to the left 

circumflex in 2010.  Cardiac catheterization on 2014 revealed 

patent DARBY to LAD, patent vein graft to right PDA, and patent stent in the 

circumflex artery.  Small vessel disease distally otherwise, nonobstructive 

disease repeat cardiac catheterization was done in 2016 showing 

patent LIMA to LAD, vein graft to the right coronary artery, patent stent in 

the mid circumflex artery with mild in-stent restenosis.  Nonobstructive 

disease.  continue on current medications and monitor. Planning for stress test 

as an outpatient





Frequent PVCs with ventricular bigeminy, ventricular couplets, was admitted 

last months with symptomatic bradycardia and frequent PVC's. S/p PPM 

implantation by Dr. Enamorado





Acute on chronic Congestive heart failure, LV systolic dysfunction with most 

recent EF is 45-50 percent, maintained on ACE-I and beta blocker as outpatient.

  





Mild to moderate aortic valve stenosis, severe mitral regurgitation with 

enlarged left atrium, status post CELI done on 2018, I will repeat 2-D 

echocardiogram as an outpatient  





Severe COPD, oxygen dependent, managed by primary care team





Hypertension, blood pressure is better at this time.  Continue to monitor





Hyperlipidemia, continue to monitor lipids





History carcinoma of the tongue, squamous cell carcinoma, followed and managed 

by Dr. Redman





Hypothyroidism, followed and managed by primary care physician





Severe right carotid stenosis, mild on the left, last ultrasound was done in 

2016.  Patient has been evaluated by Dr. Ornelas in the past.  CTA done 

2016 revealed stenosis of 40-50 percent bilaterally.  Continues to follow 

with Dr. Ornelas





Clinical Quality Measures


DVT/VTE Risk/Contraindication:


Risk Factor Score Per Nursin


RFS Level Per Nursing on Admit:  2=Moderate











ELIDIA ANTOINE MD Aug 7, 2018 07:36

## 2018-08-07 NOTE — DIAGNOSTIC IMAGING REPORT
INDICATION: Atrial fibrillation with rapid ventricular response.



COMPARISON: 08/03/2018



FINDINGS:

Bilateral pleural effusions unchanged. Cardiomegaly and vascular

congestion unchanged.  Some zones of atelectasis as well as

likely mild perihilar edema unchanged. 



IMPRESSION:

No interval change.



Dictated by: 



  Dictated on workstation # [id number]

## 2025-05-12 NOTE — DIAGNOSTIC IMAGING REPORT
2025    Angel Celeste   2015        To Whom it May Concern;    Please excuse Angel Celeste from work/school for a healthcare visit on May 12, 2025.    Sincerely,        Nik Morales MD INDICATION: COPD exacerbation.



COMPARISON: 2/27/2018.



2 views of the chest are obtained.



FINDINGS: Postoperative changes are again seen in the

mediastinum. Heart size is normal. The pulmonary vessels do not

appear congested. There is no pneumothorax or pleural fluid

demonstrated. Chronic findings of COPD are again demonstrated.

There is persistent airspace disease in the right middle lobe

mildly improved from the prior study. No new focal abnormality is

demonstrated. There are degenerative changes in the spine.



IMPRESSION: Improved but persistent right middle lobe infiltrate.

Continued followup is recommended. Chronic findings of COPD are

again demonstrated.



Dictated by: 



  Dictated on workstation # [id number]

## 2025-06-12 NOTE — CARDIOLOGY PROGRESS NOTE
Subjective


Date Seen by Provider:  Aug 5, 2018


Time Seen by Provider:  10:17


Subjective/Events-last exam


Patient is laying down in bed, feeling better.  Reporting improvement in his 

shortness of breath.  Heart rate is better.


Review of Systems


General:  No Chills, No Night Sweats, No Fatigue, No Malaise, No Appetite, No 

Other


HEENT:  No Head Aches, No Visual Changes, No Eye Pain, No Ear Pain, No Dysphasia

, No Sinus Congestion, No Post Nasal Drip, No Sore Throat, No Other


Pulmonary:  No Dyspnea, No Cough, No Pleuritic Chest Pain, No Other


Cardiovascular:  No: Chest Pain, Palpitations, Orthopnea, Paroxysmal Noc. 

Dyspnea, Edema, Lt Headedness, Other





Objective-Cardiology


Exam


Last Set of Vital Signs





Vital Signs








 18





 09:00


 


Pulse 86


 


Resp 17


 


B/P (MAP) 116/98 (104)


 


Pulse Ox 97


 


O2 Delivery Nasal Cannula


 


O2 Flow Rate 2.00





Capillary Refill : Less Than 3 Seconds


I&O











Intake and Output 


 


 18





 00:00


 


Intake Total 1170 ml


 


Output Total 2525 ml


 


Balance -1355 ml


 


 


 


Intake Oral 1170 ml


 


Output Urine Total 2525 ml


 


# Voids 3


 


# Bowel Movements 2








General:  Alert, Oriented X3, Cooperative, Mild Distress


HEENT:  Atraumatic, PERRLA


Neck:  Supple, No JVD, No Thyromegaly


Lungs:  Clear to Auscultation, Normal Air Movement


Heart:  Normal S1, Normal S2, Other (Irregular rhythm, systolic murmur at the 

left sternal border)


Abdomen:  Normal Bowel Sounds, Soft, No Tenderness, No Hepatosplenomegaly, No 

Masses


Extremities:  No Clubbing, No Cyanosis, No Edema, Normal Pulses, No Tenderness/

Swelling


Skin:  No Rashes, No Breakdown, No Significant Lesion


Neuro:  Normal Gait, Normal Speech, Strength at 5/5 X4 Ext, Normal Tone, 

Sensation Intact


Psych/Mental Status:  Mental Status NL





Results


Lab


Laboratory Tests


18 03:15














A/P-Cardiology


Admission Diagnosis


Afib with RVR


CHF


CAD


HTN





Assessment/Plan


Shortness of breath, reporting improvement.  Continue to monitor





Atrial fibrillation with RVR, had another episode of atrial fibrillation in May 

2017, Pacemaker interrogation showed Chronic atrial fibrillation at least since 

the implant of pacemaker in mid July, difficult to control, enlarged left 

atrium on CELI with no clot or thrombus, heart rate is better controlled, 

patient was started on sotalol 80 mg once daily.  I will increase the dose to 

twice a day.  Continue to monitor





Anemia, continue to monitor





Coronary artery disease, history of CABG 2 with LIMA to LAD and vein graft 

RCA.  Patient had PTCA and stent placement using 2.25 x 12 mm Atom to the left 

circumflex in 2010.  Cardiac catheterization on 2014 revealed 

patent DABRY to LAD, patent vein graft to right PDA, and patent stent in the 

circumflex artery.  Small vessel disease distally otherwise, nonobstructive 

disease repeat cardiac catheterization was done in 2016 showing 

patent LIMA to LAD, vein graft to the right coronary artery, patent stent in 

the mid circumflex artery with mild in-stent restenosis.  Nonobstructive 

disease.  continue on current medications and monitor. Planning for stress test 

as an outpatient





Frequent PVCs with ventricular bigeminy, ventricular couplets, was admitted 

last months with symptomatic bradycardia and frequent PVC's. S/p PPM 

implantation by Dr. Enamorado





Acute on chronic Congestive heart failure, LV systolic dysfunction with most 

recent EF is 45-50 percent, maintained on ACE-I and beta blocker as outpatient.

  Lasix is on hold due to hypotension, I will monitor closely and restart Lasix 

orally.





Mild to moderate aortic valve stenosis, severe mitral regurgitation with 

enlarged left atrium, status post CELI done on 2018.  Planning to 

reevaluate echocardiogram once heart rate is better controlled





Severe COPD, oxygen dependent, having worsening dyspnea, continue to monitor. 





Hypertension, blood pressure is better at this time.  Continue to monitor





Hyperlipidemia, continue to monitor lipids





History carcinoma of the tongue, squamous cell carcinoma, followed and managed 

by Dr. Redman





Hypothyroidism, followed and managed by primary care physician





Severe right carotid stenosis, mild on the left, last ultrasound was done in 

2016.  Patient has been evaluated by Dr. Ornelas in the past.  CTA done 

2016 revealed stenosis of 40-50 percent bilaterally.  Continues to follow 

with Dr. Ornelas





Clinical Quality Measures


DVT/VTE Risk/Contraindication:


Risk Factor Score Per Nursin


RFS Level Per Nursing on Admit:  2=Moderate











ELIDIA ANTOINE MD Aug 5, 2018 10:20 PAST SURGICAL HISTORY:  No significant past surgical history      PAST SURGICAL HISTORY:  History of colonoscopy     History of decompression of ulnar nerve     History of tonsillectomy